# Patient Record
Sex: FEMALE | Race: BLACK OR AFRICAN AMERICAN | NOT HISPANIC OR LATINO | Employment: FULL TIME | URBAN - METROPOLITAN AREA
[De-identification: names, ages, dates, MRNs, and addresses within clinical notes are randomized per-mention and may not be internally consistent; named-entity substitution may affect disease eponyms.]

---

## 2019-07-17 ENCOUNTER — OFFICE VISIT (OUTPATIENT)
Dept: ORTHOPEDICS | Facility: CLINIC | Age: 49
End: 2019-07-17
Payer: COMMERCIAL

## 2019-07-17 VITALS — HEART RATE: 91 BPM | BODY MASS INDEX: 23.04 KG/M2 | WEIGHT: 130 LBS | HEIGHT: 63 IN

## 2019-07-17 DIAGNOSIS — G56.03 BILATERAL CARPAL TUNNEL SYNDROME: Primary | ICD-10-CM

## 2019-07-17 PROCEDURE — 73100 PR  X-RAY WRIST 2 VW: ICD-10-PCS | Mod: FY,RT,, | Performed by: ORTHOPAEDIC SURGERY

## 2019-07-17 PROCEDURE — 99204 OFFICE O/P NEW MOD 45 MIN: CPT | Mod: 25,,, | Performed by: ORTHOPAEDIC SURGERY

## 2019-07-17 PROCEDURE — 73100 X-RAY EXAM OF WRIST: CPT | Mod: FY,RT,, | Performed by: ORTHOPAEDIC SURGERY

## 2019-07-17 PROCEDURE — 99204 PR OFFICE/OUTPT VISIT, NEW, LEVL IV, 45-59 MIN: ICD-10-PCS | Mod: 25,,, | Performed by: ORTHOPAEDIC SURGERY

## 2019-07-17 NOTE — LETTER
July 17, 2019      [facility]  [address] [address]           [facility] - Orthopedic Surgery  [address]  [address] [place] [address]  Phone: [phone]  Fax: [phone]          Patient: Sumit Burger   MR Number: [id number]   YOB: 1970   Date of Visit: 7/17/2019       Dear [facility]:    Thank you for referring Sumit Burger to me for evaluation. Attached you will find relevant portions of my assessment and plan of care.    If you have questions, please do not hesitate to call me. I look forward to following Sumit Burger along with you.    Sincerely,    Jordon Peoples MD    Enclosure  CC:  No Recipients    If you would like to receive this communication electronically, please contact [email or web address] or [phone] to request more information on Back& Link access.    For providers and/or their staff who would like to refer a patient to [facility], please contact us through our one-stop-shop provider referral line, [facility], at [phone].    If you feel you have received this communication in error or would no longer like to receive these types of communications, please e-mail [email or web address]

## 2019-07-17 NOTE — PROGRESS NOTES
History reviewed. No pertinent past medical history.    Past Surgical History:   Procedure Laterality Date    TUBAL LIGATION      WRIST FRACTURE SURGERY Right     ganglion cyst       Current Outpatient Medications   Medication Sig    alprazolam (XANAX) 0.25 MG tablet Take 0.25 mg by mouth 3 (three) times daily.    gabapentin (NEURONTIN) 300 MG capsule Take 300 mg by mouth 3 (three) times daily.    zolpidem (AMBIEN) 10 mg Tab Take 5 mg by mouth nightly as needed.     No current facility-administered medications for this visit.        Review of patient's allergies indicates:   Allergen Reactions    Benadryl [diphenhydramine hcl]     Codeine     Iodine and iodide containing products     Latex, natural rubber     Percocet [oxycodone-acetaminophen]     Shrimp        History reviewed. No pertinent family history.    Social History     Socioeconomic History    Marital status:      Spouse name: Not on file    Number of children: Not on file    Years of education: Not on file    Highest education level: Not on file   Occupational History    Not on file   Social Needs    Financial resource strain: Not on file    Food insecurity:     Worry: Not on file     Inability: Not on file    Transportation needs:     Medical: Not on file     Non-medical: Not on file   Tobacco Use    Smoking status: Never Smoker    Smokeless tobacco: Never Used   Substance and Sexual Activity    Alcohol use: Not Currently    Drug use: Never    Sexual activity: Not on file   Lifestyle    Physical activity:     Days per week: Not on file     Minutes per session: Not on file    Stress: Not on file   Relationships    Social connections:     Talks on phone: Not on file     Gets together: Not on file     Attends [employer] service: Not on file     Active member of club or organization: Not on file     Attends meetings of clubs or organizations: Not on file     Relationship status: Not on file   Other Topics Concern    Not on  "file   Social History Narrative    Not on file       Chief Complaint:   Chief Complaint   Patient presents with    Initial Visit     Bilateral Wrist Pain for many years 10 or more. She is now having Numbness and Tingling. She states her Right Wrist is worse than her Left.  [facility] No [facility]G or Xrays       Consulting Physician: [facility]    History of Present Illness:    This is a 48 y.o. year old female who complains of atient's been having a history of numbness in both her hands are several years she also has some pain in her right wrist she had a past history of a ganglion removed over the dorsum of the wrist she describes sharp pain at times      ROS    Examination:    Vital Signs:    Vitals:    07/17/19 0929   Pulse: 91   Weight: 59 kg (130 lb)   Height: 5' 3" (1.6 m)   PainSc:   3   PainLoc: Wrist       This a well-developed, well nourished patient in no acute distress.    Alert and oriented and cooperative to examination.       Physical Exam: ight hand-patient has a positive Tinel's sign over the carpal tunnel she has a healed incision over the dorsum of the wrist from a angle hand surgery she has a negative Finkelstein test is good range of motion of the wrist      Left hand-patient is a positive Tinel sign over the carpal tunnel is decreased sensation in median nerve distribution has no palpable masses moves the wrist well    Imaging:  AP and lateral x-ray of the right wrist is negative     Assessment: bilateral carpal tunnel syndrome    Plan:  We'll get nerve conduction tests of both upper extremities      DISCLAIMER: This note may have been dictated using voice recognition software and may contain grammatical errors.     NOTE: Consult report sent to referring provider via EPIC EMR.  "

## 2020-10-14 ENCOUNTER — HOSPITAL ENCOUNTER (EMERGENCY)
Facility: HOSPITAL | Age: 50
Discharge: HOME | End: 2020-10-14
Attending: EMERGENCY MEDICINE
Payer: OTHER GOVERNMENT

## 2020-10-14 VITALS
TEMPERATURE: 99 F | WEIGHT: 140 LBS | BODY MASS INDEX: 24.8 KG/M2 | RESPIRATION RATE: 18 BRPM | SYSTOLIC BLOOD PRESSURE: 136 MMHG | OXYGEN SATURATION: 100 % | HEIGHT: 63 IN | HEART RATE: 99 BPM | DIASTOLIC BLOOD PRESSURE: 82 MMHG

## 2020-10-14 DIAGNOSIS — R07.9 CHEST PAIN: ICD-10-CM

## 2020-10-14 DIAGNOSIS — R05.9 COUGH: ICD-10-CM

## 2020-10-14 DIAGNOSIS — J84.9 BILATERAL INTERSTITIAL PNEUMONIA: Primary | ICD-10-CM

## 2020-10-14 LAB
ALBUMIN SERPL BCP-MCNC: 3.7 G/DL (ref 3.5–5.2)
ALP SERPL-CCNC: 49 U/L (ref 55–135)
ALT SERPL W/O P-5'-P-CCNC: 22 U/L (ref 10–44)
ANION GAP SERPL CALC-SCNC: 11 MMOL/L (ref 8–16)
AST SERPL-CCNC: 25 U/L (ref 10–40)
BASOPHILS # BLD AUTO: 0.05 K/UL (ref 0–0.2)
BASOPHILS NFR BLD: 0.7 % (ref 0–1.9)
BILIRUB SERPL-MCNC: 0.6 MG/DL (ref 0.1–1)
BNP SERPL-MCNC: 10 PG/ML (ref 0–99)
BUN SERPL-MCNC: 12 MG/DL (ref 6–20)
CALCIUM SERPL-MCNC: 9.6 MG/DL (ref 8.7–10.5)
CHLORIDE SERPL-SCNC: 99 MMOL/L (ref 95–110)
CO2 SERPL-SCNC: 26 MMOL/L (ref 23–29)
CREAT SERPL-MCNC: 0.8 MG/DL (ref 0.5–1.4)
DIFFERENTIAL METHOD: ABNORMAL
EOSINOPHIL # BLD AUTO: 0 K/UL (ref 0–0.5)
EOSINOPHIL NFR BLD: 0.5 % (ref 0–8)
ERYTHROCYTE [DISTWIDTH] IN BLOOD BY AUTOMATED COUNT: 14.5 % (ref 11.5–14.5)
EST. GFR  (AFRICAN AMERICAN): >60 ML/MIN/1.73 M^2
EST. GFR  (NON AFRICAN AMERICAN): >60 ML/MIN/1.73 M^2
GLUCOSE SERPL-MCNC: 104 MG/DL (ref 70–110)
HCT VFR BLD AUTO: 43.9 % (ref 37–48.5)
HGB BLD-MCNC: 14 G/DL (ref 12–16)
IMM GRANULOCYTES # BLD AUTO: 0.03 K/UL (ref 0–0.04)
IMM GRANULOCYTES NFR BLD AUTO: 0.4 % (ref 0–0.5)
LYMPHOCYTES # BLD AUTO: 1.2 K/UL (ref 1–4.8)
LYMPHOCYTES NFR BLD: 16.5 % (ref 18–48)
MCH RBC QN AUTO: 27.9 PG (ref 27–31)
MCHC RBC AUTO-ENTMCNC: 31.9 G/DL (ref 32–36)
MCV RBC AUTO: 88 FL (ref 82–98)
MONOCYTES # BLD AUTO: 0.9 K/UL (ref 0.3–1)
MONOCYTES NFR BLD: 11.8 % (ref 4–15)
NEUTROPHILS # BLD AUTO: 5.2 K/UL (ref 1.8–7.7)
NEUTROPHILS NFR BLD: 70.1 % (ref 38–73)
NRBC BLD-RTO: 0 /100 WBC
PLATELET # BLD AUTO: 259 K/UL (ref 150–350)
PMV BLD AUTO: 11.2 FL (ref 9.2–12.9)
POTASSIUM SERPL-SCNC: 4.5 MMOL/L (ref 3.5–5.1)
PROT SERPL-MCNC: 7.4 G/DL (ref 6–8.4)
RBC # BLD AUTO: 5.01 M/UL (ref 4–5.4)
SARS-COV-2 RDRP RESP QL NAA+PROBE: NEGATIVE
SODIUM SERPL-SCNC: 136 MMOL/L (ref 136–145)
WBC # BLD AUTO: 7.47 K/UL (ref 3.9–12.7)

## 2020-10-14 PROCEDURE — 93005 ELECTROCARDIOGRAM TRACING: CPT | Performed by: INTERNAL MEDICINE

## 2020-10-14 PROCEDURE — 83880 ASSAY OF NATRIURETIC PEPTIDE: CPT

## 2020-10-14 PROCEDURE — 99900035 HC TECH TIME PER 15 MIN (STAT)

## 2020-10-14 PROCEDURE — 93010 ELECTROCARDIOGRAM REPORT: CPT | Mod: ,,, | Performed by: INTERNAL MEDICINE

## 2020-10-14 PROCEDURE — 85025 COMPLETE CBC W/AUTO DIFF WBC: CPT

## 2020-10-14 PROCEDURE — U0002 COVID-19 LAB TEST NON-CDC: HCPCS

## 2020-10-14 PROCEDURE — 99285 EMERGENCY DEPT VISIT HI MDM: CPT | Mod: 25

## 2020-10-14 PROCEDURE — 36415 COLL VENOUS BLD VENIPUNCTURE: CPT

## 2020-10-14 PROCEDURE — 93010 EKG 12-LEAD: ICD-10-PCS | Mod: ,,, | Performed by: INTERNAL MEDICINE

## 2020-10-14 PROCEDURE — 80053 COMPREHEN METABOLIC PANEL: CPT

## 2020-10-14 RX ORDER — AZITHROMYCIN 250 MG/1
250 TABLET, FILM COATED ORAL DAILY
Qty: 6 TABLET | Refills: 0 | Status: SHIPPED | OUTPATIENT
Start: 2020-10-14 | End: 2020-10-19

## 2020-10-14 RX ORDER — BENZONATATE 100 MG/1
100 CAPSULE ORAL 3 TIMES DAILY PRN
Qty: 30 CAPSULE | Refills: 0 | Status: SHIPPED | OUTPATIENT
Start: 2020-10-14 | End: 2020-10-14 | Stop reason: SDUPTHER

## 2020-10-14 RX ORDER — AZITHROMYCIN 250 MG/1
250 TABLET, FILM COATED ORAL DAILY
Qty: 6 TABLET | Refills: 0 | Status: SHIPPED | OUTPATIENT
Start: 2020-10-14 | End: 2020-10-14 | Stop reason: SDUPTHER

## 2020-10-14 RX ORDER — AMOXICILLIN AND CLAVULANATE POTASSIUM 875; 125 MG/1; MG/1
1 TABLET, FILM COATED ORAL 2 TIMES DAILY
Status: ON HOLD | COMMUNITY
Start: 2020-10-08 | End: 2020-10-20 | Stop reason: ALTCHOICE

## 2020-10-14 RX ORDER — BENZONATATE 100 MG/1
100 CAPSULE ORAL 3 TIMES DAILY PRN
Qty: 30 CAPSULE | Refills: 0 | Status: SHIPPED | OUTPATIENT
Start: 2020-10-14 | End: 2020-10-21

## 2020-10-14 RX ORDER — PROMETHAZINE HYDROCHLORIDE AND DEXTROMETHORPHAN HYDROBROMIDE 6.25; 15 MG/5ML; MG/5ML
5 SYRUP ORAL
COMMUNITY
Start: 2020-10-08 | End: 2020-10-14

## 2020-10-14 NOTE — ED TRIAGE NOTES
"Present to the ER with c/o " it just feel like something sitting in my chest, the simplest task get short of breath" reports negative covid last thruway and reports non productive cough x 4 days, reports going to urgent care and diagnosed with URI and placed on antibiotics, pt states " it feels like everything transfer to my chest, if feel like if I do anything I cough, if I talk, if I walk, like I fell like I;m gasping for air"  Reports worsening cp with cough, denies fever, chills   "

## 2020-10-14 NOTE — DISCHARGE INSTRUCTIONS
Rest.  Encourage oral fluids.  Tylenol every 4 hours ibuprofen every 6 hours if needed for any fever.  Take Tessalon Perles and Zithromax as directed.  Call Dr. Rolon's office for an appointment in 1 week.

## 2020-10-14 NOTE — ED PROVIDER NOTES
Encounter Date: 10/14/2020       History     Chief Complaint   Patient presents with    Cough    Shortness of Breath     50-year-old female has a history of hypertension for which she is noncompliant with her amlodipine, presents emergency room with complaints of having cough and shortness of breath of 4 days duration.  On presentation the patient's oxygen saturation is 97-99% on room air.  She admits to cough that is nonproductive.  No fever.  She states she has had some sweats.  There is some mid sternal chest pain with coughing.  No complaints of any palpitations.  No wheezing.  The patient has stop smoking for the last 2 months.  The patient states that she had a negative COVID test 1 week ago.  She denies knowing that she has been around anybody that has been COVID positive.  No history of any prior bronchospastic disease.  The 1 0 hemoptysis        Review of patient's allergies indicates:   Allergen Reactions    Benadryl [diphenhydramine hcl]     Codeine     Iodine and iodide containing products     Latex, natural rubber     Percocet [oxycodone-acetaminophen]     Shrimp      No past medical history on file.  Past Surgical History:   Procedure Laterality Date    TUBAL LIGATION      WRIST FRACTURE SURGERY Right     ganglion cyst     No family history on file.  Social History     Tobacco Use    Smoking status: Never Smoker    Smokeless tobacco: Never Used   Substance Use Topics    Alcohol use: Not Currently    Drug use: Never     Review of Systems   Constitutional: Negative for activity change, appetite change, chills, diaphoresis and fever.   HENT: Negative for congestion, drooling, ear pain, hearing loss, postnasal drip, sore throat and trouble swallowing.    Respiratory: Positive for cough and shortness of breath.    Cardiovascular: Negative for chest pain, palpitations and leg swelling.   Gastrointestinal: Negative for abdominal pain, constipation, diarrhea, nausea and vomiting.   Genitourinary:  Negative for difficulty urinating, dysuria and frequency.   Musculoskeletal: Negative for back pain.   Skin: Negative for rash.   Neurological: Positive for dizziness. Negative for syncope, weakness and headaches.   Hematological: Does not bruise/bleed easily.   All other systems reviewed and are negative.      Physical Exam     Initial Vitals [10/14/20 1126]   BP Pulse Resp Temp SpO2   (!) 161/77 104 (!) 22 97.8 °F (36.6 °C) 95 %      MAP       --         Physical Exam    Constitutional: She appears well-developed and well-nourished. She is not diaphoretic. No distress.   Persistent but intermittent cough   HENT:   Head: Normocephalic and atraumatic.   Right Ear: External ear normal.   Left Ear: External ear normal.   Nose: Nose normal.   Mouth/Throat: Oropharynx is clear and moist. No oropharyngeal exudate.   Eyes: Conjunctivae are normal. Pupils are equal, round, and reactive to light. Right eye exhibits no discharge. Left eye exhibits no discharge.   Neck: Normal range of motion. Neck supple. No JVD present.   Cardiovascular: Normal rate, regular rhythm, normal heart sounds and intact distal pulses. Exam reveals no gallop and no friction rub.    No murmur heard.  Pulmonary/Chest: Breath sounds normal. No respiratory distress. She has no wheezes. She has no rhonchi. She has no rales.   Abdominal: Soft. Bowel sounds are normal. She exhibits no distension. There is no abdominal tenderness. There is no rebound and no guarding.   Musculoskeletal: Normal range of motion. No tenderness or edema.   Lymphadenopathy:     She has no cervical adenopathy.   Neurological: She is alert and oriented to person, place, and time. She has normal strength. GCS score is 15. GCS eye subscore is 4. GCS verbal subscore is 5. GCS motor subscore is 6.   Skin: Skin is warm and dry. Capillary refill takes less than 2 seconds. No rash noted. No erythema. No pallor.   Psychiatric: She has a normal mood and affect. Her behavior is normal.  Judgment and thought content normal.         ED Course   Procedures  Labs Reviewed   CBC W/ AUTO DIFFERENTIAL - Abnormal; Notable for the following components:       Result Value    Mean Corpuscular Hemoglobin Conc 31.9 (*)     Lymph% 16.5 (*)     All other components within normal limits   COMPREHENSIVE METABOLIC PANEL - Abnormal; Notable for the following components:    Alkaline Phosphatase 49 (*)     All other components within normal limits   SARS-COV-2 RNA AMPLIFICATION, QUAL   B-TYPE NATRIURETIC PEPTIDE        ECG Results          EKG 12-lead (In process)  Result time 10/14/20 12:29:40    In process by Interface, Lab In [facility] (10/14/20 12:29:40)                 Narrative:    Test Reason : R07.9,    Vent. Rate : 104 BPM     Atrial Rate : 104 BPM     P-R Int : 114 ms          QRS Dur : 078 ms      QT Int : 328 ms       P-R-T Axes : 061 022 030 degrees     QTc Int : 431 ms    Sinus tachycardia  Possible Left atrial enlargement  Borderline Abnormal ECG  No previous ECGs available    Referred By: [email or web address]   SELF           Confirmed By:                             Imaging Results          X-Ray Chest PA And Lateral (Final result)  Result time 10/14/20 13:02:44    Final result by Casa Gonzalez MD (10/14/20 13:02:44)                 Narrative:    HISTORY: Cough,  dyspnea.    FINDINGS: PA and lateral chest radiograph at 1244 hours compared to  05/15/2019 show the trachea is midline, with the cardiomediastinal  silhouette and pulmonary vascular distribution within normal limits.    The lungs are normally and symmetrically expanded, with diffuse fine  reticulonodular and groundglass opacities throughout both lungs,  nonspecific. There is no consolidation, pleural effusion, or evidence  of bharat interstitial pulmonary edema. The bones are normal.    IMPRESSION: Diffuse interstitial opacities, nonspecific, potentially  viral or atypical pneumonia such as mycoplasma pneumonia,  smoking-related interstitial lung  disease, inhalational lung disease  or hypersensitivity pneumonitis, or diffuse alveolar hemorrhage.    Electronically Signed by Casa MORALES on 10/14/2020 1:22 PM                                            Attending Attestation:             Attending ED Notes:   This patient presented with a persistent nonproductive cough, has good oxygen saturation varying between % on room air.  Patient's peak flow was 380.  There was no evidence of any bronchospasm clinically.  Labs including CBC chemistries were normal.  COVID screen was negative.  BNP was normal.  The patient's chest x-ray shows a diffuse pattern in both lungs consistent with a probable viral pneumonia.  These findings were discussed with Dr. Rolon who suggested a course of Zithromax for the potential of a mycoplasma infection and a cough suppressant with office follow-up in 1 week.  The patient has been made aware of these recommendations and in agreement with the planned management.                    Clinical Impression:       ICD-10-CM ICD-9-CM   1. Bilateral interstitial pneumonia  J84.9 516.8   2. Chest pain  R07.9 786.50   3. Cough  R05 786.2                          ED Disposition Condition    Discharge Stable        ED Prescriptions     Medication Sig Dispense Start Date End Date Auth. Provider    azithromycin (Z-SABRINA) 250 MG tablet Take 1 tablet (250 mg total) by mouth once daily. Take first 2 tablets together, then 1 every day until finished. 6 tablet 10/14/2020  Ricky Moon Jr., MD    benzonatate (TESSALON) 100 MG capsule Take 1 capsule (100 mg total) by mouth 3 (three) times daily as needed for Cough. 30 capsule 10/14/2020 10/24/2020 Ricky Moon Jr., MD        Follow-up Information     Follow up With Specialties Details Why Contact Info    Ava Rolon MD Pulmonary Disease, Sleep Medicine Schedule an appointment as soon as possible for a visit in 1 week For re-evaluation [address]  [address]  [place]  [address]  [phone]                                         Ricky Moon Jr., MD  10/14/20 1400

## 2020-10-19 ENCOUNTER — OFFICE VISIT (OUTPATIENT)
Dept: PULMONOLOGY | Facility: CLINIC | Age: 50
End: 2020-10-19
Payer: MEDICAID

## 2020-10-19 ENCOUNTER — HOSPITAL ENCOUNTER (INPATIENT)
Facility: HOSPITAL | Age: 50
LOS: 1 days | Discharge: LEFT BEFORE DISCHARGE | DRG: 168 | End: 2020-10-19
Attending: EMERGENCY MEDICINE | Admitting: STUDENT IN AN ORGANIZED HEALTH CARE EDUCATION/TRAINING PROGRAM
Payer: OTHER GOVERNMENT

## 2020-10-19 ENCOUNTER — TELEPHONE (OUTPATIENT)
Dept: PULMONOLOGY | Facility: CLINIC | Age: 50
End: 2020-10-19

## 2020-10-19 ENCOUNTER — HOSPITAL ENCOUNTER (OUTPATIENT)
Dept: RADIOLOGY | Facility: HOSPITAL | Age: 50
Discharge: HOME | DRG: 168 | End: 2020-10-19
Attending: NURSE PRACTITIONER
Payer: OTHER GOVERNMENT

## 2020-10-19 VITALS
TEMPERATURE: 99 F | BODY MASS INDEX: 25.76 KG/M2 | TEMPERATURE: 98 F | DIASTOLIC BLOOD PRESSURE: 86 MMHG | OXYGEN SATURATION: 94 % | WEIGHT: 147 LBS | OXYGEN SATURATION: 96 % | WEIGHT: 140 LBS | HEART RATE: 112 BPM | DIASTOLIC BLOOD PRESSURE: 76 MMHG | HEIGHT: 63 IN | SYSTOLIC BLOOD PRESSURE: 132 MMHG | HEIGHT: 62 IN | BODY MASS INDEX: 26.05 KG/M2 | SYSTOLIC BLOOD PRESSURE: 135 MMHG | RESPIRATION RATE: 24 BRPM | HEART RATE: 103 BPM

## 2020-10-19 DIAGNOSIS — Z20.822 SUSPECTED COVID-19 VIRUS INFECTION: ICD-10-CM

## 2020-10-19 DIAGNOSIS — R06.00 DYSPNEA, UNSPECIFIED TYPE: ICD-10-CM

## 2020-10-19 DIAGNOSIS — R07.9 CHEST PAIN: ICD-10-CM

## 2020-10-19 DIAGNOSIS — J98.4 PNEUMONITIS: Primary | ICD-10-CM

## 2020-10-19 DIAGNOSIS — J18.9 PNEUMONIA DUE TO INFECTIOUS ORGANISM, UNSPECIFIED LATERALITY, UNSPECIFIED PART OF LUNG: ICD-10-CM

## 2020-10-19 DIAGNOSIS — J18.9 PNEUMONIA DUE TO INFECTIOUS ORGANISM, UNSPECIFIED LATERALITY, UNSPECIFIED PART OF LUNG: Primary | ICD-10-CM

## 2020-10-19 DIAGNOSIS — J18.9 PNEUMONIA: ICD-10-CM

## 2020-10-19 LAB
ADENOVIRUS: NOT DETECTED
ALBUMIN SERPL BCP-MCNC: 3.8 G/DL (ref 3.5–5.2)
ALP SERPL-CCNC: 50 U/L (ref 55–135)
ALT SERPL W/O P-5'-P-CCNC: 26 U/L (ref 10–44)
ANION GAP SERPL CALC-SCNC: 11 MMOL/L (ref 8–16)
AST SERPL-CCNC: 33 U/L (ref 10–40)
BASOPHILS # BLD AUTO: 0.06 K/UL (ref 0–0.2)
BASOPHILS NFR BLD: 0.8 % (ref 0–1.9)
BILIRUB SERPL-MCNC: 0.4 MG/DL (ref 0.1–1)
BORDETELLA PARAPERTUSSIS (IS1001): NOT DETECTED
BORDETELLA PERTUSSIS (PTXP): NOT DETECTED
BUN SERPL-MCNC: 9 MG/DL (ref 6–20)
CALCIUM SERPL-MCNC: 9.3 MG/DL (ref 8.7–10.5)
CHLAMYDIA PNEUMONIAE: NOT DETECTED
CHLORIDE SERPL-SCNC: 100 MMOL/L (ref 95–110)
CK SERPL-CCNC: 66 U/L (ref 20–180)
CO2 SERPL-SCNC: 25 MMOL/L (ref 23–29)
CORONAVIRUS 229E, COMMON COLD VIRUS: NOT DETECTED
CORONAVIRUS HKU1, COMMON COLD VIRUS: NOT DETECTED
CORONAVIRUS NL63, COMMON COLD VIRUS: NOT DETECTED
CORONAVIRUS OC43, COMMON COLD VIRUS: NOT DETECTED
CREAT SERPL-MCNC: 0.8 MG/DL (ref 0.5–1.4)
CRP SERPL-MCNC: 4.13 MG/DL
DIFFERENTIAL METHOD: ABNORMAL
EOSINOPHIL # BLD AUTO: 0.1 K/UL (ref 0–0.5)
EOSINOPHIL NFR BLD: 0.8 % (ref 0–8)
ERYTHROCYTE [DISTWIDTH] IN BLOOD BY AUTOMATED COUNT: 14.5 % (ref 11.5–14.5)
EST. GFR  (AFRICAN AMERICAN): >60 ML/MIN/1.73 M^2
EST. GFR  (NON AFRICAN AMERICAN): >60 ML/MIN/1.73 M^2
FERRITIN SERPL-MCNC: 72 NG/ML (ref 20–300)
FLUBV RNA NPH QL NAA+NON-PROBE: NOT DETECTED
GLUCOSE SERPL-MCNC: 93 MG/DL (ref 70–110)
HCT VFR BLD AUTO: 44.8 % (ref 37–48.5)
HGB BLD-MCNC: 14.3 G/DL (ref 12–16)
HPIV1 RNA NPH QL NAA+NON-PROBE: NOT DETECTED
HPIV2 RNA NPH QL NAA+NON-PROBE: NOT DETECTED
HPIV3 RNA NPH QL NAA+NON-PROBE: NOT DETECTED
HPIV4 RNA NPH QL NAA+NON-PROBE: NOT DETECTED
HUMAN METAPNEUMOVIRUS: NOT DETECTED
IMM GRANULOCYTES # BLD AUTO: 0.03 K/UL (ref 0–0.04)
IMM GRANULOCYTES NFR BLD AUTO: 0.4 % (ref 0–0.5)
INFLUENZA A (SUBTYPES H1,H1-2009,H3): NOT DETECTED
LACTATE SERPL-SCNC: 1.5 MMOL/L (ref 0.5–1.9)
LDH SERPL L TO P-CCNC: 192 U/L (ref 110–260)
LYMPHOCYTES # BLD AUTO: 1.2 K/UL (ref 1–4.8)
LYMPHOCYTES NFR BLD: 16.1 % (ref 18–48)
MCH RBC QN AUTO: 27.6 PG (ref 27–31)
MCHC RBC AUTO-ENTMCNC: 31.9 G/DL (ref 32–36)
MCV RBC AUTO: 86 FL (ref 82–98)
MONOCYTES # BLD AUTO: 0.5 K/UL (ref 0.3–1)
MONOCYTES NFR BLD: 6.8 % (ref 4–15)
MYCOPLASMA PNEUMONIAE: NOT DETECTED
NEUTROPHILS # BLD AUTO: 5.5 K/UL (ref 1.8–7.7)
NEUTROPHILS NFR BLD: 75.1 % (ref 38–73)
NRBC BLD-RTO: 0 /100 WBC
PLATELET # BLD AUTO: 277 K/UL (ref 150–350)
PMV BLD AUTO: 10.9 FL (ref 9.2–12.9)
POTASSIUM SERPL-SCNC: 3.9 MMOL/L (ref 3.5–5.1)
PROT SERPL-MCNC: 7.3 G/DL (ref 6–8.4)
RBC # BLD AUTO: 5.19 M/UL (ref 4–5.4)
RESPIRATORY INFECTION PANEL SOURCE: NORMAL
RSV RNA NPH QL NAA+NON-PROBE: NOT DETECTED
RV+EV RNA NPH QL NAA+NON-PROBE: NOT DETECTED
SARS-COV-2 RDRP RESP QL NAA+PROBE: NEGATIVE
SODIUM SERPL-SCNC: 136 MMOL/L (ref 136–145)
TROPONIN I SERPL DL<=0.01 NG/ML-MCNC: <0.03 NG/ML
TROPONIN I SERPL DL<=0.01 NG/ML-MCNC: <0.03 NG/ML
WBC # BLD AUTO: 7.38 K/UL (ref 3.9–12.7)

## 2020-10-19 PROCEDURE — 96365 THER/PROPH/DIAG IV INF INIT: CPT

## 2020-10-19 PROCEDURE — 12000002 HC ACUTE/MED SURGE SEMI-PRIVATE ROOM

## 2020-10-19 PROCEDURE — 99204 PR OFFICE/OUTPT VISIT, NEW, LEVL IV, 45-59 MIN: ICD-10-PCS | Mod: S$GLB,,, | Performed by: NURSE PRACTITIONER

## 2020-10-19 PROCEDURE — 84484 ASSAY OF TROPONIN QUANT: CPT | Mod: 91

## 2020-10-19 PROCEDURE — 25500020 PHARM REV CODE 255: Performed by: EMERGENCY MEDICINE

## 2020-10-19 PROCEDURE — 82550 ASSAY OF CK (CPK): CPT

## 2020-10-19 PROCEDURE — U0002 COVID-19 LAB TEST NON-CDC: HCPCS

## 2020-10-19 PROCEDURE — 94761 N-INVAS EAR/PLS OXIMETRY MLT: CPT

## 2020-10-19 PROCEDURE — 93010 ELECTROCARDIOGRAM REPORT: CPT | Mod: 76,,, | Performed by: INTERNAL MEDICINE

## 2020-10-19 PROCEDURE — 99285 EMERGENCY DEPT VISIT HI MDM: CPT | Mod: 25

## 2020-10-19 PROCEDURE — 25000242 PHARM REV CODE 250 ALT 637 W/ HCPCS: Performed by: STUDENT IN AN ORGANIZED HEALTH CARE EDUCATION/TRAINING PROGRAM

## 2020-10-19 PROCEDURE — 99900031 HC PATIENT EDUCATION (STAT)

## 2020-10-19 PROCEDURE — 93010 EKG 12-LEAD: ICD-10-PCS | Mod: 76,,, | Performed by: INTERNAL MEDICINE

## 2020-10-19 PROCEDURE — 82728 ASSAY OF FERRITIN: CPT | Mod: 91

## 2020-10-19 PROCEDURE — 87798 DETECT AGENT NOS DNA AMP: CPT

## 2020-10-19 PROCEDURE — 36415 COLL VENOUS BLD VENIPUNCTURE: CPT

## 2020-10-19 PROCEDURE — 63600175 PHARM REV CODE 636 W HCPCS: Performed by: EMERGENCY MEDICINE

## 2020-10-19 PROCEDURE — 85025 COMPLETE CBC W/AUTO DIFF WBC: CPT | Mod: 91

## 2020-10-19 PROCEDURE — 80053 COMPREHEN METABOLIC PANEL: CPT | Mod: 91

## 2020-10-19 PROCEDURE — 83615 LACTATE (LD) (LDH) ENZYME: CPT

## 2020-10-19 PROCEDURE — 99900035 HC TECH TIME PER 15 MIN (STAT)

## 2020-10-19 PROCEDURE — 96366 THER/PROPH/DIAG IV INF ADDON: CPT

## 2020-10-19 PROCEDURE — 93005 ELECTROCARDIOGRAM TRACING: CPT | Performed by: INTERNAL MEDICINE

## 2020-10-19 PROCEDURE — 84484 ASSAY OF TROPONIN QUANT: CPT

## 2020-10-19 PROCEDURE — 87633 RESP VIRUS 12-25 TARGETS: CPT

## 2020-10-19 PROCEDURE — 83605 ASSAY OF LACTIC ACID: CPT

## 2020-10-19 PROCEDURE — 63600175 PHARM REV CODE 636 W HCPCS: Performed by: STUDENT IN AN ORGANIZED HEALTH CARE EDUCATION/TRAINING PROGRAM

## 2020-10-19 PROCEDURE — 86140 C-REACTIVE PROTEIN: CPT | Mod: 91

## 2020-10-19 PROCEDURE — 99204 OFFICE O/P NEW MOD 45 MIN: CPT | Mod: S$GLB,,, | Performed by: NURSE PRACTITIONER

## 2020-10-19 PROCEDURE — 71046 X-RAY EXAM CHEST 2 VIEWS: CPT | Mod: TC

## 2020-10-19 PROCEDURE — 25000003 PHARM REV CODE 250: Performed by: NURSE PRACTITIONER

## 2020-10-19 PROCEDURE — 94640 AIRWAY INHALATION TREATMENT: CPT

## 2020-10-19 PROCEDURE — 86738 MYCOPLASMA ANTIBODY: CPT | Mod: 91

## 2020-10-19 RX ORDER — IPRATROPIUM BROMIDE AND ALBUTEROL SULFATE 2.5; .5 MG/3ML; MG/3ML
3 SOLUTION RESPIRATORY (INHALATION) EVERY 6 HOURS
Status: DISCONTINUED | OUTPATIENT
Start: 2020-10-19 | End: 2020-10-20 | Stop reason: HOSPADM

## 2020-10-19 RX ORDER — LEVOFLOXACIN 5 MG/ML
500 INJECTION, SOLUTION INTRAVENOUS
Status: COMPLETED | OUTPATIENT
Start: 2020-10-19 | End: 2020-10-19

## 2020-10-19 RX ORDER — ACETAMINOPHEN 325 MG/1
650 TABLET ORAL EVERY 8 HOURS PRN
Status: DISCONTINUED | OUTPATIENT
Start: 2020-10-19 | End: 2020-10-20 | Stop reason: HOSPADM

## 2020-10-19 RX ORDER — BENZONATATE 100 MG/1
100 CAPSULE ORAL 3 TIMES DAILY PRN
Status: DISCONTINUED | OUTPATIENT
Start: 2020-10-19 | End: 2020-10-20 | Stop reason: HOSPADM

## 2020-10-19 RX ORDER — SODIUM CHLORIDE 0.9 % (FLUSH) 0.9 %
10 SYRINGE (ML) INJECTION
Status: DISCONTINUED | OUTPATIENT
Start: 2020-10-19 | End: 2020-10-20 | Stop reason: HOSPADM

## 2020-10-19 RX ORDER — LEVOFLOXACIN 5 MG/ML
500 INJECTION, SOLUTION INTRAVENOUS
Status: DISCONTINUED | OUTPATIENT
Start: 2020-10-19 | End: 2020-10-20 | Stop reason: HOSPADM

## 2020-10-19 RX ORDER — ACETAMINOPHEN 325 MG/1
325 TABLET ORAL EVERY 6 HOURS PRN
COMMUNITY
End: 2020-12-30

## 2020-10-19 RX ORDER — ONDANSETRON 2 MG/ML
4 INJECTION INTRAMUSCULAR; INTRAVENOUS EVERY 8 HOURS PRN
Status: DISCONTINUED | OUTPATIENT
Start: 2020-10-19 | End: 2020-10-20 | Stop reason: HOSPADM

## 2020-10-19 RX ADMIN — IOHEXOL 100 ML: 350 INJECTION, SOLUTION INTRAVENOUS at 04:10

## 2020-10-19 RX ADMIN — BENZONATATE 100 MG: 100 CAPSULE ORAL at 09:10

## 2020-10-19 RX ADMIN — METHYLPREDNISOLONE SODIUM SUCCINATE 40 MG: 40 INJECTION, POWDER, FOR SOLUTION INTRAMUSCULAR; INTRAVENOUS at 09:10

## 2020-10-19 RX ADMIN — LEVOFLOXACIN 500 MG: 5 INJECTION, SOLUTION INTRAVENOUS at 05:10

## 2020-10-19 RX ADMIN — IPRATROPIUM BROMIDE AND ALBUTEROL SULFATE 3 ML: .5; 3 SOLUTION RESPIRATORY (INHALATION) at 07:10

## 2020-10-19 NOTE — CONSULTS
Pulmonary/Critical Care Consult      PATIENT NAME: Sumit Burger  MRN: [id number]  TODAY'S DATE: 10/19/2020  6:46 PM  ADMIT DATE: 10/19/2020  AGE: 50 y.o. : 1970    CONSULT REQUESTED BY: Lv Beebe Jr., MD    REASON FOR CONSULT:   Progressive pneumonia    HPI:  Patient is a 50-year-old female who has been feeling fatigued for the last 2 months.  She then developed a cough and chest pain.  She has had diaphoresis.  She has been tested for COVID on multiple occasions.  She has been treated with Augmentin and steroids and then azithromycin and her chest x-ray is progressing.  She feels as though she cannot take a deep breath.  Her cough is dry.  She has never had a fever.  She has no myalgias or arthralgias.  She has no history of connective tissue disease.  Her only past medical history is significant for hypertension.    It is interesting that she has parakeets.  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  She does not have any other exposure to birds or bats or caves.  The patient does smoke intermittently when she drinks alcohol.  She states she smokes less than a pack of cigarettes a week.    The patient's CT shows an extremely impressive acinar filling process predominantly in the upper and mid lungs.  The bases are spared.  There is also some mediastinal and hilar adenopathy.    REVIEW OF SYSTEMS  GENERAL: Feeling fatigued in tired.  EYES: Vision is good.  ENT: No sinusitis or pharyngitis.   HEART: No chest pain or palpitations.  LUNGS:  Cough with no sputum.  Her chest hurts with coughing.  Her chest feels heavy.  She feels as though she cannot take a deep breath.  GI: No Nausea, vomiting, constipation, diarrhea, or reflux.  : No dysuria, hesitancy, or nocturia.  SKIN: No lesions or rashes.  MUSCULOSKELETAL: No joint pain or myalgias.  NEURO: No headaches or neuropathy.  LYMPH: No edema or adenopathy.  PSYCH: No anxiety or depression.  ENDO: No weight change.    ALLERGIES  Review of patient's allergies  indicates:   Allergen Reactions    Benadryl [diphenhydramine hcl]     Codeine     Iodine and iodide containing products     Latex, natural rubber     Percocet [oxycodone-acetaminophen]     Shrimp        MEDICAL AND SURGICAL HISTORY  Past Medical History:   Diagnosis Date    Hypertension      Past Surgical History:   Procedure Laterality Date    TUBAL LIGATION      WRIST FRACTURE SURGERY Right     ganglion cyst       ALCOHOL, TOBACCO AND DRUG USE  Social History     Tobacco Use   Smoking Status Light Tobacco Smoker    Types: Cigarettes    Last attempt to quit: 2020    Years since quittin.5   Smokeless Tobacco Never Used   Tobacco Comment    ocassionally never was qd     Social History     Substance and Sexual Activity   Alcohol Use Not Currently     Social History     Substance and Sexual Activity   Drug Use Never       FAMILY HISTORY  No family history on file.    VITAL SIGNS (MOST RECENT)  Temp: 97.2 °F (36.2 °C) (10/19/20 1336)  Pulse: 96 (10/19/20 1800)  Resp: (!) 22 (10/19/20 1634)  BP: 135/81 (10/19/20 1800)  SpO2: 97 % (10/19/20 1800)    INTAKE AND OUTPUT (LAST 24 HOURS):No intake or output data in the 24 hours ending 10/19/20 1846    WEIGHT  Wt Readings from Last 1 Encounters:   10/19/20 63.5 kg (140 lb)       PHYSICAL EXAM  GENERAL: Midaged patient in no distress.  HEENT: Pupils equal and reactive. Extraocular movements intact. Nose intact. Pharynx moist.  NECK: Supple.   HEART: Regular rate and rhythm. No murmur or gallop auscultated.  LUNGS:  Crackles in both bases.  Lung excursion symmetrical. No change in fremitus.   ABDOMEN: Bowel sounds present. Non-tender, no masses palpated.  : Normal anatomy.  EXTREMITIES: Normal muscle tone and joint movement, no cyanosis or clubbing.   LYMPHATICS: No adenopathy palpated, no edema.  SKIN: Dry, intact, no lesions.   NEURO: Cranial nerves II-XII intact. Motor strength 5/5 bilaterally, upper and lower extremities.  PSYCH: Appropriate  "affect.    CBC LAST (LAST 24 HOURS)  Recent Labs   Lab 10/19/20  1400   WBC 7.38   RBC 5.19   HGB 14.3   HCT 44.8   MCV 86   MCH 27.6   MCHC 31.9*   RDW 14.5      MPV 10.9   GRAN 75.1*  5.5   LYMPH 16.1*  1.2   MONO 6.8  0.5   BASO 0.06   NRBC 0       CHEMISTRY LAST (LAST 24 HOURS)  Recent Labs   Lab 10/19/20  1400      K 3.9      CO2 25   ANIONGAP 11   BUN 9   CREATININE 0.8   GLU 93   CALCIUM 9.3   ALBUMIN 3.8   PROT 7.3   ALKPHOS 50*   ALT 26   AST 33   BILITOT 0.4       CARDIAC PROFILE (LAST 24 HOURS)  Recent Labs   Lab 10/14/20  1210 10/19/20  1400   BNP 10  --    CPK  --  66   LDH  --  192   TROPONINI  --  <0.030       LAST 7 DAYS MICROBIOLOGY   Microbiology Results (last 7 days)     Procedure Component Value Units Date/Time    Respiratory Infection Panel (PCR), Nasopharyngeal [[id number]] Collected: 10/19/20 1711    Order Status: No result Updated: 10/19/20 1827    Respiratory Viral Panel by PCR [place]; Nasopharyngeal Swab [[id number]] Collected: 10/19/20 1711    Order Status: Canceled Specimen: Respiratory           MOST RECENT IMAGING  CT Chest With Contrast    There are extensive centrilobular nodular opacities, small acinar  opacities, and groundglass densities throughout both lungs with upper  and mid lung zone predominance, with notable complete sparing of the  lung bases. There is no consolidation or interlobular septal  thickening, with no "crazy paving". No fissural beading. The central  airways are patent, with no bronchial wall thickening or  bronchiectasis. No pleural effusion or pneumothorax.    There are several enlarged enhancing mediastinal and bilateral hilar  lymph nodes, none with internal calcification. The heart and great  vessels enhance normally, with no pericardial effusion.    Images of the upper abdomen show small hypoenhancing left renal  lesions suggestive of cysts, and are otherwise unremarkable. There are  no significant osseous abnormalities.    IMPRESSION: " Predominantly interstitial pattern of lung disease with  small acinar components and lymphadenopathy as described, with  differential diagnosis to include sarcoidosis, atypical fungal  infection such as histoplasmosis, inhalational lung disease,  hypersensitivity pneumonitis, or alveolar hemorrhage.      X-Ray Chest PA And Lateral  : Worsening of diffuse groundglass opacities throughout the  lungs suggestive of atypical viral pneumonia. Additional  considerations which are documented on the prior study this also be  considered. Correlation with labs is recommended        CURRENT VISIT EKG  Results for orders placed or performed during the hospital encounter of 10/19/20   EKG 12-lead    Narrative    Test Reason : Z20.828,    Vent. Rate : 103 BPM     Atrial Rate : 103 BPM     P-R Int : 118 ms          QRS Dur : 080 ms      QT Int : 336 ms       P-R-T Axes : 057 018 033 degrees     QTc Int : 440 ms    Sinus tachycardia  Possible Left atrial enlargement  Septal infarct ,age undetermined  Abnormal ECG  When compared with ECG of 14-OCT-2020 12:03,  No significant change was found    Referred By: AAAREFERR   SELF           Confirmed By:        IMPRESSION AND PLAN    Progressive bilateral acinar filling process which is worsen.  This is not an atypical pneumonia.  Concerns of an RB-ILD, sarcoid, hypersensitivity pneumonitis, pulmonary hemorrhage syndrome are at the top of the list.  The patient needs a bronchoscopy and diagnosis as soon as possible.  Will plan to keep the patient NPO after midnight and proceed with a bronchoscopy in the morning.  She is being covered with Levaquin for atypical coverage however this is not a mycoplasma or Chlamydia.  It is possible that it is a fungal pneumonitis or chronic eosinophilic pneumonitis.

## 2020-10-19 NOTE — TELEPHONE ENCOUNTER
Chest xray  IMPRESSION: Worsening of diffuse groundglass opacities throughout the lungs suggestive of atypical viral pneumonia. Additional considerations which are documented on the prior study this also be considered    CRP 3.49, d-dimer 0.75    Discussed with Dr. Rolon chest xray much worse, room air sats 94%.  Called patient back and told her to go to the ER.  Dr. Rolon spoke with ER and advised she is coming.

## 2020-10-19 NOTE — ED NOTES
Pt presents to ed with reports of prolonged pneumonia. States she had chest x ray and was told to come to ed. Reports sob and cough. nad resp even and unlabored. Call light in reach, will monitor.,

## 2020-10-19 NOTE — PATIENT INSTRUCTIONS
What is Pneumonia?    Pneumonia is a serious lung infection. Many cases of pneumonia are caused by bacteria or viruses. Fungi may also cause pneumonia, but this is less common.  You may also get pneumonia after another illness, such as a cold, flu, or bronchitis. Those most at risk include older adults, smokers, and people with long-term (chronic) health problems or weak immune systems.  Healthy lungs  · Air travels in and out of the lungs through tubes called airways.  · The tubes branch into smaller passages called bronchioles. These end in tiny sacs called alveoli.  · Blood vessels surrounding the alveoli take oxygen into the bloodstream. At the same time, the alveoli remove carbon dioxide (a waste gas) from the blood. The carbon dioxide is then exhaled.  When you have pneumonia  · Pneumonia causes the bronchioles and the alveoli to fill with excess mucus and become inflamed.  · Your bodys response may be to cough. This can help clear out the fluid.  · The fluid (or mucus) you cough up may appear green or dark yellow.  · The excess mucus may make you feel short of breath.  · The inflammation and infection may give you a fever.  What are the symptoms?  Symptoms of pneumonia can come without warning. At first, you may think you have a cold or flu. But symptoms may get worse quickly, turning into pneumonia. Symptoms can be different for bacterial and viral pneumonia. Common symptoms may include the following:  · Severe cough with green or yellow mucus that doesn't improve or that gets worse  · Fever and chills  · Nausea, vomiting or diarrhea  · Shortness of breath with normal daily activities  · Increased heart rate  · Chest pain or discomfort when breathing in or coughing  · Headache  · Excessive sweating and clammy skin  Date Last Reviewed: 12/1/2016  © 1795-0781 [employer]. [address] [place] [address], [place] [address]. All rights reserved. This information is not intended as a substitute for  professional medical care. Always follow your healthcare professional's instructions.    CRP, Ferritin, CBC,CMP, D dimer   Chest xray, if d-eva elevated will do a CTA  Tessalon 200mg by mouth three times a day

## 2020-10-19 NOTE — PROGRESS NOTES
SUBJECTIVE:    Patient ID: Sumit Burger is a 50 y.o. female.    Chief Complaint: Establish Care, Cough, and Shortness of Breath    HPI     Patient here today for follow up after an ER visit last week.  She is here still not feeling well.  She states she started feeling bad 3 weeks ago, low grade fever, severe fatigue, GI upset,  Joint pain. She went to an urgent care was Covid, and Flu negative. She was given a steroid shot and Augmentin.  She initially felt a little better but then became worse, started suffering with a dry cough and occasional pleuritic pain.  She has low grade fever, diaphoresis and dyspnea. In the ER she was Covid tested again negative, Chest xray with diffuse interstitial opacities. She was discharged with Zithromax to treat atypical pneumonia and Tessalon. She is a runner that is usually very active, she states the fatigue and now dyspnea are not getting better. She is however now expectorating some yellow mucous at times.  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     Past Medical History:   Diagnosis Date    Hypertension      Past Surgical History:   Procedure Laterality Date    TUBAL LIGATION      WRIST FRACTURE SURGERY Right     ganglion cyst     No family history on file.     Social History:   Marital Status:   Occupation:  Dental hygienist   Alcohol History:  reports previous alcohol use.  Tobacco History:  reports that she has been smoking cigarettes. She has never used smokeless tobacco.  Drug History:  reports no history of drug use.    Review of patient's allergies indicates:   Allergen Reactions    Benadryl [diphenhydramine hcl]     Codeine     Iodine and iodide containing products     Latex, natural rubber     Percocet [oxycodone-acetaminophen]     Shrimp        Current Outpatient Medications   Medication Sig Dispense Refill    benzonatate (TESSALON) 100 MG capsule Take 1 capsule (100 mg total) by mouth  "3 (three) times daily as needed for Cough. 30 capsule 0    azithromycin (Z-SABRINA) 250 MG tablet Take 1 tablet (250 mg total) by mouth once daily. Take first 2 tablets together, then 1 every day until finished. (Patient not taking: Reported on 10/19/2020) 6 tablet 0     No current facility-administered medications for this visit.        Last Chest xray 10/14/2020  IMPRESSION: Diffuse interstitial opacities, nonspecific, potentially  viral or atypical pneumonia such as mycoplasma pneumonia,  smoking-related interstitial lung disease, inhalational lung disease  or hypersensitivity pneumonitis, or diffuse alveolar hemorrhage.       Review of Systems  General: does not feel well for the last 3 weeks, low grade fever, exhausted doing minimal exertion, extremely fatigued  Eyes: Vision is good.  ENT:  No sinusitis or pharyngitis.   Heart:: tachycardic   Lungs: had a dry cough that has now morphed to producing yellow mucous since last week. Dyspnea with little exertion, has had pleuritic pain  On and off   GI: has had some nausea  : No dysuria, hesitancy, or nocturia.  Musculoskeletal: No joint pain or myalgias.  Skin: No lesions or rashes.  Neuro: has had headaches, occasional dizziness  no brain fog   Lymph: No edema or adenopathy.  Psych: No anxiety or depression.  Endo: has lost a few pounds    OBJECTIVE:      /76 (BP Location: Left arm, Patient Position: Sitting, BP Method: Medium (Automatic))   Pulse 103   Temp 99 °F (37.2 °C)   Ht 5' 3" (1.6 m)   Wt 66.7 kg (147 lb)   SpO2 (!) 94%   BMI 26.04 kg/m²     Physical Exam  GENERAL: younger patient in no distress, appears as though she does not feel well  HEENT: Pupils equal and reactive. Extraocular movements intact. Nose intact.      Pharynx moist.  NECK: Supple.   HEART: tachycardic, Regular rate and rhythm. No murmur or gallop auscultated.  LUNGS: dry cough with deep inspiration, Clear to auscultation ABDOMEN: Bowel sounds present. Non-tender, no masses " palpated.  EXTREMITIES: Normal muscle tone and joint movement, no cyanosis or clubbing.   LYMPHATICS: No adenopathy palpated, no edema.  SKIN: Dry, intact, no lesions. Clammy   NEURO: Cranial nerves II-XII intact. Motor strength 5/5 bilaterally, upper and lower extremities.  PSYCH: Appropriate affect.    Assessment:       1. Pneumonia due to infectious organism, unspecified laterality, unspecified part of lung    2. Dyspnea, unspecified type          Plan:       Pneumonia due to infectious organism, unspecified laterality, unspecified part of lung  -     C-Reactive Protein; Future; Expected date: 10/19/2020  -     Ferritin; Future; Expected date: 10/19/2020  -     CBC auto differential; Future; Expected date: 10/19/2020  -     Comprehensive Metabolic Panel; Future; Expected date: 10/19/2020  -     X-Ray Chest PA And Lateral; Future    Dyspnea, unspecified type  -     D dimer, quantitative; Future; Expected date: 10/19/2020  -     X-Ray Chest PA And Lateral; Future           CRP, Ferritin, CBC with diff,CMP, D dimer   Chest xray, if d-eva elevated will do a CTA  Tessalon 200mg by mouth three times a day    Follow up in about 6 weeks (around 11/30/2020).

## 2020-10-20 NOTE — H&P
[facility] Medicine History & Physical Examination   Patient Name: Sumit Burger  MRN: [id number]  Patient Class: IP- Inpatient   Admission Date: 10/19/2020  1:34 PM  Length of Stay: 0  Attending Physician: Jordi Trevino MD  Primary Care Provider: Faustina Santos NP  Face-to-Face encounter date: 10/19/2020  Code Status:  Full code    Chief Complaint: Shortness of Breath        Patient information was obtained from patient, past medical records and ER records.   HISTORY OF PRESENT ILLNESS:   Sumit Burger is a 50 y.o.  female who  has no past medical history on file.. The patient presented to [facility] on 10/19/2020 with a primary complaint of Shortness of Breath    Patient was initially diagnosed with pneumonia 8 days ago at an urgent care where she was given treatment with Z-Guerrero which did resolve and she was subsequently treated with oral Augmentin.  Today she presents at Dr. Khan office on account of persistent symptoms.  She complains of chills, shortness or breath, dry cough, as she states chest pain which is constant which worsens with cough.  She denies any fever, no recent weight loss, no nausea vomiting or palpitation.  She also states that she has not had any recent travels all been around any sick persons.  A chest x-ray was done which showed worsening of pneumonia hence referral to the ED.    On presentation to the ED, a chest x-ray showed Worsening of diffuse groundglass opacities throughout the lungs suggestive of atypical viral pneumonia.  Her chest CT showed Predominantly interstitial pattern of lung disease with small acinar components and lymphadenopathy.  Her COVID test was negative.    PAST MEDICAL HISTORY:   History reviewed. No pertinent past medical history.    PAST SURGICAL HISTORY:     Past Surgical History:   Procedure Laterality Date    TUBAL LIGATION      WRIST FRACTURE SURGERY Right     ganglion cyst  "      ALLERGIES:   Benadryl [diphenhydramine hcl]; Codeine; Iodine and iodide containing products; Latex, natural rubber; Percocet [oxycodone-acetaminophen]; and Shrimp    FAMILY HISTORY:   History reviewed. No pertinent family history.    SOCIAL HISTORY:     Social History     Tobacco Use    Smoking status: Light Tobacco Smoker     Types: Cigarettes     Last attempt to quit: 2020     Years since quittin.5    Smokeless tobacco: Never Used    Tobacco comment: ocassionally never was qd   Substance Use Topics    Alcohol use: Not Currently        Social History     Substance and Sexual Activity   Sexual Activity Not Currently        HOME MEDICATIONS:     Prior to Admission medications    Medication Sig Start Date End Date Taking? Authorizing Provider   acetaminophen (TYLENOL) 325 MG tablet Take 325 mg by mouth every 6 (six) hours as needed for Pain.   Yes Historical Provider   amoxicillin-clavulanate 875-125mg (AUGMENTIN) 875-125 mg per tablet Take 1 tablet by mouth 2 (two) times a day. 10/8/20 10/19/20 Yes Historical Provider   benzonatate (TESSALON) 100 MG capsule Take 1 capsule (100 mg total) by mouth 3 (three) times daily as needed for Cough. 10/14/20 10/24/20 Yes Ricky Moon Jr., MD   azithromycin (Z-SABRINA) 250 MG tablet Take 1 tablet (250 mg total) by mouth once daily. Take first 2 tablets together, then 1 every day until finished.  Patient not taking: Reported on 10/19/2020 10/14/20 10/19/20  Ricky Moon Jr., MD       REVIEW OF SYSTEMS:   10 Point Review of System was performed and was found to be negative except for that mentioned already in the HPI above.     PHYSICAL EXAM:   /81   Pulse 96   Temp 97.2 °F (36.2 °C) (Oral)   Resp (!) 22   Ht 5' 2" (1.575 m)   Wt 63.5 kg (140 lb)   SpO2 97%   BMI 25.61 kg/m²     Vitals Reviewed  General appearance: Well-developed, well-nourished female in no apparent distress.  HENT: Atraumatic head. Moist mucous membranes of oral " cavity.  Eyes: Normal extraocular movements.   Neck: Supple.   Lungs: Clear to auscultation bilaterally. No wheezing present.   Heart: Regular rate and rhythm. S1 and S2 present with no murmurs/gallop/rub. No pedal edema. No JVD present.   Abdomen: Soft, non-distended, non-tender. No rebound tenderness/guarding. Bowel sounds are normal.   Extremities: No cyanosis, clubbing, or edema.  Skin: No Rash.   Neuro: Motor and sensory exams grossly intact. Good tone. Muscle strength 5/5 in all 4 extremities  Psych/mental status: Appropriate mood and affect. Responds appropriately to questions.     EMERGENCY DEPARTMENT LABS AND IMAGING:     Labs Reviewed   CBC W/ AUTO DIFFERENTIAL - Abnormal; Notable for the following components:       Result Value    Mean Corpuscular Hemoglobin Conc 31.9 (*)     Gran% 75.1 (*)     Lymph% 16.1 (*)     All other components within normal limits   COMPREHENSIVE METABOLIC PANEL - Abnormal; Notable for the following components:    Alkaline Phosphatase 50 (*)     All other components within normal limits   C-REACTIVE PROTEIN - Abnormal; Notable for the following components:    CRP 4.13 (*)     All other components within normal limits   RESPIRATORY INFECTION PANEL (PCR), NASOPHARYNGEAL   FERRITIN   LACTATE DEHYDROGENASE   CK   LACTIC ACID, PLASMA   TROPONIN I   SARS-COV-2 RNA AMPLIFICATION, QUAL   MYCOPLASMA PNEUMONIAE AB IGG & IGM       CT Chest With Contrast   Final Result      FL Bronchoscopy Fluoro in Xray    (Results Pending)       ASSESSMENT & PLAN:   Sumit Burger is a 50 y.o. female admitted for    Active Hospital Problems    Diagnosis    Pneumonia  Continue oxygen  Consult to pulmonology  DuoNeb treatment  Solu Medrol 40 mg daily  Continue levofloxacin 500 Q24 H  Respiratory cultures and Gram stain  Would hold off on anticoagulation since patient has been worked up for a bronch        DVT Prophylaxis: will be placed on SCDs for DVT prophylaxis and will be advised to be as mobile  as possible and sit in a chair as tolerated.   ________________________________________________________________________________    Discharge Planning and Disposition: No mobility needs. Ambulating well. Good social support system. Patient will be discharged in     Face-to-Face encounter date: 10/19/2020  Encounter included review of the medical records, interviewing and examining the patient face-to-face, discussion with family and other health care providers including emergency medicine physician, admission orders, interpreting lab/test results and formulating a plan of care.     Medical Decision Making during this encounter was  [_] Low Complexity  [_] Moderate Complexity  [X] High Complexity  _________________________________________________________________________________    INPATIENT LIST OF MEDICATIONS   No current facility-administered medications for this encounter.     Current Outpatient Medications:     acetaminophen (TYLENOL) 325 MG tablet, Take 325 mg by mouth every 6 (six) hours as needed for Pain., Disp: , Rfl:     amoxicillin-clavulanate 875-125mg (AUGMENTIN) 875-125 mg per tablet, Take 1 tablet by mouth 2 (two) times a day., Disp: , Rfl:     benzonatate (TESSALON) 100 MG capsule, Take 1 capsule (100 mg total) by mouth 3 (three) times daily as needed for Cough., Disp: 30 capsule, Rfl: 0      Scheduled Meds:  Continuous Infusions:  PRN Meds:.      Jordi Trevino MD  [facility] Hospitalist  10/19/2020

## 2020-10-20 NOTE — NURSING
"Pt has refused admit and leaving AMA- state she does not want to stay here in this room because it reminds her of previous psych admit in another hospital.  House supv called to make aware but no other rooms available.  MD made aware and pt signed AMA form and made aware of possible "death" if not treated. PIV removed and pt left   "

## 2020-10-20 NOTE — PLAN OF CARE
10/19/20 1945   Patient Assessment/Suction   Level of Consciousness (AVPU) alert   Respiratory Effort Normal;Unlabored   Expansion/Accessory Muscles/Retractions no use of accessory muscles   All Lung Fields Breath Sounds wheezes, expiratory   Rhythm/Pattern, Respiratory shortness of breath;tachypneic   Cough Frequency frequent   Cough Type good;dry;nonproductive   PRE-TX-O2   O2 Device (Oxygen Therapy) room air   SpO2 95 %   Pulse Oximetry Type Continuous   $ Pulse Oximetry - Multiple Charge Pulse Oximetry - Multiple   Pulse (!) 118   Resp (!) 24   Aerosol Therapy   $ Aerosol Therapy Charges Aerosol Treatment   Daily Review of Necessity (SVN) completed   Respiratory Treatment Status (SVN) given   Treatment Route (SVN) mouthpiece   Patient Position (SVN) HOB elevated   Post Treatment Assessment (SVN) breath sounds unchanged   Signs of Intolerance (SVN) none   Breath Sounds Post-Respiratory Treatment   Post-treatment Heart Rate (beats/min) 105   Post-treatment Resp Rate (breaths/min) 20   Education   $ Education 15 min;Bronchodilator   Respiratory Evaluation   $ Care Plan Tech Time 15 min   Evaluation For New Orders

## 2020-10-21 ENCOUNTER — TELEPHONE (OUTPATIENT)
Dept: PULMONOLOGY | Facility: CLINIC | Age: 50
End: 2020-10-21

## 2020-10-21 DIAGNOSIS — D86.9 SARCOIDOSIS: Primary | ICD-10-CM

## 2020-10-21 RX ORDER — BENZONATATE 200 MG/1
200 CAPSULE ORAL 3 TIMES DAILY PRN
Qty: 90 CAPSULE | Refills: 11 | Status: SHIPPED | OUTPATIENT
Start: 2020-10-21 | End: 2020-10-31

## 2020-10-21 RX ORDER — PREDNISONE 10 MG/1
40 TABLET ORAL DAILY
Qty: 120 TABLET | Refills: 11 | Status: ON HOLD | OUTPATIENT
Start: 2020-10-21 | End: 2022-08-11 | Stop reason: HOSPADM

## 2020-10-21 NOTE — TELEPHONE ENCOUNTER
The patient's pathology is consistent with sarcoidosis.  Told her.  Will start her on 40 mg of prednisone and 200 a benzonatate 3 times a day told her to start the 1st dose tonight and come to the office in 3 weeks.

## 2020-10-21 NOTE — TELEPHONE ENCOUNTER
----- Message from Cat Amaya sent at 10/21/2020 10:03 AM CDT -----  Patient called for bronch results??          Thank You      Cat

## 2020-10-21 NOTE — ED PROVIDER NOTES
Encounter Date: 10/19/2020       History     Chief Complaint   Patient presents with    Shortness of Breath     HPI     Seen and evaluated.  Presented with a chief complaint of shortness of breath.  I was contacted by her primary care physician who had seen her previously treated as an outpatient with antibiotics, and then saw her for follow-up only to find a worsened chest x-ray.  The patient had associated diaphoresis and felt generally unwell.  This is an exacerbation of an existing condition.  She has failed outpatient antibiotics.  The symptoms are moderate to severe persistent worsening.  She is not febrile.  He does have some associated shortness of breath.     Review of patient's allergies indicates:   Allergen Reactions    Benadryl [diphenhydramine hcl]     Codeine     Iodine and iodide containing products     Latex, natural rubber     Percocet [oxycodone-acetaminophen]     Shrimp      Past Medical History:   Diagnosis Date    Pneumonia 10/2020     Past Surgical History:   Procedure Laterality Date     SECTION      x2    FALLOPIAN TUBOPLASTY      TUBAL LIGATION      WRIST FRACTURE SURGERY Right     ganglion cyst     History reviewed. No pertinent family history.  Social History     Tobacco Use    Smoking status: Light Tobacco Smoker     Types: Cigarettes     Last attempt to quit: 2020     Years since quittin.5    Smokeless tobacco: Never Used    Tobacco comment: ocassionally never was qd   Substance Use Topics    Alcohol use: Not Currently    Drug use: Never     Review of Systems   Constitutional: Positive for fatigue. Negative for fever.   HENT: Negative for sore throat.    Respiratory: Positive for shortness of breath.    Cardiovascular: Positive for chest pain.   Gastrointestinal: Negative for nausea.   Genitourinary: Negative for dysuria.   Musculoskeletal: Negative for back pain.   Skin: Negative for rash.   Neurological: Positive for weakness.   Hematological: Does  not bruise/bleed easily.   All other systems reviewed and are negative.      Physical Exam     Initial Vitals [10/19/20 1336]   BP Pulse Resp Temp SpO2   (!) 197/91 (!) 114 20 97.2 °F (36.2 °C) 96 %      MAP       --         Physical Exam    Nursing note and vitals reviewed.  Constitutional: She appears well-developed.   Ill appearing   HENT:   Head: Normocephalic and atraumatic.   Eyes: Conjunctivae are normal.   Cardiovascular: Regular rhythm.   tachycardic   Pulmonary/Chest: No respiratory distress.   Abdominal: Soft. Normal appearance.   Musculoskeletal: Normal range of motion.   Neurological: She is alert and oriented to person, place, and time.   Skin: Skin is warm and dry.   Psychiatric: She has a normal mood and affect. Her speech is normal.         ED Course   Procedures  Labs Reviewed   CBC W/ AUTO DIFFERENTIAL - Abnormal; Notable for the following components:       Result Value    Mean Corpuscular Hemoglobin Conc 31.9 (*)     Gran% 75.1 (*)     Lymph% 16.1 (*)     All other components within normal limits   COMPREHENSIVE METABOLIC PANEL - Abnormal; Notable for the following components:    Alkaline Phosphatase 50 (*)     All other components within normal limits   C-REACTIVE PROTEIN - Abnormal; Notable for the following components:    CRP 4.13 (*)     All other components within normal limits   RESPIRATORY INFECTION PANEL (PCR), NASOPHARYNGEAL    Narrative:     Receiving Lab:->[place]   FERRITIN   LACTATE DEHYDROGENASE   CK   LACTIC ACID, PLASMA   TROPONIN I   SARS-COV-2 RNA AMPLIFICATION, QUAL   PROCALCITONIN   MYCOPLASMA PNEUMONIAE AB IGG & IGM        ECG Results          EKG 12-lead (In process)  Result time 10/20/20 05:17:23    In process by Interface, Lab In [facility] (10/20/20 05:17:23)                 Narrative:    Test Reason : R07.9,    Vent. Rate : 116 BPM     Atrial Rate : 116 BPM     P-R Int : 116 ms          QRS Dur : 076 ms      QT Int : 296 ms       P-R-T Axes : 060 028 012 degrees     QTc Int  ": 411 ms    Sinus tachycardia  Possible Left atrial enlargement  Septal infarct (cited on or before 19-OCT-2020)  Abnormal ECG  When compared with ECG of 19-OCT-2020 16:30,  No significant change was found    Referred By: [email or web address]   SELF           Confirmed By:                              EKG 12-lead (In process)  Result time 10/19/20 17:32:01    In process by Interface, Lab In [facility] (10/19/20 17:32:01)                 Narrative:    Test Reason : Z20.828,    Vent. Rate : 103 BPM     Atrial Rate : 103 BPM     P-R Int : 118 ms          QRS Dur : 080 ms      QT Int : 336 ms       P-R-T Axes : 057 018 033 degrees     QTc Int : 440 ms    Sinus tachycardia  Possible Left atrial enlargement  Septal infarct ,age undetermined  Abnormal ECG  When compared with ECG of 14-OCT-2020 12:03,  No significant change was found    Referred By: [email or web address]   SELF           Confirmed By:                             Imaging Results          CT Chest With Contrast (Final result)  Result time 10/19/20 17:02:53    Final result by Casa Gonzalez MD (10/19/20 17:02:53)                 Narrative:    CMS MANDATED QUALITY DATA-CT RADIATION DOSE-436    All CT scans at this facility use dose modulation, iterative  reconstruction, and or weight based dosing when appropriate, to reduce  radiation dose to as low as reasonably achievable.    HISTORY: Persistent pneumonia, dyspnea, cough, abnormal chest x-ray.    FINDINGS: Axial imaging through the chest was performed with 100 mL  Omnipaque 350 IV contrast.  Comparison to multiple prior chest  radiographs.    There are extensive centrilobular nodular opacities, small acinar  opacities, and groundglass densities throughout both lungs with upper  and mid lung zone predominance, with notable complete sparing of the  lung bases. There is no consolidation or interlobular septal  thickening, with no "crazy paving". No fissural beading. The central  airways are patent, with no bronchial wall thickening " or  bronchiectasis. No pleural effusion or pneumothorax.    There are several enlarged enhancing mediastinal and bilateral hilar  lymph nodes, none with internal calcification. The heart and great  vessels enhance normally, with no pericardial effusion.    Images of the upper abdomen show small hypoenhancing left renal  lesions suggestive of cysts, and are otherwise unremarkable. There are  no significant osseous abnormalities.    IMPRESSION: Predominantly interstitial pattern of lung disease with  small acinar components and lymphadenopathy as described, with  differential diagnosis to include sarcoidosis, atypical fungal  infection such as histoplasmosis, inhalational lung disease,  hypersensitivity pneumonitis, or alveolar hemorrhage.    Electronically Signed by Casa MORALES on 10/19/2020 5:56 PM                               Medical Decision Making:   Initial Assessment:   Seen and examined.  Clinically worsening.  Presents with shortness of breath and chest x-ray findings that are worse than previous examination.  CT ordered.  Discussed with pulmonology who knows patient well.  Will treat with Levaquin and admitted to Hospital Medicine.                             Clinical Impression:       ICD-10-CM ICD-9-CM   1. Pneumonitis  J18.9 486   2. Suspected COVID-19 virus infection  Z20.828 V01.79   3. Pneumonia  J18.9 486   4. Chest pain  R07.9 786.50                          ED Disposition Condition    Admit                             Lv Beebe Jr., MD  10/20/20 2045

## 2020-10-21 NOTE — PLAN OF CARE
10/21/20 1002   Final Note   Assessment Type Final Discharge Note   Anticipated Discharge Disposition Left Against

## 2020-10-22 LAB
M PNEUMO IGG SER IA-ACNC: 782 U/ML (ref 0–99)
M PNEUMO IGM SER IA-ACNC: <770 U/ML (ref 0–769)

## 2020-10-23 ENCOUNTER — TELEPHONE (OUTPATIENT)
Dept: PULMONOLOGY | Facility: HOSPITAL | Age: 50
End: 2020-10-23

## 2020-11-03 ENCOUNTER — HOSPITAL ENCOUNTER (OUTPATIENT)
Dept: PREADMISSION TESTING | Facility: HOSPITAL | Age: 50
Discharge: HOME | End: 2020-11-03
Attending: INTERNAL MEDICINE
Payer: OTHER GOVERNMENT

## 2020-11-03 ENCOUNTER — HOSPITAL ENCOUNTER (OUTPATIENT)
Dept: RADIOLOGY | Facility: HOSPITAL | Age: 50
Discharge: HOME | End: 2020-11-03
Attending: INTERNAL MEDICINE
Payer: MEDICAID

## 2020-11-03 ENCOUNTER — OFFICE VISIT (OUTPATIENT)
Dept: PULMONOLOGY | Facility: CLINIC | Age: 50
End: 2020-11-03
Payer: MEDICAID

## 2020-11-03 VITALS
HEIGHT: 63 IN | TEMPERATURE: 98 F | HEART RATE: 100 BPM | BODY MASS INDEX: 24.8 KG/M2 | SYSTOLIC BLOOD PRESSURE: 127 MMHG | OXYGEN SATURATION: 99 % | DIASTOLIC BLOOD PRESSURE: 99 MMHG | WEIGHT: 140 LBS

## 2020-11-03 DIAGNOSIS — R06.02 SHORTNESS OF BREATH: ICD-10-CM

## 2020-11-03 DIAGNOSIS — D86.9 SARCOIDOSIS: Primary | ICD-10-CM

## 2020-11-03 DIAGNOSIS — D86.9 SARCOIDOSIS: ICD-10-CM

## 2020-11-03 DIAGNOSIS — R00.2 PALPITATIONS: ICD-10-CM

## 2020-11-03 DIAGNOSIS — R05.9 COUGH: ICD-10-CM

## 2020-11-03 DIAGNOSIS — R09.A2 GLOBUS PHARYNGEUS: ICD-10-CM

## 2020-11-03 PROCEDURE — 99215 OFFICE O/P EST HI 40 MIN: CPT | Mod: 25,S$GLB,, | Performed by: INTERNAL MEDICINE

## 2020-11-03 PROCEDURE — 71046 X-RAY EXAM CHEST 2 VIEWS: CPT | Mod: TC

## 2020-11-03 PROCEDURE — 90471 IMMUNIZATION ADMIN: CPT | Mod: S$GLB,,, | Performed by: INTERNAL MEDICINE

## 2020-11-03 PROCEDURE — 90686 FLU VACCINE (QUAD) GREATER THAN OR EQUAL TO 3YO PRESERVATIVE FREE IM: ICD-10-PCS | Mod: S$GLB,,, | Performed by: INTERNAL MEDICINE

## 2020-11-03 PROCEDURE — 93010 EKG 12-LEAD: ICD-10-PCS | Mod: ,,, | Performed by: INTERNAL MEDICINE

## 2020-11-03 PROCEDURE — 90471 FLU VACCINE (QUAD) GREATER THAN OR EQUAL TO 3YO PRESERVATIVE FREE IM: ICD-10-PCS | Mod: S$GLB,,, | Performed by: INTERNAL MEDICINE

## 2020-11-03 PROCEDURE — 93005 ELECTROCARDIOGRAM TRACING: CPT | Performed by: INTERNAL MEDICINE

## 2020-11-03 PROCEDURE — 90686 IIV4 VACC NO PRSV 0.5 ML IM: CPT | Mod: S$GLB,,, | Performed by: INTERNAL MEDICINE

## 2020-11-03 PROCEDURE — 99215 PR OFFICE/OUTPT VISIT, EST, LEVL V, 40-54 MIN: ICD-10-PCS | Mod: 25,S$GLB,, | Performed by: INTERNAL MEDICINE

## 2020-11-03 PROCEDURE — 93010 ELECTROCARDIOGRAM REPORT: CPT | Mod: ,,, | Performed by: INTERNAL MEDICINE

## 2020-11-03 RX ORDER — BENZONATATE 200 MG/1
200 CAPSULE ORAL 3 TIMES DAILY PRN
COMMUNITY
End: 2020-12-09

## 2020-11-03 RX ORDER — SULFAMETHOXAZOLE AND TRIMETHOPRIM 800; 160 MG/1; MG/1
1 TABLET ORAL EVERY OTHER DAY
Status: DISCONTINUED | OUTPATIENT
Start: 2020-11-04 | End: 2020-11-30

## 2020-11-03 NOTE — PATIENT INSTRUCTIONS
Pulmonary Sarcoidosis  Sarcoidosis is a disease that causes inflammation of the body tissues. This leads to small lumps called granulomas. The disease can affect any organ in the body. But it often starts in the lungs and lymph nodes.  What causes sarcoidosis?  It is not known what causes sarcoidosis. It results from a problem with the bodys immune system. The main job of the immune system is to help the body fight infection. People of any age, race, or gender can have it. But it happens most often in people between the ages of 20 and 40. It is also more common in women than in men and in people exposed to mara or moldy conditions.  How it affects the lungs  When you breathe in, you inhale air that is full of oxygen. The oxygen travels from the lungs to the blood. Then it is carried to the rest of the body. In some cases of pulmonary sarcoidosis, the lungs become scarred. This is called pulmonary fibrosis. This scarring can make it hard to take a full breath. The damage can also make it hard for oxygen to pass from the lungs into the blood.  Symptoms of pulmonary sarcoidosis  Most people have no symptoms at all. If symptoms do occur, they can include dry cough, tightness in the chest, and tiredness. Wheezing, shortness of breath, skin rashes, joint pain, kidney stones, vision problems, and loss of appetite can also occur. In some cases, pulmonary sarcoidosis stops getting worse. It may even go away. In other cases, the disease is long-lasting (chronic).  Treatment of pulmonary sarcoidosis  Many people dont need treatment. The disease may not cause symptoms. And it may go away on its own. But treatment can be done to help relieve symptoms. It can reduce inflammation and help prevent damage. Medicines are used to treat the disease. More than one may be used. They may be taken in pill form or by injection. Some common ones are listed below.  · Prednisone. This is an anti-inflammatory steroid (corticosteroid). It  helps prevent or reduce inflammation that can harm lungs.  · Methotrexate. This medicine is commonly the first one used so that you can cut back on the dose of prednisone. This helps lower the long-term side effects of the steroid.  · Azathioprine. This medicine suppresses the immune system. It may be used alone or with prednisone. It helps reduce lung inflammation.  · Cyclophosphamide. This is another type of medicine that suppresses the immune system. It may be used with prednisone. You may be given it as a single dose if you have problems with prednisone.  Other medicines that may be used include anti-TNF medicines such as inflizimab and etanercept. Antimalaria medicines such as hydroxychloroquine may help when skin, joints or brain are affected.  Note: These medicines can have serious side effects. Talk with your healthcare provider about them. Don't stop taking a medicine unless your provider says its OK. And tell your healthcare provider or pharmacist about all medicines you use. This includes over-the-counter medicines. It also includes herbs or supplements.  Date Last Reviewed: 9/1/2016 © 2000-2017 [employer]. [address] [place] [address], [place] [address]. All rights reserved. This information is not intended as a substitute for professional medical care. Always follow your healthcare professional's instructions.      Continue Prednisone 40mg daily  See an ophthalmologist  EKG  PFT's  CXR today  Will need to do bone density]  See ENT for throat   Influenza immunization

## 2020-11-03 NOTE — PROGRESS NOTES
SUBJECTIVE:    Patient ID: Sumit Burger is a 50 y.o. female.    Chief Complaint: Follow-up    HPI The patient is here with a diagnosis of sarcoidosis.  She is continuing to cough.  She states she cannot talk without coughing and losing her breath.  She cannot walk without becoming very short of breath.  She is taking 40 mg of prednisone daily.  I explained sarcoidosis its etiology the possible organ involvement, the possible outcomes.  We then reviewed the warnings of prednisone extensively.  I have answered all of her questions and her mother's questions.  Past Medical History:   Diagnosis Date    Pneumonia 10/2020     Past Surgical History:   Procedure Laterality Date    BRONCHOSCOPY WITH FLUOROSCOPY Right 10/20/2020    Procedure: BRONCHOSCOPY, WITH FLUOROSCOPY;  Surgeon: Ava Rolon MD;  Location: [facility];  Service: Pulmonary;  Laterality: Right;     SECTION      x2    FALLOPIAN TUBOPLASTY      TUBAL LIGATION      WRIST FRACTURE SURGERY Right     ganglion cyst     Family History   Problem Relation Age of Onset    Hypertension Mother         Social History:   Marital Status:   Occupation: Data Unavailable  Alcohol History:  reports previous alcohol use.  Tobacco History:  reports that she has been smoking cigarettes. She has never used smokeless tobacco.  Drug History:  reports no history of drug use.    Review of patient's allergies indicates:   Allergen Reactions    Benadryl [diphenhydramine hcl]     Codeine     Iodine and iodide containing products     Latex, natural rubber     Percocet [oxycodone-acetaminophen]     Shrimp        Current Outpatient Medications   Medication Sig Dispense Refill    benzonatate (TESSALON) 200 MG capsule Take 200 mg by mouth 3 (three) times daily as needed for Cough.      predniSONE (DELTASONE) 10 MG tablet Take 4 tablets (40 mg total) by mouth once daily. With food 120 tablet 11    acetaminophen (TYLENOL) 325 MG tablet Take 325 mg  "by mouth every 6 (six) hours as needed for Pain.       Current Facility-Administered Medications   Medication Dose Route Frequency Provider Last Rate Last Dose    [START ON 11/4/2020] sulfamethoxazole-trimethoprim 800-160mg per tablet 1 tablet  1 tablet Oral Every other day Ava Rolon MD           Alpha-1 Antitrypsin:  Last PFT:   Last CT:    Review of Systems  General: Feeling better.  Eyes: Vision is good.  ENT:  Her throat always feels like it is closing in  Heart:: always has palpatations  Lungs: coughing less, limiting physical activity, cough starts when she talks or moves  GI: No Nausea, vomiting, constipation, diarrhea, or reflux.  : No dysuria, hesitancy, or nocturia.  Musculoskeletal: muscle pains from midshin down  Skin: No lesions or rashes.  Neuro: headaches from the coughing  Lymph: No edema or adenopathy.  Psych: having anxiety and depression  Endo: 12 pounds in the last month, down    OBJECTIVE:      BP (!) 127/99 (BP Location: Left arm, Patient Position: Sitting, BP Method: Medium (Manual))   Pulse 100   Temp 97.8 °F (36.6 °C)   Ht 5' 3" (1.6 m)   Wt 63.5 kg (140 lb)   SpO2 99%   BMI 24.80 kg/m²     Physical Exam  GENERAL: Midaged patient in no distress.  HEENT: Pupils equal and reactive. Extraocular movements intact. Nose intact.      Pharynx is cobblestoned posteriorly.  NECK: Supple.   HEART: Regular rate and rhythm. No murmur or gallop auscultated.  LUNGS: Clear to auscultation and percussion. Lung excursion symmetrical. No change in fremitus. No adventitial noises.  ABDOMEN: Bowel sounds present. Non-tender, no masses palpated.  EXTREMITIES: Normal muscle tone and joint movement, no cyanosis or clubbing.   LYMPHATICS: No adenopathy palpated, no edema.  SKIN: Dry, intact, no lesions.   NEURO: Cranial nerves II-XII intact. Motor strength 5/5 bilaterally, upper and lower extremities.  PSYCH: Appropriate affect.    Assessment:       1. Sarcoidosis    2. Shortness of breath    3. " Cough    4. Globus pharyngeus    5. Palpitations        Extensive discussion with regards to sarcoidosis and prednisone  Plan:       Sarcoidosis  -     Complete PFT with bronchodilator; Future  -     X-Ray Chest PA And Lateral; Future  -     EKG 12-lead; Future  -     Influenza - Quadrivalent (PF)  -     sulfamethoxazole-trimethoprim 800-160mg per tablet 1 tablet    Shortness of breath    Cough    Globus pharyngeus    Palpitations         Follow up in about 1 month (around 12/3/2020).    Continue Tessalon perles  Continue Prednisone 40mg daily  See an ophthalmologist  EKG  PFT's  CXR today  Flu shot  Bactrim DS 1 qod  Will need to do bone density]  See ENT for throat

## 2020-11-05 ENCOUNTER — TELEPHONE (OUTPATIENT)
Dept: PULMONOLOGY | Facility: HOSPITAL | Age: 50
End: 2020-11-05

## 2020-11-05 NOTE — TELEPHONE ENCOUNTER
----- Message from Cat Amaya sent at 11/5/2020  1:15 PM CST -----  Please call with chest xray results.        Thank You       Cat

## 2020-11-06 ENCOUNTER — PATIENT MESSAGE (OUTPATIENT)
Dept: PULMONOLOGY | Facility: CLINIC | Age: 50
End: 2020-11-06

## 2020-11-18 ENCOUNTER — HOSPITAL ENCOUNTER (OUTPATIENT)
Dept: PULMONOLOGY | Facility: HOSPITAL | Age: 50
Discharge: HOME | End: 2020-11-18
Attending: INTERNAL MEDICINE
Payer: OTHER GOVERNMENT

## 2020-11-18 ENCOUNTER — DOCUMENTATION ONLY (OUTPATIENT)
Dept: PULMONOLOGY | Facility: HOSPITAL | Age: 50
End: 2020-11-18

## 2020-11-18 VITALS — BODY MASS INDEX: 24.8 KG/M2 | HEIGHT: 63 IN | WEIGHT: 140 LBS

## 2020-11-18 DIAGNOSIS — D86.9 SARCOIDOSIS: ICD-10-CM

## 2020-11-18 PROCEDURE — 94729 DIFFUSING CAPACITY: CPT

## 2020-11-18 PROCEDURE — 94010 BREATHING CAPACITY TEST: CPT

## 2020-11-18 PROCEDURE — 94727 GAS DIL/WSHOT DETER LNG VOL: CPT

## 2020-11-30 ENCOUNTER — OFFICE VISIT (OUTPATIENT)
Dept: PULMONOLOGY | Facility: CLINIC | Age: 50
End: 2020-11-30
Payer: OTHER GOVERNMENT

## 2020-11-30 VITALS
WEIGHT: 139.63 LBS | BODY MASS INDEX: 24.74 KG/M2 | SYSTOLIC BLOOD PRESSURE: 140 MMHG | OXYGEN SATURATION: 99 % | HEART RATE: 80 BPM | DIASTOLIC BLOOD PRESSURE: 90 MMHG | TEMPERATURE: 98 F | HEIGHT: 63 IN

## 2020-11-30 DIAGNOSIS — D86.9 SARCOIDOSIS: Primary | ICD-10-CM

## 2020-11-30 PROCEDURE — 99214 PR OFFICE/OUTPT VISIT, EST, LEVL IV, 30-39 MIN: ICD-10-PCS | Mod: S$GLB,,, | Performed by: INTERNAL MEDICINE

## 2020-11-30 PROCEDURE — 99214 OFFICE O/P EST MOD 30 MIN: CPT | Mod: S$GLB,,, | Performed by: INTERNAL MEDICINE

## 2020-11-30 RX ORDER — ESCITALOPRAM OXALATE 10 MG/1
10 TABLET ORAL DAILY
Qty: 30 TABLET | Refills: 11 | Status: SHIPPED | OUTPATIENT
Start: 2020-11-30 | End: 2022-08-24

## 2020-11-30 RX ORDER — SULFAMETHOXAZOLE AND TRIMETHOPRIM 800; 160 MG/1; MG/1
1 TABLET ORAL EVERY OTHER DAY
Qty: 15 TABLET | Refills: 11 | Status: SHIPPED | OUTPATIENT
Start: 2020-11-30 | End: 2020-12-30

## 2020-11-30 RX ORDER — VARENICLINE TARTRATE 0.5 (11)-1
KIT ORAL
Qty: 1 PACKAGE | Refills: 0 | Status: SHIPPED | OUTPATIENT
Start: 2020-11-30 | End: 2022-08-24

## 2020-11-30 NOTE — PROGRESS NOTES
SUBJECTIVE:    Patient ID: Sumit Burger is a 50 y.o. female.    Chief Complaint: Follow-up    HPI the patient returns feeling better.  She never got her Bactrim but did not call.  She is taking 40 mg of prednisone.  She is still waiting for her ENT and ophthalmology exams.  Her chest x-ray shows improving sarcoid.  Her PFT showed mild restriction but a severe diffusion defect.  Her EKG did not show any blocks.  She has better exercise tolerance.  She is able to climb or stairs without issue.  She is coughing less.  Past Medical History:   Diagnosis Date    Pneumonia 10/2020     Past Surgical History:   Procedure Laterality Date    BRONCHOSCOPY WITH FLUOROSCOPY Right 10/20/2020    Procedure: BRONCHOSCOPY, WITH FLUOROSCOPY;  Surgeon: Ava Rolon MD;  Location: [facility];  Service: Pulmonary;  Laterality: Right;     SECTION      x2    FALLOPIAN TUBOPLASTY      TUBAL LIGATION      WRIST FRACTURE SURGERY Right     ganglion cyst     Family History   Problem Relation Age of Onset    Hypertension Mother         Social History:   Marital Status:   Occupation: Data Unavailable  Alcohol History:  reports previous alcohol use.  Tobacco History:  reports that she has been smoking cigarettes. She has never used smokeless tobacco.  Drug History:  reports no history of drug use.    Review of patient's allergies indicates:   Allergen Reactions    Benadryl [diphenhydramine hcl]     Codeine     Iodine and iodide containing products     Latex, natural rubber     Percocet [oxycodone-acetaminophen]     Shrimp        Current Outpatient Medications   Medication Sig Dispense Refill    benzonatate (TESSALON) 200 MG capsule Take 200 mg by mouth 3 (three) times daily as needed for Cough.      predniSONE (DELTASONE) 10 MG tablet Take 4 tablets (40 mg total) by mouth once daily. With food 120 tablet 11    acetaminophen (TYLENOL) 325 MG tablet Take 325 mg by mouth every 6 (six) hours as needed for  "Pain.      escitalopram oxalate (LEXAPRO) 10 MG tablet Take 1 tablet (10 mg total) by mouth once daily. 30 tablet 11    sulfamethoxazole-trimethoprim 800-160mg (BACTRIM DS) 800-160 mg Tab Take 1 tablet by mouth every other day. 15 tablet 11    varenicline (CHANTIX STARTING MONTH BOX) 0.5 mg (11)- 1 mg (42) tablet Take one 0.5mg tab by mouth once daily X3 days,then increase to one 0.5mg tab twice daily X4 days,then increase to one 1mg tab twice daily 1 Package 0     No current facility-administered medications for this visit.        Alpha-1 Antitrypsin:  Last PFT: 11/19/20  No obstruction, mild restriction, severe diffusion defect  Last CT:  CXR 11/3   Improvement of the interstitial and groundglass opacities  throughout the lungs. Differential includes sarcoidosis, atypical  infection, inhalational lung disease, pneumonitis or alveolar  hemorrhage  Review of Systems  General: Feeling better.  Eyes: Vision is good.  ENT:  Her throat is intermittently feeling tight, seeing ENT next week  Heart:: always has palpatations  Lungs: coughing less, physical activity is improving  GI: No Nausea, vomiting, constipation, diarrhea, or reflux.  : No dysuria, hesitancy, or nocturia.  Musculoskeletal: muscle pains from midshin down  Skin: No lesions or rashes.  Neuro: headaches from the coughing  Lymph: No edema or adenopathy.  Psych: having anxiety and depression  Endo: gained 2 pounds    OBJECTIVE:      BP (!) 140/90 (BP Location: Left arm, Patient Position: Sitting, BP Method: Medium (Manual))   Pulse 80   Temp 97.6 °F (36.4 °C)   Ht 5' 3" (1.6 m)   Wt 63.3 kg (139 lb 9.6 oz)   SpO2 99%   BMI 24.73 kg/m²     Physical Exam  GENERAL: Midaged patient in no distress.  HEENT: Pupils equal and reactive. Extraocular movements intact. Nose intact.      Pharynx is cobblestoned posteriorly.  NECK: Supple.   HEART: Regular rate and rhythm. No murmur or gallop auscultated.  LUNGS: Clear to auscultation and percussion. Lung " excursion symmetrical. No change in fremitus. No adventitial noises.  ABDOMEN: Bowel sounds present. Non-tender, no masses palpated.  EXTREMITIES: Normal muscle tone and joint movement, no cyanosis or clubbing.   LYMPHATICS: No adenopathy palpated, no edema.  SKIN: Dry, intact, no lesions.   NEURO: Cranial nerves II-XII intact. Motor strength 5/5 bilaterally, upper and lower extremities.  PSYCH: Appropriate affect.    Assessment:       1. Sarcoidosis        Extensive discussion with regards to sarcoidosis and prednisone  Plan:       Sarcoidosis  -     sulfamethoxazole-trimethoprim 800-160mg (BACTRIM DS) 800-160 mg Tab; Take 1 tablet by mouth every other day.  Dispense: 15 tablet; Refill: 11  -     escitalopram oxalate (LEXAPRO) 10 MG tablet; Take 1 tablet (10 mg total) by mouth once daily.  Dispense: 30 tablet; Refill: 11  -     varenicline (CHANTIX STARTING MONTH BOX) 0.5 mg (11)- 1 mg (42) tablet; Take one 0.5mg tab by mouth once daily X3 days,then increase to one 0.5mg tab twice daily X4 days,then increase to one 1mg tab twice daily  Dispense: 1 Package; Refill: 0  -     X-Ray Chest PA And Lateral; Future; Expected date: 12/30/2020         Follow up in about 1 month (around 12/30/2020).    Continue Tessalon perles  Continue Prednisone 40mg daily for another 3 weeks, then drop to 30mg and return to office  See an ophthalmologist and ENT  Bactrim DS 1 qod  Chantix starter kit  Lexapro 10mg daily  Melatonin 6mg qhs

## 2020-12-09 ENCOUNTER — HOSPITAL ENCOUNTER (EMERGENCY)
Facility: HOSPITAL | Age: 50
Discharge: HOME | End: 2020-12-09
Attending: EMERGENCY MEDICINE
Payer: MEDICAID

## 2020-12-09 VITALS
RESPIRATION RATE: 18 BRPM | HEIGHT: 63 IN | HEART RATE: 88 BPM | SYSTOLIC BLOOD PRESSURE: 154 MMHG | OXYGEN SATURATION: 99 % | WEIGHT: 138 LBS | DIASTOLIC BLOOD PRESSURE: 90 MMHG | BODY MASS INDEX: 24.45 KG/M2 | TEMPERATURE: 98 F

## 2020-12-09 DIAGNOSIS — T78.40XA ALLERGIC REACTION, INITIAL ENCOUNTER: Primary | ICD-10-CM

## 2020-12-09 DIAGNOSIS — R06.02 SHORTNESS OF BREATH: ICD-10-CM

## 2020-12-09 LAB
ALBUMIN SERPL BCP-MCNC: 4.3 G/DL (ref 3.5–5.2)
ALP SERPL-CCNC: 53 U/L (ref 55–135)
ALT SERPL W/O P-5'-P-CCNC: 22 U/L (ref 10–44)
ANION GAP SERPL CALC-SCNC: 9 MMOL/L (ref 8–16)
AST SERPL-CCNC: 23 U/L (ref 10–40)
BASOPHILS # BLD AUTO: 0.01 K/UL (ref 0–0.2)
BASOPHILS NFR BLD: 0.1 % (ref 0–1.9)
BILIRUB SERPL-MCNC: 0.9 MG/DL (ref 0.1–1)
BNP SERPL-MCNC: 13 PG/ML (ref 0–99)
BUN SERPL-MCNC: 16 MG/DL (ref 6–20)
CALCIUM SERPL-MCNC: 9.8 MG/DL (ref 8.7–10.5)
CHLORIDE SERPL-SCNC: 101 MMOL/L (ref 95–110)
CO2 SERPL-SCNC: 27 MMOL/L (ref 23–29)
CREAT SERPL-MCNC: 1.1 MG/DL (ref 0.5–1.4)
DIFFERENTIAL METHOD: ABNORMAL
EOSINOPHIL # BLD AUTO: 0 K/UL (ref 0–0.5)
EOSINOPHIL NFR BLD: 0 % (ref 0–8)
ERYTHROCYTE [DISTWIDTH] IN BLOOD BY AUTOMATED COUNT: 15.9 % (ref 11.5–14.5)
EST. GFR  (AFRICAN AMERICAN): >60 ML/MIN/1.73 M^2
EST. GFR  (NON AFRICAN AMERICAN): 59.5 ML/MIN/1.73 M^2
GLUCOSE SERPL-MCNC: 117 MG/DL (ref 70–110)
HCT VFR BLD AUTO: 45.5 % (ref 37–48.5)
HGB BLD-MCNC: 14.1 G/DL (ref 12–16)
IMM GRANULOCYTES # BLD AUTO: 0.06 K/UL (ref 0–0.04)
IMM GRANULOCYTES NFR BLD AUTO: 0.6 % (ref 0–0.5)
INR PPP: 1.1
LYMPHOCYTES # BLD AUTO: 0.4 K/UL (ref 1–4.8)
LYMPHOCYTES NFR BLD: 3.7 % (ref 18–48)
MAGNESIUM SERPL-MCNC: 2.1 MG/DL (ref 1.6–2.6)
MCH RBC QN AUTO: 27.4 PG (ref 27–31)
MCHC RBC AUTO-ENTMCNC: 31 G/DL (ref 32–36)
MCV RBC AUTO: 89 FL (ref 82–98)
MONOCYTES # BLD AUTO: 0.1 K/UL (ref 0.3–1)
MONOCYTES NFR BLD: 1.3 % (ref 4–15)
NEUTROPHILS # BLD AUTO: 9.1 K/UL (ref 1.8–7.7)
NEUTROPHILS NFR BLD: 94.3 % (ref 38–73)
NRBC BLD-RTO: 0 /100 WBC
PLATELET # BLD AUTO: 254 K/UL (ref 150–350)
PMV BLD AUTO: 10.9 FL (ref 9.2–12.9)
POTASSIUM SERPL-SCNC: 4.5 MMOL/L (ref 3.5–5.1)
PROT SERPL-MCNC: 7.5 G/DL (ref 6–8.4)
PROTHROMBIN TIME: 14.1 SEC (ref 10.6–14.8)
RBC # BLD AUTO: 5.14 M/UL (ref 4–5.4)
SARS-COV-2 RDRP RESP QL NAA+PROBE: NEGATIVE
SODIUM SERPL-SCNC: 137 MMOL/L (ref 136–145)
TROPONIN I SERPL DL<=0.01 NG/ML-MCNC: <0.03 NG/ML
WBC # BLD AUTO: 9.69 K/UL (ref 3.9–12.7)

## 2020-12-09 PROCEDURE — 93010 EKG 12-LEAD: ICD-10-PCS | Mod: ,,, | Performed by: INTERNAL MEDICINE

## 2020-12-09 PROCEDURE — 99900035 HC TECH TIME PER 15 MIN (STAT)

## 2020-12-09 PROCEDURE — 80053 COMPREHEN METABOLIC PANEL: CPT

## 2020-12-09 PROCEDURE — 85025 COMPLETE CBC W/AUTO DIFF WBC: CPT

## 2020-12-09 PROCEDURE — 93010 ELECTROCARDIOGRAM REPORT: CPT | Mod: ,,, | Performed by: INTERNAL MEDICINE

## 2020-12-09 PROCEDURE — 36415 COLL VENOUS BLD VENIPUNCTURE: CPT

## 2020-12-09 PROCEDURE — 84484 ASSAY OF TROPONIN QUANT: CPT

## 2020-12-09 PROCEDURE — 93005 ELECTROCARDIOGRAM TRACING: CPT | Performed by: INTERNAL MEDICINE

## 2020-12-09 PROCEDURE — U0002 COVID-19 LAB TEST NON-CDC: HCPCS

## 2020-12-09 PROCEDURE — 99285 EMERGENCY DEPT VISIT HI MDM: CPT | Mod: 25

## 2020-12-09 PROCEDURE — 25000242 PHARM REV CODE 250 ALT 637 W/ HCPCS: Performed by: EMERGENCY MEDICINE

## 2020-12-09 PROCEDURE — 99900031 HC PATIENT EDUCATION (STAT)

## 2020-12-09 PROCEDURE — 83880 ASSAY OF NATRIURETIC PEPTIDE: CPT

## 2020-12-09 PROCEDURE — 83735 ASSAY OF MAGNESIUM: CPT

## 2020-12-09 PROCEDURE — 25000003 PHARM REV CODE 250: Performed by: EMERGENCY MEDICINE

## 2020-12-09 PROCEDURE — 85610 PROTHROMBIN TIME: CPT

## 2020-12-09 PROCEDURE — 94640 AIRWAY INHALATION TREATMENT: CPT

## 2020-12-09 PROCEDURE — 96375 TX/PRO/DX INJ NEW DRUG ADDON: CPT

## 2020-12-09 PROCEDURE — 96365 THER/PROPH/DIAG IV INF INIT: CPT

## 2020-12-09 PROCEDURE — 63600175 PHARM REV CODE 636 W HCPCS: Performed by: EMERGENCY MEDICINE

## 2020-12-09 RX ORDER — FAMOTIDINE 10 MG/ML
20 INJECTION INTRAVENOUS
Status: COMPLETED | OUTPATIENT
Start: 2020-12-09 | End: 2020-12-09

## 2020-12-09 RX ORDER — FAMOTIDINE 20 MG/1
20 TABLET, FILM COATED ORAL 2 TIMES DAILY
Qty: 20 TABLET | Refills: 0 | Status: SHIPPED | OUTPATIENT
Start: 2020-12-09 | End: 2022-08-11

## 2020-12-09 RX ORDER — ALBUTEROL SULFATE 90 UG/1
1-2 AEROSOL, METERED RESPIRATORY (INHALATION) EVERY 6 HOURS PRN
Qty: 1 G | Refills: 1 | Status: SHIPPED | OUTPATIENT
Start: 2020-12-09 | End: 2023-06-30

## 2020-12-09 RX ORDER — MAGNESIUM SULFATE HEPTAHYDRATE 40 MG/ML
2 INJECTION, SOLUTION INTRAVENOUS ONCE
Status: COMPLETED | OUTPATIENT
Start: 2020-12-09 | End: 2020-12-09

## 2020-12-09 RX ORDER — BENZONATATE 100 MG/1
200 CAPSULE ORAL 3 TIMES DAILY PRN
Qty: 30 CAPSULE | Refills: 1 | Status: SHIPPED | OUTPATIENT
Start: 2020-12-09 | End: 2021-01-08

## 2020-12-09 RX ORDER — IPRATROPIUM BROMIDE AND ALBUTEROL SULFATE 2.5; .5 MG/3ML; MG/3ML
3 SOLUTION RESPIRATORY (INHALATION)
Status: COMPLETED | OUTPATIENT
Start: 2020-12-09 | End: 2020-12-09

## 2020-12-09 RX ORDER — ONDANSETRON 2 MG/ML
4 INJECTION INTRAMUSCULAR; INTRAVENOUS
Status: COMPLETED | OUTPATIENT
Start: 2020-12-09 | End: 2020-12-09

## 2020-12-09 RX ORDER — METHYLPREDNISOLONE SOD SUCC 125 MG
125 VIAL (EA) INJECTION
Status: COMPLETED | OUTPATIENT
Start: 2020-12-09 | End: 2020-12-09

## 2020-12-09 RX ADMIN — ONDANSETRON 4 MG: 2 INJECTION INTRAMUSCULAR; INTRAVENOUS at 06:12

## 2020-12-09 RX ADMIN — MAGNESIUM SULFATE IN WATER 2 G: 40 INJECTION, SOLUTION INTRAVENOUS at 06:12

## 2020-12-09 RX ADMIN — IPRATROPIUM BROMIDE AND ALBUTEROL SULFATE 3 ML: .5; 3 SOLUTION RESPIRATORY (INHALATION) at 05:12

## 2020-12-09 RX ADMIN — FAMOTIDINE 20 MG: 10 INJECTION, SOLUTION INTRAVENOUS at 06:12

## 2020-12-09 RX ADMIN — METHYLPREDNISOLONE SODIUM SUCCINATE 125 MG: 125 INJECTION, POWDER, FOR SOLUTION INTRAMUSCULAR; INTRAVENOUS at 06:12

## 2020-12-09 NOTE — ED PROVIDER NOTES
Encounter Date: 2020       History     Chief Complaint   Patient presents with    Shortness of Breath     x2 days; has pulmonary sarcadosis    Chest Pain     x2 days     50-year-old female with history of sarcoidosis on chronic steroids recently started taking Bactrim as prophylaxis on Monday and since then started getting shortness of breath and worsening cough.  Denies fever or chills or nausea or vomiting or abdominal pain or weakness or numbness.  Denies any chest pain however cough and shortness of breath and wheezing or getting worse.        Review of patient's allergies indicates:   Allergen Reactions    Benadryl [diphenhydramine hcl]     Codeine     Iodine and iodide containing products     Latex, natural rubber     Percocet [oxycodone-acetaminophen]     Shrimp      Past Medical History:   Diagnosis Date    Pneumonia 10/2020     Past Surgical History:   Procedure Laterality Date    BRONCHOSCOPY WITH FLUOROSCOPY Right 10/20/2020    Procedure: BRONCHOSCOPY, WITH FLUOROSCOPY;  Surgeon: Ava Rolon MD;  Location: [facility];  Service: Pulmonary;  Laterality: Right;     SECTION      x2    FALLOPIAN TUBOPLASTY      TUBAL LIGATION      WRIST FRACTURE SURGERY Right     ganglion cyst     Family History   Problem Relation Age of Onset    Hypertension Mother      Social History     Tobacco Use    Smoking status: Light Tobacco Smoker     Types: Cigarettes     Last attempt to quit: 2020     Years since quittin.6    Smokeless tobacco: Never Used    Tobacco comment: ocassionally never was qd   Substance Use Topics    Alcohol use: Not Currently    Drug use: Never     Review of Systems   Constitutional: Negative.    HENT: Negative.    Eyes: Negative.    Respiratory: Positive for cough, shortness of breath and wheezing.    Cardiovascular: Negative.  Negative for chest pain.   Gastrointestinal: Negative.    Endocrine: Negative.    Genitourinary: Negative.    Musculoskeletal:  Negative.    Skin: Negative.    Allergic/Immunologic: Negative.    Neurological: Negative.    Hematological: Negative.    Psychiatric/Behavioral: Negative.    All other systems reviewed and are negative.      Physical Exam     Initial Vitals [12/09/20 1543]   BP Pulse Resp Temp SpO2   (!) 137/92 91 15 97.7 °F (36.5 °C) 99 %      MAP       --         Physical Exam    Nursing note and vitals reviewed.  Constitutional: She appears well-developed and well-nourished.   HENT:   Head: Normocephalic and atraumatic.   Nose: Nose normal.   Mouth/Throat: Oropharynx is clear and moist.   Eyes: Conjunctivae and EOM are normal. Pupils are equal, round, and reactive to light.   Neck: Normal range of motion. Neck supple. No thyromegaly present. No tracheal deviation present. No JVD present.   Cardiovascular: Normal rate, regular rhythm, normal heart sounds and intact distal pulses.   No murmur heard.  Pulmonary/Chest: No stridor. She is in respiratory distress. She has wheezes. She has no rales.   Abdominal: Soft. Bowel sounds are normal. She exhibits no distension. There is no abdominal tenderness.   Musculoskeletal: Normal range of motion. No edema.   Neurological: She is alert and oriented to person, place, and time. She has normal strength. No sensory deficit. GCS score is 15. GCS eye subscore is 4. GCS verbal subscore is 5. GCS motor subscore is 6.   Skin: Skin is warm. Capillary refill takes less than 2 seconds.   Psychiatric: She has a normal mood and affect. Thought content normal.         ED Course   Procedures  Labs Reviewed   CBC W/ AUTO DIFFERENTIAL - Abnormal; Notable for the following components:       Result Value    MCHC 31.0 (*)     RDW 15.9 (*)     Immature Granulocytes 0.6 (*)     Gran # (ANC) 9.1 (*)     Immature Grans (Abs) 0.06 (*)     Lymph # 0.4 (*)     Mono # 0.1 (*)     Gran % 94.3 (*)     Lymph % 3.7 (*)     Mono % 1.3 (*)     All other components within normal limits   COMPREHENSIVE METABOLIC PANEL -  Abnormal; Notable for the following components:    Glucose 117 (*)     Alkaline Phosphatase 53 (*)     eGFR if non  59.5 (*)     All other components within normal limits   TROPONIN I   B-TYPE NATRIURETIC PEPTIDE   PROTIME-INR   SARS-COV-2 RNA AMPLIFICATION, QUAL   MAGNESIUM   POCT URINE PREGNANCY          Imaging Results          X-Ray Chest PA And Lateral (Final result)  Result time 12/09/20 16:59:49   Procedure changed from X-Ray Chest AP Portable     Final result by Peng Desai MD (12/09/20 16:59:49)                 Narrative:    Reason: CHF Shortness of Breath?(x2 days; has pulmonary sarcoidosis);  Chest Pain?(x2 days)    FINDINGS:  PA and lateral chest compared with 11/3/2020 show normal  cardiomediastinal silhouette.    Pulmonary groundglass alveolar opacities remain diffuse throughout mid  and lower lung zones, with slight improved aeration since prior exam.  No pleural effusion Central pulmonary vasculature is normal. Bones are  normal.    IMPRESSION:  Slight improved aeration of diffuse bilateral pulmonary groundglass  alveolar opacities.    Electronically Signed by Peng Desai M.D. on 12/9/2020 5:02 PM                               Medical Decision Making:   Differential Diagnosis:   Patient with cough and shortness of breath and wheezing started after recent Bactrim use and patient likely has a sulfa allergy given this presentation.  Patient advised to stop Bactrim.  Patient treated for possible allergy and for bronchitis.  Screening labs done.  Chest x-ray done.  Patient advised to stop Bactrim.  Patient's symptoms improved with treatment.  As patient feeling better discharged with instructions and follow-up.  Screening labs and workup reviewed and unremarkable and chest x-ray improved compared to prior x-ray  Clinical Tests:   Lab Tests: Reviewed  Radiological Study: Reviewed                             Clinical Impression:       ICD-10-CM ICD-9-CM   1. Allergic reaction, initial  encounter  T78.40XA 995.3   2. Shortness of breath  R06.02 786.05                          ED Disposition Condition    Discharge Stable        ED Prescriptions     Medication Sig Dispense Start Date End Date Auth. Provider    albuterol (PROVENTIL/VENTOLIN HFA) 90 mcg/actuation inhaler Inhale 1-2 puffs into the lungs every 6 (six) hours as needed. Rescue 1 g 12/9/2020 12/9/2021 Fabiola Fuentes MD    benzonatate (TESSALON PERLES) 100 MG capsule Take 2 capsules (200 mg total) by mouth 3 (three) times daily as needed for Cough. 30 capsule 12/9/2020 1/8/2021 Fabiola Fuentes MD    famotidine (PEPCID) 20 MG tablet Take 1 tablet (20 mg total) by mouth 2 (two) times daily. 20 tablet 12/9/2020 12/9/2021 Fabiola Fuentes MD        Follow-up Information     Follow up With Specialties Details Why Contact Info    Ava Rolon MD Pulmonary Disease, Sleep Medicine In 1 day  [address]  [address]  [place] [address]  [phone]                                         Fabiola Fuentes MD  12/09/20 2022       Fabiola Fuentes MD  12/09/20 2030

## 2020-12-10 NOTE — ED NOTES
Chief Complaint   Patient presents with    Shortness of Breath       x2 days; has pulmonary sarcadosis    Chest Pain       x2 days     50-year-old female with history of sarcoidosis on chronic steroids recently started taking Bactrim as prophylaxis on Monday and since then started getting shortness of breath and worsening cough.  Denies fever or chills or nausea or vomiting or abdominal pain or weakness or numbness.  Denies any chest pain however cough and shortness of breath and wheezing or getting worse.

## 2020-12-11 ENCOUNTER — PATIENT MESSAGE (OUTPATIENT)
Dept: PULMONOLOGY | Facility: CLINIC | Age: 50
End: 2020-12-11

## 2020-12-14 ENCOUNTER — TELEPHONE (OUTPATIENT)
Dept: PULMONOLOGY | Facility: HOSPITAL | Age: 50
End: 2020-12-14

## 2020-12-14 DIAGNOSIS — R05.9 COUGH: Primary | ICD-10-CM

## 2020-12-14 DIAGNOSIS — F19.982 DRUG-INDUCED INSOMNIA: Primary | ICD-10-CM

## 2020-12-14 RX ORDER — VARENICLINE TARTRATE 0.5 (11)-1
KIT ORAL
Qty: 1 PACKAGE | Refills: 0 | Status: SHIPPED | OUTPATIENT
Start: 2020-12-14 | End: 2022-08-24

## 2020-12-14 RX ORDER — ZOLPIDEM TARTRATE 5 MG/1
5 TABLET ORAL NIGHTLY PRN
Qty: 30 TABLET | Refills: 5 | Status: SHIPPED | OUTPATIENT
Start: 2020-12-14 | End: 2022-08-24

## 2020-12-14 NOTE — TELEPHONE ENCOUNTER
ambien 5 mg called in for patient who is still suffering with insomnia.  She did not  the phone.  I left a message for her to call back if she is still feeling more short of breath.

## 2020-12-15 ENCOUNTER — TELEPHONE (OUTPATIENT)
Dept: PULMONOLOGY | Facility: CLINIC | Age: 50
End: 2020-12-15

## 2020-12-15 NOTE — TELEPHONE ENCOUNTER
Pt called this morning stating that she had not received a response about her message. In review, Dr. Rolon attempted to contact without success and left a message. I called Sumit this morning to advise that her message was received and an rx was called in with further instruction to return to the ER if her SOB gets worse. She did not answer so I also left a message.

## 2020-12-15 NOTE — TELEPHONE ENCOUNTER
"----- Message from Sabrina Crockett sent at 12/15/2020  7:57 AM CST -----  Spoke with her this morning. She said that she sent Dr Rolon an message "with some urgency" and has not gotten a response. Said that she ended up in the ER again and wanted to speak with either Dr Rolon or Massiel.    "

## 2020-12-21 ENCOUNTER — TELEPHONE (OUTPATIENT)
Dept: PULMONOLOGY | Facility: CLINIC | Age: 50
End: 2020-12-21

## 2020-12-21 NOTE — TELEPHONE ENCOUNTER
The patient is complaining of knee pain.  This is likely secondary to her prednisone.  If she is not dropped to 30 mg will drop it to 30 mg at this time.  No answer, left message.

## 2020-12-23 ENCOUNTER — TELEPHONE (OUTPATIENT)
Dept: PULMONOLOGY | Facility: CLINIC | Age: 50
End: 2020-12-23

## 2020-12-23 NOTE — TELEPHONE ENCOUNTER
Spoke with the patient about her knee pain.  Reminded her to be cautious taking nonsteroidals with steroids.  Her breathing is doing well so we will dropped from 30 mg to 20 mg after 2 weeks.  She will call me at that time.

## 2020-12-30 ENCOUNTER — TELEPHONE (OUTPATIENT)
Dept: PULMONOLOGY | Facility: CLINIC | Age: 50
End: 2020-12-30

## 2020-12-30 ENCOUNTER — HOSPITAL ENCOUNTER (OUTPATIENT)
Dept: RADIOLOGY | Facility: HOSPITAL | Age: 50
Discharge: HOME | End: 2020-12-30
Attending: INTERNAL MEDICINE
Payer: MEDICAID

## 2020-12-30 ENCOUNTER — OFFICE VISIT (OUTPATIENT)
Dept: PULMONOLOGY | Facility: CLINIC | Age: 50
End: 2020-12-30
Payer: OTHER GOVERNMENT

## 2020-12-30 VITALS
HEART RATE: 93 BPM | TEMPERATURE: 97 F | BODY MASS INDEX: 25.34 KG/M2 | HEIGHT: 63 IN | WEIGHT: 143 LBS | DIASTOLIC BLOOD PRESSURE: 80 MMHG | SYSTOLIC BLOOD PRESSURE: 120 MMHG | OXYGEN SATURATION: 99 %

## 2020-12-30 DIAGNOSIS — Z53.21 PATIENT LEFT BEFORE EVALUATION BY PHYSICIAN: Primary | ICD-10-CM

## 2020-12-30 DIAGNOSIS — D86.9 SARCOIDOSIS: Primary | ICD-10-CM

## 2020-12-30 DIAGNOSIS — D86.9 SARCOIDOSIS: ICD-10-CM

## 2020-12-30 PROCEDURE — 71046 X-RAY EXAM CHEST 2 VIEWS: CPT | Mod: TC

## 2020-12-30 NOTE — TELEPHONE ENCOUNTER
Pt is experiencing a lot of pain in from her mid thigh down on both legs, she also feels like her hands and feet are swelling which is very painful.  She was on 40mg prednisone but currently taking 30mg and was hoping to taper down more.  Pt also had a CXR done today and would like the results.  Pt would like for you to call her about the prednisone and pain as well as CXR results.

## 2020-12-30 NOTE — TELEPHONE ENCOUNTER
Tried to reach patient.  No answer.  Advised that she dropped to 20 mg of prednisone.  Told her her chest x-ray was stable.  Told her that the severe swelling and pain in her hands and pain running down her legs is unlikely to be the sarcoid or the prednisone.  Advised that she follow-up with her PCP.

## 2021-04-21 ENCOUNTER — TELEPHONE (OUTPATIENT)
Dept: PULMONOLOGY | Facility: CLINIC | Age: 51
End: 2021-04-21

## 2021-04-26 ENCOUNTER — TELEPHONE (OUTPATIENT)
Dept: PULMONOLOGY | Facility: HOSPITAL | Age: 51
End: 2021-04-26

## 2021-05-03 ENCOUNTER — TELEPHONE (OUTPATIENT)
Dept: PULMONOLOGY | Facility: CLINIC | Age: 51
End: 2021-05-03

## 2021-05-04 ENCOUNTER — OFFICE VISIT (OUTPATIENT)
Dept: PULMONOLOGY | Facility: CLINIC | Age: 51
End: 2021-05-04
Payer: MEDICAID

## 2021-05-04 VITALS
WEIGHT: 152 LBS | DIASTOLIC BLOOD PRESSURE: 90 MMHG | OXYGEN SATURATION: 98 % | HEIGHT: 63 IN | SYSTOLIC BLOOD PRESSURE: 140 MMHG | BODY MASS INDEX: 26.93 KG/M2 | HEART RATE: 90 BPM

## 2021-05-04 DIAGNOSIS — R06.02 SHORTNESS OF BREATH: ICD-10-CM

## 2021-05-04 DIAGNOSIS — D86.9 SARCOIDOSIS: Primary | ICD-10-CM

## 2021-05-04 PROCEDURE — 99214 PR OFFICE/OUTPT VISIT, EST, LEVL IV, 30-39 MIN: ICD-10-PCS | Mod: S$GLB,,, | Performed by: INTERNAL MEDICINE

## 2021-05-04 PROCEDURE — 99214 OFFICE O/P EST MOD 30 MIN: CPT | Mod: S$GLB,,, | Performed by: INTERNAL MEDICINE

## 2021-05-12 ENCOUNTER — TELEPHONE (OUTPATIENT)
Dept: PULMONOLOGY | Facility: HOSPITAL | Age: 51
End: 2021-05-12

## 2021-05-12 ENCOUNTER — HOSPITAL ENCOUNTER (OUTPATIENT)
Dept: RADIOLOGY | Facility: HOSPITAL | Age: 51
Discharge: HOME | End: 2021-05-12
Attending: INTERNAL MEDICINE
Payer: MEDICAID

## 2021-05-12 ENCOUNTER — HOSPITAL ENCOUNTER (OUTPATIENT)
Dept: PULMONOLOGY | Facility: HOSPITAL | Age: 51
Discharge: HOME | End: 2021-05-12
Attending: INTERNAL MEDICINE
Payer: MEDICAID

## 2021-05-12 DIAGNOSIS — R06.02 SHORTNESS OF BREATH: ICD-10-CM

## 2021-05-12 DIAGNOSIS — D86.9 SARCOIDOSIS: ICD-10-CM

## 2021-05-12 PROCEDURE — 94729 DIFFUSING CAPACITY: CPT

## 2021-05-12 PROCEDURE — 94010 BREATHING CAPACITY TEST: CPT

## 2021-05-12 PROCEDURE — 25500020 PHARM REV CODE 255: Performed by: INTERNAL MEDICINE

## 2021-05-12 PROCEDURE — 71260 CT THORAX DX C+: CPT | Mod: TC

## 2021-05-12 PROCEDURE — 94727 GAS DIL/WSHOT DETER LNG VOL: CPT

## 2021-05-12 RX ADMIN — IOHEXOL 100 ML: 350 INJECTION, SOLUTION INTRAVENOUS at 07:05

## 2021-05-14 ENCOUNTER — TELEPHONE (OUTPATIENT)
Dept: PULMONOLOGY | Facility: CLINIC | Age: 51
End: 2021-05-14

## 2021-12-17 ENCOUNTER — TELEPHONE (OUTPATIENT)
Dept: PULMONOLOGY | Facility: CLINIC | Age: 51
End: 2021-12-17

## 2021-12-17 ENCOUNTER — OFFICE VISIT (OUTPATIENT)
Dept: PULMONOLOGY | Facility: CLINIC | Age: 51
End: 2021-12-17
Payer: MEDICAID

## 2021-12-17 ENCOUNTER — HOSPITAL ENCOUNTER (OUTPATIENT)
Dept: RADIOLOGY | Facility: HOSPITAL | Age: 51
Discharge: HOME | End: 2021-12-17
Attending: NURSE PRACTITIONER
Payer: MEDICAID

## 2021-12-17 VITALS
WEIGHT: 152 LBS | OXYGEN SATURATION: 99 % | SYSTOLIC BLOOD PRESSURE: 171 MMHG | BODY MASS INDEX: 26.93 KG/M2 | HEART RATE: 99 BPM | DIASTOLIC BLOOD PRESSURE: 110 MMHG | HEIGHT: 63 IN

## 2021-12-17 DIAGNOSIS — D86.9 SARCOIDOSIS: Primary | ICD-10-CM

## 2021-12-17 DIAGNOSIS — Z72.0 TOBACCO ABUSE: ICD-10-CM

## 2021-12-17 DIAGNOSIS — D86.9 SARCOIDOSIS: ICD-10-CM

## 2021-12-17 PROCEDURE — 99214 OFFICE O/P EST MOD 30 MIN: CPT | Mod: S$GLB,,, | Performed by: NURSE PRACTITIONER

## 2021-12-17 PROCEDURE — 99214 PR OFFICE/OUTPT VISIT, EST, LEVL IV, 30-39 MIN: ICD-10-PCS | Mod: S$GLB,,, | Performed by: NURSE PRACTITIONER

## 2021-12-17 PROCEDURE — 71046 X-RAY EXAM CHEST 2 VIEWS: CPT | Mod: TC

## 2022-01-17 ENCOUNTER — HOSPITAL ENCOUNTER (EMERGENCY)
Facility: HOSPITAL | Age: 52
Discharge: HOME | End: 2022-01-17
Attending: EMERGENCY MEDICINE
Payer: MEDICAID

## 2022-01-17 VITALS
OXYGEN SATURATION: 98 % | TEMPERATURE: 98 F | HEIGHT: 65 IN | RESPIRATION RATE: 18 BRPM | DIASTOLIC BLOOD PRESSURE: 109 MMHG | WEIGHT: 145 LBS | HEART RATE: 93 BPM | SYSTOLIC BLOOD PRESSURE: 168 MMHG | BODY MASS INDEX: 24.16 KG/M2

## 2022-01-17 DIAGNOSIS — S46.812A STRAIN OF LEFT TRAPEZIUS MUSCLE, INITIAL ENCOUNTER: ICD-10-CM

## 2022-01-17 DIAGNOSIS — R52 PAIN: ICD-10-CM
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
## 2022-01-17 LAB
B-HCG UR QL: NEGATIVE
CTP QC/QA: YES

## 2022-01-17 PROCEDURE — 99285 EMERGENCY DEPT VISIT HI MDM: CPT | Mod: 25

## 2022-01-17 PROCEDURE — 93005 ELECTROCARDIOGRAM TRACING: CPT | Performed by: INTERNAL MEDICINE

## 2022-01-17 PROCEDURE — 81025 URINE PREGNANCY TEST: CPT | Performed by: STUDENT IN AN ORGANIZED HEALTH CARE EDUCATION/TRAINING PROGRAM

## 2022-01-17 RX ORDER — METHOCARBAMOL 500 MG/1
1000 TABLET, FILM COATED ORAL 3 TIMES DAILY
Qty: 30 TABLET | Refills: 0 | Status: SHIPPED | OUTPATIENT
Start: 2022-01-17 | End: 2022-01-22
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
## 2022-01-17 NOTE — Clinical Note
"Sumit Ceja" Tao was seen and treated in our emergency department on 1/17/2022.  She may return to work on 01/18/2022.       If you have any questions or concerns, please don't hesitate to call.      Luis Angel Curran PA-C"
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
## 2022-08-10 ENCOUNTER — ANESTHESIA EVENT (OUTPATIENT)
Dept: SURGERY | Facility: HOSPITAL | Age: 52
End: 2022-08-10
Payer: OTHER GOVERNMENT

## 2022-08-10 ENCOUNTER — ANESTHESIA (OUTPATIENT)
Dept: SURGERY | Facility: HOSPITAL | Age: 52
End: 2022-08-10
Payer: OTHER GOVERNMENT

## 2022-08-10 ENCOUNTER — HOSPITAL ENCOUNTER (OUTPATIENT)
Facility: HOSPITAL | Age: 52
Discharge: HOME | End: 2022-08-11
Attending: EMERGENCY MEDICINE | Admitting: INTERNAL MEDICINE
Payer: OTHER GOVERNMENT

## 2022-08-10 DIAGNOSIS — R10.13 EPIGASTRIC PAIN: ICD-10-CM

## 2022-08-10 DIAGNOSIS — R11.2 NON-INTRACTABLE VOMITING WITH NAUSEA, UNSPECIFIED VOMITING TYPE: ICD-10-CM

## 2022-08-10 DIAGNOSIS — K37 APPENDICITIS, UNSPECIFIED APPENDICITIS TYPE: Primary | ICD-10-CM

## 2022-08-10 PROBLEM — K35.80 ACUTE APPENDICITIS: Status: ACTIVE | Noted: 2022-08-10

## 2022-08-10 LAB
ALBUMIN SERPL BCP-MCNC: 4.7 G/DL (ref 3.5–5.2)
ALP SERPL-CCNC: 45 U/L (ref 55–135)
ALT SERPL W/O P-5'-P-CCNC: 32 U/L (ref 10–44)
ANION GAP SERPL CALC-SCNC: 10 MMOL/L (ref 8–16)
AST SERPL-CCNC: 28 U/L (ref 10–40)
B-HCG UR QL: NEGATIVE
BACTERIA #/AREA URNS HPF: NEGATIVE /HPF
BASOPHILS # BLD AUTO: 0.02 K/UL (ref 0–0.2)
BASOPHILS NFR BLD: 0.1 % (ref 0–1.9)
BILIRUB SERPL-MCNC: 0.5 MG/DL (ref 0.1–1)
BILIRUB UR QL STRIP: NEGATIVE
BUN SERPL-MCNC: 8 MG/DL (ref 6–20)
CALCIUM SERPL-MCNC: 9.6 MG/DL (ref 8.7–10.5)
CHLORIDE SERPL-SCNC: 95 MMOL/L (ref 95–110)
CLARITY UR: CLEAR
CO2 SERPL-SCNC: 24 MMOL/L (ref 23–29)
COLOR UR: YELLOW
CREAT SERPL-MCNC: 0.6 MG/DL (ref 0.5–1.4)
CTP QC/QA: YES
DIFFERENTIAL METHOD: ABNORMAL
EOSINOPHIL # BLD AUTO: 0 K/UL (ref 0–0.5)
EOSINOPHIL NFR BLD: 0 % (ref 0–8)
ERYTHROCYTE [DISTWIDTH] IN BLOOD BY AUTOMATED COUNT: 13.8 % (ref 11.5–14.5)
EST. GFR  (NO RACE VARIABLE): >60 ML/MIN/1.73 M^2
GLUCOSE SERPL-MCNC: 129 MG/DL (ref 70–110)
GLUCOSE UR QL STRIP: NEGATIVE
HCT VFR BLD AUTO: 41.8 % (ref 37–48.5)
HGB BLD-MCNC: 13.8 G/DL (ref 12–16)
HGB UR QL STRIP: ABNORMAL
HYALINE CASTS #/AREA URNS LPF: 6 /LPF
IMM GRANULOCYTES # BLD AUTO: 0.07 K/UL (ref 0–0.04)
IMM GRANULOCYTES NFR BLD AUTO: 0.4 % (ref 0–0.5)
KETONES UR QL STRIP: NEGATIVE
LEUKOCYTE ESTERASE UR QL STRIP: NEGATIVE
LIPASE SERPL-CCNC: 25 U/L (ref 4–60)
LYMPHOCYTES # BLD AUTO: 0.9 K/UL (ref 1–4.8)
LYMPHOCYTES NFR BLD: 5.2 % (ref 18–48)
MCH RBC QN AUTO: 28.4 PG (ref 27–31)
MCHC RBC AUTO-ENTMCNC: 33 G/DL (ref 32–36)
MCV RBC AUTO: 86 FL (ref 82–98)
MICROSCOPIC COMMENT: ABNORMAL
MONOCYTES # BLD AUTO: 0.6 K/UL (ref 0.3–1)
MONOCYTES NFR BLD: 3.5 % (ref 4–15)
NEUTROPHILS # BLD AUTO: 16.2 K/UL (ref 1.8–7.7)
NEUTROPHILS NFR BLD: 90.8 % (ref 38–73)
NITRITE UR QL STRIP: NEGATIVE
NRBC BLD-RTO: 0 /100 WBC
PH UR STRIP: 6 [PH] (ref 5–8)
PLATELET # BLD AUTO: 259 K/UL (ref 150–450)
PLATELET BLD QL SMEAR: ABNORMAL
PMV BLD AUTO: 11.4 FL (ref 9.2–12.9)
POTASSIUM SERPL-SCNC: 3.7 MMOL/L (ref 3.5–5.1)
PROT SERPL-MCNC: 8.8 G/DL (ref 6–8.4)
PROT UR QL STRIP: ABNORMAL
RBC # BLD AUTO: 4.86 M/UL (ref 4–5.4)
RBC #/AREA URNS HPF: 3 /HPF (ref 0–4)
SARS-COV-2 RDRP RESP QL NAA+PROBE: NEGATIVE
SODIUM SERPL-SCNC: 129 MMOL/L (ref 136–145)
SP GR UR STRIP: 1.01 (ref 1–1.03)
SQUAMOUS #/AREA URNS HPF: 5 /HPF
URN SPEC COLLECT METH UR: ABNORMAL
UROBILINOGEN UR STRIP-ACNC: NEGATIVE EU/DL
WBC # BLD AUTO: 17.88 K/UL (ref 3.9–12.7)
WBC #/AREA URNS HPF: 3 /HPF (ref 0–5)

## 2022-08-10 PROCEDURE — 81025 URINE PREGNANCY TEST: CPT | Performed by: PHYSICIAN ASSISTANT

## 2022-08-10 PROCEDURE — 44970 LAPAROSCOPY APPENDECTOMY: CPT | Mod: ,,, | Performed by: SURGERY

## 2022-08-10 PROCEDURE — 25000003 PHARM REV CODE 250: Performed by: NURSE ANESTHETIST, CERTIFIED REGISTERED

## 2022-08-10 PROCEDURE — 63600175 PHARM REV CODE 636 W HCPCS: Performed by: ANESTHESIOLOGY

## 2022-08-10 PROCEDURE — 71000039 HC RECOVERY, EACH ADD'L HOUR: Performed by: SURGERY

## 2022-08-10 PROCEDURE — 37000008 HC ANESTHESIA 1ST 15 MINUTES: Performed by: SURGERY

## 2022-08-10 PROCEDURE — 00840 ANES IPER PX LOWER ABD NOS: CPT | Performed by: SURGERY

## 2022-08-10 PROCEDURE — 27000671 HC TUBING MICROBORE EXT: Performed by: ANESTHESIOLOGY

## 2022-08-10 PROCEDURE — G0378 HOSPITAL OBSERVATION PER HR: HCPCS

## 2022-08-10 PROCEDURE — 63600175 PHARM REV CODE 636 W HCPCS: Performed by: EMERGENCY MEDICINE

## 2022-08-10 PROCEDURE — 96375 TX/PRO/DX INJ NEW DRUG ADDON: CPT

## 2022-08-10 PROCEDURE — 25500020 PHARM REV CODE 255: Performed by: EMERGENCY MEDICINE

## 2022-08-10 PROCEDURE — 25000003 PHARM REV CODE 250: Performed by: EMERGENCY MEDICINE

## 2022-08-10 PROCEDURE — 27202177 HC INTRODUCER, TRACHEAL TUBE: Performed by: ANESTHESIOLOGY

## 2022-08-10 PROCEDURE — 96376 TX/PRO/DX INJ SAME DRUG ADON: CPT

## 2022-08-10 PROCEDURE — U0002 COVID-19 LAB TEST NON-CDC: HCPCS | Performed by: EMERGENCY MEDICINE

## 2022-08-10 PROCEDURE — 25000003 PHARM REV CODE 250: Performed by: PHYSICIAN ASSISTANT

## 2022-08-10 PROCEDURE — 37000009 HC ANESTHESIA EA ADD 15 MINS: Performed by: SURGERY

## 2022-08-10 PROCEDURE — 36000708 HC OR TIME LEV III 1ST 15 MIN: Performed by: SURGERY

## 2022-08-10 PROCEDURE — 96375 TX/PRO/DX INJ NEW DRUG ADDON: CPT | Mod: 59

## 2022-08-10 PROCEDURE — 25000003 PHARM REV CODE 250: Performed by: SURGERY

## 2022-08-10 PROCEDURE — 27201423 OPTIME MED/SURG SUP & DEVICES STERILE SUPPLY: Performed by: SURGERY

## 2022-08-10 PROCEDURE — 27000673 HC TUBING BLOOD Y: Performed by: ANESTHESIOLOGY

## 2022-08-10 PROCEDURE — 63600175 PHARM REV CODE 636 W HCPCS: Performed by: NURSE ANESTHETIST, CERTIFIED REGISTERED

## 2022-08-10 PROCEDURE — 96365 THER/PROPH/DIAG IV INF INIT: CPT | Mod: 59

## 2022-08-10 PROCEDURE — 71000033 HC RECOVERY, INTIAL HOUR: Performed by: SURGERY

## 2022-08-10 PROCEDURE — 85025 COMPLETE CBC W/AUTO DIFF WBC: CPT | Performed by: NURSE PRACTITIONER

## 2022-08-10 PROCEDURE — 99220 PR INITIAL OBSERVATION CARE,LEVL III: CPT | Mod: 57,,, | Performed by: SURGERY

## 2022-08-10 PROCEDURE — 96361 HYDRATE IV INFUSION ADD-ON: CPT

## 2022-08-10 PROCEDURE — 88304 TISSUE EXAM BY PATHOLOGIST: CPT | Mod: TC

## 2022-08-10 PROCEDURE — 80053 COMPREHEN METABOLIC PANEL: CPT | Performed by: NURSE PRACTITIONER

## 2022-08-10 PROCEDURE — 83690 ASSAY OF LIPASE: CPT | Performed by: NURSE PRACTITIONER

## 2022-08-10 PROCEDURE — 36000709 HC OR TIME LEV III EA ADD 15 MIN: Performed by: SURGERY

## 2022-08-10 PROCEDURE — 44970 PR LAP,APPENDECTOMY: ICD-10-PCS | Mod: ,,, | Performed by: SURGERY

## 2022-08-10 PROCEDURE — 27202107 HC XP QUATRO SENSOR: Performed by: ANESTHESIOLOGY

## 2022-08-10 PROCEDURE — 99285 EMERGENCY DEPT VISIT HI MDM: CPT | Mod: 25

## 2022-08-10 PROCEDURE — 81001 URINALYSIS AUTO W/SCOPE: CPT | Performed by: NURSE PRACTITIONER

## 2022-08-10 PROCEDURE — 63600175 PHARM REV CODE 636 W HCPCS: Performed by: SURGERY

## 2022-08-10 PROCEDURE — 99220 PR INITIAL OBSERVATION CARE,LEVL III: ICD-10-PCS | Mod: 57,,, | Performed by: SURGERY

## 2022-08-10 PROCEDURE — 63600175 PHARM REV CODE 636 W HCPCS: Performed by: PHYSICIAN ASSISTANT

## 2022-08-10 RX ORDER — MEPERIDINE HYDROCHLORIDE 50 MG/ML
12.5 INJECTION INTRAMUSCULAR; INTRAVENOUS; SUBCUTANEOUS EVERY 10 MIN PRN
Status: DISCONTINUED | OUTPATIENT
Start: 2022-08-10 | End: 2022-08-10 | Stop reason: HOSPADM

## 2022-08-10 RX ORDER — ONDANSETRON 2 MG/ML
INJECTION INTRAMUSCULAR; INTRAVENOUS
Status: DISCONTINUED | OUTPATIENT
Start: 2022-08-10 | End: 2022-08-10

## 2022-08-10 RX ORDER — MORPHINE SULFATE 4 MG/ML
4 INJECTION, SOLUTION INTRAMUSCULAR; INTRAVENOUS EVERY 4 HOURS PRN
Status: DISCONTINUED | OUTPATIENT
Start: 2022-08-10 | End: 2022-08-10

## 2022-08-10 RX ORDER — ACETAMINOPHEN 10 MG/ML
1000 INJECTION, SOLUTION INTRAVENOUS EVERY 8 HOURS
Status: DISCONTINUED | OUTPATIENT
Start: 2022-08-10 | End: 2022-08-11

## 2022-08-10 RX ORDER — PROPOFOL 10 MG/ML
VIAL (ML) INTRAVENOUS
Status: DISCONTINUED | OUTPATIENT
Start: 2022-08-10 | End: 2022-08-10

## 2022-08-10 RX ORDER — SODIUM CHLORIDE 9 MG/ML
INJECTION, SOLUTION INTRAVENOUS CONTINUOUS
Status: DISCONTINUED | OUTPATIENT
Start: 2022-08-10 | End: 2022-08-10

## 2022-08-10 RX ORDER — LANOLIN ALCOHOL/MO/W.PET/CERES
800 CREAM (GRAM) TOPICAL
Status: DISCONTINUED | OUTPATIENT
Start: 2022-08-10 | End: 2022-08-11 | Stop reason: HOSPADM

## 2022-08-10 RX ORDER — FENTANYL CITRATE 50 UG/ML
INJECTION, SOLUTION INTRAMUSCULAR; INTRAVENOUS
Status: DISCONTINUED | OUTPATIENT
Start: 2022-08-10 | End: 2022-08-10

## 2022-08-10 RX ORDER — SODIUM CHLORIDE 9 MG/ML
INJECTION, SOLUTION INTRAVENOUS CONTINUOUS
Status: DISCONTINUED | OUTPATIENT
Start: 2022-08-10 | End: 2022-08-11 | Stop reason: HOSPADM

## 2022-08-10 RX ORDER — KETOROLAC TROMETHAMINE 30 MG/ML
15 INJECTION, SOLUTION INTRAMUSCULAR; INTRAVENOUS
Status: COMPLETED | OUTPATIENT
Start: 2022-08-10 | End: 2022-08-10

## 2022-08-10 RX ORDER — ACETAMINOPHEN 10 MG/ML
INJECTION, SOLUTION INTRAVENOUS
Status: DISCONTINUED | OUTPATIENT
Start: 2022-08-10 | End: 2022-08-10

## 2022-08-10 RX ORDER — ONDANSETRON 2 MG/ML
4 INJECTION INTRAMUSCULAR; INTRAVENOUS EVERY 6 HOURS PRN
Status: DISCONTINUED | OUTPATIENT
Start: 2022-08-10 | End: 2022-08-10

## 2022-08-10 RX ORDER — POLYETHYLENE GLYCOL 3350 17 G/17G
17 POWDER, FOR SOLUTION ORAL 2 TIMES DAILY PRN
Status: DISCONTINUED | OUTPATIENT
Start: 2022-08-10 | End: 2022-08-11 | Stop reason: HOSPADM

## 2022-08-10 RX ORDER — HYDROCODONE BITARTRATE AND ACETAMINOPHEN 5; 325 MG/1; MG/1
1 TABLET ORAL EVERY 4 HOURS PRN
Status: DISCONTINUED | OUTPATIENT
Start: 2022-08-10 | End: 2022-08-11 | Stop reason: HOSPADM

## 2022-08-10 RX ORDER — ONDANSETRON 2 MG/ML
4 INJECTION INTRAMUSCULAR; INTRAVENOUS EVERY 12 HOURS PRN
Status: DISCONTINUED | OUTPATIENT
Start: 2022-08-10 | End: 2022-08-11 | Stop reason: HOSPADM

## 2022-08-10 RX ORDER — TALC
9 POWDER (GRAM) TOPICAL NIGHTLY PRN
Status: DISCONTINUED | OUTPATIENT
Start: 2022-08-10 | End: 2022-08-11 | Stop reason: HOSPADM

## 2022-08-10 RX ORDER — SODIUM CHLORIDE 0.9 % (FLUSH) 0.9 %
10 SYRINGE (ML) INJECTION
Status: DISCONTINUED | OUTPATIENT
Start: 2022-08-10 | End: 2022-08-11 | Stop reason: HOSPADM

## 2022-08-10 RX ORDER — ONDANSETRON 2 MG/ML
4 INJECTION INTRAMUSCULAR; INTRAVENOUS DAILY PRN
Status: DISCONTINUED | OUTPATIENT
Start: 2022-08-10 | End: 2022-08-10 | Stop reason: HOSPADM

## 2022-08-10 RX ORDER — ENOXAPARIN SODIUM 100 MG/ML
40 INJECTION SUBCUTANEOUS EVERY 24 HOURS
Status: DISCONTINUED | OUTPATIENT
Start: 2022-08-10 | End: 2022-08-11 | Stop reason: HOSPADM

## 2022-08-10 RX ORDER — ONDANSETRON 2 MG/ML
4 INJECTION INTRAMUSCULAR; INTRAVENOUS
Status: COMPLETED | OUTPATIENT
Start: 2022-08-10 | End: 2022-08-10

## 2022-08-10 RX ORDER — LIDOCAINE HYDROCHLORIDE 20 MG/ML
INJECTION, SOLUTION EPIDURAL; INFILTRATION; INTRACAUDAL; PERINEURAL
Status: DISCONTINUED | OUTPATIENT
Start: 2022-08-10 | End: 2022-08-10

## 2022-08-10 RX ORDER — FENTANYL CITRATE 50 UG/ML
25 INJECTION, SOLUTION INTRAMUSCULAR; INTRAVENOUS EVERY 5 MIN PRN
Status: COMPLETED | OUTPATIENT
Start: 2022-08-10 | End: 2022-08-10

## 2022-08-10 RX ORDER — SUCCINYLCHOLINE CHLORIDE 20 MG/ML
INJECTION INTRAMUSCULAR; INTRAVENOUS
Status: DISCONTINUED | OUTPATIENT
Start: 2022-08-10 | End: 2022-08-10

## 2022-08-10 RX ORDER — LIDOCAINE HYDROCHLORIDE 20 MG/ML
JELLY TOPICAL
Status: DISCONTINUED | OUTPATIENT
Start: 2022-08-10 | End: 2022-08-10

## 2022-08-10 RX ORDER — AMLODIPINE BESYLATE 10 MG/1
10 TABLET ORAL DAILY
COMMUNITY
Start: 2021-12-30 | End: 2023-10-11

## 2022-08-10 RX ORDER — FAMOTIDINE 10 MG/ML
INJECTION INTRAVENOUS
Status: DISCONTINUED | OUTPATIENT
Start: 2022-08-10 | End: 2022-08-10

## 2022-08-10 RX ORDER — ESMOLOL HYDROCHLORIDE 10 MG/ML
INJECTION INTRAVENOUS
Status: DISCONTINUED | OUTPATIENT
Start: 2022-08-10 | End: 2022-08-10

## 2022-08-10 RX ORDER — LEVOFLOXACIN 5 MG/ML
750 INJECTION, SOLUTION INTRAVENOUS
Status: DISCONTINUED | OUTPATIENT
Start: 2022-08-10 | End: 2022-08-11 | Stop reason: HOSPADM

## 2022-08-10 RX ORDER — LOSARTAN POTASSIUM 100 MG/1
50 TABLET ORAL
Status: ON HOLD | COMMUNITY
Start: 2021-12-17 | End: 2022-08-11 | Stop reason: HOSPADM

## 2022-08-10 RX ORDER — MUPIROCIN 20 MG/G
1 OINTMENT TOPICAL 2 TIMES DAILY
Status: DISCONTINUED | OUTPATIENT
Start: 2022-08-10 | End: 2022-08-11 | Stop reason: HOSPADM

## 2022-08-10 RX ORDER — ROCURONIUM BROMIDE 10 MG/ML
INJECTION, SOLUTION INTRAVENOUS
Status: DISCONTINUED | OUTPATIENT
Start: 2022-08-10 | End: 2022-08-10

## 2022-08-10 RX ORDER — HYDROMORPHONE HYDROCHLORIDE 1 MG/ML
1 INJECTION, SOLUTION INTRAMUSCULAR; INTRAVENOUS; SUBCUTANEOUS EVERY 4 HOURS PRN
Status: DISCONTINUED | OUTPATIENT
Start: 2022-08-10 | End: 2022-08-11

## 2022-08-10 RX ORDER — ONDANSETRON 4 MG/1
4 TABLET, ORALLY DISINTEGRATING ORAL EVERY 6 HOURS PRN
Qty: 12 TABLET | Refills: 0 | Status: SHIPPED | OUTPATIENT
Start: 2022-08-10 | End: 2023-06-30

## 2022-08-10 RX ORDER — MORPHINE SULFATE 2 MG/ML
2 INJECTION, SOLUTION INTRAMUSCULAR; INTRAVENOUS
Status: COMPLETED | OUTPATIENT
Start: 2022-08-10 | End: 2022-08-10

## 2022-08-10 RX ORDER — MIDAZOLAM HYDROCHLORIDE 1 MG/ML
INJECTION INTRAMUSCULAR; INTRAVENOUS
Status: DISCONTINUED | OUTPATIENT
Start: 2022-08-10 | End: 2022-08-10

## 2022-08-10 RX ADMIN — FENTANYL CITRATE 25 MCG: 50 INJECTION, SOLUTION INTRAMUSCULAR; INTRAVENOUS at 06:08

## 2022-08-10 RX ADMIN — LIDOCAINE HYDROCHLORIDE 5 ML: 20 JELLY TOPICAL at 05:08

## 2022-08-10 RX ADMIN — SUCCINYLCHOLINE CHLORIDE 140 MG: 20 INJECTION, SOLUTION INTRAMUSCULAR; INTRAVENOUS at 05:08

## 2022-08-10 RX ADMIN — MORPHINE SULFATE 2 MG: 2 INJECTION, SOLUTION INTRAMUSCULAR; INTRAVENOUS at 04:08

## 2022-08-10 RX ADMIN — IOHEXOL 100 ML: 350 INJECTION, SOLUTION INTRAVENOUS at 03:08

## 2022-08-10 RX ADMIN — ACETAMINOPHEN 1000 MG: 10 INJECTION, SOLUTION INTRAVENOUS at 05:08

## 2022-08-10 RX ADMIN — KETOROLAC TROMETHAMINE 15 MG: 30 INJECTION, SOLUTION INTRAMUSCULAR at 12:08

## 2022-08-10 RX ADMIN — SUGAMMADEX 400 MG: 100 INJECTION, SOLUTION INTRAVENOUS at 05:08

## 2022-08-10 RX ADMIN — SODIUM CHLORIDE 1000 ML: 0.9 INJECTION, SOLUTION INTRAVENOUS at 12:08

## 2022-08-10 RX ADMIN — ROCURONIUM BROMIDE 20 MG: 10 INJECTION, SOLUTION INTRAVENOUS at 05:08

## 2022-08-10 RX ADMIN — SODIUM CHLORIDE 1000 ML: 0.9 INJECTION, SOLUTION INTRAVENOUS at 03:08

## 2022-08-10 RX ADMIN — ACETAMINOPHEN 1000 MG: 10 INJECTION, SOLUTION INTRAVENOUS at 09:08

## 2022-08-10 RX ADMIN — FAMOTIDINE 20 MG: 10 INJECTION, SOLUTION INTRAVENOUS at 05:08

## 2022-08-10 RX ADMIN — SODIUM CHLORIDE: 0.9 INJECTION, SOLUTION INTRAVENOUS at 08:08

## 2022-08-10 RX ADMIN — FENTANYL CITRATE 50 MCG: 50 INJECTION INTRAMUSCULAR; INTRAVENOUS at 06:08

## 2022-08-10 RX ADMIN — ROCURONIUM BROMIDE 5 MG: 10 INJECTION, SOLUTION INTRAVENOUS at 05:08

## 2022-08-10 RX ADMIN — LEVOFLOXACIN 750 MG: 5 INJECTION, SOLUTION INTRAVENOUS at 05:08

## 2022-08-10 RX ADMIN — PROPOFOL 140 MG: 10 INJECTION, EMULSION INTRAVENOUS at 05:08

## 2022-08-10 RX ADMIN — ONDANSETRON 4 MG: 2 INJECTION INTRAMUSCULAR; INTRAVENOUS at 12:08

## 2022-08-10 RX ADMIN — SODIUM CHLORIDE: 900 INJECTION INTRAVENOUS at 05:08

## 2022-08-10 RX ADMIN — HYDROMORPHONE HYDROCHLORIDE 1 MG: 1 INJECTION, SOLUTION INTRAMUSCULAR; INTRAVENOUS; SUBCUTANEOUS at 11:08

## 2022-08-10 RX ADMIN — ONDANSETRON 4 MG: 2 INJECTION INTRAMUSCULAR; INTRAVENOUS at 05:08

## 2022-08-10 RX ADMIN — FENTANYL CITRATE 50 MCG: 50 INJECTION INTRAMUSCULAR; INTRAVENOUS at 05:08

## 2022-08-10 RX ADMIN — IBUPROFEN 600 MG: 400 TABLET ORAL at 09:08

## 2022-08-10 RX ADMIN — ESMOLOL HYDROCHLORIDE 40 MG: 10 INJECTION, SOLUTION INTRAVENOUS at 05:08

## 2022-08-10 RX ADMIN — LIDOCAINE HYDROCHLORIDE 80 MG: 20 INJECTION, SOLUTION EPIDURAL; INFILTRATION; INTRACAUDAL; PERINEURAL at 05:08

## 2022-08-10 RX ADMIN — MIDAZOLAM HYDROCHLORIDE 1 MG: 1 INJECTION, SOLUTION INTRAMUSCULAR; INTRAVENOUS at 05:08

## 2022-08-10 RX ADMIN — ONDANSETRON 4 MG: 2 INJECTION INTRAMUSCULAR; INTRAVENOUS at 03:08

## 2022-08-10 NOTE — BRIEF OP NOTE
[facility]  Brief Operative Note    SUMMARY     Surgery Date: 8/10/2022     Surgeon(s) and Role:     * Jackson Vogel MD - Primary    Assisting Surgeon: None    Pre-op Diagnosis:  Appendicitis, unspecified appendicitis type [K37]    Post-op Diagnosis:  Post-Op Diagnosis Codes:     * Appendicitis, unspecified appendicitis type [K37]    Procedure(s) (LRB):  APPENDECTOMY, LAPAROSCOPIC (N/A)    Anesthesia: General    Operative Findings: Acutely inflamed appendix      Estimated Blood Loss: 10 cc's    Estimated Blood Loss has been documented.         Specimens:   Specimen (24h ago, onward)             Start     Ordered    08/10/22 1745  Specimen to Pathology - Surgery  Once        Comments: Pre-op Diagnosis: Appendicitis, unspecified appendicitis type [K37]Procedure(s):APPENDECTOMY, LAPAROSCOPIC Number of specimens: 1Name of specimens: 1. Appendix     Question:  Release to patient  Answer:  Immediate    08/10/22 7552                [id number]

## 2022-08-10 NOTE — ANESTHESIA PROCEDURE NOTES
Intubation    Date/Time: 8/10/2022 5:19 PM  Performed by: Leonel Christina CRNA  Authorized by: Ta Maier Jr., MD     Intubation:     Induction:  Rapid sequence induction    Intubated:  Postinduction    Mask Ventilation:  N/a    Attempts:  1    Attempted By:  CRNA    Method of Intubation:  Video laryngoscopy    Blade:  Pryor 3    Laryngeal View Grade: Grade I - full view of cords      Difficult Airway Encountered?: No      Complications:  None    Airway Device:  Oral endotracheal tube    Airway Device Size:  7.5    Style/Cuff Inflation:  Cuffed    Tube secured:  21    Secured at:  The lips    Placement Verified By:  Capnometry    Complicating Factors:  None    Findings Post-Intubation:  BS equal bilateral and atraumatic/condition of teeth unchanged

## 2022-08-10 NOTE — TRANSFER OF CARE
Anesthesia Transfer of Care Note    Patient: Sumit Burger    Procedure(s) Performed: Procedure(s) (LRB):  APPENDECTOMY, LAPAROSCOPIC (N/A)    Patient location: PACU    Anesthesia Type: general    Transport from OR: Transported from OR on room air with adequate spontaneous ventilation    Post pain: adequate analgesia    Post assessment: no apparent anesthetic complications    Post vital signs: stable    Level of consciousness: awake, alert and oriented    Nausea/Vomiting: no nausea/vomiting    Complications: none    Transfer of care protocol was followed      Last vitals:   Visit Vitals  BP (!) 168/94   Pulse 99   Temp 36.9 °C (98.5 °F) (Oral)   Resp 16   Wt 72.6 kg (160 lb)   SpO2 99%   BMI 26.63 kg/m²

## 2022-08-10 NOTE — ANESTHESIA PREPROCEDURE EVALUATION
08/10/2022  Sumit Burger is a 51 y.o., female.      Pre-op Assessment    I have reviewed the Patient Summary Reports.     I have reviewed the Nursing Notes. I have reviewed the NPO Status.   I have reviewed the Medications.     Review of Systems  Anesthesia Hx:  No problems with previous Anesthesia Denies Hx of Anesthetic complications  Neg history of prior surgery. Denies Family Hx of Anesthesia complications.   Denies Personal Hx of Anesthesia complications.   Social:  No Alcohol Use, Smoker    Hematology/Oncology:  Hematology Normal   Oncology Normal     EENT/Dental:EENT/Dental Normal   Cardiovascular:   Hypertension    Pulmonary:  Pulmonary Normal Hx of Sarcoidosis, but patient states currently clear   Renal/:  Renal/ Normal     Hepatic/GI:  Hepatic/GI Normal    Musculoskeletal:  Musculoskeletal Normal    Neurological:   Neuromuscular disease: Carpal tunnel bilateral.    Endocrine:  Endocrine Normal    Dermatological:  Skin Normal    Psych:  Psychiatric Normal           Patient Active Problem List   Diagnosis    Carpal tunnel syndrome on right    Wrist pain    Bursitis/tendonitis, shoulder    Cervical radiculopathy    Cervical strain    Carpal tunnel syndrome, bilateral    Pneumonia    Sarcoidosis    Shortness of breath    Cough    Globus pharyngeus    Palpitations       Past Surgical History:   Procedure Laterality Date    BRONCHOSCOPY WITH FLUOROSCOPY Right 10/20/2020    Procedure: BRONCHOSCOPY, WITH FLUOROSCOPY;  Surgeon: Ava Rolon MD;  Location: [facility];  Service: Pulmonary;  Laterality: Right;     SECTION      x2    FALLOPIAN TUBOPLASTY      TUBAL LIGATION      WRIST FRACTURE SURGERY Right     ganglion cyst        Tobacco Use:  The patient  reports that she has been smoking cigarettes. She has never used smokeless tobacco.     Results for orders placed  or performed during the hospital encounter of 01/17/22   EKG 12-lead    Collection Time: 01/17/22  2:49 PM    Narrative    Test Reason : R52,    Vent. Rate : 095 BPM     Atrial Rate : 095 BPM     P-R Int : 128 ms          QRS Dur : 078 ms      QT Int : 338 ms       P-R-T Axes : 055 034 -12 degrees     QTc Int : 424 ms    Normal sinus rhythm  Nonspecific T wave abnormality    Abnormal ECG  When compared with ECG of 09-DEC-2020 17:16,  Inverted T waves have replaced nonspecific T wave abnormality in Inferior  leads  Nonspecific T wave abnormality now evident in Anterior-lateral leads  Confirmed by Josette MENON, Osmin ALVAREZ ([id number]) on 1/18/2022 5:01:22 PM    Referred By: [email or web address]   SELF           Confirmed By:Osmin Finch MD        Imaging Results          CT Abdomen Pelvis With Contrast (Final result)  Result time 08/10/22 15:42:20    Final result by Raji Ring MD (08/10/22 15:42:20)                 Narrative:    CMS MANDATED QUALITY DATA - CT RADIATION  436    All CT scans at this facility utilize dose modulation, iterative reconstruction, and/or weight based dosing when appropriate to reduce radiation dose to as low as reasonably achievable.    CT ABDOMEN PELVIS WITH IV CONTRAST    CLINICAL HISTORY:  51 years Female LLQ abdominal pain    COMPARISON: None    FINDINGS: Images through the lower thorax demonstrate mild basilar atelectasis/scarring bilaterally. Bone window images show no acute aggressive osseous abnormality.    Borderline hepatic steatosis. No focal hepatic lesion. Gallbladder and biliary tree are unremarkable. Spleen appears normal. Pancreas is unremarkable. No adrenal lesion. Right renal parapelvic cysts. Subcentimeter left renal hypodensities are too small to characterize, most likely cysts. No renal calculi identified. Ureters are normal in caliber. Urinary bladder is within normal limits.    Stomach is largely collapsed. No evidence of small bowel obstruction. In the right lower  quadrant, there is a tubular structure containing fluid and gas, with surrounding inflammatory stranding and small internal calcifications. The structures thought to represent an enlarged appendix (11 mm in diameter). Lack of oral contrast material limits confidence however. No evidence of perforation. No abscess/drainable fluid collection. No evidence of colitis.    No pathologically enlarged nodes within the abdomen or pelvis. Uterus is unremarkable.    IMPRESSION:    CT findings concerning for acute appendicitis. See above comments.    Electronically signed by:  Raji Ring MD  8/10/2022 3:42 PM CDT Workstation: [id number]                               Lab Results   Component Value Date    WBC 17.88 (H) 08/10/2022    HGB 13.8 08/10/2022    HCT 41.8 08/10/2022    MCV 86 08/10/2022     08/10/2022     BMP  Lab Results   Component Value Date     (L) 08/10/2022    K 3.7 08/10/2022    CL 95 08/10/2022    CO2 24 08/10/2022    BUN 8 08/10/2022    CREATININE 0.6 08/10/2022    CALCIUM 9.6 08/10/2022    ANIONGAP 10 08/10/2022     (H) 08/10/2022     (H) 12/09/2020    GLU 93 10/19/2020      Latest Reference Range & Units 08/10/22 14:44   Preg Test, Ur Negative  Negative     No results found for this or any previous visit.          Physical Exam  General: Well nourished and Alert    Airway:  Mallampati: II   Mouth Opening: Normal  TM Distance: Normal  Tongue: Normal  Neck ROM: Normal ROM    Dental:  Intact    Chest/Lungs:  Clear to auscultation, Normal Respiratory Rate    Heart:  Rate: Normal  Rhythm: Regular Rhythm  Sounds: Normal        Anesthesia Plan  Type of Anesthesia, risks & benefits discussed:    Anesthesia Type: Gen ETT  Intra-op Monitoring Plan: Standard ASA Monitors  Post Op Pain Control Plan: multimodal analgesia  Induction:  IV  Airway Plan: Video, Post-Induction  Informed Consent: Informed consent signed with the Patient representative and all parties understand the risks and agree  with anesthesia plan.  All questions answered. Patient consented to blood products? Yes  ASA Score: 2  Anesthesia Plan Notes: Tylenol 1 gm IV, Ketamine  Zofran, Pepcid  RSI    Ready For Surgery From Anesthesia Perspective.     .

## 2022-08-10 NOTE — CONSULTS
[facility]  General Surgery  Consult Note    Patient Name: Sumit Burger  MRN: [id number]  Code Status: Prior  Admission Date: 8/10/2022  Hospital Length of Stay: 0 days  Attending Physician: Harpreet Newsome MD  Primary Care Provider: Ava Rolon MD    Patient information was obtained from patient and ER records.     Consults  Subjective:     Principal Problem: <principal problem not specified>    History of Present Illness: 52 yo F who presented to the ER this afternoon with abdominal pain that had localized to the RLQ.  SHe notes that her pain began yesterday evening after dinner.  Initially she thought it was food poisioning that pain increased and became more localized to the right lower quadrant pumping her come to the ER.  She noted mild nausea.  She denies fevers.  In the ER she had a CT scan with findings consistent with acute appendicitis.  Patient does have a past medical history significant for sarcoidosis.  She has had no significant abdominal surgical history.      No current facility-administered medications on file prior to encounter.     Current Outpatient Medications on File Prior to Encounter   Medication Sig    amLODIPine (NORVASC) 10 MG tablet 10 mg.    losartan (COZAAR) 100 MG tablet 50 mg.    albuterol (PROVENTIL/VENTOLIN HFA) 90 mcg/actuation inhaler Inhale 1-2 puffs into the lungs every 6 (six) hours as needed. Rescue    escitalopram oxalate (LEXAPRO) 10 MG tablet Take 1 tablet (10 mg total) by mouth once daily.    famotidine (PEPCID) 20 MG tablet Take 1 tablet (20 mg total) by mouth 2 (two) times daily. (Patient not taking: Reported on 12/30/2020)    predniSONE (DELTASONE) 10 MG tablet Take 4 tablets (40 mg total) by mouth once daily. With food (Patient not taking: Reported on 12/17/2021)    varenicline (CHANTIX STARTING MONTH BOX) 0.5 mg (11)- 1 mg (42) tablet Take one 0.5mg tab by mouth once daily X3 days,then increase to one 0.5mg tab twice daily X4  days,then increase to one 1mg tab twice daily (Patient not taking: No sig reported)    varenicline (CHANTIX STARTING MONTH BOX) 0.5 mg (11)- 1 mg (42) tablet Take one 0.5mg tab by mouth once daily X3 days,then increase to one 0.5mg tab twice daily X4 days,then increase to one 1mg tab twice daily (Patient not taking: No sig reported)    zolpidem (AMBIEN) 5 MG Tab Take 1 tablet (5 mg total) by mouth nightly as needed.       Review of patient's allergies indicates:   Allergen Reactions    Bactrim [sulfamethoxazole-trimethoprim] Hives, Shortness Of Breath and Nausea And Vomiting     Hives, nausea and vomiting, and shortness of breath.  Did not require admission    Benadryl [diphenhydramine hcl]     Codeine     Latex, natural rubber     Percocet [oxycodone-acetaminophen]     Shrimp        Past Medical History:   Diagnosis Date    HTN (hypertension)     Pneumonia 10/2020    Sarcoidosis      Past Surgical History:   Procedure Laterality Date    BRONCHOSCOPY WITH FLUOROSCOPY Right 10/20/2020    Procedure: BRONCHOSCOPY, WITH FLUOROSCOPY;  Surgeon: Ava Rolon MD;  Location: [facility];  Service: Pulmonary;  Laterality: Right;     SECTION      x2    FALLOPIAN TUBOPLASTY      TUBAL LIGATION      WRIST FRACTURE SURGERY Right     ganglion cyst     Family History       Problem Relation (Age of Onset)    Hypertension Mother          Tobacco Use    Smoking status: Light Tobacco Smoker     Types: Cigarettes     Last attempt to quit: 2020     Years since quittin.3    Smokeless tobacco: Never Used    Tobacco comment: ocassionally never was qd   Substance and Sexual Activity    Alcohol use: Not Currently    Drug use: Never    Sexual activity: Not Currently     Review of Systems   Constitutional:  Negative for activity change and appetite change.   Gastrointestinal:  Positive for abdominal pain and nausea.   Skin:  Negative for color change and pallor.   Hematological:  Negative for  adenopathy. Does not bruise/bleed easily.   Objective:     Vital Signs (Most Recent):  Temp: 98.5 °F (36.9 °C) (08/10/22 1124)  Pulse: 99 (08/10/22 1124)  Resp: 16 (08/10/22 1628)  BP: (!) 168/94 (08/10/22 1124)  SpO2: 99 % (08/10/22 1124)   Vital Signs (24h Range):  Temp:  [98.5 °F (36.9 °C)] 98.5 °F (36.9 °C)  Pulse:  [99] 99  Resp:  [16-18] 16  SpO2:  [99 %] 99 %  BP: (168)/(94) 168/94     Weight: 72.6 kg (160 lb)  Body mass index is 26.63 kg/m².    Physical Exam  Vitals reviewed.   Cardiovascular:      Rate and Rhythm: Normal rate.   Pulmonary:      Effort: Pulmonary effort is normal. No respiratory distress.   Abdominal:      General: Abdomen is flat. Bowel sounds are normal. There is no distension.      Tenderness: There is abdominal tenderness. There is guarding.      Comments: RLQ tenderness and guarding on deep palpation.     Neurological:      General: No focal deficit present.      Mental Status: She is alert.   Psychiatric:         Mood and Affect: Mood normal.         Behavior: Behavior normal.       Significant Labs:  I have reviewed all pertinent lab results within the past 24 hours.  CBC:   Recent Labs   Lab 08/10/22  1239   WBC 17.88*   RBC 4.86   HGB 13.8   HCT 41.8      MCV 86   MCH 28.4   MCHC 33.0     BMP:   Recent Labs   Lab 08/10/22  1239   *   *   K 3.7   CL 95   CO2 24   BUN 8   CREATININE 0.6   CALCIUM 9.6       Significant Diagnostics:  CT scan reviewed and with findings consistent with acute appendicitis.        Assessment/Plan:     Acute appendicitis  D/w pt and daughter.  Findings consistent with acute appendicitis.  Will proceed with lap appy today.  Informed consent obtained.      VTE Risk Mitigation (From admission, onward)         Ordered     Place sequential compression device  Until discontinued         08/10/22 1631                Thank you for your consult. I will follow-up with patient. Please contact us if you have any additional questions.    Jackson VALENZUELA  MD Lela  General Surgery  Former[facility]

## 2022-08-10 NOTE — HPI
52 yo F who presented to the ER this afternoon with abdominal pain that had localized to the RLQ.  SHe notes that her pain began yesterday evening after dinner.  Initially she thought it was food poisioning that pain increased and became more localized to the right lower quadrant pumping her come to the ER.  She noted mild nausea.  She denies fevers.  In the ER she had a CT scan with findings consistent with acute appendicitis.  Patient does have a past medical history significant for sarcoidosis.  She has had no significant abdominal surgical history.

## 2022-08-10 NOTE — OP NOTE
Date of procedure:  August 10, 2022    Staff surgeon:  Dr. Jackson Vogel    Preoperative diagnosis:  Acute appendicitis    Postoperative diagnosis:  The same    Procedure:  Laparoscopic appendectomy    Anesthesia:  General endotracheal anesthesia    Indication for procedure:  51-year-old female presented to the hospital with findings consistent with acute appendicitis.  Patient was scheduled for laparoscopic appendectomy on the above-mentioned date.        Description of procedure:    FOllowing signing of informed consent patient was taken to the operating room and placed in supine position. GEneral endotracheal anesthesia is administered.  Winchester catheter is inserted.  Abdomen is prepped and draped in standard fashion and an appropriate time out procedure is performed.  A small vertical incision was made just above the umbilicus.  Dissection was carried down to the fascia.  The abdominal cavity was entered with a Veress needle.  Pneumoperitoneum to 15 mmHg was established.  12 mm Optiview trocars placed under direct visualization.  Next 2 small 5 mm trocars were placed in the lower midline under direct visualization.  Patient is placed in Trendelenburg and tilted to the left side.  Evaluation of the right lower quadrant demonstrates an acutely inflamed appendix with localized purulent fluid surrounding.  The appendix was grasped and held upright.  The mesoappendix was  from the appendix at the base of the appendix.  I next staple across the base of the appendix with an Endo-JEANNA stapler.  Next the mesoappendix stapled across uneventfully.  The appendix is removed from the abdominal cavity with the assistance of an Endo-Catch bag.  Both staple lines are evaluated.  There is a slight ooze from the mesentery of the appendix.  This is cauterized uneventfully.  Next the areas irrigated copious amounts of fluid hemostasis once again checked and obtained.  Having achieved hemostasis all trocars removed under direct  visualization.  The umbilical fascia was closed with 0 Vicryl suture.  Skin incisions closed with 4-0 Monocryl.  Patient is extubated taken recovery room stable condition.  There were no immediate complications.  Blood loss was 10 cc

## 2022-08-10 NOTE — ASSESSMENT & PLAN NOTE
D/w pt and daughter.  Findings consistent with acute appendicitis.  Will proceed with lap appy today.  Informed consent obtained.

## 2022-08-10 NOTE — ED PROVIDER NOTES
Encounter Date: 8/10/2022       History     Chief Complaint   Patient presents with    Abdominal Pain     Vomiting-started lastnight-reports r lower abd hurts with vomiting     51-year-old female presents complaining of abdominal pain patient has a history of sarcoidosis, hypertension patient reports left upper quadrant abdominal pain starting yesterday patient describes pain as sharp patient reports pain started after eating fast food patient reports nausea and vomiting patient denies change in bowel movements        Review of patient's allergies indicates:   Allergen Reactions    Bactrim [sulfamethoxazole-trimethoprim] Hives, Shortness Of Breath and Nausea And Vomiting     Hives, nausea and vomiting, and shortness of breath.  Did not require admission    Benadryl [diphenhydramine hcl]     Codeine     Latex, natural rubber     Percocet [oxycodone-acetaminophen]     Shrimp      Past Medical History:   Diagnosis Date    HTN (hypertension)     Pneumonia 10/2020    Sarcoidosis      Past Surgical History:   Procedure Laterality Date    BRONCHOSCOPY WITH FLUOROSCOPY Right 10/20/2020    Procedure: BRONCHOSCOPY, WITH FLUOROSCOPY;  Surgeon: Ava Rolon MD;  Location: [facility] ENDO;  Service: Pulmonary;  Laterality: Right;     SECTION      x2    FALLOPIAN TUBOPLASTY      LAPAROSCOPIC APPENDECTOMY N/A 8/10/2022    Procedure: APPENDECTOMY, LAPAROSCOPIC;  Surgeon: Jackson Vogel MD;  Location: [facility] OR;  Service: General;  Laterality: N/A;    TUBAL LIGATION      WRIST FRACTURE SURGERY Right     ganglion cyst     Family History   Problem Relation Age of Onset    Hypertension Mother      Social History     Tobacco Use    Smoking status: Light Tobacco Smoker     Types: Cigarettes     Last attempt to quit: 2020     Years since quittin.3    Smokeless tobacco: Never Used    Tobacco comment: ocassionally never was qd   Substance Use Topics    Alcohol use: Not Currently    Drug use: Never      Review of Systems   Constitutional: Negative for fever.   HENT: Negative for congestion, rhinorrhea, sore throat and trouble swallowing.    Eyes: Negative for visual disturbance.   Respiratory: Negative for cough, chest tightness, shortness of breath and wheezing.    Cardiovascular: Negative for chest pain, palpitations and leg swelling.   Gastrointestinal: Positive for abdominal pain, nausea and vomiting. Negative for abdominal distention, constipation and diarrhea.   Genitourinary: Negative for difficulty urinating, dysuria, flank pain and frequency.   Musculoskeletal: Negative for arthralgias, back pain, joint swelling and neck pain.   Skin: Negative for color change and rash.   Neurological: Negative for dizziness, syncope, speech difficulty, weakness, numbness and headaches.   All other systems reviewed and are negative.      Physical Exam     Initial Vitals [08/10/22 1124]   BP Pulse Resp Temp SpO2   (!) 168/94 99 18 98.5 °F (36.9 °C) 99 %      MAP       --         Physical Exam    Nursing note and vitals reviewed.  Constitutional: She appears well-developed and well-nourished. She is not diaphoretic. No distress.   HENT:   Head: Normocephalic and atraumatic.   Right Ear: External ear normal.   Left Ear: External ear normal.   Nose: Nose normal.   Mouth/Throat: Oropharynx is clear and moist. No oropharyngeal exudate.   Eyes: Conjunctivae and EOM are normal. Pupils are equal, round, and reactive to light. Right eye exhibits no discharge. Left eye exhibits no discharge. No scleral icterus.   Neck: Neck supple. No thyromegaly present. No tracheal deviation present. No JVD present.   Normal range of motion.  Cardiovascular: Normal rate, regular rhythm, normal heart sounds and intact distal pulses. Exam reveals no gallop and no friction rub.    No murmur heard.  Pulmonary/Chest: Breath sounds normal. No stridor. No respiratory distress. She has no wheezes. She has no rhonchi. She has no rales. She exhibits no  tenderness.   Abdominal: Abdomen is soft. Bowel sounds are normal. She exhibits no distension and no mass. There is abdominal tenderness.   Mild tenderness to palpation left upper quadrant There is no rebound and no guarding.   Musculoskeletal:         General: Tenderness present. No edema. Normal range of motion.      Cervical back: Normal range of motion and neck supple.     Lymphadenopathy:     She has no cervical adenopathy.   Neurological: She is alert and oriented to person, place, and time. She has normal strength. No cranial nerve deficit or sensory deficit.   Skin: Skin is warm and dry. No rash and no abscess noted. No erythema. No pallor.         ED Course   Procedures  Labs Reviewed   CBC W/ AUTO DIFFERENTIAL - Abnormal; Notable for the following components:       Result Value    WBC 17.88 (*)     Gran # (ANC) 16.2 (*)     Immature Grans (Abs) 0.07 (*)     Lymph # 0.9 (*)     Gran % 90.8 (*)     Lymph % 5.2 (*)     Mono % 3.5 (*)     All other components within normal limits   COMPREHENSIVE METABOLIC PANEL - Abnormal; Notable for the following components:    Sodium 129 (*)     Glucose 129 (*)     Total Protein 8.8 (*)     Alkaline Phosphatase 45 (*)     All other components within normal limits   URINALYSIS, REFLEX TO URINE CULTURE - Abnormal; Notable for the following components:    Protein, UA Trace (*)     Occult Blood UA 2+ (*)     All other components within normal limits    Narrative:     In and Out Cath as needed it patient unable to void  Specimen Source->Urine   URINALYSIS MICROSCOPIC - Abnormal; Notable for the following components:    Hyaline Casts, UA 6 (*)     All other components within normal limits    Narrative:     In and Out Cath as needed it patient unable to void  Specimen Source->Urine   LIPASE   SARS-COV-2 RNA AMPLIFICATION, QUAL   POCT URINE PREGNANCY          Imaging Results          CT Abdomen Pelvis With Contrast (Final result)  Result time 08/10/22 15:42:20    Final result by  Raji Ring MD (08/10/22 15:42:20)                 Narrative:    CMS MANDATED QUALITY DATA - CT RADIATION  436    All CT scans at this facility utilize dose modulation, iterative reconstruction, and/or weight based dosing when appropriate to reduce radiation dose to as low as reasonably achievable.    CT ABDOMEN PELVIS WITH IV CONTRAST    CLINICAL HISTORY:  51 years Female LLQ abdominal pain    COMPARISON: None    FINDINGS: Images through the lower thorax demonstrate mild basilar atelectasis/scarring bilaterally. Bone window images show no acute aggressive osseous abnormality.    Borderline hepatic steatosis. No focal hepatic lesion. Gallbladder and biliary tree are unremarkable. Spleen appears normal. Pancreas is unremarkable. No adrenal lesion. Right renal parapelvic cysts. Subcentimeter left renal hypodensities are too small to characterize, most likely cysts. No renal calculi identified. Ureters are normal in caliber. Urinary bladder is within normal limits.    Stomach is largely collapsed. No evidence of small bowel obstruction. In the right lower quadrant, there is a tubular structure containing fluid and gas, with surrounding inflammatory stranding and small internal calcifications. The structures thought to represent an enlarged appendix (11 mm in diameter). Lack of oral contrast material limits confidence however. No evidence of perforation. No abscess/drainable fluid collection. No evidence of colitis.    No pathologically enlarged nodes within the abdomen or pelvis. Uterus is unremarkable.    IMPRESSION:    CT findings concerning for acute appendicitis. See above comments.    Electronically signed by:  Raji Ring MD  8/10/2022 3:42 PM CDT Workstation: [id number]                               Medications   ondansetron injection 4 mg (4 mg Intravenous Given 8/10/22 1239)   sodium chloride 0.9% bolus 1,000 mL (0 mLs Intravenous Stopped 8/10/22 1350)   iohexoL (OMNIPAQUE 350) injection 100 mL (100  mLs Intravenous Given 8/10/22 1518)   ketorolac injection 15 mg (15 mg Intravenous Given 8/10/22 1239)   sodium chloride 0.9% bolus 1,000 mL (1,000 mLs Intravenous New Bag 8/10/22 1531)   ondansetron injection 4 mg (4 mg Intravenous Given 8/10/22 1557)   morphine injection 2 mg (2 mg Intravenous Given 8/10/22 1628)   fentaNYL 50 mcg/mL injection 25 mcg (25 mcg Intravenous Given 8/10/22 1850)   potassium chloride SA CR tablet 40 mEq (40 mEq Oral Given 8/11/22 1019)     Medical Decision Making:   History:   Old Medical Records: I decided to obtain old medical records.  Initial Assessment:   51-year-old female presents complaining of abdominal pain differential diagnosis includes infection obstruction perforation gastritis dehydration            Attending Attestation:             Attending ED Notes:   Patient has findings concerning for acute appendicitis patient started on antibiotics and consulted General surgery with Internal Medicine to follow for admission.               Clinical Impression:   Final diagnoses:  [R11.2] Non-intractable vomiting with nausea, unspecified vomiting type  [R10.13] Epigastric pain  [K37] Appendicitis, unspecified appendicitis type (Primary)          ED Disposition Condition    Observation               Harpreet Newsome MD  08/12/22 1210

## 2022-08-10 NOTE — CONSULTS
[facility]  General Surgery  Consult Note    Patient Name: Sumit Burger  MRN: [id number]  Code Status: Prior  Admission Date: 8/10/2022  Hospital Length of Stay: 0 days  Attending Physician: Harpreet Newsome MD  Primary Care Provider: Ava Rolon MD    Patient information was obtained from patient and ER records.     Inpatient consult to General surgery  Consult performed by: Jackson Vogel MD  Consult ordered by: Harpreet Newsome MD        Subjective:     Principal Problem: <principal problem not specified>    History of Present Illness: 52 yo F who presented to the ER this afternoon with abdominal pain that had localized to the RLQ.  SHe notes that her pain began yesterday evening after dinner.  Initially she thought it was food poisioning that pain increased and became more localized to the right lower quadrant pumping her come to the ER.  She noted mild nausea.  She denies fevers.  In the ER she had a CT scan with findings consistent with acute appendicitis.  Patient does have a past medical history significant for sarcoidosis.  She has had no significant abdominal surgical history.      No new subjective & objective note has been filed under this hospital service since the last note was generated.    Assessment/Plan:     Acute appendicitis  D/w pt and daughter.  Findings consistent with acute appendicitis.  Will proceed with lap appy today.  Informed consent obtained.      VTE Risk Mitigation (From admission, onward)         Ordered     Place sequential compression device  Until discontinued         08/10/22 4325                Thank you for your consult. I will follow-up with patient. Please contact us if you have any additional questions.    Jackson Vogel MD  General Surgery  [facility]

## 2022-08-10 NOTE — SUBJECTIVE & OBJECTIVE
No current facility-administered medications on file prior to encounter.     Current Outpatient Medications on File Prior to Encounter   Medication Sig    amLODIPine (NORVASC) 10 MG tablet 10 mg.    losartan (COZAAR) 100 MG tablet 50 mg.    albuterol (PROVENTIL/VENTOLIN HFA) 90 mcg/actuation inhaler Inhale 1-2 puffs into the lungs every 6 (six) hours as needed. Rescue    escitalopram oxalate (LEXAPRO) 10 MG tablet Take 1 tablet (10 mg total) by mouth once daily.    famotidine (PEPCID) 20 MG tablet Take 1 tablet (20 mg total) by mouth 2 (two) times daily. (Patient not taking: Reported on 12/30/2020)    predniSONE (DELTASONE) 10 MG tablet Take 4 tablets (40 mg total) by mouth once daily. With food (Patient not taking: Reported on 12/17/2021)    varenicline (CHANTIX STARTING MONTH BOX) 0.5 mg (11)- 1 mg (42) tablet Take one 0.5mg tab by mouth once daily X3 days,then increase to one 0.5mg tab twice daily X4 days,then increase to one 1mg tab twice daily (Patient not taking: No sig reported)    varenicline (CHANTIX STARTING MONTH BOX) 0.5 mg (11)- 1 mg (42) tablet Take one 0.5mg tab by mouth once daily X3 days,then increase to one 0.5mg tab twice daily X4 days,then increase to one 1mg tab twice daily (Patient not taking: No sig reported)    zolpidem (AMBIEN) 5 MG Tab Take 1 tablet (5 mg total) by mouth nightly as needed.       Review of patient's allergies indicates:   Allergen Reactions    Bactrim [sulfamethoxazole-trimethoprim] Hives, Shortness Of Breath and Nausea And Vomiting     Hives, nausea and vomiting, and shortness of breath.  Did not require admission    Benadryl [diphenhydramine hcl]     Codeine     Latex, natural rubber     Percocet [oxycodone-acetaminophen]     Shrimp        Past Medical History:   Diagnosis Date    HTN (hypertension)     Pneumonia 10/2020    Sarcoidosis      Past Surgical History:   Procedure Laterality Date    BRONCHOSCOPY WITH FLUOROSCOPY Right 10/20/2020    Procedure: BRONCHOSCOPY, WITH  FLUOROSCOPY;  Surgeon: Ava Rolon MD;  Location: [facility];  Service: Pulmonary;  Laterality: Right;     SECTION      x2    FALLOPIAN TUBOPLASTY      TUBAL LIGATION      WRIST FRACTURE SURGERY Right     ganglion cyst     Family History       Problem Relation (Age of Onset)    Hypertension Mother          Tobacco Use    Smoking status: Light Tobacco Smoker     Types: Cigarettes     Last attempt to quit: 2020     Years since quittin.3    Smokeless tobacco: Never Used    Tobacco comment: ocassionally never was qd   Substance and Sexual Activity    Alcohol use: Not Currently    Drug use: Never    Sexual activity: Not Currently     Review of Systems   Constitutional:  Negative for activity change and appetite change.   Gastrointestinal:  Positive for abdominal pain and nausea.   Skin:  Negative for color change and pallor.   Hematological:  Negative for adenopathy. Does not bruise/bleed easily.   Objective:     Vital Signs (Most Recent):  Temp: 98.5 °F (36.9 °C) (08/10/22 1124)  Pulse: 99 (08/10/22 1124)  Resp: 16 (08/10/22 1628)  BP: (!) 168/94 (08/10/22 1124)  SpO2: 99 % (08/10/22 1124)   Vital Signs (24h Range):  Temp:  [98.5 °F (36.9 °C)] 98.5 °F (36.9 °C)  Pulse:  [99] 99  Resp:  [16-18] 16  SpO2:  [99 %] 99 %  BP: (168)/(94) 168/94     Weight: 72.6 kg (160 lb)  Body mass index is 26.63 kg/m².    Physical Exam  Vitals reviewed.   Cardiovascular:      Rate and Rhythm: Normal rate.   Pulmonary:      Effort: Pulmonary effort is normal. No respiratory distress.   Abdominal:      General: Abdomen is flat. Bowel sounds are normal. There is no distension.      Tenderness: There is abdominal tenderness. There is guarding.      Comments: RLQ tenderness and guarding on deep palpation.     Neurological:      General: No focal deficit present.      Mental Status: She is alert.   Psychiatric:         Mood and Affect: Mood normal.         Behavior: Behavior normal.       Significant Labs:  I have reviewed  all pertinent lab results within the past 24 hours.  CBC:   Recent Labs   Lab 08/10/22  1239   WBC 17.88*   RBC 4.86   HGB 13.8   HCT 41.8      MCV 86   MCH 28.4   MCHC 33.0     BMP:   Recent Labs   Lab 08/10/22  1239   *   *   K 3.7   CL 95   CO2 24   BUN 8   CREATININE 0.6   CALCIUM 9.6       Significant Diagnostics:  CT scan reviewed and with findings consistent with acute appendicitis.

## 2022-08-11 VITALS
DIASTOLIC BLOOD PRESSURE: 65 MMHG | WEIGHT: 160 LBS | SYSTOLIC BLOOD PRESSURE: 128 MMHG | HEART RATE: 89 BPM | OXYGEN SATURATION: 100 % | RESPIRATION RATE: 18 BRPM | BODY MASS INDEX: 26.63 KG/M2 | TEMPERATURE: 98 F

## 2022-08-11 PROBLEM — K35.80 ACUTE APPENDICITIS: Status: RESOLVED | Noted: 2022-08-10 | Resolved: 2022-08-11

## 2022-08-11 LAB
ALBUMIN SERPL BCP-MCNC: 3.6 G/DL (ref 3.5–5.2)
ALP SERPL-CCNC: 41 U/L (ref 55–135)
ALT SERPL W/O P-5'-P-CCNC: 20 U/L (ref 10–44)
ANION GAP SERPL CALC-SCNC: 7 MMOL/L (ref 8–16)
AST SERPL-CCNC: 18 U/L (ref 10–40)
BILIRUB SERPL-MCNC: 0.7 MG/DL (ref 0.1–1)
BUN SERPL-MCNC: 6 MG/DL (ref 6–20)
CALCIUM SERPL-MCNC: 8.5 MG/DL (ref 8.7–10.5)
CHLORIDE SERPL-SCNC: 106 MMOL/L (ref 95–110)
CO2 SERPL-SCNC: 25 MMOL/L (ref 23–29)
CREAT SERPL-MCNC: 0.6 MG/DL (ref 0.5–1.4)
ERYTHROCYTE [DISTWIDTH] IN BLOOD BY AUTOMATED COUNT: 14.2 % (ref 11.5–14.5)
EST. GFR  (NO RACE VARIABLE): >60 ML/MIN/1.73 M^2
GLUCOSE SERPL-MCNC: 103 MG/DL (ref 70–110)
HCT VFR BLD AUTO: 33.2 % (ref 37–48.5)
HGB BLD-MCNC: 10.8 G/DL (ref 12–16)
MAGNESIUM SERPL-MCNC: 1.8 MG/DL (ref 1.6–2.6)
MCH RBC QN AUTO: 28.8 PG (ref 27–31)
MCHC RBC AUTO-ENTMCNC: 32.5 G/DL (ref 32–36)
MCV RBC AUTO: 89 FL (ref 82–98)
PLATELET # BLD AUTO: 201 K/UL (ref 150–450)
PMV BLD AUTO: 11.6 FL (ref 9.2–12.9)
POTASSIUM SERPL-SCNC: 3.3 MMOL/L (ref 3.5–5.1)
PROT SERPL-MCNC: 6.9 G/DL (ref 6–8.4)
RBC # BLD AUTO: 3.75 M/UL (ref 4–5.4)
SODIUM SERPL-SCNC: 138 MMOL/L (ref 136–145)
WBC # BLD AUTO: 11.53 K/UL (ref 3.9–12.7)

## 2022-08-11 PROCEDURE — 83735 ASSAY OF MAGNESIUM: CPT | Performed by: INTERNAL MEDICINE

## 2022-08-11 PROCEDURE — 25000003 PHARM REV CODE 250: Performed by: SURGERY

## 2022-08-11 PROCEDURE — 96376 TX/PRO/DX INJ SAME DRUG ADON: CPT

## 2022-08-11 PROCEDURE — 80053 COMPREHEN METABOLIC PANEL: CPT | Performed by: INTERNAL MEDICINE

## 2022-08-11 PROCEDURE — G0378 HOSPITAL OBSERVATION PER HR: HCPCS

## 2022-08-11 PROCEDURE — 85027 COMPLETE CBC AUTOMATED: CPT | Performed by: SURGERY

## 2022-08-11 PROCEDURE — 25000003 PHARM REV CODE 250: Performed by: HOSPITALIST

## 2022-08-11 PROCEDURE — 36415 COLL VENOUS BLD VENIPUNCTURE: CPT | Performed by: INTERNAL MEDICINE

## 2022-08-11 PROCEDURE — 63600175 PHARM REV CODE 636 W HCPCS: Performed by: SURGERY

## 2022-08-11 RX ORDER — AMOXICILLIN AND CLAVULANATE POTASSIUM 875; 125 MG/1; MG/1
1 TABLET, FILM COATED ORAL EVERY 12 HOURS
Qty: 10 TABLET | Refills: 0 | Status: SHIPPED | OUTPATIENT
Start: 2022-08-11 | End: 2022-08-16

## 2022-08-11 RX ORDER — HYDROCODONE BITARTRATE AND ACETAMINOPHEN 5; 325 MG/1; MG/1
1 TABLET ORAL EVERY 6 HOURS PRN
Qty: 20 TABLET | Refills: 0 | Status: SHIPPED | OUTPATIENT
Start: 2022-08-11 | End: 2022-08-16

## 2022-08-11 RX ORDER — POTASSIUM CHLORIDE 20 MEQ/1
40 TABLET, EXTENDED RELEASE ORAL ONCE
Status: COMPLETED | OUTPATIENT
Start: 2022-08-11 | End: 2022-08-11

## 2022-08-11 RX ADMIN — HYDROCODONE BITARTRATE AND ACETAMINOPHEN 1 TABLET: 5; 325 TABLET ORAL at 10:08

## 2022-08-11 RX ADMIN — ACETAMINOPHEN 1000 MG: 10 INJECTION, SOLUTION INTRAVENOUS at 05:08

## 2022-08-11 RX ADMIN — ONDANSETRON 4 MG: 2 INJECTION INTRAMUSCULAR; INTRAVENOUS at 10:08

## 2022-08-11 RX ADMIN — POTASSIUM CHLORIDE 40 MEQ: 1500 TABLET, EXTENDED RELEASE ORAL at 10:08

## 2022-08-11 RX ADMIN — HYDROCODONE BITARTRATE AND ACETAMINOPHEN 1 TABLET: 5; 325 TABLET ORAL at 02:08

## 2022-08-11 RX ADMIN — SODIUM CHLORIDE: 0.9 INJECTION, SOLUTION INTRAVENOUS at 05:08

## 2022-08-11 NOTE — ANESTHESIA POSTPROCEDURE EVALUATION
Anesthesia Post Evaluation    Patient: Sumit Burger    Procedure(s) Performed: Procedure(s) (LRB):  APPENDECTOMY, LAPAROSCOPIC (N/A)    Final Anesthesia Type: general      Patient location during evaluation: PACU  Patient participation: Yes- Able to Participate  Level of consciousness: awake and alert and oriented  Post-procedure vital signs: reviewed and stable  Pain management: adequate  Airway patency: patent    PONV status at discharge: No PONV  Anesthetic complications: no      Cardiovascular status: blood pressure returned to baseline, hemodynamically stable and stable  Respiratory status: unassisted, spontaneous ventilation and room air  Hydration status: euvolemic  Follow-up not needed.          Vitals Value Taken Time   /78 08/10/22 1945   Temp 36.2 °C (97.2 °F) 08/10/22 1803   Pulse 85 08/10/22 1949   Resp 16 08/10/22 1945   SpO2 100 % 08/10/22 1949   Vitals shown include unvalidated device data.      No case tracking events are documented in the log.      Pain/Kimberly Score: Pain Rating Prior to Med Admin: 7 (8/10/2022  6:50 PM)  Kimberly Score: 10 (8/10/2022  7:15 PM)

## 2022-08-11 NOTE — HOSPITAL COURSE
51-year-old lady with prior history of sarcoid came with abdominal pain found to have acute appendicitis.  She underwent laparoscopic appendicectomy.  So far postop recovery have remained uneventful.  She is eating regular diet, has good bowel sounds.  Laparoscopic sites look unremarkable.  She was discharged home in hemodynamically stable condition with prescription of Zofran, Augmentin, pain medications.  She was given with lifting instructions.  She was advised to follow-up with her PCP and surgeon.     I have seen the patient on the day of discharge and reviewed the discharge instructions as outlined below. Patient verbalized understanding and is aware to contact primary care physician or return to ED if new or worsening symptoms.

## 2022-08-11 NOTE — SUBJECTIVE & OBJECTIVE
Past Medical History:   Diagnosis Date    HTN (hypertension)     Pneumonia 10/2020    Sarcoidosis        Past Surgical History:   Procedure Laterality Date    BRONCHOSCOPY WITH FLUOROSCOPY Right 10/20/2020    Procedure: BRONCHOSCOPY, WITH FLUOROSCOPY;  Surgeon: Ava Rolon MD;  Location: [facility];  Service: Pulmonary;  Laterality: Right;     SECTION      x2    FALLOPIAN TUBOPLASTY      TUBAL LIGATION      WRIST FRACTURE SURGERY Right     ganglion cyst       Review of patient's allergies indicates:   Allergen Reactions    Bactrim [sulfamethoxazole-trimethoprim] Hives, Shortness Of Breath and Nausea And Vomiting     Hives, nausea and vomiting, and shortness of breath.  Did not require admission    Benadryl [diphenhydramine hcl]     Codeine     Latex, natural rubber     Percocet [oxycodone-acetaminophen]     Shrimp        No current facility-administered medications on file prior to encounter.     Current Outpatient Medications on File Prior to Encounter   Medication Sig    amLODIPine (NORVASC) 10 MG tablet 10 mg.    losartan (COZAAR) 100 MG tablet 50 mg.    albuterol (PROVENTIL/VENTOLIN HFA) 90 mcg/actuation inhaler Inhale 1-2 puffs into the lungs every 6 (six) hours as needed. Rescue    escitalopram oxalate (LEXAPRO) 10 MG tablet Take 1 tablet (10 mg total) by mouth once daily.    famotidine (PEPCID) 20 MG tablet Take 1 tablet (20 mg total) by mouth 2 (two) times daily. (Patient not taking: Reported on 2020)    predniSONE (DELTASONE) 10 MG tablet Take 4 tablets (40 mg total) by mouth once daily. With food (Patient not taking: Reported on 2021)    varenicline (CHANTIX STARTING MONTH BOX) 0.5 mg (11)- 1 mg (42) tablet Take one 0.5mg tab by mouth once daily X3 days,then increase to one 0.5mg tab twice daily X4 days,then increase to one 1mg tab twice daily (Patient not taking: No sig reported)    varenicline (CHANTIX STARTING MONTH BOX) 0.5 mg (11)- 1 mg (42) tablet Take one 0.5mg tab by mouth  once daily X3 days,then increase to one 0.5mg tab twice daily X4 days,then increase to one 1mg tab twice daily (Patient not taking: No sig reported)    zolpidem (AMBIEN) 5 MG Tab Take 1 tablet (5 mg total) by mouth nightly as needed.     Family History       Problem Relation (Age of Onset)    Hypertension Mother          Tobacco Use    Smoking status: Light Tobacco Smoker     Types: Cigarettes     Last attempt to quit: 2020     Years since quittin.3    Smokeless tobacco: Never Used    Tobacco comment: ocassionally never was qd   Substance and Sexual Activity    Alcohol use: Not Currently    Drug use: Never    Sexual activity: Not Currently     Review of Systems  ROS:  Complete 12 point review of systems negative except as per HPI above  Objective:     Vital Signs (Most Recent):  Temp: 97.2 °F (36.2 °C) (08/10/22 1803)  Pulse: 92 (08/10/22 1845)  Resp: (!) 6 (08/10/22 1850)  BP: 122/74 (08/10/22 184)  SpO2: 100 % (08/10/22 1845)   Vital Signs (24h Range):  Temp:  [97.2 °F (36.2 °C)-98.5 °F (36.9 °C)] 97.2 °F (36.2 °C)  Pulse:  [] 92  Resp:  [6-18] 6  SpO2:  [99 %-100 %] 100 %  BP: (122-168)/(74-94) 122/74     Weight: 72.6 kg (160 lb)  Body mass index is 26.63 kg/m².    Physical Exam  GEN: nontoxic, nondiaphoretic, comfortable, seen in Pre-Op holding area  ENT: DRY mucous membranes  EYES: PERRLA, anicteric sclera, no conjunctival discharge  PULM: comfortable work of breathing, CTA B  CV: RRR, 2+ radial pulses  GI: abd soft NT, TTP Right sided  Skin: dry and warm no jaundice  Psych: mood calm, affect normal, insight good  Neuro: Nonfocal motor exam, fluent speech, A&O, follows commands     Significant Labs: All pertinent labs within the past 24 hours have been reviewed.  BMP:   Recent Labs   Lab 08/10/22  1239   *   *   K 3.7   CL 95   CO2 24   BUN 8   CREATININE 0.6   CALCIUM 9.6     CBC:   Recent Labs   Lab 08/10/22  1239   WBC 17.88*   HGB 13.8   HCT 41.8        CMP:   Recent Labs    Lab 08/10/22  1239   *   K 3.7   CL 95   CO2 24   *   BUN 8   CREATININE 0.6   CALCIUM 9.6   PROT 8.8*   ALBUMIN 4.7   BILITOT 0.5   ALKPHOS 45*   AST 28   ALT 32   ANIONGAP 10       Significant Imaging: I have reviewed all pertinent imaging results/findings within the past 24 hours.  CT ABD PELVIS Reveals acute appendicitis

## 2022-08-11 NOTE — PLAN OF CARE
[facility]  Initial Discharge Assessment       Primary Care Provider: Ava Rolon MD    Admission Diagnosis: Appendicitis, unspecified appendicitis type [K37]    Admission Date: 8/10/2022  Expected Discharge Date: 8/11/2022      met with Pt at bedside to complete discharge assessment. Pt AAOx4s. Demographics, PCP, and insurance verified. No home health. No dialysis. Pt reports ability to complete ADLs without assistance. Pt verbalized plan to discharge home via family transport. Pt has no other needs to be addressed at this time.      Discharge Barriers Identified: None    Payor: [employer] / Plan: [facility] OPTUM / Product Type: Government /     Extended Emergency Contact Information  Primary Emergency Contact: BurgerSusan  Mobile Phone: [phone]  Relation: Daughter  Preferred language: English   needed? No  Secondary Emergency Contact: Rayray Burger  Address: [address] [place] [address] [address] [place] [address] [facility]  Home Phone: [phone]  Mobile Phone: [phone]  Relation: Other    Discharge Plan A: Home with family  Discharge Plan B: Home with family      [facility] [place] - [address]  [address]   [place] [address]  Phone: [phone] Fax: [phone]    [id number]  [place] - [address] AT [facility] [address]  [address]  [place] [address]  Phone: [phone] Fax: [phone]      Initial Assessment (most recent)     Adult Discharge Assessment - 08/11/22 1002        Discharge Assessment    Assessment Type Discharge Planning Assessment     Confirmed/corrected address, phone number and insurance Yes     Confirmed Demographics Correct on Facesheet     Source of Information patient;family     Does patient/caregiver understand observation status Yes     Communicated SHAKIRA with patient/caregiver Yes     Lives With child(carter), dependent     Do you expect to return to  your current living situation? Yes     Do you have help at home or someone to help you manage your care at home? Yes     Who are your caregiver(s) and their phone number(s)? Susan Burger (Daughter)   [phone] (Mobile)     Prior to hospitilization cognitive status: Alert/Oriented     Current cognitive status: Alert/Oriented     Walking or Climbing Stairs Difficulty none     Dressing/Bathing Difficulty none     Home Accessibility stairs within home;wheelchair accessible     Stairs, Within Home, Primary Stairs to 2nd floor     Number of Stairs, Within Home, Primary other (see comments)   Stairs to 2nd floor    Surface of Stairs, Within Home, Primary carpeting     Stair Railings, Within Home, Primary railings on both sides of stairs     Landing, Stairs, Within Home, Primary adequate turning radius     Home Layout Able to live on 1st floor;Bedroom on 2nd floor     Equipment Currently Used at Home none     Readmission within 30 days? No     Patient currently being followed by outpatient case management? No     Do you currently have service(s) that help you manage your care at home? No     Do you take prescription medications? Yes     Do you have prescription coverage? Yes     Coverage [employer] [facility] [employer]     Do you have any problems affording any of your prescribed medications? No     Is the patient taking medications as prescribed? yes     Who is going to help you get home at discharge? Family will transport     How do you get to doctors appointments? car, drives self     Are you on dialysis? No     Do you take coumadin? No     Discharge Plan A Home with family     Discharge Plan B Home with family     DME Needed Upon Discharge  none     Discharge Plan discussed with: Patient;Parent(s)     Name(s) and Number(s) Pt's mother     Discharge Barriers Identified None        Relationship/Environment    Name(s) of Who Lives With Patient Rayray Burger Other [phone], Children: 18, 14

## 2022-08-11 NOTE — H&P
[facility] Medicine  History & Physical    Patient Name: Sumit Burger  MRN: [id number]  Patient Class: OP- Observation  Admission Date: 8/10/2022  Attending Physician: Mark Anthony Kang MD  Primary Care Provider: Ava Rolon MD       Patient information was obtained from patient and ER records.     Subjective:     Principal Problem:Acute appendicitis    Chief Complaint:   Chief Complaint   Patient presents with    Abdominal Pain     Vomiting-started lastnight-reports r lower abd hurts with vomiting        HPI: 46-year-old female history of sarcoidosis and right carpal tunnel, currently not taking any medications, who presents to the hospital with abdominal pain.  Duration abdominal pain is 1 day.  Location is right-sided.  Intensity is severe at its worst, waxing and waning in intensity.  She has associated anorexia, nausea, and low-grade fever.  No chest pain, shortness a breath, headache, syncope, or palpitation.  No bleeding, dysuria, or joint pain.  Based on her symptoms she presented to ER for evaluation.  UPT negative.  Urinalysis unremarkable.  WBC 17. CT abdomen revealed acute appendicitis.  General surgery consultation was obtained.  Patient to go for emergent appendectomy today.      Past Medical History:   Diagnosis Date    HTN (hypertension)     Pneumonia 10/2020    Sarcoidosis        Past Surgical History:   Procedure Laterality Date    BRONCHOSCOPY WITH FLUOROSCOPY Right 10/20/2020    Procedure: BRONCHOSCOPY, WITH FLUOROSCOPY;  Surgeon: Ava Rolon MD;  Location: [facility];  Service: Pulmonary;  Laterality: Right;     SECTION      x2    FALLOPIAN TUBOPLASTY      TUBAL LIGATION      WRIST FRACTURE SURGERY Right     ganglion cyst       Review of patient's allergies indicates:   Allergen Reactions    Bactrim [sulfamethoxazole-trimethoprim] Hives, Shortness Of Breath and Nausea And Vomiting     Hives, nausea and vomiting, and shortness of breath.   Did not require admission    Benadryl [diphenhydramine hcl]     Codeine     Latex, natural rubber     Percocet [oxycodone-acetaminophen]     Shrimp        No current facility-administered medications on file prior to encounter.     Current Outpatient Medications on File Prior to Encounter   Medication Sig    amLODIPine (NORVASC) 10 MG tablet 10 mg.    losartan (COZAAR) 100 MG tablet 50 mg.    albuterol (PROVENTIL/VENTOLIN HFA) 90 mcg/actuation inhaler Inhale 1-2 puffs into the lungs every 6 (six) hours as needed. Rescue    escitalopram oxalate (LEXAPRO) 10 MG tablet Take 1 tablet (10 mg total) by mouth once daily.    famotidine (PEPCID) 20 MG tablet Take 1 tablet (20 mg total) by mouth 2 (two) times daily. (Patient not taking: Reported on 2020)    predniSONE (DELTASONE) 10 MG tablet Take 4 tablets (40 mg total) by mouth once daily. With food (Patient not taking: Reported on 2021)    varenicline (CHANTIX STARTING MONTH BOX) 0.5 mg (11)- 1 mg (42) tablet Take one 0.5mg tab by mouth once daily X3 days,then increase to one 0.5mg tab twice daily X4 days,then increase to one 1mg tab twice daily (Patient not taking: No sig reported)    varenicline (CHANTIX STARTING MONTH BOX) 0.5 mg (11)- 1 mg (42) tablet Take one 0.5mg tab by mouth once daily X3 days,then increase to one 0.5mg tab twice daily X4 days,then increase to one 1mg tab twice daily (Patient not taking: No sig reported)    zolpidem (AMBIEN) 5 MG Tab Take 1 tablet (5 mg total) by mouth nightly as needed.     Family History       Problem Relation (Age of Onset)    Hypertension Mother          Tobacco Use    Smoking status: Light Tobacco Smoker     Types: Cigarettes     Last attempt to quit: 2020     Years since quittin.3    Smokeless tobacco: Never Used    Tobacco comment: ocassionally never was qd   Substance and Sexual Activity    Alcohol use: Not Currently    Drug use: Never    Sexual activity: Not Currently     Review of  Systems  ROS:  Complete 12 point review of systems negative except as per HPI above  Objective:     Vital Signs (Most Recent):  Temp: 97.2 °F (36.2 °C) (08/10/22 1803)  Pulse: 92 (08/10/22 1845)  Resp: (!) 6 (08/10/22 1850)  BP: 122/74 (08/10/22 1845)  SpO2: 100 % (08/10/22 1845)   Vital Signs (24h Range):  Temp:  [97.2 °F (36.2 °C)-98.5 °F (36.9 °C)] 97.2 °F (36.2 °C)  Pulse:  [] 92  Resp:  [6-18] 6  SpO2:  [99 %-100 %] 100 %  BP: (122-168)/(74-94) 122/74     Weight: 72.6 kg (160 lb)  Body mass index is 26.63 kg/m².    Physical Exam  GEN: nontoxic, nondiaphoretic, comfortable, seen in Pre-Op holding area  ENT: DRY mucous membranes  EYES: PERRLA, anicteric sclera, no conjunctival discharge  PULM: comfortable work of breathing, CTA B  CV: RRR, 2+ radial pulses  GI: abd soft NT, TTP Right sided  Skin: dry and warm no jaundice  Psych: mood calm, affect normal, insight good  Neuro: Nonfocal motor exam, fluent speech, A&O, follows commands     Significant Labs: All pertinent labs within the past 24 hours have been reviewed.  BMP:   Recent Labs   Lab 08/10/22  1239   *   *   K 3.7   CL 95   CO2 24   BUN 8   CREATININE 0.6   CALCIUM 9.6     CBC:   Recent Labs   Lab 08/10/22  1239   WBC 17.88*   HGB 13.8   HCT 41.8        CMP:   Recent Labs   Lab 08/10/22  1239   *   K 3.7   CL 95   CO2 24   *   BUN 8   CREATININE 0.6   CALCIUM 9.6   PROT 8.8*   ALBUMIN 4.7   BILITOT 0.5   ALKPHOS 45*   AST 28   ALT 32   ANIONGAP 10       Significant Imaging: I have reviewed all pertinent imaging results/findings within the past 24 hours.  CT ABD PELVIS Reveals acute appendicitis    Assessment/Plan:     * Acute appendicitis  Observation  Surgical consultation for appendectomy today.  I discussed the case with General surgery.  Continue supportive care measures including pain control and antiemetics as needed  Continue empiric IV antibiotics for now.  Follow culture data, deescalate as able.  NPO  pending surgery and advance as tolerated thereafter per General surgery  Continuous IV fluids  Serial labs.  Replace electrolytes as needed.  DVT prophylaxis with SCDs and Lovenox       VTE Risk Mitigation (From admission, onward)         Ordered     enoxaparin injection 40 mg  Daily         08/10/22 1854     IP VTE HIGH RISK PATIENT  Once         08/10/22 1854     Place sequential compression device  Until discontinued         08/10/22 1854                   Mark Anthony Kang MD  Department of Hospital Medicine   [facility]

## 2022-08-11 NOTE — NURSING
C/o pain was unable to give because patient was over the Acetaminaphine. After speaking to Dr Rima duenas OK'ed for patient to have a Hydrocodone and notified pharmacy that patient is in pain and being discharged, medication given and discharge instruction given. IV d/stella.

## 2022-08-11 NOTE — ASSESSMENT & PLAN NOTE
Observation  Surgical consultation for appendectomy today.  I discussed the case with General surgery.  Continue supportive care measures including pain control and antiemetics as needed  Continue empiric IV antibiotics for now.  Follow culture data, deescalate as able.  NPO pending surgery and advance as tolerated thereafter per General surgery  Continuous IV fluids  Serial labs.  Replace electrolytes as needed.  DVT prophylaxis with SCDs and Lovenox

## 2022-08-11 NOTE — PLAN OF CARE
met with Pt at bedside to confirm discharge plans. Pt verbalized plan to discharge home via family transport.  called Pt's PCP office to schedule a  follow-up appointment and added to AVS. Pt has no other needs to be addressed by case management. Pt cleared to discharge by case management.      Final Discharge Note (most recent)     Final Note - 08/11/22 1008        Final Note    Assessment Type Final Discharge Note     Anticipated Discharge Disposition Home or Self Care     What phone number can be called within the next 1-3 days to see how you are doing after discharge? [phone]     Hospital Resources/Appts/Education Provided Provided patient/caregiver with written discharge plan information;Appointments scheduled and added to AVS        Post-Acute Status    Discharge Delays None known at this time

## 2022-08-11 NOTE — DISCHARGE SUMMARY
[facility]  Discharge Summary      Patient Name: Sumit Burger  MRN: [id number]  Patient Class: OP- Observation  Admission Date: 8/10/2022  Hospital Length of Stay: 0 days  Discharge Date and Time: 8/11/2022 11:06 AM  Attending Physician: No att. providers found   Discharging Provider: Azalea Abarca MD  Primary Care Provider: Ava Rolon MD      HPI:   46-year-old female history of sarcoidosis and right carpal tunnel, currently not taking any medications, who presents to the hospital with abdominal pain.  Duration abdominal pain is 1 day.  Location is right-sided.  Intensity is severe at its worst, waxing and waning in intensity.  She has associated anorexia, nausea, and low-grade fever.  No chest pain, shortness a breath, headache, syncope, or palpitation.  No bleeding, dysuria, or joint pain.  Based on her symptoms she presented to ER for evaluation.  UPT negative.  Urinalysis unremarkable.  WBC 17. CT abdomen revealed acute appendicitis.  General surgery consultation was obtained.  Patient to go for emergent appendectomy today.      Procedure(s) (LRB):  APPENDECTOMY, LAPAROSCOPIC (N/A)      Hospital Course:   51-year-old lady with prior history of sarcoid came with abdominal pain found to have acute appendicitis.  She underwent laparoscopic appendicectomy.  So far postop recovery have remained uneventful.  She is eating regular diet, has good bowel sounds.  Laparoscopic sites look unremarkable.  She was discharged home in hemodynamically stable condition with prescription of Zofran, Augmentin, pain medications.  She was given with lifting instructions.  She was advised to follow-up with her PCP and surgeon.     I have seen the patient on the day of discharge and reviewed the discharge instructions as outlined below. Patient verbalized understanding and is aware to contact primary care physician or return to ED if new or worsening symptoms.        Goals of Care  Treatment Preferences:  Code Status: Full Code      Consults:   Consults (From admission, onward)        Status Ordering Provider     Inpatient consult to General surgery  Once        Provider:  Jackson Vogel MD    Completed LORY BARROSO          No new Assessment & Plan notes have been filed under this hospital service since the last note was generated.  Service: Hospital Medicine    Final Active Diagnoses:      Problems Resolved During this Admission:    Diagnosis Date Noted Date Resolved POA    PRINCIPAL PROBLEM:  Acute appendicitis [K35.80] 08/10/2022 08/11/2022 Yes       Discharged Condition: good    Disposition: Home or Self Care    Follow Up:   Follow-up Information     [facility] - Emergency Dept .    Specialty: Emergency Medicine  Why: If symptoms worsen  Contact information:  [address] [facility] [id number] [phone]  Additional information:  1st floor           Jackson Vogel MD Follow up in 2 week(s).    Specialties: General Surgery, Colon and Rectal Surgery, Surgery  Contact information:  [address]  [address]  [place] [address]  [phone]             [facility]. Go on 8/30/2022.    Specialty: Internal Medicine  Why: Appointment scheduled for 08/30/2022 at 10:00am.  Contact information:  [address]  [address]  [place] [facility] [place] [address]  [phone]             [facility] Follow up.    Contact information:  [address] [facility] [address]  [phone]                       Patient Instructions:      Diet Cardiac   Order Comments: Advance slowly over next 2 days     Lifting restrictions   Order Comments: No more than 15lbs weight lifting for next 3 weeks     Notify your health care provider if you experience any of the following:  temperature >100.4     Notify your health care provider if you experience any of the following:  redness, tenderness, or signs of infection (pain, swelling, redness, odor or  green/yellow discharge around incision site)     Notify your health care provider if you experience any of the following:  severe uncontrolled pain     Activity as tolerated       Significant Diagnostic Studies: Labs: All labs within the past 24 hours have been reviewed    Pending Diagnostic Studies:     Procedure Component Value Units Date/Time    Specimen to Pathology - Surgery [[id number]] Collected: 08/10/22 4305    Order Status: Sent Lab Status: No result     Specimen: Tissue          Medications:  Reconciled Home Medications:      Medication List      START taking these medications    amoxicillin-clavulanate 875-125mg 875-125 mg per tablet  Commonly known as: AUGMENTIN  Take 1 tablet by mouth every 12 (twelve) hours. for 5 days     HYDROcodone-acetaminophen 5-325 mg per tablet  Commonly known as: NORCO  Take 1 tablet by mouth every 6 (six) hours as needed for Pain.     ondansetron 4 MG Tbdl  Commonly known as: ZOFRAN-ODT  Take 1 tablet (4 mg total) by mouth every 6 (six) hours as needed.        CONTINUE taking these medications    albuterol 90 mcg/actuation inhaler  Commonly known as: PROVENTIL/VENTOLIN HFA  Inhale 1-2 puffs into the lungs every 6 (six) hours as needed. Rescue     amLODIPine 10 MG tablet  Commonly known as: NORVASC  10 mg.     * CHANTIX STARTING MONTH BOX 0.5 mg (11)- 1 mg (42) tablet  Generic drug: varenicline  Take one 0.5mg tab by mouth once daily X3 days,then increase to one 0.5mg tab twice daily X4 days,then increase to one 1mg tab twice daily     * CHANTIX STARTING MONTH BOX 0.5 mg (11)- 1 mg (42) tablet  Generic drug: varenicline  Take one 0.5mg tab by mouth once daily X3 days,then increase to one 0.5mg tab twice daily X4 days,then increase to one 1mg tab twice daily     EScitalopram oxalate 10 MG tablet  Commonly known as: LEXAPRO  Take 1 tablet (10 mg total) by mouth once daily.     zolpidem 5 MG Tab  Commonly known as: AMBIEN  Take 1 tablet (5 mg total) by mouth nightly as needed.          * This list has 2 medication(s) that are the same as other medications prescribed for you. Read the directions carefully, and ask your doctor or other care provider to review them with you.            STOP taking these medications    famotidine 20 MG tablet  Commonly known as: PEPCID     losartan 100 MG tablet  Commonly known as: COZAAR     predniSONE 10 MG tablet  Commonly known as: DELTASONE            Indwelling Lines/Drains at time of discharge:   Lines/Drains/Airways     None                 Time spent on the discharge of patient: 32 minutes         Azalea Abarca MD  Department of Hospital Medicine  [facility]

## 2022-08-11 NOTE — HPI
46-year-old female history of sarcoidosis and right carpal tunnel, currently not taking any medications, who presents to the hospital with abdominal pain.  Duration abdominal pain is 1 day.  Location is right-sided.  Intensity is severe at its worst, waxing and waning in intensity.  She has associated anorexia, nausea, and low-grade fever.  No chest pain, shortness a breath, headache, syncope, or palpitation.  No bleeding, dysuria, or joint pain.  Based on her symptoms she presented to ER for evaluation.  UPT negative.  Urinalysis unremarkable.  WBC 17. CT abdomen revealed acute appendicitis.  General surgery consultation was obtained.  Patient to go for emergent appendectomy today.

## 2022-08-16 DIAGNOSIS — G89.18 POST-OP PAIN: Primary | ICD-10-CM

## 2022-08-16 RX ORDER — HYDROCODONE BITARTRATE AND ACETAMINOPHEN 10; 325 MG/1; MG/1
1 TABLET ORAL EVERY 6 HOURS PRN
Qty: 20 TABLET | Refills: 0 | Status: SHIPPED | OUTPATIENT
Start: 2022-08-16 | End: 2022-09-26

## 2022-08-16 NOTE — TELEPHONE ENCOUNTER
----- Message from Tresa Ellis sent at 8/16/2022 11:04 AM CDT -----  Type:  Sooner Appointment Request    Caller is requesting a sooner appointment.  Caller declined first available appointment listed below.  Caller will not accept being placed on the waitlist and is requesting a message be sent to doctor.    Name of Caller:  pt  When is the first available appointment?  8/30  Symptoms:  dx from hospital 8/11 and 2 week f/u  Best Call Back Number:  [phone] (home)     Additional Information:  please advise--thank you

## 2022-08-16 NOTE — TELEPHONE ENCOUNTER
I called patient and informed her that Dr. Vogel sent in HYDROcodone-acetaminophen (NORCO)  mg to her pharmacy.  Woody

## 2022-08-16 NOTE — TELEPHONE ENCOUNTER
I called patient to schedule her post op with Peggy Ellis PA-C on Thursday, 8/25/2 @ 9am at [facility].  Patient states that she's still having nausea and pain with a score of 7.  I informed her that I'll send Dr. Vogel the message.  She uses [facility] on [facility].  Woody

## 2022-08-24 ENCOUNTER — OFFICE VISIT (OUTPATIENT)
Dept: SURGERY | Facility: CLINIC | Age: 52
End: 2022-08-24
Payer: MEDICAID

## 2022-08-24 VITALS
TEMPERATURE: 98 F | HEIGHT: 65 IN | HEART RATE: 80 BPM | BODY MASS INDEX: 26.48 KG/M2 | WEIGHT: 158.94 LBS | DIASTOLIC BLOOD PRESSURE: 98 MMHG | SYSTOLIC BLOOD PRESSURE: 156 MMHG

## 2022-08-24 DIAGNOSIS — Z09 POSTOP CHECK: Primary | ICD-10-CM

## 2022-08-24 PROCEDURE — 3008F BODY MASS INDEX DOCD: CPT | Mod: CPTII,,, | Performed by: SURGERY

## 2022-08-24 PROCEDURE — 3008F PR BODY MASS INDEX (BMI) DOCUMENTED: ICD-10-PCS | Mod: CPTII,,, | Performed by: SURGERY

## 2022-08-24 PROCEDURE — 99024 PR POST-OP FOLLOW-UP VISIT: ICD-10-PCS | Mod: ,,, | Performed by: SURGERY

## 2022-08-24 PROCEDURE — 1159F MED LIST DOCD IN RCRD: CPT | Mod: CPTII,,, | Performed by: SURGERY

## 2022-08-24 PROCEDURE — 3080F DIAST BP >= 90 MM HG: CPT | Mod: CPTII,,, | Performed by: SURGERY

## 2022-08-24 PROCEDURE — 99999 PR PBB SHADOW E&M-EST. PATIENT-LVL III: ICD-10-PCS | Mod: PBBFAC,,, | Performed by: SURGERY

## 2022-08-24 PROCEDURE — 3077F PR MOST RECENT SYSTOLIC BLOOD PRESSURE >= 140 MM HG: ICD-10-PCS | Mod: CPTII,,, | Performed by: SURGERY

## 2022-08-24 PROCEDURE — 99999 PR PBB SHADOW E&M-EST. PATIENT-LVL III: CPT | Mod: PBBFAC,,, | Performed by: SURGERY

## 2022-08-24 PROCEDURE — 1159F PR MEDICATION LIST DOCUMENTED IN MEDICAL RECORD: ICD-10-PCS | Mod: CPTII,,, | Performed by: SURGERY

## 2022-08-24 PROCEDURE — 99213 OFFICE O/P EST LOW 20 MIN: CPT | Mod: PBBFAC,PN | Performed by: SURGERY

## 2022-08-24 PROCEDURE — 3080F PR MOST RECENT DIASTOLIC BLOOD PRESSURE >= 90 MM HG: ICD-10-PCS | Mod: CPTII,,, | Performed by: SURGERY

## 2022-08-24 PROCEDURE — 99024 POSTOP FOLLOW-UP VISIT: CPT | Mod: ,,, | Performed by: SURGERY

## 2022-08-24 PROCEDURE — 3077F SYST BP >= 140 MM HG: CPT | Mod: CPTII,,, | Performed by: SURGERY

## 2022-08-24 NOTE — PROGRESS NOTES
Cc: post op    HPI: 52 y.o.  female  2 weeks s/p laparoscopic appendectomy.   Pt notes she is now feeling well.  Did have some nausea persisting for a while which has since resolved    PE: AFVSS    AAOx3  CTA  Soft/NT/nd  Inc: c/d/i        Path:   VERMIFORM APPENDIX:   - ACUTE APPENDICITIS AND PERIAPPENDICITIS.   - NEGATIVE FOR DYSPLASIA OR MALIGNANCY.     A/P:   Pt doing well post surgery.   F/U with me prn

## 2022-09-23 ENCOUNTER — TELEPHONE (OUTPATIENT)
Dept: PSYCHIATRY | Facility: CLINIC | Age: 52
End: 2022-09-23
Payer: OTHER GOVERNMENT

## 2022-09-23 NOTE — TELEPHONE ENCOUNTER
----- Message from Christel Rodriguez MA sent at 9/23/2022 10:22 AM CDT -----  Regarding: Reschedule Appt.  Patient states she will keep Monday appt.@9am. if no other appts are available,but she is only able to come on Fridays or Mondays because she does not want to miss work and if we could schedule her around that.

## 2022-09-23 NOTE — TELEPHONE ENCOUNTER
Called pt regarding below message. Pt states she will keep Monday appt but for future appts to only schedule on Wednesdays or Fridays due to work schedule. Advised pt I will add this to her preferences. Pt states no further needs.

## 2022-09-26 ENCOUNTER — OFFICE VISIT (OUTPATIENT)
Dept: PSYCHIATRY | Facility: CLINIC | Age: 52
End: 2022-09-26
Payer: OTHER GOVERNMENT

## 2022-09-26 VITALS
SYSTOLIC BLOOD PRESSURE: 130 MMHG | BODY MASS INDEX: 27.66 KG/M2 | DIASTOLIC BLOOD PRESSURE: 85 MMHG | HEART RATE: 83 BPM | HEIGHT: 65 IN | WEIGHT: 166 LBS

## 2022-09-26 DIAGNOSIS — F41.0 PANIC DISORDER: ICD-10-CM

## 2022-09-26 DIAGNOSIS — F33.1 MODERATE EPISODE OF RECURRENT MAJOR DEPRESSIVE DISORDER: Primary | ICD-10-CM

## 2022-09-26 DIAGNOSIS — F43.10 PTSD (POST-TRAUMATIC STRESS DISORDER): ICD-10-CM

## 2022-09-26 PROCEDURE — 99999 PR PBB SHADOW E&M-EST. PATIENT-LVL IV: ICD-10-PCS | Mod: PBBFAC,,, | Performed by: PHYSICIAN ASSISTANT

## 2022-09-26 PROCEDURE — 99214 OFFICE O/P EST MOD 30 MIN: CPT | Mod: PBBFAC,PN | Performed by: PHYSICIAN ASSISTANT

## 2022-09-26 PROCEDURE — 90792 PR PSYCHIATRIC DIAGNOSTIC EVALUATION W/MEDICAL SERVICES: ICD-10-PCS | Mod: ,,, | Performed by: PHYSICIAN ASSISTANT

## 2022-09-26 PROCEDURE — 90792 PSYCH DIAG EVAL W/MED SRVCS: CPT | Mod: ,,, | Performed by: PHYSICIAN ASSISTANT

## 2022-09-26 PROCEDURE — 99999 PR PBB SHADOW E&M-EST. PATIENT-LVL IV: CPT | Mod: PBBFAC,,, | Performed by: PHYSICIAN ASSISTANT

## 2022-09-26 RX ORDER — BUPROPION HYDROCHLORIDE 150 MG/1
150 TABLET ORAL DAILY
Qty: 30 TABLET | Refills: 0 | Status: SHIPPED | OUTPATIENT
Start: 2022-09-26 | End: 2022-10-04 | Stop reason: SDUPTHER

## 2022-09-26 RX ORDER — ALPRAZOLAM 0.25 MG/1
0.25 TABLET ORAL 2 TIMES DAILY PRN
Qty: 15 TABLET | Refills: 0 | Status: SHIPPED | OUTPATIENT
Start: 2022-09-26 | End: 2022-10-04 | Stop reason: SDUPTHER

## 2022-09-26 RX ORDER — TRAZODONE HYDROCHLORIDE 50 MG/1
TABLET ORAL
Qty: 15 TABLET | Refills: 0 | Status: SHIPPED | OUTPATIENT
Start: 2022-09-26 | End: 2022-10-04 | Stop reason: SDUPTHER
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
## 2022-09-26 NOTE — PATIENT INSTRUCTIONS
Re-trial Wellbutrin XL 150mg once daily in the morning.     Utilize alprazolam 0.25-0.5mg as needed for panic.    Utilize trazodone 12.5-50mg for sleep.     Please go to emergency department if feeling as though you are a harm to yourself or others or if you are in crisis. Please call the clinic to report any worsening of symptoms or problems associated with medication.    Side effects of benzodiazepines includes sedation, fatigue, depression, dizziness, slurred speech, weakness, forgetfulness, confusion, nervousness, dry mouth. Life threatening side effects include respiratory depression which can result in death especially when taken with other medications such as opioids (this is a [place] boxed warning) and liver/kidney dysfunction. Stopping this medication abruptly can cause withdrawal seizures that have the potential to result in death. These medications are not indicated or recommended for long term usage due to risks not outweighing benefits for the medication. Benzodiazepines are habit forming. Do not use alcohol while taking this medication. Patient verbalized understanding of these risks.

## 2022-10-04 DIAGNOSIS — F41.0 PANIC DISORDER: ICD-10-CM

## 2022-10-04 DIAGNOSIS — F33.1 MODERATE EPISODE OF RECURRENT MAJOR DEPRESSIVE DISORDER: ICD-10-CM

## 2022-10-04 RX ORDER — BUPROPION HYDROCHLORIDE 150 MG/1
150 TABLET ORAL DAILY
Qty: 30 TABLET | Refills: 0 | Status: SHIPPED | OUTPATIENT
Start: 2022-10-04 | End: 2022-10-21 | Stop reason: SDUPTHER

## 2022-10-04 RX ORDER — TRAZODONE HYDROCHLORIDE 50 MG/1
TABLET ORAL
Qty: 15 TABLET | Refills: 0 | Status: SHIPPED | OUTPATIENT
Start: 2022-10-04 | End: 2022-10-17

## 2022-10-04 RX ORDER — ALPRAZOLAM 0.25 MG/1
0.25 TABLET ORAL 2 TIMES DAILY PRN
Qty: 15 TABLET | Refills: 0 | Status: SHIPPED | OUTPATIENT
Start: 2022-10-04 | End: 2022-11-09 | Stop reason: SDUPTHER

## 2022-10-04 NOTE — TELEPHONE ENCOUNTER
Patient canceled the appointment for tomorrow 10/5/22. Patient rescheduled for 10/21/22 @8 am. Patient also requests a refill of the following:     Pt is requesting medication refill on traZODone (DESYREL) 50 MG tablet  Last refill: 09/26/22   Last visit: 09/26/22  Follow Up: 10/21/2022    Pt is requesting medication refill on ALPRAZolam (XANAX) 0.25 MG tablet  Last refill: 09/26/22  Last visit: 09/26/2022  Follow Up: 10/21/2022    Pt is requesting medication refill on buPROPion (WELLBUTRIN XL) 150 MG TB24 tablet  Last refill: 09/26/2022  Last visit: 09/26/2022  Follow Up: 10/21/2022    Verified pharmacy information.     Called patient back to find out if she is completely out of her medication as they are not due for refill until 10/26/2022/ She did not answer, left a voice message asking her to return the call to our office.

## 2022-10-21 ENCOUNTER — HOSPITAL ENCOUNTER (EMERGENCY)
Facility: HOSPITAL | Age: 52
Discharge: HOME | End: 2022-10-21
Attending: STUDENT IN AN ORGANIZED HEALTH CARE EDUCATION/TRAINING PROGRAM
Payer: OTHER GOVERNMENT

## 2022-10-21 ENCOUNTER — OFFICE VISIT (OUTPATIENT)
Dept: PSYCHIATRY | Facility: CLINIC | Age: 52
End: 2022-10-21
Payer: OTHER GOVERNMENT

## 2022-10-21 VITALS
OXYGEN SATURATION: 99 % | SYSTOLIC BLOOD PRESSURE: 150 MMHG | HEART RATE: 84 BPM | TEMPERATURE: 98 F | DIASTOLIC BLOOD PRESSURE: 97 MMHG | RESPIRATION RATE: 19 BRPM | HEIGHT: 63 IN | WEIGHT: 160 LBS | BODY MASS INDEX: 28.35 KG/M2

## 2022-10-21 VITALS
HEART RATE: 98 BPM | BODY MASS INDEX: 27.15 KG/M2 | DIASTOLIC BLOOD PRESSURE: 105 MMHG | SYSTOLIC BLOOD PRESSURE: 162 MMHG | HEIGHT: 65 IN | WEIGHT: 162.94 LBS

## 2022-10-21 DIAGNOSIS — G47.00 INSOMNIA, UNSPECIFIED TYPE: ICD-10-CM

## 2022-10-21 DIAGNOSIS — F33.1 MODERATE EPISODE OF RECURRENT MAJOR DEPRESSIVE DISORDER: Primary | ICD-10-CM

## 2022-10-21 DIAGNOSIS — D86.9 SARCOIDOSIS: ICD-10-CM

## 2022-10-21 DIAGNOSIS — F41.0 PANIC DISORDER: ICD-10-CM

## 2022-10-21 DIAGNOSIS — F43.10 PTSD (POST-TRAUMATIC STRESS DISORDER): ICD-10-CM

## 2022-10-21 DIAGNOSIS — R07.9 CHEST PAIN: Primary | ICD-10-CM

## 2022-10-21 DIAGNOSIS — F17.200 NICOTINE USE DISORDER: ICD-10-CM

## 2022-10-21 LAB
ALBUMIN SERPL BCP-MCNC: 4.8 G/DL (ref 3.5–5.2)
ALP SERPL-CCNC: 67 U/L (ref 55–135)
ALT SERPL W/O P-5'-P-CCNC: 32 U/L (ref 10–44)
ANION GAP SERPL CALC-SCNC: 9 MMOL/L (ref 8–16)
AST SERPL-CCNC: 27 U/L (ref 10–40)
BASOPHILS # BLD AUTO: 0.04 K/UL (ref 0–0.2)
BASOPHILS NFR BLD: 0.3 % (ref 0–1.9)
BILIRUB SERPL-MCNC: 0.8 MG/DL (ref 0.1–1)
BNP SERPL-MCNC: 20 PG/ML (ref 0–99)
BUN SERPL-MCNC: 15 MG/DL (ref 6–20)
CALCIUM SERPL-MCNC: 9.8 MG/DL (ref 8.7–10.5)
CHLORIDE SERPL-SCNC: 100 MMOL/L (ref 95–110)
CO2 SERPL-SCNC: 27 MMOL/L (ref 23–29)
CREAT SERPL-MCNC: 0.8 MG/DL (ref 0.5–1.4)
DIFFERENTIAL METHOD: ABNORMAL
EOSINOPHIL # BLD AUTO: 0.1 K/UL (ref 0–0.5)
EOSINOPHIL NFR BLD: 0.5 % (ref 0–8)
ERYTHROCYTE [DISTWIDTH] IN BLOOD BY AUTOMATED COUNT: 14.5 % (ref 11.5–14.5)
EST. GFR  (NO RACE VARIABLE): >60 ML/MIN/1.73 M^2
GLUCOSE SERPL-MCNC: 94 MG/DL (ref 70–110)
HCT VFR BLD AUTO: 46.9 % (ref 37–48.5)
HGB BLD-MCNC: 14.8 G/DL (ref 12–16)
IMM GRANULOCYTES # BLD AUTO: 0.04 K/UL (ref 0–0.04)
IMM GRANULOCYTES NFR BLD AUTO: 0.3 % (ref 0–0.5)
INR PPP: 1
LYMPHOCYTES # BLD AUTO: 3.5 K/UL (ref 1–4.8)
LYMPHOCYTES NFR BLD: 27.6 % (ref 18–48)
MCH RBC QN AUTO: 28.1 PG (ref 27–31)
MCHC RBC AUTO-ENTMCNC: 31.6 G/DL (ref 32–36)
MCV RBC AUTO: 89 FL (ref 82–98)
MONOCYTES # BLD AUTO: 0.7 K/UL (ref 0.3–1)
MONOCYTES NFR BLD: 5.4 % (ref 4–15)
NEUTROPHILS # BLD AUTO: 8.4 K/UL (ref 1.8–7.7)
NEUTROPHILS NFR BLD: 65.9 % (ref 38–73)
NRBC BLD-RTO: 0 /100 WBC
PLATELET # BLD AUTO: 260 K/UL (ref 150–450)
PMV BLD AUTO: 10.7 FL (ref 9.2–12.9)
POTASSIUM SERPL-SCNC: 4.2 MMOL/L (ref 3.5–5.1)
PROT SERPL-MCNC: 9 G/DL (ref 6–8.4)
PROTHROMBIN TIME: 12 SEC (ref 11.4–13.7)
RBC # BLD AUTO: 5.27 M/UL (ref 4–5.4)
SODIUM SERPL-SCNC: 136 MMOL/L (ref 136–145)
TROPONIN I SERPL DL<=0.01 NG/ML-MCNC: <0.03 NG/ML
WBC # BLD AUTO: 12.7 K/UL (ref 3.9–12.7)

## 2022-10-21 PROCEDURE — 99999 PR PBB SHADOW E&M-EST. PATIENT-LVL V: CPT | Mod: PBBFAC,,, | Performed by: PHYSICIAN ASSISTANT

## 2022-10-21 PROCEDURE — 93010 EKG 12-LEAD: ICD-10-PCS | Mod: ,,, | Performed by: INTERNAL MEDICINE

## 2022-10-21 PROCEDURE — 93005 ELECTROCARDIOGRAM TRACING: CPT | Performed by: INTERNAL MEDICINE

## 2022-10-21 PROCEDURE — 99999 PR PBB SHADOW E&M-EST. PATIENT-LVL V: ICD-10-PCS | Mod: PBBFAC,,, | Performed by: PHYSICIAN ASSISTANT

## 2022-10-21 PROCEDURE — 99215 OFFICE O/P EST HI 40 MIN: CPT | Mod: PBBFAC,PN | Performed by: PHYSICIAN ASSISTANT

## 2022-10-21 PROCEDURE — 99214 PR OFFICE/OUTPT VISIT, EST, LEVL IV, 30-39 MIN: ICD-10-PCS | Mod: S$PBB,,, | Performed by: PHYSICIAN ASSISTANT

## 2022-10-21 PROCEDURE — 90833 PSYTX W PT W E/M 30 MIN: CPT | Mod: ,,, | Performed by: PHYSICIAN ASSISTANT

## 2022-10-21 PROCEDURE — 85610 PROTHROMBIN TIME: CPT | Performed by: EMERGENCY MEDICINE

## 2022-10-21 PROCEDURE — 85025 COMPLETE CBC W/AUTO DIFF WBC: CPT | Performed by: EMERGENCY MEDICINE

## 2022-10-21 PROCEDURE — 83880 ASSAY OF NATRIURETIC PEPTIDE: CPT | Performed by: EMERGENCY MEDICINE

## 2022-10-21 PROCEDURE — 99214 OFFICE O/P EST MOD 30 MIN: CPT | Mod: S$PBB,,, | Performed by: PHYSICIAN ASSISTANT

## 2022-10-21 PROCEDURE — 93010 ELECTROCARDIOGRAM REPORT: CPT | Mod: ,,, | Performed by: INTERNAL MEDICINE

## 2022-10-21 PROCEDURE — 99284 EMERGENCY DEPT VISIT MOD MDM: CPT | Mod: 25

## 2022-10-21 PROCEDURE — 80053 COMPREHEN METABOLIC PANEL: CPT | Performed by: EMERGENCY MEDICINE

## 2022-10-21 PROCEDURE — 84484 ASSAY OF TROPONIN QUANT: CPT | Performed by: EMERGENCY MEDICINE

## 2022-10-21 PROCEDURE — 90833 PR PSYCHOTHERAPY W/PATIENT W/E&M, 30 MIN (ADD ON): ICD-10-PCS | Mod: ,,, | Performed by: PHYSICIAN ASSISTANT

## 2022-10-21 RX ORDER — LOSARTAN POTASSIUM 100 MG/1
50 TABLET ORAL
Status: ON HOLD | COMMUNITY
Start: 2021-12-17 | End: 2023-07-03 | Stop reason: HOSPADM

## 2022-10-21 RX ORDER — PREDNISONE 10 MG/1
40 TABLET ORAL DAILY
COMMUNITY
Start: 2022-10-05 | End: 2023-06-30 | Stop reason: DRUGHIGH

## 2022-10-21 RX ORDER — AZATHIOPRINE 50 MG/1
50 TABLET ORAL DAILY
COMMUNITY
Start: 2022-10-05 | End: 2023-06-30

## 2022-10-21 RX ORDER — ZOLPIDEM TARTRATE 5 MG/1
5 TABLET ORAL NIGHTLY PRN
Qty: 10 TABLET | Refills: 0 | Status: SHIPPED | OUTPATIENT
Start: 2022-10-21 | End: 2022-11-09 | Stop reason: SDUPTHER

## 2022-10-21 RX ORDER — BUPROPION HYDROCHLORIDE 300 MG/1
300 TABLET ORAL DAILY
Qty: 30 TABLET | Refills: 0 | Status: SHIPPED | OUTPATIENT
Start: 2022-10-21 | End: 2022-11-09 | Stop reason: SDUPTHER
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
## 2022-10-21 NOTE — PATIENT INSTRUCTIONS
Rexulti - antidepressant augmentation  Trintellix or Viibryd - new serotonin medicines     Up to 10mg melatonin with trazodone 12.5mg (quarter tablet)    Increase wellbutrin XL to 300mg daily     Please go to emergency department if feeling as though you are a harm to yourself or others or if you are in crisis. Please call the clinic to report any worsening of symptoms or problems associated with medication.

## 2022-10-21 NOTE — ED PROVIDER NOTES
"Encounter Date: 10/21/2022       History     Chief Complaint   Patient presents with    Hypertension     Pt c/o "spasms" in chest. + htn     52-year-old female with history of pulmonary sarcoid currently on azathioprine and prednisone, presents for 1 day history of intermittent left-sided chest spasms, lasting 10 seconds at a time, nonradiating, with no nausea or vomiting, along with a few day history of bilateral burning sensation in the hands in a glove distribution.  She denies shortness of breath.  She has chronic cough secondary to a pulmonary sarcoid.  She denies fevers or chills.  She also reports increased blood pressure over past few days.    Review of patient's allergies indicates:   Allergen Reactions    Sulfamethoxazole-trimethoprim Hives, Shortness Of Breath, Nausea And Vomiting and Rash     Hives, nausea and vomiting, and shortness of breath.  Did not require admission  Hives, nausea and vomiting, and shortness of breath.  Did not require admission    Benadryl [diphenhydramine hcl]     Latex, natural rubber     Percocet [oxycodone-acetaminophen]     Shrimp     Codeine Itching and Nausea Only     Past Medical History:   Diagnosis Date    HTN (hypertension)     Pneumonia 10/2020    Sarcoidosis      Past Surgical History:   Procedure Laterality Date    BRONCHOSCOPY WITH FLUOROSCOPY Right 10/20/2020    Procedure: BRONCHOSCOPY, WITH FLUOROSCOPY;  Surgeon: Ava Rolon MD;  Location: [facility] ENDO;  Service: Pulmonary;  Laterality: Right;     SECTION      x2    FALLOPIAN TUBOPLASTY      LAPAROSCOPIC APPENDECTOMY N/A 8/10/2022    Procedure: APPENDECTOMY, LAPAROSCOPIC;  Surgeon: Jackson Vogel MD;  Location: [facility] OR;  Service: General;  Laterality: N/A;    TUBAL LIGATION      WRIST FRACTURE SURGERY Right     ganglion cyst     Family History   Problem Relation Age of Onset    Hypertension Mother      Social History     Tobacco Use    Smoking status: Light Smoker     Packs/day: 0.25     Types: " Cigarettes     Last attempt to quit: 2020     Years since quittin.5    Smokeless tobacco: Never    Tobacco comments:     ocassionally never was qd   Substance Use Topics    Alcohol use: Not Currently    Drug use: Never     Review of Systems   Constitutional:  Negative for activity change, appetite change, chills, fever and unexpected weight change.   HENT:  Negative for dental problem and drooling.    Eyes:  Negative for discharge and itching.   Respiratory:  Positive for cough and chest tightness. Negative for shortness of breath, wheezing and stridor.    Cardiovascular:  Negative for chest pain, palpitations and leg swelling.   Gastrointestinal:  Negative for abdominal distention, abdominal pain, diarrhea and nausea.   Genitourinary:  Negative for difficulty urinating, dysuria, frequency and urgency.   Musculoskeletal:  Negative for back pain, gait problem and joint swelling.   Neurological:  Negative for dizziness, syncope, numbness and headaches.   Psychiatric/Behavioral:  Negative for agitation, behavioral problems and confusion.      Physical Exam     Initial Vitals [10/21/22 0925]   BP Pulse Resp Temp SpO2   (!) 158/103 98 17 98.3 °F (36.8 °C) 100 %      MAP       --         Physical Exam    Nursing note and vitals reviewed.  Constitutional: She appears well-developed and well-nourished. She is not diaphoretic.   HENT:   Head: Normocephalic and atraumatic.   Mouth/Throat: Oropharynx is clear and moist.   Eyes: EOM are normal. Pupils are equal, round, and reactive to light. Right eye exhibits no discharge. Left eye exhibits no discharge.   Neck: No tracheal deviation present.   Normal range of motion.  Cardiovascular:  Normal rate, regular rhythm and intact distal pulses.           Pulmonary/Chest: No respiratory distress. She has no wheezes. She exhibits no tenderness.   Abdominal: Abdomen is soft. She exhibits no distension. There is no abdominal tenderness.   Musculoskeletal:         General: No  tenderness or edema. Normal range of motion.      Cervical back: Normal range of motion.     Neurological: She is alert and oriented to person, place, and time. She has normal strength. No cranial nerve deficit or sensory deficit. GCS eye subscore is 4. GCS verbal subscore is 5. GCS motor subscore is 6.   Skin: Skin is warm and dry. No rash noted.   Psychiatric: She has a normal mood and affect. Her behavior is normal. Thought content normal.       ED Course   Procedures  Labs Reviewed   CBC W/ AUTO DIFFERENTIAL - Abnormal; Notable for the following components:       Result Value    MCHC 31.6 (*)     Gran # (ANC) 8.4 (*)     All other components within normal limits   COMPREHENSIVE METABOLIC PANEL - Abnormal; Notable for the following components:    Total Protein 9.0 (*)     All other components within normal limits   B-TYPE NATRIURETIC PEPTIDE   TROPONIN I   PROTIME-INR     EKG Readings: (Independently Interpreted)   EKG with regular rate, regular rhythm, normal axis, no acute ST elevations or depressions, normal ID, QRS and QT interval. Interpreted by me.     ECG Results              EKG 12-lead (In process)  Result time 10/21/22 09:39:30      In process by Interface, [facility] (10/21/22 09:39:30)                   Narrative:    Test Reason : R07.9,    Vent. Rate : 093 BPM     Atrial Rate : 093 BPM     P-R Int : 116 ms          QRS Dur : 084 ms      QT Int : 356 ms       P-R-T Axes : 059 017 030 degrees     QTc Int : 442 ms    Normal sinus rhythm  Possible Left atrial enlargement  Borderline Abnormal ECG  When compared with ECG of 17-JAN-2022 14:49,  Non-specific change in ST segment in Anterior leads  Nonspecific T wave abnormality no longer evident in Anterior leads    Referred By: [email or web address]   SELF           Confirmed By:                                   Imaging Results              X-Ray Chest PA And Lateral (Final result)  Result time 10/21/22 10:47:37   Procedure changed from X-Ray Chest AP Portable      Final result by Ced Dillon MD (10/21/22 10:47:37)                   Narrative:    CHEST PA AND LATERAL    CLINICAL DATA:Chest pain.    FINDINGS: PA and lateral views demonstrate no cardiac, pulmonary, or osseous abnormalities.    IMPRESSION:  1. Normal two-view chest.    Electronically signed by:  Ced Dillon MD  10/21/2022 10:47 AM CDT Workstation: [id number]                                  X-Rays:   Independently Interpreted Readings:   Chest X-Ray: Normal heart size.  No infiltrates.  No acute abnormalities.   Medications - No data to display  Medical Decision Making:   Initial Assessment:   52-year-old female presents for intermittent left-sided chest tightness over the past day.  Vitals notable for hypertension otherwise normal.  Differential Diagnosis:   Side effect of azathioprine, steroids, sarcoid flare, ACS, CHF, pneumonia, GERD  ED Management:  Chest x-ray without acute cardiopulmonary abnormality, no infiltrate seen, doubt pneumonia or pneumothorax.  EKG without acute ischemic changes, troponin within normal limits, history less concerning for ACS, patient does not have risk factors aside for hypertension, heart score of 2.  Patient did not have chest pain prior to arrival so no 2nd troponin ordered given he been going for home for 3 days.  BNP within normal limits, doubt heart failure.  Patient is not hypoxic, not tachycardic, does not have pleuritic chest pain, doubt acute PE.  Patient's hypertension is likely due to the recent steroid use.  She has a mild leukocytosis which is also likely due to recent steroid use.  Patient is stable for discharge with outpatient follow-up with echo and cardiac MRI for her sarcoidosis.  [facility]  Will refer her to Cardiology here for that imaging.  Patient still with discharge regular.                        Clinical Impression:   Final diagnoses:  [R07.9] Chest  pain (Primary)  [D86.9] Sarcoidosis        ED Disposition Condition    Discharge Stable          ED Prescriptions    None       Follow-up Information       Follow up With Specialties Details Why Contact Info    [facility]  Call today To set up a follow-up appointment and get a repeat echo [address] [facility] [address]  [phone]               Oswald Sahu MD  10/21/22 3627

## 2022-10-21 NOTE — DISCHARGE INSTRUCTIONS
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
## 2022-10-26 ENCOUNTER — OFFICE VISIT (OUTPATIENT)
Dept: PSYCHIATRY | Facility: CLINIC | Age: 52
End: 2022-10-26
Payer: OTHER GOVERNMENT

## 2022-10-26 DIAGNOSIS — F43.10 PTSD (POST-TRAUMATIC STRESS DISORDER): Primary | ICD-10-CM

## 2022-10-26 DIAGNOSIS — F33.1 MODERATE EPISODE OF RECURRENT MAJOR DEPRESSIVE DISORDER: ICD-10-CM

## 2022-10-26 DIAGNOSIS — F40.01 PANIC DISORDER WITH AGORAPHOBIA: ICD-10-CM

## 2022-10-26 PROCEDURE — 99212 OFFICE O/P EST SF 10 MIN: CPT | Mod: PBBFAC,PN | Performed by: PSYCHOLOGIST

## 2022-10-26 PROCEDURE — 90791 PSYCH DIAGNOSTIC EVALUATION: CPT | Mod: ,,, | Performed by: PSYCHOLOGIST

## 2022-10-26 PROCEDURE — 90791 PR PSYCHIATRIC DIAGNOSTIC EVALUATION: ICD-10-PCS | Mod: ,,, | Performed by: PSYCHOLOGIST

## 2022-10-26 PROCEDURE — 99999 PR PBB SHADOW E&M-EST. PATIENT-LVL II: ICD-10-PCS | Mod: PBBFAC,,, | Performed by: PSYCHOLOGIST

## 2022-10-26 PROCEDURE — 99999 PR PBB SHADOW E&M-EST. PATIENT-LVL II: CPT | Mod: PBBFAC,,, | Performed by: PSYCHOLOGIST

## 2022-11-09 ENCOUNTER — OFFICE VISIT (OUTPATIENT)
Dept: PSYCHIATRY | Facility: CLINIC | Age: 52
End: 2022-11-09
Payer: MEDICAID

## 2022-11-09 DIAGNOSIS — F43.10 PTSD (POST-TRAUMATIC STRESS DISORDER): ICD-10-CM

## 2022-11-09 DIAGNOSIS — G47.00 INSOMNIA, UNSPECIFIED TYPE: ICD-10-CM

## 2022-11-09 DIAGNOSIS — F33.1 MODERATE EPISODE OF RECURRENT MAJOR DEPRESSIVE DISORDER: Primary | ICD-10-CM

## 2022-11-09 DIAGNOSIS — F41.0 PANIC DISORDER: ICD-10-CM

## 2022-11-09 PROCEDURE — 1160F RVW MEDS BY RX/DR IN RCRD: CPT | Mod: SA,HB,CPTII,95 | Performed by: PHYSICIAN ASSISTANT

## 2022-11-09 PROCEDURE — 99999 PR PBB SHADOW E&M-EST. PATIENT-LVL III: ICD-10-PCS | Mod: PBBFAC,SA,HB, | Performed by: PHYSICIAN ASSISTANT

## 2022-11-09 PROCEDURE — 99999 PR PBB SHADOW E&M-EST. PATIENT-LVL III: CPT | Mod: PBBFAC,SA,HB, | Performed by: PHYSICIAN ASSISTANT

## 2022-11-09 PROCEDURE — 1159F MED LIST DOCD IN RCRD: CPT | Mod: SA,HB,CPTII,95 | Performed by: PHYSICIAN ASSISTANT

## 2022-11-09 PROCEDURE — 1160F PR REVIEW ALL MEDS BY PRESCRIBER/CLIN PHARMACIST DOCUMENTED: ICD-10-PCS | Mod: SA,HB,CPTII,95 | Performed by: PHYSICIAN ASSISTANT

## 2022-11-09 PROCEDURE — 1159F PR MEDICATION LIST DOCUMENTED IN MEDICAL RECORD: ICD-10-PCS | Mod: SA,HB,CPTII,95 | Performed by: PHYSICIAN ASSISTANT

## 2022-11-09 PROCEDURE — 99214 PR OFFICE/OUTPT VISIT, EST, LEVL IV, 30-39 MIN: ICD-10-PCS | Mod: S$PBB,SA,HB,95 | Performed by: PHYSICIAN ASSISTANT

## 2022-11-09 PROCEDURE — 99213 OFFICE O/P EST LOW 20 MIN: CPT | Mod: PBBFAC,PN | Performed by: PHYSICIAN ASSISTANT

## 2022-11-09 PROCEDURE — 99214 OFFICE O/P EST MOD 30 MIN: CPT | Mod: S$PBB,SA,HB,95 | Performed by: PHYSICIAN ASSISTANT

## 2022-11-09 RX ORDER — ZOLPIDEM TARTRATE 5 MG/1
5 TABLET ORAL NIGHTLY PRN
Qty: 30 TABLET | Refills: 0 | Status: SHIPPED | OUTPATIENT
Start: 2022-11-09 | End: 2023-06-30

## 2022-11-09 RX ORDER — BUPROPION HYDROCHLORIDE 300 MG/1
300 TABLET ORAL DAILY
Qty: 30 TABLET | Refills: 1 | Status: SHIPPED | OUTPATIENT
Start: 2022-11-09 | End: 2023-06-30

## 2022-11-09 RX ORDER — ESCITALOPRAM OXALATE 5 MG/1
5 TABLET ORAL DAILY
Qty: 30 TABLET | Refills: 1 | Status: SHIPPED | OUTPATIENT
Start: 2022-11-09 | End: 2023-06-30

## 2022-11-09 RX ORDER — ALPRAZOLAM 0.25 MG/1
0.25 TABLET ORAL 2 TIMES DAILY PRN
Qty: 15 TABLET | Refills: 0 | Status: SHIPPED | OUTPATIENT
Start: 2022-11-09 | End: 2023-06-30
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
## 2022-11-11 ENCOUNTER — TELEPHONE (OUTPATIENT)
Dept: FAMILY MEDICINE | Facility: CLINIC | Age: 52
End: 2022-11-11
Payer: OTHER GOVERNMENT

## 2022-11-11 NOTE — TELEPHONE ENCOUNTER
----- Message from Haily Cheng sent at 11/11/2022  8:46 AM CST -----  Contact: pt at [phone]  Type:  Sooner Appointment Request    Caller is requesting a sooner appointment.  Caller declined first available appointment listed below.  Caller will not accept being placed on the waitlist and is requesting a message be sent to doctor.    Name of Caller:  pt  When is the first available appointment?  N/A  Symptoms:  est care  Best Call Back Number:  [phone]  Additional Information:  Please call back to advise.

## 2022-11-11 NOTE — TELEPHONE ENCOUNTER
Spoke to pt and provided with medicaid escalation line for scheduling. Unable to schedule NP appt for pt.  Medicaid escalation line [phone]

## 2022-11-16 ENCOUNTER — OFFICE VISIT (OUTPATIENT)
Dept: PSYCHIATRY | Facility: CLINIC | Age: 52
End: 2022-11-16
Payer: MEDICAID

## 2022-11-16 DIAGNOSIS — F43.10 PTSD (POST-TRAUMATIC STRESS DISORDER): Primary | ICD-10-CM

## 2022-11-16 PROCEDURE — 1159F MED LIST DOCD IN RCRD: CPT | Mod: AH,HB,CPTII, | Performed by: PSYCHOLOGIST

## 2022-11-16 PROCEDURE — 99999 PR PBB SHADOW E&M-EST. PATIENT-LVL II: ICD-10-PCS | Mod: PBBFAC,AF,HB, | Performed by: PSYCHOLOGIST

## 2022-11-16 PROCEDURE — 1160F PR REVIEW ALL MEDS BY PRESCRIBER/CLIN PHARMACIST DOCUMENTED: ICD-10-PCS | Mod: AH,HB,CPTII, | Performed by: PSYCHOLOGIST

## 2022-11-16 PROCEDURE — 90834 PR PSYCHOTHERAPY W/PATIENT, 45 MIN: ICD-10-PCS | Mod: AH,HB,, | Performed by: PSYCHOLOGIST

## 2022-11-16 PROCEDURE — 99212 OFFICE O/P EST SF 10 MIN: CPT | Mod: PBBFAC,PN | Performed by: PSYCHOLOGIST

## 2022-11-16 PROCEDURE — 90834 PSYTX W PT 45 MINUTES: CPT | Mod: AH,HB,, | Performed by: PSYCHOLOGIST

## 2022-11-16 PROCEDURE — 99999 PR PBB SHADOW E&M-EST. PATIENT-LVL II: CPT | Mod: PBBFAC,AF,HB, | Performed by: PSYCHOLOGIST

## 2022-11-16 PROCEDURE — 1160F RVW MEDS BY RX/DR IN RCRD: CPT | Mod: AH,HB,CPTII, | Performed by: PSYCHOLOGIST

## 2022-11-16 PROCEDURE — 1159F PR MEDICATION LIST DOCUMENTED IN MEDICAL RECORD: ICD-10-PCS | Mod: AH,HB,CPTII, | Performed by: PSYCHOLOGIST
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
## 2022-12-06 ENCOUNTER — TELEPHONE (OUTPATIENT)
Dept: PSYCHIATRY | Facility: CLINIC | Age: 52
End: 2022-12-06
Payer: OTHER GOVERNMENT

## 2022-12-06 NOTE — TELEPHONE ENCOUNTER
----- Message from Hannah Almodovar sent at 12/6/2022  4:14 PM CST -----  Contact: PATIENT  Type: Apoointment Request      Name of Caller:PATIENT  Would the patient rather a call back or a response via [facility]? CALL   Best Call Back Number:[phone]  Additional Information: SURENDRA

## 2022-12-15 ENCOUNTER — TELEPHONE (OUTPATIENT)
Dept: SMOKING CESSATION | Facility: CLINIC | Age: 52
End: 2022-12-15
Payer: OTHER GOVERNMENT

## 2022-12-15 NOTE — TELEPHONE ENCOUNTER
Telephoned patient for follow up after missed intake appointment.  Patient was unavailable at this time.  I left a detailed message with nature of call and contact information.

## 2023-01-23 ENCOUNTER — OFFICE VISIT (OUTPATIENT)
Dept: PSYCHIATRY | Facility: CLINIC | Age: 53
End: 2023-01-23
Payer: MEDICAID

## 2023-01-23 DIAGNOSIS — F43.10 PTSD (POST-TRAUMATIC STRESS DISORDER): Primary | ICD-10-CM

## 2023-01-23 PROCEDURE — 99999 PR PBB SHADOW E&M-EST. PATIENT-LVL II: CPT | Mod: PBBFAC,AF,HB, | Performed by: PSYCHOLOGIST

## 2023-01-23 PROCEDURE — 90837 PSYTX W PT 60 MINUTES: CPT | Mod: AH,HB,S$PBB, | Performed by: PSYCHOLOGIST

## 2023-01-23 PROCEDURE — 1159F PR MEDICATION LIST DOCUMENTED IN MEDICAL RECORD: ICD-10-PCS | Mod: AH,HB,CPTII, | Performed by: PSYCHOLOGIST

## 2023-01-23 PROCEDURE — 1160F RVW MEDS BY RX/DR IN RCRD: CPT | Mod: AH,HB,CPTII, | Performed by: PSYCHOLOGIST

## 2023-01-23 PROCEDURE — 90837 PR PSYCHOTHERAPY W/PATIENT, 60 MIN: ICD-10-PCS | Mod: AH,HB,S$PBB, | Performed by: PSYCHOLOGIST

## 2023-01-23 PROCEDURE — 99999 PR PBB SHADOW E&M-EST. PATIENT-LVL II: ICD-10-PCS | Mod: PBBFAC,AF,HB, | Performed by: PSYCHOLOGIST

## 2023-01-23 PROCEDURE — 90838 PSYTX W PT W E/M 60 MIN: CPT | Mod: PBBFAC,PN | Performed by: PSYCHOLOGIST

## 2023-01-23 PROCEDURE — 1160F PR REVIEW ALL MEDS BY PRESCRIBER/CLIN PHARMACIST DOCUMENTED: ICD-10-PCS | Mod: AH,HB,CPTII, | Performed by: PSYCHOLOGIST

## 2023-01-23 PROCEDURE — 99212 OFFICE O/P EST SF 10 MIN: CPT | Mod: PBBFAC,PN | Performed by: PSYCHOLOGIST

## 2023-01-23 PROCEDURE — 1159F MED LIST DOCD IN RCRD: CPT | Mod: AH,HB,CPTII, | Performed by: PSYCHOLOGIST
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
## 2023-05-18 ENCOUNTER — HOSPITAL ENCOUNTER (EMERGENCY)
Facility: HOSPITAL | Age: 53
Discharge: HOME | End: 2023-05-18
Attending: EMERGENCY MEDICINE
Payer: OTHER GOVERNMENT

## 2023-05-18 VITALS
DIASTOLIC BLOOD PRESSURE: 120 MMHG | RESPIRATION RATE: 18 BRPM | TEMPERATURE: 98 F | WEIGHT: 150 LBS | HEIGHT: 63 IN | BODY MASS INDEX: 26.58 KG/M2 | HEART RATE: 97 BPM | SYSTOLIC BLOOD PRESSURE: 188 MMHG | OXYGEN SATURATION: 100 %

## 2023-05-18 DIAGNOSIS — M79.674 PAIN OF TOE OF RIGHT FOOT: Primary | ICD-10-CM
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
## 2023-05-18 PROCEDURE — 99283 EMERGENCY DEPT VISIT LOW MDM: CPT

## 2023-05-18 RX ORDER — HYDROCODONE BITARTRATE AND ACETAMINOPHEN 5; 325 MG/1; MG/1
1 TABLET ORAL
Status: DISCONTINUED | OUTPATIENT
Start: 2023-05-18 | End: 2023-05-18

## 2023-05-18 RX ORDER — HYDROCODONE BITARTRATE AND ACETAMINOPHEN 5; 325 MG/1; MG/1
1 TABLET ORAL EVERY 6 HOURS PRN
Qty: 10 TABLET | Refills: 0 | Status: SHIPPED | OUTPATIENT
Start: 2023-05-18 | End: 2023-06-30

## 2023-05-18 NOTE — Clinical Note
"Sumit Ceja" Tao was seen and treated in our emergency department on 5/18/2023.  She may return to work on 05/22/2023.       If you have any questions or concerns, please don't hesitate to call.      Rebeca LOPEZ RN    "

## 2023-05-18 NOTE — ED NOTES
Pain medicine ordered for patient but she drove herself here and states that she can get someone to come get her.

## 2023-05-18 NOTE — ED PROVIDER NOTES
"Encounter Date: 2023       History     Chief Complaint   Patient presents with    Toe Injury     Pt states she tripped going down the stairs and injured her "big toe"     HPI    Patient is a 52-year-old female with history of hypertension presenting with acute onset right great toe pain x8 hours.  Patient was walking down the stairs with a laundry basket when she rolled her foot and bent her right great toe.  Patient has been able to walk however has significant pain with any dorsiflexion and plantar flexion of the right great toe.  Denies any significant swelling, bleeding.  Patient took ibuprofen 400 mg at 8:00 p.m. without significant relief.  Patient was awoken from the pain and decided to come to the ED for evaluation this morning.  No other areas of pain or trauma.    Review of patient's allergies indicates:   Allergen Reactions    Sulfamethoxazole-trimethoprim Hives, Shortness Of Breath, Nausea And Vomiting and Rash     Hives, nausea and vomiting, and shortness of breath.  Did not require admission  Hives, nausea and vomiting, and shortness of breath.  Did not require admission    Benadryl [diphenhydramine hcl]     Latex, natural rubber     Percocet [oxycodone-acetaminophen]     Shrimp     Codeine Itching and Nausea Only     Past Medical History:   Diagnosis Date    HTN (hypertension)     Pneumonia 10/2020    Sarcoidosis      Past Surgical History:   Procedure Laterality Date    BRONCHOSCOPY WITH FLUOROSCOPY Right 10/20/2020    Procedure: BRONCHOSCOPY, WITH FLUOROSCOPY;  Surgeon: Ava Rolon MD;  Location: [facility] ENDO;  Service: Pulmonary;  Laterality: Right;     SECTION      x2    FALLOPIAN TUBOPLASTY      LAPAROSCOPIC APPENDECTOMY N/A 8/10/2022    Procedure: APPENDECTOMY, LAPAROSCOPIC;  Surgeon: Jackson Vogel MD;  Location: [facility] OR;  Service: General;  Laterality: N/A;    TUBAL LIGATION      WRIST FRACTURE SURGERY Right     ganglion cyst     Family History   Problem Relation Age of Onset "    Hypertension Mother      Social History     Tobacco Use    Smoking status: Light Smoker     Packs/day: 0.25     Types: Cigarettes     Last attempt to quit: 4/19/2020     Years since quitting: 3.0    Smokeless tobacco: Never    Tobacco comments:     ocassionally never was qd   Substance Use Topics    Alcohol use: Not Currently    Drug use: Never     Review of Systems   Musculoskeletal:  Positive for arthralgias. Negative for back pain, gait problem and joint swelling.   Skin:  Positive for wound.     Physical Exam     Initial Vitals [05/18/23 0438]   BP Pulse Resp Temp SpO2   (!) 188/120 97 18 98.3 °F (36.8 °C) 100 %      MAP       --         Physical Exam    Constitutional: She appears well-developed and well-nourished.   HENT:   Head: Normocephalic.   Eyes: Conjunctivae are normal.   Cardiovascular:  Normal rate.           Abdominal: Abdomen is soft.   Musculoskeletal:      Comments: Significant tenderness to right great toe and distal end of metatarsal.  No sensation and cap refill.  No evidence of swelling or erythema.         ED Course   Procedures  Labs Reviewed - No data to display       Imaging Results              X-Ray Foot Complete Right (In process)                      Medications - No data to display  Medical Decision Making:   Initial Assessment:   52-year-old female presenting with acute right great toe pain after tripping on the stairs.  No other area of injury.  Vital signs are stable.  Exam with significant tenderness to 1st metatarsal and great toe.  Differential Diagnosis:   Fracture, dislocation, hematoma, tendon or ligament injury  Clinical Tests:   Radiological Study: Ordered and Reviewed  ED Management:  Patient given Norco for pain control.  X-ray interpreted by me with no obvious fracture.  Patient took ibuprofen prior to arrival.  Patient drove here and could not receive narcotic for additional pain control because she did not have a ride readily available.  Patient given hard-soled  postop shoe as well as short course of Norco prescription.  Return precautions were discussed.  Patient understands and agrees with this plan.    Alfie Mathur MD  Internal/Emergency Medicine, PGY-V              Attending Attestation:   Physician Attestation Statement for Resident:  As the supervising MD   I agree with the above history.  -:   As the supervising MD I agree with the above PE.     As the supervising MD I agree with the above treatment, course, plan, and disposition.    I have reviewed and agree with the residents interpretation of the following: x-rays.                            Clinical Impression:   Final diagnoses:  [W19.XXXA] Fall  [M79.674] Pain of toe of right foot (Primary)        ED Disposition Condition    Discharge Stable          ED Prescriptions       Medication Sig Dispense Start Date End Date Auth. Provider    HYDROcodone-acetaminophen (NORCO) 5-325 mg per tablet Take 1 tablet by mouth every 6 (six) hours as needed for Pain. 10 tablet 5/18/2023 -- Alfie Mathur MD          Follow-up Information       Follow up With Specialties Details Why Contact Info Additional Information    [facility] - Emergency Dept Emergency Medicine  As needed, If symptoms worsen including worsening pain, swelling, fever, any other concerns [address] [facility] [address]  [phone] 1st floor             Alfie Mathur MD  Resident  05/18/23 0616       Luis You MD  05/18/23 0633

## 2023-05-18 NOTE — ED NOTES
"I went in to see if patient had someone to come get her so I could give her the pain medicine that the  Ordered. I said to her when they get here let me know and I will come in and give it to her. She stated " that is ridiculous". She seemed to be upset that I could not give her the pain medicine tell her ride got here.  "

## 2023-05-18 NOTE — ED NOTES
Patient identifiers for Sumit Burger checked and correct.  LOC:  Sumit Burger is awake, alert, and aware of environment with an appropriate affect. She is oriented x 3 and speaking appropriately.  APPEARANCE:  She is resting comfortably and in no acute distress. She is clean and well groomed, patient's clothing is properly fastened.  SKIN:  The skin is warm and dry. She has normal skin turgor and moist mucus membranes. Skin is intact; no bruising or breakdown noted.  MUSCULOSKELETAL:  She is moving all extremities well, no obvious deformities noted. Pulses intact.   RESPIRATORY:  Airway is open and patent. Respirations are spontaneous and non-labored with normal effort and rate.  CARDIAC:  She has a normal rate and rhythm. No peripheral edema noted. Capillary refill < 3 seconds.  ABDOMEN:  No distention noted.  Soft and non-tender upon palpation.  NEUROLOGICAL:  PERRL. Facial expression is symmetrical. Hand grasps are equal bilaterally. Normal sensation in all extremities when touched with finger.  Allergies reported:    Review of patient's allergies indicates:   Allergen Reactions    Sulfamethoxazole-trimethoprim Hives, Shortness Of Breath, Nausea And Vomiting and Rash     Hives, nausea and vomiting, and shortness of breath.  Did not require admission  Hives, nausea and vomiting, and shortness of breath.  Did not require admission    Benadryl [diphenhydramine hcl]     Latex, natural rubber     Percocet [oxycodone-acetaminophen]     Shrimp     Codeine Itching and Nausea Only     OTHER NOTES:  Sumit Burger is here  by herself.

## 2023-06-26 ENCOUNTER — OFFICE VISIT (OUTPATIENT)
Dept: PULMONOLOGY | Facility: CLINIC | Age: 53
End: 2023-06-26
Payer: OTHER GOVERNMENT

## 2023-06-26 ENCOUNTER — HOSPITAL ENCOUNTER (OUTPATIENT)
Dept: RADIOLOGY | Facility: HOSPITAL | Age: 53
Discharge: HOME | End: 2023-06-26
Attending: NURSE PRACTITIONER
Payer: MEDICAID

## 2023-06-26 VITALS
HEART RATE: 108 BPM | WEIGHT: 153.63 LBS | DIASTOLIC BLOOD PRESSURE: 72 MMHG | BODY MASS INDEX: 27.21 KG/M2 | SYSTOLIC BLOOD PRESSURE: 140 MMHG | OXYGEN SATURATION: 98 %

## 2023-06-26 DIAGNOSIS — R05.9 COUGH, UNSPECIFIED TYPE: ICD-10-CM

## 2023-06-26 DIAGNOSIS — Z72.0 TOBACCO ABUSE: ICD-10-CM

## 2023-06-26 DIAGNOSIS — D86.9 SARCOIDOSIS: Primary | ICD-10-CM

## 2023-06-26 PROCEDURE — 99213 OFFICE O/P EST LOW 20 MIN: CPT | Mod: S$GLB,,, | Performed by: NURSE PRACTITIONER

## 2023-06-26 PROCEDURE — 99213 PR OFFICE/OUTPT VISIT, EST, LEVL III, 20-29 MIN: ICD-10-PCS | Mod: S$GLB,,, | Performed by: NURSE PRACTITIONER

## 2023-06-26 PROCEDURE — 71046 X-RAY EXAM CHEST 2 VIEWS: CPT | Mod: TC

## 2023-06-26 RX ORDER — DOXYCYCLINE HYCLATE 100 MG/1
100 TABLET, DELAYED RELEASE ORAL EVERY 12 HOURS
Status: ON HOLD | COMMUNITY
End: 2023-07-03 | Stop reason: HOSPADM

## 2023-06-26 RX ORDER — PREDNISONE 10 MG/1
TABLET ORAL
Qty: 40 TABLET | Refills: 0 | Status: ON HOLD | OUTPATIENT
Start: 2023-06-26 | End: 2023-07-03 | Stop reason: HOSPADM

## 2023-06-26 RX ORDER — PROMETHAZINE HYDROCHLORIDE AND DEXTROMETHORPHAN HYDROBROMIDE 6.25; 15 MG/5ML; MG/5ML
5 SYRUP ORAL NIGHTLY PRN
Qty: 118 ML | Refills: 0 | Status: SHIPPED | OUTPATIENT
Start: 2023-06-26 | End: 2023-07-20

## 2023-06-26 RX ORDER — BENZONATATE 200 MG/1
200 CAPSULE ORAL 3 TIMES DAILY PRN
Qty: 90 CAPSULE | Refills: 3 | Status: ON HOLD | OUTPATIENT
Start: 2023-06-26 | End: 2023-07-03 | Stop reason: SDUPTHER

## 2023-06-26 NOTE — PATIENT INSTRUCTIONS
Chest xray  Ace, ESR, CBC  DO NOT SMOKE  Long Prednisone taper   PFT  Tessalon 200mg by mouth three times a day for the cough  Tussionex 5ml at night as needed for cough  Finish the antibiotic urgent care sent

## 2023-06-26 NOTE — PROGRESS NOTES
SUBJECTIVE:    Patient ID: Sumit Burger is a 52 y.o. female.    Chief Complaint: Follow-up and Sarcoidosis (Follow up Sarcoidosis/Patients has cough, SOB, over 3 weeks)    Patient here for the first time since .  She states she has been doing well up till a month ago. She started having more and more dyspnea.  She is also coughing more and more when she talks.  The cough is waking her up at night. She does still smoke at times.   Past Medical History:   Diagnosis Date    HTN (hypertension)     Pneumonia 10/2020    Sarcoidosis      Past Surgical History:   Procedure Laterality Date    BRONCHOSCOPY WITH FLUOROSCOPY Right 10/20/2020    Procedure: BRONCHOSCOPY, WITH FLUOROSCOPY;  Surgeon: Ava Rooln MD;  Location: [facility] ENDO;  Service: Pulmonary;  Laterality: Right;     SECTION      x2    FALLOPIAN TUBOPLASTY      LAPAROSCOPIC APPENDECTOMY N/A 8/10/2022    Procedure: APPENDECTOMY, LAPAROSCOPIC;  Surgeon: Jackson Vogel MD;  Location: [facility] OR;  Service: General;  Laterality: N/A;    TUBAL LIGATION      WRIST FRACTURE SURGERY Right     ganglion cyst     Family History   Problem Relation Age of Onset    Hypertension Mother         Social History:   Marital Status:   Occupation: Dental hygienist  Alcohol History:  reports that she does not currently use alcohol.  Tobacco History:  reports that she has been smoking cigarettes. She has been smoking an average of .25 packs per day. She has never used smokeless tobacco.  Drug History:  reports no history of drug use.    Review of patient's allergies indicates:   Allergen Reactions    Sulfamethoxazole-trimethoprim Hives, Shortness Of Breath, Nausea And Vomiting and Rash     Hives, nausea and vomiting, and shortness of breath.  Did not require admission  Hives, nausea and vomiting, and shortness of breath.  Did not require admission    Benadryl [diphenhydramine hcl]     Latex, natural rubber     Percocet [oxycodone-acetaminophen]     Shrimp      Codeine Itching and Nausea Only       Current Outpatient Medications   Medication Sig Dispense Refill    albuterol (PROVENTIL/VENTOLIN HFA) 90 mcg/actuation inhaler Inhale 1-2 puffs into the lungs every 6 (six) hours as needed. Rescue 1 g 1    ALPRAZolam (XANAX) 0.25 MG tablet Take 1 tablet (0.25 mg total) by mouth 2 (two) times daily as needed for Anxiety. 15 tablet 0    amLODIPine (NORVASC) 10 MG tablet Take 10 mg by mouth once daily.      azaTHIOprine (IMURAN) 50 mg Tab Take 50 mg by mouth once daily.      benzonatate (TESSALON) 200 MG capsule Take 1 capsule (200 mg total) by mouth 3 (three) times daily as needed for Cough. 90 capsule 3    buPROPion (WELLBUTRIN XL) 300 MG 24 hr tablet Take 1 tablet (300 mg total) by mouth once daily. 30 tablet 1    doxycycline (DORYX) 100 MG EC tablet Take 100 mg by mouth every 12 (twelve) hours.      EScitalopram oxalate (LEXAPRO) 5 MG Tab Take 1 tablet (5 mg total) by mouth once daily. 30 tablet 1    HYDROcodone-acetaminophen (NORCO) 5-325 mg per tablet Take 1 tablet by mouth every 6 (six) hours as needed for Pain. (Patient not taking: Reported on 6/26/2023) 10 tablet 0    losartan (COZAAR) 100 MG tablet 50 mg.      ondansetron (ZOFRAN-ODT) 4 MG TbDL Take 1 tablet (4 mg total) by mouth every 6 (six) hours as needed. (Patient not taking: Reported on 10/21/2022) 12 tablet 0    predniSONE (DELTASONE) 10 MG tablet Take 40 mg by mouth once daily.      predniSONE (DELTASONE) 10 MG tablet Take 4 tabs x 4 days, then take 3 tabs x 4 days, then take 2 tabs x 4 days, then take 1 tab x 4 days. 40 tablet 0    promethazine-dextromethorphan (PROMETHAZINE-DM) 6.25-15 mg/5 mL Syrp Take 5 mLs by mouth nightly as needed (cough). 118 mL 0    zolpidem (AMBIEN) 5 MG Tab Take 1 tablet (5 mg total) by mouth nightly as needed (insomnia). 30 tablet 0     No current facility-administered medications for this visit.         Last PFT: 11/19/20  No obstruction, mild restriction, severe diffusion  defect  Last CT chest:  IMPRESSION:05/2021     1.  Bilateral mild groundglass opacities with more central distribution are demonstrated and have decreased when compared to previous exam. No new pulmonary nodules.  2.  Interval resolution of previously described mediastinal and hilar lymphadenopathy     Review of Systems  General: fatigued   Eyes: Vision is good.  ENT:  Her throat is fine  Heart::no palpitations, some pressure  Lungs:  dry cough more and more and increased dyspnea over the past month  GI: No Nausea, vomiting, constipation, diarrhea, or reflux.  : No dysuria, hesitancy, or nocturia.  Musculoskeletal: joint and muscle pain  Skin: No lesions or rashes.  Neuro: no headaches   Lymph: No edema or adenopathy.  Psych:  anxiety and depression  Endo: gained more weight    OBJECTIVE:      BP (!) 140/72 (BP Location: Right arm, Patient Position: Sitting, BP Method: Medium (Manual))   Pulse 108   Wt 69.7 kg (153 lb 9.6 oz)   LMP 06/24/2022   SpO2 98%   BMI 27.21 kg/m²     Physical Exam  GENERAL: Midaged patient in no distress.  HEENT: Pupils equal and reactive. Extraocular movements intact. Nose intact.  Pharynx pink.  NECK: Supple.   HEART: Regular rate and rhythm. No murmur or gallop auscultated.  LUNGS: coughing throughout visit Clear to auscultation and percussion. Lung excursion symmetrical. No change in fremitus. No adventitial noises.  ABDOMEN: Bowel sounds present. Non-tender, no masses palpated.  EXTREMITIES: Normal muscle tone and joint movement, no cyanosis or clubbing.   LYMPHATICS: No adenopathy palpated, no edema.  SKIN: Dry, intact, no lesions.   NEURO: Cranial nerves II-XII intact. Motor strength 5/5 bilaterally, upper and lower extremities.  PSYCH: Appropriate affect.    Assessment:       1. Sarcoidosis    2. Cough, unspecified type    3. Tobacco abuse            Plan:       Sarcoidosis    Cough, unspecified type  -     Complete PFT with bronchodilator; Future  -     X-Ray Chest PA And  Lateral; Future  -     Angiotensin Converting Enzyme; Future; Expected date: 06/26/2023  -     Sedimentation rate; Future; Expected date: 06/26/2023    Tobacco abuse    Other orders  -     predniSONE (DELTASONE) 10 MG tablet; Take 4 tabs x 4 days, then take 3 tabs x 4 days, then take 2 tabs x 4 days, then take 1 tab x 4 days.  Dispense: 40 tablet; Refill: 0  -     benzonatate (TESSALON) 200 MG capsule; Take 1 capsule (200 mg total) by mouth 3 (three) times daily as needed for Cough.  Dispense: 90 capsule; Refill: 3  -     promethazine-dextromethorphan (PROMETHAZINE-DM) 6.25-15 mg/5 mL Syrp; Take 5 mLs by mouth nightly as needed (cough).  Dispense: 118 mL; Refill: 0      Follow up if symptoms worsen or fail to improve.      Chest xray  Ace, ESR, CBC  DO NOT SMOKE  Long Prednisone taper   PFT  Tessalon 200mg by mouth three times a day for the cough  Promethazine dm at night   Finish the antibiotic urgent care sent

## 2023-06-27 ENCOUNTER — PATIENT MESSAGE (OUTPATIENT)
Dept: PULMONOLOGY | Facility: CLINIC | Age: 53
End: 2023-06-27

## 2023-06-27 DIAGNOSIS — D86.9 SARCOIDOSIS: Primary | ICD-10-CM

## 2023-06-28 ENCOUNTER — TELEPHONE (OUTPATIENT)
Dept: PULMONOLOGY | Facility: CLINIC | Age: 53
End: 2023-06-28

## 2023-06-28 DIAGNOSIS — D86.9 SARCOIDOSIS: Primary | ICD-10-CM

## 2023-06-28 DIAGNOSIS — R06.00 DYSPNEA, UNSPECIFIED TYPE: Primary | ICD-10-CM

## 2023-06-28 DIAGNOSIS — R07.9 CHEST PAIN, UNSPECIFIED TYPE: ICD-10-CM

## 2023-06-28 NOTE — TELEPHONE ENCOUNTER
----- Message from Rachna Kolb sent at 6/28/2023 12:42 PM CDT -----  Regarding: Patient Requesting Orders  Contact: Sumit, [phone]  The patient called stating her PCP may be concerned that her SOB could be a heart issue and wants the patient to have a echocardiogram.  She is requesting that LUIS ARMANDO Floyd place the order for her so she wouldn't have to wait on her PCP which would be in August.  Please assist and thanks in advance for your help.       Mariajose, PAR ext [phone]

## 2023-06-28 NOTE — TELEPHONE ENCOUNTER
Called patient and discussed chest xray.  Spoke with the patient  will order ECHO and EKG.   The patient states she has had chest discomfort and sweating for a month. I told her if that is the case she should go to the ER to be evaluated. She states she does not feel it is that urgent as this has been going on for a month. States her cough has actually improved some since starting the Prednisone. She is finishing the Doxycycline.

## 2023-06-29 ENCOUNTER — TELEPHONE (OUTPATIENT)
Dept: PULMONOLOGY | Facility: CLINIC | Age: 53
End: 2023-06-29

## 2023-06-29 ENCOUNTER — PATIENT MESSAGE (OUTPATIENT)
Dept: PULMONOLOGY | Facility: CLINIC | Age: 53
End: 2023-06-29

## 2023-06-29 DIAGNOSIS — R05.9 COUGH, UNSPECIFIED TYPE: Primary | ICD-10-CM

## 2023-06-30 ENCOUNTER — PATIENT MESSAGE (OUTPATIENT)
Dept: RHEUMATOLOGY | Facility: CLINIC | Age: 53
End: 2023-06-30
Payer: OTHER GOVERNMENT

## 2023-06-30 ENCOUNTER — HOSPITAL ENCOUNTER (OUTPATIENT)
Dept: RADIOLOGY | Facility: HOSPITAL | Age: 53
Discharge: HOME | End: 2023-06-30
Attending: NURSE PRACTITIONER
Payer: MEDICAID

## 2023-06-30 ENCOUNTER — PATIENT MESSAGE (OUTPATIENT)
Dept: PULMONOLOGY | Facility: CLINIC | Age: 53
End: 2023-06-30

## 2023-06-30 ENCOUNTER — HOSPITAL ENCOUNTER (INPATIENT)
Facility: HOSPITAL | Age: 53
LOS: 3 days | Discharge: HOME | DRG: 198 | End: 2023-07-03
Attending: EMERGENCY MEDICINE | Admitting: INTERNAL MEDICINE
Payer: OTHER GOVERNMENT

## 2023-06-30 DIAGNOSIS — D86.9 SARCOIDOSIS: Primary | ICD-10-CM

## 2023-06-30 DIAGNOSIS — D86.9 SARCOIDOSIS: ICD-10-CM

## 2023-06-30 DIAGNOSIS — R07.9 CHEST PAIN: ICD-10-CM

## 2023-06-30 DIAGNOSIS — R06.02 SHORTNESS OF BREATH: ICD-10-CM

## 2023-06-30 DIAGNOSIS — I50.9 CHF (CONGESTIVE HEART FAILURE): ICD-10-CM

## 2023-06-30 PROBLEM — I10 ESSENTIAL HYPERTENSION: Status: ACTIVE | Noted: 2023-06-30

## 2023-06-30 PROBLEM — Z72.0 TOBACCO ABUSE: Status: ACTIVE | Noted: 2023-06-30

## 2023-06-30 PROBLEM — J18.9 BILATERAL PNEUMONIA: Status: ACTIVE | Noted: 2023-06-30

## 2023-06-30 LAB
ALBUMIN SERPL BCP-MCNC: 4 G/DL (ref 3.5–5.2)
ALP SERPL-CCNC: 101 U/L (ref 55–135)
ALT SERPL W/O P-5'-P-CCNC: 46 U/L (ref 10–44)
ANION GAP SERPL CALC-SCNC: 7 MMOL/L (ref 8–16)
AST SERPL-CCNC: 25 U/L (ref 10–40)
BASOPHILS # BLD AUTO: 0.01 K/UL (ref 0–0.2)
BASOPHILS NFR BLD: 0.1 % (ref 0–1.9)
BILIRUB SERPL-MCNC: 0.6 MG/DL (ref 0.1–1)
BNP SERPL-MCNC: 9 PG/ML (ref 0–99)
BUN SERPL-MCNC: 16 MG/DL (ref 6–20)
CALCIUM SERPL-MCNC: 9.4 MG/DL (ref 8.7–10.5)
CHLORIDE SERPL-SCNC: 105 MMOL/L (ref 95–110)
CO2 SERPL-SCNC: 25 MMOL/L (ref 23–29)
CREAT SERPL-MCNC: 0.7 MG/DL (ref 0.5–1.4)
DIFFERENTIAL METHOD: ABNORMAL
EOSINOPHIL # BLD AUTO: 0 K/UL (ref 0–0.5)
EOSINOPHIL NFR BLD: 0 % (ref 0–8)
ERYTHROCYTE [DISTWIDTH] IN BLOOD BY AUTOMATED COUNT: 14.4 % (ref 11.5–14.5)
EST. GFR  (NO RACE VARIABLE): >60 ML/MIN/1.73 M^2
GLUCOSE SERPL-MCNC: 128 MG/DL (ref 70–110)
HCT VFR BLD AUTO: 44.7 % (ref 37–48.5)
HGB BLD-MCNC: 14.8 G/DL (ref 12–16)
IMM GRANULOCYTES # BLD AUTO: 0.02 K/UL (ref 0–0.04)
IMM GRANULOCYTES NFR BLD AUTO: 0.2 % (ref 0–0.5)
LACTATE SERPL-SCNC: 1.6 MMOL/L (ref 0.5–1.9)
LACTATE SERPL-SCNC: 1.6 MMOL/L (ref 0.5–1.9)
LYMPHOCYTES # BLD AUTO: 1 K/UL (ref 1–4.8)
LYMPHOCYTES NFR BLD: 11.6 % (ref 18–48)
MCH RBC QN AUTO: 28 PG (ref 27–31)
MCHC RBC AUTO-ENTMCNC: 33.1 G/DL (ref 32–36)
MCV RBC AUTO: 85 FL (ref 82–98)
MONOCYTES # BLD AUTO: 0.2 K/UL (ref 0.3–1)
MONOCYTES NFR BLD: 2.4 % (ref 4–15)
NEUTROPHILS # BLD AUTO: 7.3 K/UL (ref 1.8–7.7)
NEUTROPHILS NFR BLD: 85.7 % (ref 38–73)
NRBC BLD-RTO: 0 /100 WBC
PLATELET # BLD AUTO: 323 K/UL (ref 150–450)
PMV BLD AUTO: 10.8 FL (ref 9.2–12.9)
POTASSIUM SERPL-SCNC: 4.1 MMOL/L (ref 3.5–5.1)
PROT SERPL-MCNC: 7.9 G/DL (ref 6–8.4)
RBC # BLD AUTO: 5.28 M/UL (ref 4–5.4)
SODIUM SERPL-SCNC: 137 MMOL/L (ref 136–145)
TROPONIN I SERPL HS-MCNC: 2.8 PG/ML (ref 0–14.9)
WBC # BLD AUTO: 8.47 K/UL (ref 3.9–12.7)

## 2023-06-30 PROCEDURE — 63600175 PHARM REV CODE 636 W HCPCS: Performed by: INTERNAL MEDICINE

## 2023-06-30 PROCEDURE — 36415 COLL VENOUS BLD VENIPUNCTURE: CPT | Performed by: EMERGENCY MEDICINE

## 2023-06-30 PROCEDURE — 25000003 PHARM REV CODE 250: Performed by: INTERNAL MEDICINE

## 2023-06-30 PROCEDURE — 84484 ASSAY OF TROPONIN QUANT: CPT | Performed by: EMERGENCY MEDICINE

## 2023-06-30 PROCEDURE — 63600175 PHARM REV CODE 636 W HCPCS: Performed by: EMERGENCY MEDICINE

## 2023-06-30 PROCEDURE — 87040 BLOOD CULTURE FOR BACTERIA: CPT | Performed by: EMERGENCY MEDICINE

## 2023-06-30 PROCEDURE — 93010 ELECTROCARDIOGRAM REPORT: CPT | Mod: ,,, | Performed by: INTERNAL MEDICINE

## 2023-06-30 PROCEDURE — 80053 COMPREHEN METABOLIC PANEL: CPT | Mod: 91 | Performed by: EMERGENCY MEDICINE

## 2023-06-30 PROCEDURE — 99900035 HC TECH TIME PER 15 MIN (STAT)

## 2023-06-30 PROCEDURE — 93010 EKG 12-LEAD: ICD-10-PCS | Mod: ,,, | Performed by: INTERNAL MEDICINE

## 2023-06-30 PROCEDURE — 99285 EMERGENCY DEPT VISIT HI MDM: CPT | Mod: 25

## 2023-06-30 PROCEDURE — 25500020 PHARM REV CODE 255: Performed by: NURSE PRACTITIONER

## 2023-06-30 PROCEDURE — 94761 N-INVAS EAR/PLS OXIMETRY MLT: CPT

## 2023-06-30 PROCEDURE — 99900031 HC PATIENT EDUCATION (STAT)

## 2023-06-30 PROCEDURE — 25000242 PHARM REV CODE 250 ALT 637 W/ HCPCS: Performed by: INTERNAL MEDICINE

## 2023-06-30 PROCEDURE — 85025 COMPLETE CBC W/AUTO DIFF WBC: CPT | Mod: 91 | Performed by: EMERGENCY MEDICINE

## 2023-06-30 PROCEDURE — 93005 ELECTROCARDIOGRAM TRACING: CPT | Performed by: INTERNAL MEDICINE

## 2023-06-30 PROCEDURE — 94640 AIRWAY INHALATION TREATMENT: CPT

## 2023-06-30 PROCEDURE — 71260 CT THORAX DX C+: CPT | Mod: TC

## 2023-06-30 PROCEDURE — 94799 UNLISTED PULMONARY SVC/PX: CPT

## 2023-06-30 PROCEDURE — 83605 ASSAY OF LACTIC ACID: CPT | Mod: 91 | Performed by: EMERGENCY MEDICINE

## 2023-06-30 PROCEDURE — 25000003 PHARM REV CODE 250: Performed by: EMERGENCY MEDICINE

## 2023-06-30 PROCEDURE — 83880 ASSAY OF NATRIURETIC PEPTIDE: CPT | Performed by: EMERGENCY MEDICINE

## 2023-06-30 PROCEDURE — 12000002 HC ACUTE/MED SURGE SEMI-PRIVATE ROOM

## 2023-06-30 RX ORDER — SODIUM CHLORIDE 0.9 % (FLUSH) 0.9 %
10 SYRINGE (ML) INJECTION EVERY 6 HOURS PRN
Status: DISCONTINUED | OUTPATIENT
Start: 2023-06-30 | End: 2023-07-03 | Stop reason: HOSPADM

## 2023-06-30 RX ORDER — HYDROCODONE BITARTRATE AND ACETAMINOPHEN 5; 325 MG/1; MG/1
1 TABLET ORAL EVERY 6 HOURS PRN
Status: DISCONTINUED | OUTPATIENT
Start: 2023-06-30 | End: 2023-07-03 | Stop reason: HOSPADM

## 2023-06-30 RX ORDER — VALSARTAN 80 MG/1
80 TABLET ORAL DAILY
Status: ON HOLD | COMMUNITY
Start: 2023-05-26 | End: 2023-07-03 | Stop reason: HOSPADM

## 2023-06-30 RX ORDER — ACETAMINOPHEN 325 MG/1
650 TABLET ORAL EVERY 4 HOURS PRN
Status: DISCONTINUED | OUTPATIENT
Start: 2023-06-30 | End: 2023-07-03 | Stop reason: HOSPADM

## 2023-06-30 RX ORDER — SIMETHICONE 80 MG
1 TABLET,CHEWABLE ORAL 4 TIMES DAILY PRN
Status: DISCONTINUED | OUTPATIENT
Start: 2023-06-30 | End: 2023-07-03 | Stop reason: HOSPADM

## 2023-06-30 RX ORDER — GLUCAGON 1 MG
1 KIT INJECTION
Status: DISCONTINUED | OUTPATIENT
Start: 2023-06-30 | End: 2023-07-03 | Stop reason: HOSPADM

## 2023-06-30 RX ORDER — ACETAMINOPHEN 500 MG
1000 TABLET ORAL EVERY 8 HOURS PRN
Status: DISCONTINUED | OUTPATIENT
Start: 2023-06-30 | End: 2023-07-03 | Stop reason: HOSPADM

## 2023-06-30 RX ORDER — MORPHINE SULFATE 4 MG/ML
4 INJECTION, SOLUTION INTRAMUSCULAR; INTRAVENOUS EVERY 4 HOURS PRN
Status: DISCONTINUED | OUTPATIENT
Start: 2023-06-30 | End: 2023-07-03 | Stop reason: HOSPADM

## 2023-06-30 RX ORDER — MAG HYDROX/ALUMINUM HYD/SIMETH 200-200-20
30 SUSPENSION, ORAL (FINAL DOSE FORM) ORAL 4 TIMES DAILY PRN
Status: DISCONTINUED | OUTPATIENT
Start: 2023-06-30 | End: 2023-07-03 | Stop reason: HOSPADM

## 2023-06-30 RX ORDER — ENOXAPARIN SODIUM 100 MG/ML
40 INJECTION SUBCUTANEOUS EVERY 24 HOURS
Status: DISCONTINUED | OUTPATIENT
Start: 2023-06-30 | End: 2023-07-03 | Stop reason: HOSPADM

## 2023-06-30 RX ORDER — NALOXONE HCL 0.4 MG/ML
0.02 VIAL (ML) INJECTION
Status: DISCONTINUED | OUTPATIENT
Start: 2023-06-30 | End: 2023-07-03 | Stop reason: HOSPADM

## 2023-06-30 RX ORDER — AMLODIPINE BESYLATE 5 MG/1
10 TABLET ORAL DAILY
Status: DISCONTINUED | OUTPATIENT
Start: 2023-07-01 | End: 2023-07-01

## 2023-06-30 RX ORDER — ONDANSETRON 2 MG/ML
4 INJECTION INTRAMUSCULAR; INTRAVENOUS EVERY 6 HOURS PRN
Status: DISCONTINUED | OUTPATIENT
Start: 2023-06-30 | End: 2023-07-03 | Stop reason: HOSPADM

## 2023-06-30 RX ORDER — METHYLPREDNISOLONE SOD SUCC 125 MG
125 VIAL (EA) INJECTION
Status: DISCONTINUED | OUTPATIENT
Start: 2023-06-30 | End: 2023-06-30

## 2023-06-30 RX ORDER — IPRATROPIUM BROMIDE AND ALBUTEROL SULFATE 2.5; .5 MG/3ML; MG/3ML
3 SOLUTION RESPIRATORY (INHALATION)
Status: DISPENSED | OUTPATIENT
Start: 2023-06-30 | End: 2023-07-01

## 2023-06-30 RX ORDER — BENZONATATE 100 MG/1
100 CAPSULE ORAL 3 TIMES DAILY PRN
Status: DISCONTINUED | OUTPATIENT
Start: 2023-07-01 | End: 2023-07-02

## 2023-06-30 RX ORDER — TALC
9 POWDER (GRAM) TOPICAL NIGHTLY PRN
Status: DISCONTINUED | OUTPATIENT
Start: 2023-06-30 | End: 2023-07-03 | Stop reason: HOSPADM

## 2023-06-30 RX ORDER — IPRATROPIUM BROMIDE AND ALBUTEROL SULFATE 2.5; .5 MG/3ML; MG/3ML
3 SOLUTION RESPIRATORY (INHALATION) EVERY 4 HOURS
Status: DISCONTINUED | OUTPATIENT
Start: 2023-06-30 | End: 2023-07-02

## 2023-06-30 RX ORDER — PROCHLORPERAZINE EDISYLATE 5 MG/ML
5 INJECTION INTRAMUSCULAR; INTRAVENOUS EVERY 6 HOURS PRN
Status: DISCONTINUED | OUTPATIENT
Start: 2023-06-30 | End: 2023-07-03 | Stop reason: HOSPADM

## 2023-06-30 RX ORDER — IBUPROFEN 200 MG
16 TABLET ORAL
Status: DISCONTINUED | OUTPATIENT
Start: 2023-06-30 | End: 2023-07-03 | Stop reason: HOSPADM

## 2023-06-30 RX ORDER — IBUPROFEN 200 MG
24 TABLET ORAL
Status: DISCONTINUED | OUTPATIENT
Start: 2023-06-30 | End: 2023-07-03 | Stop reason: HOSPADM

## 2023-06-30 RX ADMIN — METHYLPREDNISOLONE SODIUM SUCCINATE 40 MG: 40 INJECTION, POWDER, FOR SOLUTION INTRAMUSCULAR; INTRAVENOUS at 08:06

## 2023-06-30 RX ADMIN — BENZONATATE 100 MG: 100 CAPSULE ORAL at 11:06

## 2023-06-30 RX ADMIN — METHYLPREDNISOLONE SODIUM SUCCINATE 40 MG: 40 INJECTION, POWDER, FOR SOLUTION INTRAMUSCULAR; INTRAVENOUS at 11:06

## 2023-06-30 RX ADMIN — MORPHINE SULFATE 4 MG: 4 INJECTION, SOLUTION INTRAMUSCULAR; INTRAVENOUS at 11:06

## 2023-06-30 RX ADMIN — ENOXAPARIN SODIUM 40 MG: 100 INJECTION SUBCUTANEOUS at 11:06

## 2023-06-30 RX ADMIN — IOHEXOL 100 ML: 350 INJECTION, SOLUTION INTRAVENOUS at 01:06

## 2023-06-30 RX ADMIN — CEFTRIAXONE SODIUM 2 G: 2 INJECTION, POWDER, FOR SOLUTION INTRAMUSCULAR; INTRAVENOUS at 08:06

## 2023-06-30 RX ADMIN — AZITHROMYCIN DIHYDRATE 500 MG: 500 INJECTION, POWDER, LYOPHILIZED, FOR SOLUTION INTRAVENOUS at 10:06

## 2023-06-30 RX ADMIN — GUAIFENESIN AND DEXTROMETHORPHAN HYDROBROMIDE 1 TABLET: 600; 30 TABLET, EXTENDED RELEASE ORAL at 10:06

## 2023-06-30 RX ADMIN — IPRATROPIUM BROMIDE AND ALBUTEROL SULFATE 3 ML: 2.5; .5 SOLUTION RESPIRATORY (INHALATION) at 08:06

## 2023-07-01 ENCOUNTER — CLINICAL SUPPORT (OUTPATIENT)
Dept: CARDIOLOGY | Facility: HOSPITAL | Age: 53
DRG: 198 | End: 2023-07-01
Attending: INTERNAL MEDICINE
Payer: OTHER GOVERNMENT

## 2023-07-01 VITALS — BODY MASS INDEX: 26.95 KG/M2 | HEIGHT: 63 IN | WEIGHT: 152.13 LBS

## 2023-07-01 LAB
ANION GAP SERPL CALC-SCNC: 8 MMOL/L (ref 8–16)
AORTIC ROOT ANNULUS: 2.4 CM
AORTIC VALVE CUSP SEPERATION: 1.8 CM
AV INDEX (PROSTH): 0.93
AV MEAN GRADIENT: 6 MMHG
AV PEAK GRADIENT: 11 MMHG
AV VALVE AREA: 2.63 CM2
AV VELOCITY RATIO: 0.91
BASOPHILS # BLD AUTO: 0.01 K/UL (ref 0–0.2)
BASOPHILS NFR BLD: 0.1 % (ref 0–1.9)
BILIRUB UR QL STRIP: NEGATIVE
BSA FOR ECHO PROCEDURE: 1.75 M2
BUN SERPL-MCNC: 13 MG/DL (ref 6–20)
CALCIUM SERPL-MCNC: 9.7 MG/DL (ref 8.7–10.5)
CHLORIDE SERPL-SCNC: 105 MMOL/L (ref 95–110)
CLARITY UR: CLEAR
CO2 SERPL-SCNC: 26 MMOL/L (ref 23–29)
COLOR UR: YELLOW
CREAT SERPL-MCNC: 0.7 MG/DL (ref 0.5–1.4)
CV ECHO LV RWT: 0.53 CM
DIFFERENTIAL METHOD: ABNORMAL
DOP CALC AO PEAK VEL: 1.64 M/S
DOP CALC AO VTI: 30.2 CM
DOP CALC LVOT AREA: 2.8 CM2
DOP CALC LVOT DIAMETER: 1.9 CM
DOP CALC LVOT PEAK VEL: 1.49 M/S
DOP CALC LVOT STROKE VOLUME: 79.35 CM3
DOP CALC MV VTI: 21.2 CM
DOP CALCLVOT PEAK VEL VTI: 28 CM
E WAVE DECELERATION TIME: 174 MSEC
E/A RATIO: 0.76
E/E' RATIO: 6.86 M/S
ECHO LV POSTERIOR WALL: 0.98 CM (ref 0.6–1.1)
EJECTION FRACTION: 65 %
EOSINOPHIL # BLD AUTO: 0 K/UL (ref 0–0.5)
EOSINOPHIL NFR BLD: 0 % (ref 0–8)
ERYTHROCYTE [DISTWIDTH] IN BLOOD BY AUTOMATED COUNT: 14.5 % (ref 11.5–14.5)
EST. GFR  (NO RACE VARIABLE): >60 ML/MIN/1.73 M^2
FRACTIONAL SHORTENING: 35 % (ref 28–44)
GLUCOSE SERPL-MCNC: 131 MG/DL (ref 70–110)
GLUCOSE UR QL STRIP: NEGATIVE
HCT VFR BLD AUTO: 42.8 % (ref 37–48.5)
HGB BLD-MCNC: 14.2 G/DL (ref 12–16)
HGB UR QL STRIP: NEGATIVE
IMM GRANULOCYTES # BLD AUTO: 0.03 K/UL (ref 0–0.04)
IMM GRANULOCYTES NFR BLD AUTO: 0.4 % (ref 0–0.5)
INTERVENTRICULAR SEPTUM: 0.99 CM (ref 0.6–1.1)
IVC DIAMETER: 1.52 CM
IVRT: 70 MSEC
KETONES UR QL STRIP: NEGATIVE
LEFT ATRIUM SIZE: 2.9 CM
LEFT ATRIUM VOLUME INDEX MOD: 30.6 ML/M2
LEFT ATRIUM VOLUME MOD: 52.6 CM3
LEFT INTERNAL DIMENSION IN SYSTOLE: 2.4 CM (ref 2.1–4)
LEFT VENTRICLE DIASTOLIC VOLUME INDEX: 33.78 ML/M2
LEFT VENTRICLE DIASTOLIC VOLUME: 58.1 ML
LEFT VENTRICLE MASS INDEX: 64 G/M2
LEFT VENTRICLE SYSTOLIC VOLUME INDEX: 11.7 ML/M2
LEFT VENTRICLE SYSTOLIC VOLUME: 20.2 ML
LEFT VENTRICULAR INTERNAL DIMENSION IN DIASTOLE: 3.7 CM (ref 3.5–6)
LEFT VENTRICULAR MASS: 110.12 G
LEUKOCYTE ESTERASE UR QL STRIP: NEGATIVE
LV LATERAL E/E' RATIO: 5.54 M/S
LV SEPTAL E/E' RATIO: 9 M/S
LVOT MG: 5 MMHG
LVOT MV: 0.95 CM/S
LYMPHOCYTES # BLD AUTO: 1 K/UL (ref 1–4.8)
LYMPHOCYTES NFR BLD: 12.9 % (ref 18–48)
MAGNESIUM SERPL-MCNC: 2.3 MG/DL (ref 1.6–2.6)
MCH RBC QN AUTO: 28.2 PG (ref 27–31)
MCHC RBC AUTO-ENTMCNC: 33.2 G/DL (ref 32–36)
MCV RBC AUTO: 85 FL (ref 82–98)
MONOCYTES # BLD AUTO: 0.1 K/UL (ref 0.3–1)
MONOCYTES NFR BLD: 1.5 % (ref 4–15)
MV MEAN GRADIENT: 3 MMHG
MV PEAK A VEL: 0.95 M/S
MV PEAK E VEL: 0.72 M/S
MV PEAK GRADIENT: 6 MMHG
MV VALVE AREA BY CONTINUITY EQUATION: 3.74 CM2
NEUTROPHILS # BLD AUTO: 6.8 K/UL (ref 1.8–7.7)
NEUTROPHILS NFR BLD: 85.1 % (ref 38–73)
NITRITE UR QL STRIP: NEGATIVE
NRBC BLD-RTO: 0 /100 WBC
PH UR STRIP: 6 [PH] (ref 5–8)
PISA TR MAX VEL: 2.78 M/S
PLATELET # BLD AUTO: 301 K/UL (ref 150–450)
PMV BLD AUTO: 10.6 FL (ref 9.2–12.9)
POTASSIUM SERPL-SCNC: 3.9 MMOL/L (ref 3.5–5.1)
PROT UR QL STRIP: NEGATIVE
PV MV: 0.83 M/S
PV PEAK VELOCITY: 1.22 CM/S
RA PRESSURE: 3 MMHG
RBC # BLD AUTO: 5.04 M/UL (ref 4–5.4)
RIGHT VENTRICULAR END-DIASTOLIC DIMENSION: 1.58 CM
RV TISSUE DOPPLER FREE WALL SYSTOLIC VELOCITY 1 (APICAL 4 CHAMBER VIEW): 18.9 CM/S
SODIUM SERPL-SCNC: 139 MMOL/L (ref 136–145)
SP GR UR STRIP: 1.01 (ref 1–1.03)
TDI LATERAL: 0.13 M/S
TDI SEPTAL: 0.08 M/S
TDI: 0.11 M/S
TR MAX PG: 31 MMHG
TRICUSPID ANNULAR PLANE SYSTOLIC EXCURSION: 2.96 CM
TV REST PULMONARY ARTERY PRESSURE: 34 MMHG
URN SPEC COLLECT METH UR: NORMAL
UROBILINOGEN UR STRIP-ACNC: NEGATIVE EU/DL
WBC # BLD AUTO: 8 K/UL (ref 3.9–12.7)

## 2023-07-01 PROCEDURE — 93306 TTE W/DOPPLER COMPLETE: CPT

## 2023-07-01 PROCEDURE — 83735 ASSAY OF MAGNESIUM: CPT | Performed by: INTERNAL MEDICINE

## 2023-07-01 PROCEDURE — 93306 TTE W/DOPPLER COMPLETE: CPT | Mod: 26,,, | Performed by: GENERAL PRACTICE

## 2023-07-01 PROCEDURE — 80048 BASIC METABOLIC PNL TOTAL CA: CPT | Performed by: INTERNAL MEDICINE

## 2023-07-01 PROCEDURE — 12000002 HC ACUTE/MED SURGE SEMI-PRIVATE ROOM

## 2023-07-01 PROCEDURE — 99900035 HC TECH TIME PER 15 MIN (STAT)

## 2023-07-01 PROCEDURE — 81003 URINALYSIS AUTO W/O SCOPE: CPT | Performed by: EMERGENCY MEDICINE

## 2023-07-01 PROCEDURE — 93306 ECHO (CUPID ONLY): ICD-10-PCS | Mod: 26,,, | Performed by: GENERAL PRACTICE

## 2023-07-01 PROCEDURE — 25000003 PHARM REV CODE 250: Performed by: INTERNAL MEDICINE

## 2023-07-01 PROCEDURE — 99900031 HC PATIENT EDUCATION (STAT)

## 2023-07-01 PROCEDURE — 94799 UNLISTED PULMONARY SVC/PX: CPT

## 2023-07-01 PROCEDURE — 94761 N-INVAS EAR/PLS OXIMETRY MLT: CPT

## 2023-07-01 PROCEDURE — 85025 COMPLETE CBC W/AUTO DIFF WBC: CPT | Performed by: INTERNAL MEDICINE

## 2023-07-01 PROCEDURE — 36415 COLL VENOUS BLD VENIPUNCTURE: CPT | Performed by: INTERNAL MEDICINE

## 2023-07-01 PROCEDURE — 99255 IP/OBS CONSLTJ NEW/EST HI 80: CPT | Mod: ,,, | Performed by: INTERNAL MEDICINE

## 2023-07-01 PROCEDURE — 63600175 PHARM REV CODE 636 W HCPCS: Performed by: EMERGENCY MEDICINE

## 2023-07-01 PROCEDURE — 63600175 PHARM REV CODE 636 W HCPCS: Performed by: INTERNAL MEDICINE

## 2023-07-01 PROCEDURE — 25000003 PHARM REV CODE 250: Performed by: EMERGENCY MEDICINE

## 2023-07-01 PROCEDURE — 99255 PR INITIAL INPATIENT CONSULT,LEVL V: ICD-10-PCS | Mod: ,,, | Performed by: INTERNAL MEDICINE

## 2023-07-01 PROCEDURE — 25000242 PHARM REV CODE 250 ALT 637 W/ HCPCS: Performed by: INTERNAL MEDICINE

## 2023-07-01 PROCEDURE — 94640 AIRWAY INHALATION TREATMENT: CPT

## 2023-07-01 RX ORDER — HYDROXYZINE HYDROCHLORIDE 25 MG/1
25 TABLET, FILM COATED ORAL 3 TIMES DAILY PRN
Status: DISCONTINUED | OUTPATIENT
Start: 2023-07-01 | End: 2023-07-03 | Stop reason: HOSPADM

## 2023-07-01 RX ORDER — VALSARTAN 80 MG/1
80 TABLET ORAL NIGHTLY
Status: DISCONTINUED | OUTPATIENT
Start: 2023-07-01 | End: 2023-07-02

## 2023-07-01 RX ORDER — PREDNISONE 20 MG/1
60 TABLET ORAL DAILY
Status: DISCONTINUED | OUTPATIENT
Start: 2023-07-02 | End: 2023-07-02

## 2023-07-01 RX ORDER — VALSARTAN 80 MG/1
80 TABLET ORAL NIGHTLY
Status: DISCONTINUED | OUTPATIENT
Start: 2023-07-01 | End: 2023-07-01

## 2023-07-01 RX ORDER — HYDRALAZINE HYDROCHLORIDE 25 MG/1
25 TABLET, FILM COATED ORAL EVERY 8 HOURS
Status: DISCONTINUED | OUTPATIENT
Start: 2023-07-01 | End: 2023-07-03 | Stop reason: HOSPADM

## 2023-07-01 RX ADMIN — HYDROCODONE BITARTRATE AND ACETAMINOPHEN 1 TABLET: 5; 325 TABLET ORAL at 09:07

## 2023-07-01 RX ADMIN — IPRATROPIUM BROMIDE AND ALBUTEROL SULFATE 3 ML: 2.5; .5 SOLUTION RESPIRATORY (INHALATION) at 11:07

## 2023-07-01 RX ADMIN — HYDROXYZINE HYDROCHLORIDE 25 MG: 25 TABLET ORAL at 01:07

## 2023-07-01 RX ADMIN — METHYLPREDNISOLONE SODIUM SUCCINATE 40 MG: 40 INJECTION, POWDER, FOR SOLUTION INTRAMUSCULAR; INTRAVENOUS at 05:07

## 2023-07-01 RX ADMIN — AZITHROMYCIN DIHYDRATE 500 MG: 500 INJECTION, POWDER, LYOPHILIZED, FOR SOLUTION INTRAVENOUS at 11:07

## 2023-07-01 RX ADMIN — IPRATROPIUM BROMIDE AND ALBUTEROL SULFATE 3 ML: 2.5; .5 SOLUTION RESPIRATORY (INHALATION) at 03:07

## 2023-07-01 RX ADMIN — IPRATROPIUM BROMIDE AND ALBUTEROL SULFATE 3 ML: 2.5; .5 SOLUTION RESPIRATORY (INHALATION) at 12:07

## 2023-07-01 RX ADMIN — VALSARTAN 80 MG: 80 TABLET, FILM COATED ORAL at 11:07

## 2023-07-01 RX ADMIN — IPRATROPIUM BROMIDE AND ALBUTEROL SULFATE 3 ML: 2.5; .5 SOLUTION RESPIRATORY (INHALATION) at 08:07

## 2023-07-01 RX ADMIN — Medication 9 MG: at 09:07

## 2023-07-01 RX ADMIN — METHYLPREDNISOLONE SODIUM SUCCINATE 40 MG: 40 INJECTION, POWDER, FOR SOLUTION INTRAMUSCULAR; INTRAVENOUS at 11:07

## 2023-07-01 RX ADMIN — ENOXAPARIN SODIUM 40 MG: 100 INJECTION SUBCUTANEOUS at 04:07

## 2023-07-01 RX ADMIN — GUAIFENESIN AND DEXTROMETHORPHAN HYDROBROMIDE 1 TABLET: 600; 30 TABLET, EXTENDED RELEASE ORAL at 09:07

## 2023-07-01 RX ADMIN — MORPHINE SULFATE 4 MG: 4 INJECTION, SOLUTION INTRAMUSCULAR; INTRAVENOUS at 11:07

## 2023-07-01 RX ADMIN — HYDRALAZINE HYDROCHLORIDE 25 MG: 25 TABLET ORAL at 09:07

## 2023-07-01 RX ADMIN — HYDROCODONE BITARTRATE AND ACETAMINOPHEN 1 TABLET: 5; 325 TABLET ORAL at 03:07

## 2023-07-01 RX ADMIN — CEFTRIAXONE SODIUM 2 G: 2 INJECTION, POWDER, FOR SOLUTION INTRAMUSCULAR; INTRAVENOUS at 09:07

## 2023-07-01 RX ADMIN — MORPHINE SULFATE 4 MG: 4 INJECTION, SOLUTION INTRAMUSCULAR; INTRAVENOUS at 04:07

## 2023-07-01 NOTE — PLAN OF CARE
[facility]  Initial Discharge Assessment     Patient is independent, no needs identified at this time.     Primary Care Provider: [employer]    Admission Diagnosis: Shortness of breath [R06.02]    Admission Date: 6/30/2023  Expected Discharge Date:     Transition of Care Barriers: None    Payor: [employer] / Plan: [place] [employer] / Product Type: Government /     Extended Emergency Contact Information  Primary Emergency Contact: Susan Burger  Mobile Phone: [phone]  Relation: Daughter  Preferred language: English   needed? No  Secondary Emergency Contact: Rayray Burger  Address: [address] [place] [address] [place] [address] [facility]  Home Phone: [phone]  Mobile Phone: [phone]  Relation: Other    Discharge Plan A: Home  Discharge Plan B: Home      [facility] [place] [facility] [place] - [address]  [email or web address]  [place] [address]  Phone: [phone] Fax: [phone]    [id number] - [place] - [address] AT [facility] [address]  [address]  [place] [address]  Phone: [phone] Fax: [phone]      Initial Assessment (most recent)       Adult Discharge Assessment - 07/01/23 1157          Discharge Assessment    Assessment Type Discharge Planning Assessment     Facility Arrived From: Home     Do you expect to return to your current living situation? Yes     Do you have help at home or someone to help you manage your care at home? Yes     Prior to hospitilization cognitive status: Alert/Oriented;No Deficits     Current cognitive status: Alert/Oriented;No Deficits     Equipment Currently Used at Home none     Readmission within 30 days? No     Patient currently being followed by outpatient case management? No     Do you currently have service(s) that help you manage your care at home? No     Do you take prescription medications? Yes     Do you have prescription coverage? Yes     [place] Medicaid (Healthy Blue)     How do you get to doctors appointments? car, drives self     Are you on dialysis? No     Do you take coumadin? No     Discharge Plan A Home     Discharge Plan B Home     DME Needed Upon Discharge  none     Transition of Care Barriers None

## 2023-07-01 NOTE — CONSULTS
Pulmonary/Critical Care Consult      PATIENT NAME: Sumit Burger  MRN: [id number]  TODAY'S DATE: 2023  ADMIT DATE: 2023  AGE: 52 y.o. : 1970    CONSULT REQUESTED BY: Betito Deleon MD    REASON FOR CONSULT:   Sarcoidosis    HISTORY OF PRESENT ILLNESS   Sumit Burger is a 52 y.o. female with a PMH of arcoidosis, HTN, and tobacco use on whom we have been consulted for shortness of breath.    RUL transbronchial biopsy showed granulomatous inflammation consistent with sarcoidosis. She was not seen in pulmonology from  until this year. She was given a course of doxycycline at an urgent care center 3 weeks ago when she developed cough, congestion, SOB, and excessive sweating. On recent pulm visit, ACE level was found to be elevated. She was started on a 16-day prednisone taper starting at 40 mg daily on 23 by Massiel Floyd in Dr. Rolon's office.      SMOKING HISTORY  Active smoker until a month ago of about 1/4 PPD.    EXPOSURE HISTORY  Never incarcerated or homeless or known TB (or other pneumonia) exposure). Lived in various other countries on  bases in [facility], [facility], and others.      REVIEW OF SYSTEMS  GENERAL: Feeling better since starting treatment. Sweating and fatigue with any exertion, sometimes even talking for past 3 weeks. Not cyclical.  EYES: Vision is good.  ENT: No sinusitis or pharyngitis.   HEART: No chest pain or palpitations.  LUNGS: No cough, sputum, or wheezing.  GI: No abdominal pain, nausea, vomiting, or diarrhea.  : No dysuria, urgency, or frequency.  SKIN: No lesions or rashes.  MUSCULOSKELETAL: No joint pain or myalgias.  NEURO: No headaches or neuropathy.  LYMPH: No edema or adenopathy.  PSYCH: No anxiety or depression.  ENDO: No unexpected weight change.    ALLERGIES  Review of patient's allergies indicates:   Allergen Reactions    Sulfamethoxazole-trimethoprim Hives, Shortness Of Breath, Nausea And Vomiting and Rash     Hives, nausea  and vomiting, and shortness of breath.  Did not require admission  Hives, nausea and vomiting, and shortness of breath.  Did not require admission    Benadryl [diphenhydramine hcl]     Latex, natural rubber     Percocet [oxycodone-acetaminophen]     Shrimp     Codeine Itching and Nausea Only       INPATIENT SCHEDULED MEDICATIONS   albuterol-ipratropium  3 mL Nebulization Q4H    azithromycin  500 mg Intravenous Q24H    cefTRIAXone (ROCEPHIN) IVPB  2 g Intravenous Q24H    dextromethorphan-guaiFENesin  mg  1 tablet Oral BID    enoxparin  40 mg Subcutaneous Daily    hydrALAZINE  25 mg Oral Q8H    methylPREDNISolone sodium succinate injection  40 mg Intravenous Q6H    valsartan  80 mg Oral QHS         MEDICAL AND SURGICAL HISTORY  Past Medical History:   Diagnosis Date    HTN (hypertension)     Pneumonia 10/2020    Sarcoidosis      Past Surgical History:   Procedure Laterality Date    BRONCHOSCOPY WITH FLUOROSCOPY Right 10/20/2020    Procedure: BRONCHOSCOPY, WITH FLUOROSCOPY;  Surgeon: Ava Rolon MD;  Location: [facility] ENDO;  Service: Pulmonary;  Laterality: Right;     SECTION      x2    FALLOPIAN TUBOPLASTY      LAPAROSCOPIC APPENDECTOMY N/A 8/10/2022    Procedure: APPENDECTOMY, LAPAROSCOPIC;  Surgeon: Jackson Vogel MD;  Location: [facility] OR;  Service: General;  Laterality: N/A;    TUBAL LIGATION      WRIST FRACTURE SURGERY Right     ganglion cyst       ALCOHOL, TOBACCO AND DRUG USE  Social History     Tobacco Use   Smoking Status Light Smoker    Packs/day: 0.25    Types: Cigarettes    Last attempt to quit: 2020    Years since quitting: 3.2   Smokeless Tobacco Never   Tobacco Comments    ocassionally never was qd     Social History     Substance and Sexual Activity   Alcohol Use Not Currently     Social History     Substance and Sexual Activity   Drug Use Never       FAMILY HISTORY  Family History   Problem Relation Age of Onset    Hypertension Mother        VITAL SIGNS (MOST RECENT)  Temp: 98 °F  (36.7 °C) (07/01/23 1614)  Pulse: 93 (07/01/23 1614)  Resp: 16 (07/01/23 1625)  BP: (!) 165/80 (notified nurse osorioke) (07/01/23 1614)  SpO2: 98 % (07/01/23 1614)    INTAKE AND OUTPUT (LAST 24 HOURS):  Intake/Output Summary (Last 24 hours) at 7/1/2023 6611  Last data filed at 7/1/2023 0355  Gross per 24 hour   Intake 120 ml   Output --   Net 120 ml       WEIGHT  Wt Readings from Last 1 Encounters:   06/30/23 69 kg (152 lb 1.9 oz)       PHYSICAL EXAM  GENERAL: A&O. NAD.  HEENT: Extraocular movements intact. Pharynx moist.  NECK: Supple. No JVD or hepatojugular reflux.  HEART: Regular rate and normal rhythm. No murmur or gallop auscultated.  LUNGS: Clear to auscultation and percussion. Lung excursion symmetrical.   ABDOMEN: Soft, non-tender, non-distended, no masses palpated.  EXTREMITIES: Normal muscle tone and joint movement, no cyanosis or clubbing.   LYMPHATICS: No adenopathy palpated, no edema.  SKIN: Dry, intact, no lesions.   NEURO: No gross deficit.  PSYCH: Appropriate affect    ACUTE PHASE REACTANT (LAST 24 HOURS)  No results for input(s): FERRITIN, CRP, LDH, DDIMER in the last 24 hours.    CBC LAST (LAST 24 HOURS)  Recent Labs   Lab 07/01/23  0500   WBC 8.00   RBC 5.04   HGB 14.2   HCT 42.8   MCV 85   MCH 28.2   MCHC 33.2   RDW 14.5      MPV 10.6   GRAN 85.1*  6.8   LYMPH 12.9*  1.0   MONO 1.5*  0.1*   BASO 0.01   NRBC 0       CHEMISTRY LAST (LAST 24 HOURS)  Recent Labs   Lab 06/30/23 2008 07/01/23  0500    139   K 4.1 3.9    105   CO2 25 26   ANIONGAP 7* 8   BUN 16 13   CREATININE 0.7 0.7   * 131*   CALCIUM 9.4 9.7   MG  --  2.3   ALBUMIN 4.0  --    PROT 7.9  --    ALKPHOS 101  --    ALT 46*  --    AST 25  --    BILITOT 0.6  --        COAGULATION LAST (LAST 24 HOURS)  No results for input(s): LABPT, INR, APTT in the last 24 hours.    CARDIAC PROFILE (LAST 24 HOURS)  Recent Labs   Lab 06/30/23 2018   BNP 9       LAST 7 DAYS MICROBIOLOGY   Microbiology Results (last 7 days)        Procedure Component Value Units Date/Time    AFB Culture & Smear [[id number]]     Order Status: Sent Specimen: Respiratory from Sputum     Blood culture x two cultures. Draw prior to antibiotics. [[id number]] Collected: 06/30/23 2020    Order Status: Completed Specimen: Blood Updated: 07/01/23 0317     Blood Culture, Routine No Growth to date    Narrative:      Aerobic and anaerobic    Blood culture x two cultures. Draw prior to antibiotics. [[id number]] Collected: 06/30/23 2021    Order Status: Completed Specimen: Blood Updated: 07/01/23 0317     Blood Culture, Routine No Growth to date    Narrative:      Aerobic and anaerobic    Culture, Respiratory with Gram Stain [[id number]]     Order Status: No result Specimen: Respiratory     AFB Culture & Smear [[id number]]     Order Status: No result Specimen: Sputum             MOST RECENT IMAGING  Echo  · The left ventricle is normal in size with concentric remodeling and   normal systolic function.  · The estimated ejection fraction is 65%.  · Normal left ventricular diastolic function.  · Mild tricuspid regurgitation.  · Moderate left atrial enlargement.  · Mild mitral regurgitation.  · The estimated PA systolic pressure is 34 mmHg.  · Normal right ventricular size with normal right ventricular systolic   function.  · Normal central venous pressure (3 mmHg).     X-Ray Chest AP Portable  Narrative: EXAMINATION:  XR CHEST AP PORTABLE    CLINICAL HISTORY:  CHF;    FINDINGS:  Portable chest at 2033 compared with 06/26/2023 shows normal cardiomediastinal silhouette.    Diffuse pulmonary interstitial and ground-glass alveolar opacities have not significantly changed.  Central pulmonary vasculature is unchanged.  No pneumothorax or pleural effusion.  No acute osseous abnormality.  Impression: Unchanged pulmonary interstitial and ground-glass alveolar opacities which can be seen with pulmonary edema, although internal bleed can be seen with pneumonia or alveolar  hemorrhage.    Electronically signed by: Peng Desai MD  Date:    07/01/2023  Time:    08:57      CURRENT VISIT EKG  Results for orders placed or performed during the hospital encounter of 06/30/23   EKG 12-lead    Narrative    Test Reason : R06.02,    Vent. Rate : 079 BPM     Atrial Rate : 079 BPM     P-R Int : 130 ms          QRS Dur : 084 ms      QT Int : 350 ms       P-R-T Axes : 035 009 032 degrees     QTc Int : 401 ms    Normal sinus rhythm  Possible Left atrial enlargement  LVH  Abnormal ECG  When compared with ECG of 21-OCT-2022 09:32,  No significant change was found    Referred By: [email or web address]   SELF           Confirmed By:        ECHOCARDIOGRAM RESULTS  Results for orders placed during the hospital encounter of 06/30/23    Echo    Interpretation Summary  · The left ventricle is normal in size with concentric remodeling and normal systolic function.  · The estimated ejection fraction is 65%.  · Normal left ventricular diastolic function.  · Mild tricuspid regurgitation.  · Moderate left atrial enlargement.  · Mild mitral regurgitation.  · The estimated PA systolic pressure is 34 mmHg.  · Normal right ventricular size with normal right ventricular systolic function.  · Normal central venous pressure (3 mmHg).        RESPIRATORY SUPPORT              LAST ARTERIAL BLOOD GAS  ABG  No results for input(s): PH, PO2, PCO2, HCO3, BE in the last 168 hours.    CXR 6/30 and 6/26/23: bilateral lower lung predominant hazy interstitial opacities that were not present on CXR in 12/2021.    CT chest 6/30/23: micronodules in a miliary and subpleural/lymphatic distribution with occasional areas of confluence into larger densities consistent with the patient's known history of sarcoidosis; upper-lobe predominant. This is similar to but much progressed from the findings on CT chest 5/12/2021.    IMPRESSION AND PLAN  Sumit Burger is a 52 y.o. female with a PMH of sarcoidosis, HTN, and tobacco use on whom we have been  consulted for shortness of breath.    #Sarcoidosis with exacerbation  Procalcitonin low. NO fevers. Dry cough.  - AFB x 3 and Quantiferon ordered, but I doubt MTB infection  - sputum induction  - continue Abx for now -- may cancel tomorrow  - airborne precautions for now, but may cancel also  - steroids weaned to 60 mg prednisone daily starting tomorrow    Discussed with hospitalist. I have reviewed the patient's most recent CBC and serum chemistry, as well as the most recent chest x-ray and/or chest CT. My interpretation of the chest imaging is as above. The patient is at high risk of morbidity or death and should be admitted to the hospital.    Chris Garcia MD  [facility]  Department of Pulmonology  Date of Service: 07/01/2023  5:27 PM

## 2023-07-01 NOTE — PHARMACY MED REC
"  Admission Medication History     The home medication history was taken by Ofe Stein.    You may go to "Admission" then "Reconcile Home Medications" tabs to review and/or act upon these items.     The home medication list has been updated by the Pharmacy department.   Please read ALL comments highlighted in yellow.   Please address this information as you see fit.    Feel free to contact us if you have any questions or require assistance.      The medications listed below were removed from the home medication list. Please reorder if appropriate:  Patient reports no longer taking the following medication(s):  Ventolin HFA inh  Xanax  Norvasc  Imuran  Wellbutrin XL  Lexapro  Pepcid  Norco  Losartan  Ambien        Medications listed below were obtained from: Patient/family and Analytic software- [email or web address]  Current Facility-Administered Medications on File Prior to Encounter   Medication Dose Route Frequency Provider Last Rate Last Admin    [COMPLETED] iohexoL (OMNIPAQUE 350) injection 100 mL  100 mL Intravenous ONCE PRN Massiel Floyd, FNP   100 mL at 06/30/23 1331     Current Outpatient Medications on File Prior to Encounter   Medication Sig Dispense Refill    benzonatate (TESSALON) 200 MG capsule Take 1 capsule (200 mg total) by mouth 3 (three) times daily as needed for Cough. 90 capsule 3    doxycycline (DORYX) 100 MG EC tablet Take 100 mg by mouth every 12 (twelve) hours.      predniSONE (DELTASONE) 10 MG tablet Take 4 tabs x 4 days, then take 3 tabs x 4 days, then take 2 tabs x 4 days, then take 1 tab x 4 days. 40 tablet 0    promethazine-dextromethorphan (PROMETHAZINE-DM) 6.25-15 mg/5 mL Syrp Take 5 mLs by mouth nightly as needed (cough). 118 mL 0    valsartan (DIOVAN) 80 MG tablet Take 80 mg by mouth once daily.      amLODIPine (NORVASC) 10 MG tablet Take 10 mg by mouth once daily.      losartan (COZAAR) 100 MG tablet 50 mg.      [DISCONTINUED] albuterol (PROVENTIL/VENTOLIN HFA) 90 mcg/actuation inhaler " Inhale 1-2 puffs into the lungs every 6 (six) hours as needed. Rescue 1 g 1    [DISCONTINUED] ALPRAZolam (XANAX) 0.25 MG tablet Take 1 tablet (0.25 mg total) by mouth 2 (two) times daily as needed for Anxiety. 15 tablet 0    [DISCONTINUED] azaTHIOprine (IMURAN) 50 mg Tab Take 50 mg by mouth once daily.      [DISCONTINUED] buPROPion (WELLBUTRIN XL) 300 MG 24 hr tablet Take 1 tablet (300 mg total) by mouth once daily. 30 tablet 1    [DISCONTINUED] EScitalopram oxalate (LEXAPRO) 5 MG Tab Take 1 tablet (5 mg total) by mouth once daily. 30 tablet 1    [DISCONTINUED] famotidine (PEPCID) 20 MG tablet Take 1 tablet (20 mg total) by mouth 2 (two) times daily. (Patient not taking: Reported on 12/30/2020) 20 tablet 0    [DISCONTINUED] HYDROcodone-acetaminophen (NORCO) 5-325 mg per tablet Take 1 tablet by mouth every 6 (six) hours as needed for Pain. (Patient not taking: Reported on 6/26/2023) 10 tablet 0    [DISCONTINUED] ondansetron (ZOFRAN-ODT) 4 MG TbDL Take 1 tablet (4 mg total) by mouth every 6 (six) hours as needed. (Patient not taking: Reported on 10/21/2022) 12 tablet 0    [DISCONTINUED] predniSONE (DELTASONE) 10 MG tablet Take 40 mg by mouth once daily.      [DISCONTINUED] zolpidem (AMBIEN) 5 MG Tab Take 1 tablet (5 mg total) by mouth nightly as needed (insomnia). 30 tablet 0         Ofe Stein  [id number]                .

## 2023-07-01 NOTE — PLAN OF CARE
07/01/23 0011   Patient Assessment/Suction   Level of Consciousness (AVPU) alert   Respiratory Effort Unlabored   All Lung Fields Breath Sounds coarse;diminished   Cough Frequency frequent   Cough Type nonproductive   PRE-TX-O2   Device (Oxygen Therapy) room air   SpO2 99 %   Pulse Oximetry Type Intermittent   $ Pulse Oximetry - Multiple Charge Pulse Oximetry - Multiple   Pulse 81   Resp 18   Aerosol Therapy   $ Aerosol Therapy Charges Aerosol Treatment   Respiratory Treatment Status (SVN) given   Treatment Route (SVN) mask   Patient Position (SVN) Montague's   Post Treatment Assessment (SVN) breath sounds unchanged   Signs of Intolerance (SVN) none   Breath Sounds Post-Respiratory Treatment   Post-treatment Heart Rate (beats/min) 83   Post-treatment Resp Rate (breaths/min) 20   Respiratory Evaluation   $ Care Plan Tech Time 15 min   $ Eval/Re-eval Charges Evaluation   Evaluation For New Orders   Admitting Diagnosis Bilat pneumonia

## 2023-07-01 NOTE — ED PROVIDER NOTES
Encounter Date: 2023       History     Chief Complaint   Patient presents with    Cough     X 3 weeks, on steroids and antibiotics, had ct today, states no improvement     52-year-old female presented emergency department with 3 weeks of productive cough and wheezing and shortness of breath which is gradually getting worse.  Patient also complains of weight loss and night sweats.  Patient complains of lack of appetite as well.  Patient was seen by primary care provider and had a CT scan done today as there was suspicion given the abnormal chest x-ray and persistent symptoms.  Patient on outpatient antibiotics and getting worse so also concerned.  CT scan shows concern for possible miliary tuberculosis so patient was called to come back to the emergency department to get admitted and treated.  Patient denies dysuria or hematuria or any focal weakness or numbness however has generalized malaise and fatigue and weakness.    Review of patient's allergies indicates:   Allergen Reactions    Sulfamethoxazole-trimethoprim Hives, Shortness Of Breath, Nausea And Vomiting and Rash     Hives, nausea and vomiting, and shortness of breath.  Did not require admission  Hives, nausea and vomiting, and shortness of breath.  Did not require admission    Benadryl [diphenhydramine hcl]     Latex, natural rubber     Percocet [oxycodone-acetaminophen]     Shrimp     Codeine Itching and Nausea Only     Past Medical History:   Diagnosis Date    HTN (hypertension)     Pneumonia 10/2020    Sarcoidosis      Past Surgical History:   Procedure Laterality Date    BRONCHOSCOPY WITH FLUOROSCOPY Right 10/20/2020    Procedure: BRONCHOSCOPY, WITH FLUOROSCOPY;  Surgeon: Ava Rolon MD;  Location: [facility] ENDO;  Service: Pulmonary;  Laterality: Right;     SECTION      x2    FALLOPIAN TUBOPLASTY      LAPAROSCOPIC APPENDECTOMY N/A 8/10/2022    Procedure: APPENDECTOMY, LAPAROSCOPIC;  Surgeon: Jackson Vogel MD;  Location: [facility] OR;   Service: General;  Laterality: N/A;    TUBAL LIGATION      WRIST FRACTURE SURGERY Right     ganglion cyst     Family History   Problem Relation Age of Onset    Hypertension Mother      Social History     Tobacco Use    Smoking status: Light Smoker     Packs/day: 0.25     Types: Cigarettes     Last attempt to quit: 4/19/2020     Years since quitting: 3.1    Smokeless tobacco: Never    Tobacco comments:     ocassionally never was qd   Substance Use Topics    Alcohol use: Not Currently    Drug use: Never     Review of Systems   Constitutional:  Positive for appetite change, fatigue and unexpected weight change.   HENT: Negative.     Eyes: Negative.    Respiratory:  Positive for cough, shortness of breath and wheezing.    Cardiovascular: Negative.  Negative for chest pain.   Gastrointestinal: Negative.    Endocrine: Negative.    Genitourinary: Negative.    Musculoskeletal: Negative.    Skin: Negative.    Allergic/Immunologic: Negative.    Neurological: Negative.    Hematological: Negative.    Psychiatric/Behavioral: Negative.     All other systems reviewed and are negative.    Physical Exam     Initial Vitals [06/30/23 1805]   BP Pulse Resp Temp SpO2   (!) 184/104 85 20 98.4 °F (36.9 °C) 99 %      MAP       --         Physical Exam    Nursing note and vitals reviewed.  Constitutional: She appears well-developed and well-nourished.   HENT:   Head: Normocephalic and atraumatic.   Nose: Nose normal.   Mouth/Throat: Oropharynx is clear and moist.   Eyes: Conjunctivae and EOM are normal. Pupils are equal, round, and reactive to light.   Neck: Neck supple. No thyromegaly present. No tracheal deviation present. No JVD present.   Normal range of motion.  Cardiovascular:  Normal rate, regular rhythm, normal heart sounds and intact distal pulses.           No murmur heard.  Pulmonary/Chest: Breath sounds normal. No stridor. No respiratory distress. She has no wheezes. She has no rales.   Abdominal: Abdomen is soft. Bowel sounds  are normal. There is no abdominal tenderness.   Musculoskeletal:         General: No edema. Normal range of motion.      Cervical back: Normal range of motion and neck supple.     Neurological: She is alert and oriented to person, place, and time. She has normal strength. No sensory deficit. GCS score is 15. GCS eye subscore is 4. GCS verbal subscore is 5. GCS motor subscore is 6.   Skin: Skin is warm. Capillary refill takes less than 2 seconds.   Psychiatric: She has a normal mood and affect. Thought content normal.       ED Course   Procedures  Labs Reviewed   CBC W/ AUTO DIFFERENTIAL - Abnormal; Notable for the following components:       Result Value    Mono # 0.2 (*)     Gran % 85.7 (*)     Lymph % 11.6 (*)     Mono % 2.4 (*)     All other components within normal limits   COMPREHENSIVE METABOLIC PANEL - Abnormal; Notable for the following components:    Glucose 128 (*)     ALT 46 (*)     Anion Gap 7 (*)     All other components within normal limits   CULTURE, BLOOD   CULTURE, BLOOD   CULTURE, RESPIRATORY   AFB CULTURE & SMEAR   LACTIC ACID, PLASMA   TROPONIN I HIGH SENSITIVITY   B-TYPE NATRIURETIC PEPTIDE   URINALYSIS, REFLEX TO URINE CULTURE          Imaging Results              X-Ray Chest AP Portable (In process)  Result time 06/30/23 19:14:53                  X-Rays:   Independently Interpreted Readings:   Other Readings:  Chest x-ray shows bilateral pulmonary infiltrates consistent with sarcoidosis versus multifocal infiltrates from pneumonia given patient's symptoms  Medications   albuterol-ipratropium 2.5 mg-0.5 mg/3 mL nebulizer solution 3 mL ( Nebulization Canceled Entry 6/30/23 1845)   cefTRIAXone (ROCEPHIN) 2 g in dextrose 5 % 100 mL IVPB (ready to mix) (2 g Intravenous New Bag 6/30/23 2040)   azithromycin 500 mg in dextrose 5 % 250 mL IVPB (ready to mix system) ( Intravenous Canceled Entry 6/30/23 1900)   methylPREDNISolone sodium succinate injection 40 mg (40 mg Intravenous Given 6/30/23 2040)    azithromycin 500 mg in dextrose 5 % 250 mL IVPB (ready to mix system) (has no administration in time range)   albuterol-ipratropium 2.5 mg-0.5 mg/3 mL nebulizer solution 3 mL (3 mLs Nebulization Given 6/30/23 2021)   dextromethorphan-guaiFENesin  mg per 12 hr tablet 1 tablet (has no administration in time range)     Medical Decision Making:   Differential Diagnosis:   52-year-old female presented emergency department with the abnormal CT finding with complaints of having 3 weeks of worsening cough and generalized malaise fatigue and weakness and differential diagnosis includes pulmonary tuberculosis and COPD exacerbation and pulmonary infection with multifocal pneumonia.  Given failing outpatient treatment and CT scan findings and lab work findings which were from today Hospital Medicine consulted for evaluation for further management.  Patient isolated.  Broad-spectrum antibiotics started.  Sputum cultures a to be done.  Blood cultures ordered and hospitalist evaluate patient for further management.  Independently Interpreted Test(s):   I have ordered and independently interpreted X-rays - see prior notes.  I have ordered and independently interpreted EKG Reading(s) - see prior notes  Clinical Tests:   Lab Tests: Reviewed  Radiological Study: Reviewed  Medical Tests: Reviewed  Other:   I have discussed this case with another health care provider.       <> Summary of the Discussion: Yes Hospital Medicine consult for evaluation for further management given abnormal CT and patient's presentation to rule out pulmonary tuberculosis                        Clinical Impression:   Final diagnoses:  [R06.02] Shortness of breath        ED Disposition Condition    Admit Fair                Fabiola Fuentes MD  06/30/23 1943       Fabiola Fuentes MD  06/30/23 1985

## 2023-07-01 NOTE — H&P
Formerly [facility], [facility] Medicine History & Physical Examination   Patient Name: Sumit Burger  MRN: [id number]  Patient Class: IP- Inpatient   Admission Date: 6/30/2023  6:02 PM  Length of Stay: 0  Attending Physician: Betito Deleon MD  Primary Care Provider: [facility]  Face-to-Face encounter date: 06/30/2023  Code Status: Full Code  Chief Complaint: Cough     HISTORY OF PRESENT ILLNESS:   Sumit Burger is a 52 y.o. of Black or  descent with a known history of chronic tobacco usage (last smoked one month ago) and sarcoidosis, was directed to visit the emergency room by her pulmonologist. She began to experience symptoms, including cough, congestion, shortness of breath, and excessive sweating approximately three weeks ago. She was seen at urgent care center and being prescribed a course of doxycycline, but there was no alleviation in her condition. Consequently, her pulmonologist prescribed Prednisone 40mg, which she has been taking for a week, resulting in some symptom improvement. Today, CT scan of her chest displayed diffuse micronodular ground-glass opacities bilaterally and several ill-defined hypoenhancement zones in the liver. Considering her symptoms and imaging results, her pulmonologist advised her to visit the ER for further evaluation. Upon arrival, she was administered intravenous antibiotics and steroids, and hospital medicine was consulted for admission.    REVIEW OF SYSTEMS:   10 Point Review of System was performed and was found to be negative except for that mentioned already in the HPI above.     PAST MEDICAL HISTORY:     Past Medical History:   Diagnosis Date    HTN (hypertension)     Pneumonia 10/2020    Sarcoidosis        PAST SURGICAL HISTORY:     Past Surgical History:   Procedure Laterality Date    BRONCHOSCOPY WITH FLUOROSCOPY Right 10/20/2020    Procedure: BRONCHOSCOPY, WITH FLUOROSCOPY;  Surgeon: Ava Rolon MD;   Location: [facility] ENDO;  Service: Pulmonary;  Laterality: Right;     SECTION      x2    FALLOPIAN TUBOPLASTY      LAPAROSCOPIC APPENDECTOMY N/A 8/10/2022    Procedure: APPENDECTOMY, LAPAROSCOPIC;  Surgeon: Jackson Vogel MD;  Location: [facility] OR;  Service: General;  Laterality: N/A;    TUBAL LIGATION      WRIST FRACTURE SURGERY Right     ganglion cyst       ALLERGIES:   Sulfamethoxazole-trimethoprim; Benadryl [diphenhydramine hcl]; Latex, natural rubber; Percocet [oxycodone-acetaminophen]; Shrimp; and Codeine    FAMILY HISTORY:     Family History   Problem Relation Age of Onset    Hypertension Mother        SOCIAL HISTORY:     Social History     Tobacco Use    Smoking status: Light Smoker     Packs/day: 0.25     Types: Cigarettes     Last attempt to quit: 2020     Years since quitting: 3.1    Smokeless tobacco: Never    Tobacco comments:     ocassionally never was qd   Substance Use Topics    Alcohol use: Not Currently        Social History     Substance and Sexual Activity   Sexual Activity Not Currently        HOME MEDICATIONS:     Prior to Admission medications    Medication Sig Start Date End Date Taking? Authorizing Provider   albuterol (PROVENTIL/VENTOLIN HFA) 90 mcg/actuation inhaler Inhale 1-2 puffs into the lungs every 6 (six) hours as needed. Rescue 12/9/20 10/21/22  Fabiola Fuentes MD   ALPRAZolam (XANAX) 0.25 MG tablet Take 1 tablet (0.25 mg total) by mouth 2 (two) times daily as needed for Anxiety. 22  Zahra Roberts PA-C   amLODIPine (NORVASC) 10 MG tablet Take 10 mg by mouth once daily. 21   Historical Provider   azaTHIOprine (IMURAN) 50 mg Tab Take 50 mg by mouth once daily. 10/5/22   Historical Provider   benzonatate (TESSALON) 200 MG capsule Take 1 capsule (200 mg total) by mouth 3 (three) times daily as needed for Cough. 6/26/23 10/24/23  OSMEL Savage   buPROPion (WELLBUTRIN XL) 300 MG 24 hr tablet Take 1 tablet (300 mg total) by mouth once daily. 22  "1/8/23  Zahra Roberts PA-C   doxycycline (DORYX) 100 MG EC tablet Take 100 mg by mouth every 12 (twelve) hours.    Historical Provider   EScitalopram oxalate (LEXAPRO) 5 MG Tab Take 1 tablet (5 mg total) by mouth once daily. 11/9/22 1/8/23  Zahra Roberts PA-C   HYDROcodone-acetaminophen (NORCO) 5-325 mg per tablet Take 1 tablet by mouth every 6 (six) hours as needed for Pain.  Patient not taking: Reported on 6/26/2023 5/18/23   Alfie Mathur MD   losartan (COZAAR) 100 MG tablet 50 mg. 12/17/21   Historical Provider   ondansetron (ZOFRAN-ODT) 4 MG TbDL Take 1 tablet (4 mg total) by mouth every 6 (six) hours as needed.  Patient not taking: Reported on 10/21/2022 8/10/22   Harpreet Newsome MD   predniSONE (DELTASONE) 10 MG tablet Take 40 mg by mouth once daily. 10/5/22   Historical Provider   predniSONE (DELTASONE) 10 MG tablet Take 4 tabs x 4 days, then take 3 tabs x 4 days, then take 2 tabs x 4 days, then take 1 tab x 4 days. 6/26/23   OSMEL Savage   promethazine-dextromethorphan (PROMETHAZINE-DM) 6.25-15 mg/5 mL Syrp Take 5 mLs by mouth nightly as needed (cough). 6/26/23 7/20/23  OSMEL Savage   zolpidem (AMBIEN) 5 MG Tab Take 1 tablet (5 mg total) by mouth nightly as needed (insomnia). 11/9/22 5/10/23  Zahra Roberts PA-C   famotidine (PEPCID) 20 MG tablet Take 1 tablet (20 mg total) by mouth 2 (two) times daily.  Patient not taking: Reported on 12/30/2020 12/9/20 8/11/22  Fabiola Fuentes MD         PHYSICAL EXAM:   BP (!) 184/104   Pulse 85   Temp 98.4 °F (36.9 °C) (Oral)   Resp 20   Ht 5' 3" (1.6 m)   Wt 68 kg (150 lb)   LMP 06/24/2022   SpO2 99%   BMI 26.57 kg/m²   Vitals Reviewed  General appearance: non-toxic and not acutely ill-appearingBlack or  female in no apparent distress.  Skin: No Rash.   Neuro: Motor and sensory exams grossly intact. Good tone. Power in all 4 extremities 5/5.   HENT: Atraumatic head. Moist mucous membranes of oral cavity.  Eyes: Normal " extraocular movements.   Neck: Supple. No evidence of lymphadenopathy. No thyroidomegaly.  Lungs: Coarse breath sounds bilaterally   Heart: Regular rate and rhythm. S1 and S2 present with no murmurs/gallop/rub. No pedal edema. No JVD present.   Abdomen: Soft, non-distended, non-tender. No rebound tenderness/guarding. No masses or organomegaly. Bowel sounds are normal. Bladder is not palpable.   Extremities: No cyanosis, clubbing.  Psych/mental status: Alert and oriented. Cooperative. Responds appropriately to questions.   EMERGENCY DEPARTMENT LABS AND IMAGING:     Labs Reviewed   CULTURE, BLOOD   CULTURE, BLOOD   CULTURE, RESPIRATORY   AFB CULTURE & SMEAR   CBC W/ AUTO DIFFERENTIAL   COMPREHENSIVE METABOLIC PANEL   LACTIC ACID, PLASMA   URINALYSIS, REFLEX TO URINE CULTURE   TROPONIN I HIGH SENSITIVITY   B-TYPE NATRIURETIC PEPTIDE       X-Ray Chest AP Portable    (Results Pending)       ASSESSMENT & PLAN:   Sumit Burger is a 52 y.o. female admitted for    Active Hospital Problems    Diagnosis    *Bilateral pneumonia    Tobacco abuse    Essential hypertension    Sarcoidosis    Shortness of breath      Plan  Admit to med-surg  Oxygen supplementation  IV antibiotics - IV Azithromycin and IV Ceftriaxone  IV steroids  Duo nebs q4h   CT scan chest reviewed  Pulmonary consulted, discussed case with Dr. Garcia  Supportive care with electrolyte replacement, anti-tussive medications    Diet: Cardiac    DVT Prophylaxis: low molecular weight heparin and Encourage ambulation. OOB as tolerated.     Discharge Planning and Disposition:  Home with assistance from family    Expected LOS: 1-2 days    This patient is high risk for life-threatening deterioration and death secondary to above comorbidities and need for IV treatment. This patient meets inpatient criteria.   ________________________________________________________________________________    Face-to-Face encounter date: 06/30/2023  Encounter included review of the  medical records, interviewing and examining the patient face-to-face, discussion with family and other health care providers including emergency medicine physician, admission orders, interpreting lab/test results and formulating a plan of care.   Medical Decision Making during this encounter was High Complexity due to bilateral pneumonia  ________________________________________________________________________________    INPATIENT LIST OF MEDICATIONS     Current Facility-Administered Medications:     albuterol-ipratropium 2.5 mg-0.5 mg/3 mL nebulizer solution 3 mL, 3 mL, Nebulization, ED 1 Time, Fabiola Fuentes MD    albuterol-ipratropium 2.5 mg-0.5 mg/3 mL nebulizer solution 3 mL, 3 mL, Nebulization, Q4H, Betito Deleon MD    azithromycin 500 mg in dextrose 5 % 250 mL IVPB (ready to mix system), 500 mg, Intravenous, Once, Fabiola Fuentes MD    azithromycin 500 mg in dextrose 5 % 250 mL IVPB (ready to mix system), 500 mg, Intravenous, Q24H, Betito Deleon MD    cefTRIAXone (ROCEPHIN) 2 g in dextrose 5 % 100 mL IVPB (ready to mix), 2 g, Intravenous, Q24H, Fabiola Fuentes MD    dextromethorphan-guaiFENesin  mg per 12 hr tablet 1 tablet, 1 tablet, Oral, BID, Betito Deleon MD    methylPREDNISolone sodium succinate injection 40 mg, 40 mg, Intravenous, Q6H, Betito Deleon MD    Current Outpatient Medications:     albuterol (PROVENTIL/VENTOLIN HFA) 90 mcg/actuation inhaler, Inhale 1-2 puffs into the lungs every 6 (six) hours as needed. Rescue, Disp: 1 g, Rfl: 1    ALPRAZolam (XANAX) 0.25 MG tablet, Take 1 tablet (0.25 mg total) by mouth 2 (two) times daily as needed for Anxiety., Disp: 15 tablet, Rfl: 0    amLODIPine (NORVASC) 10 MG tablet, Take 10 mg by mouth once daily., Disp: , Rfl:     azaTHIOprine (IMURAN) 50 mg Tab, Take 50 mg by mouth once daily., Disp: , Rfl:     benzonatate (TESSALON) 200 MG capsule, Take 1 capsule (200 mg total) by mouth 3 (three) times daily as needed for Cough., Disp: 90 capsule,  Rfl: 3    buPROPion (WELLBUTRIN XL) 300 MG 24 hr tablet, Take 1 tablet (300 mg total) by mouth once daily., Disp: 30 tablet, Rfl: 1    doxycycline (DORYX) 100 MG EC tablet, Take 100 mg by mouth every 12 (twelve) hours., Disp: , Rfl:     EScitalopram oxalate (LEXAPRO) 5 MG Tab, Take 1 tablet (5 mg total) by mouth once daily., Disp: 30 tablet, Rfl: 1    HYDROcodone-acetaminophen (NORCO) 5-325 mg per tablet, Take 1 tablet by mouth every 6 (six) hours as needed for Pain. (Patient not taking: Reported on 6/26/2023), Disp: 10 tablet, Rfl: 0    losartan (COZAAR) 100 MG tablet, 50 mg., Disp: , Rfl:     ondansetron (ZOFRAN-ODT) 4 MG TbDL, Take 1 tablet (4 mg total) by mouth every 6 (six) hours as needed. (Patient not taking: Reported on 10/21/2022), Disp: 12 tablet, Rfl: 0    predniSONE (DELTASONE) 10 MG tablet, Take 40 mg by mouth once daily., Disp: , Rfl:     predniSONE (DELTASONE) 10 MG tablet, Take 4 tabs x 4 days, then take 3 tabs x 4 days, then take 2 tabs x 4 days, then take 1 tab x 4 days., Disp: 40 tablet, Rfl: 0    promethazine-dextromethorphan (PROMETHAZINE-DM) 6.25-15 mg/5 mL Syrp, Take 5 mLs by mouth nightly as needed (cough)., Disp: 118 mL, Rfl: 0    zolpidem (AMBIEN) 5 MG Tab, Take 1 tablet (5 mg total) by mouth nightly as needed (insomnia)., Disp: 30 tablet, Rfl: 0      Scheduled Meds:   albuterol-ipratropium  3 mL Nebulization ED 1 Time    albuterol-ipratropium  3 mL Nebulization Q4H    azithromycin  500 mg Intravenous Once    azithromycin  500 mg Intravenous Q24H    cefTRIAXone (ROCEPHIN) IVPB  2 g Intravenous Q24H    dextromethorphan-guaiFENesin  mg  1 tablet Oral BID    methylPREDNISolone sodium succinate injection  40 mg Intravenous Q6H     Continuous Infusions:  PRN Meds:.      [facility]  06/30/2023

## 2023-07-01 NOTE — RESPIRATORY THERAPY
07/01/23 0828   Patient Assessment/Suction   Level of Consciousness (AVPU) alert   Respiratory Effort Unlabored;Normal   All Lung Fields Breath Sounds diminished;wheezes, expiratory   ION Breath Sounds coarse;diminished   LLL Breath Sounds diminished   RUL Breath Sounds coarse;diminished   RLL Breath Sounds diminished   Rhythm/Pattern, Respiratory unlabored;pattern regular   Cough Frequency infrequent   Cough Type nonproductive   PRE-TX-O2   Device (Oxygen Therapy) room air   SpO2 98 %   Pulse Oximetry Type Intermittent   $ Pulse Oximetry - Multiple Charge Pulse Oximetry - Multiple   Pulse 97   Resp 20   Aerosol Therapy   $ Aerosol Therapy Charges Aerosol Treatment   Daily Review of Necessity (SVN) completed   Respiratory Treatment Status (SVN) given   Treatment Route (SVN) mask;oxygen   Patient Position (SVN) semi-Montague's   Post Treatment Assessment (SVN) increased aeration   Signs of Intolerance (SVN) none   Education   $ Education Bronchodilator

## 2023-07-01 NOTE — PROGRESS NOTES
"[facility] Medicine Progress Note  Patient Name: Sumit Burger MRN: [id number]   Patient Class: IP- Inpatient  Length of Stay: 1   Admission Date: 6/30/2023  6:02 PM Attending Physician: Betito Deleon MD   Primary Care Provider: [facility] ADMINISTRATION Face-to-Face encounter date: 07/01/2023   Chief Complaint: Cough (X 3 weeks, on steroids and antibiotics, had ct today, states no improvement)      Subjective:    Interval History   Patient is doing fairly well  Not coughing as much as she was yesterday when I saw her in the ER. Shortness of breath is till there as she has not returned back to baseline  Denies chest pain, palpitations, abdominal pain, nausea/vomiting.   No concerns/issues overnight reported by the patient or the nursing staff.  Reviewed the labs and discussed the plan of care.   No family present at bedside.     Review of Systems   All other Review of Systems were found to be negative expect for that mentioned already in HPI.     Hospital Course     07/01/2023     albuterol-ipratropium  3 mL Nebulization Q4H    azithromycin  500 mg Intravenous Q24H    cefTRIAXone (ROCEPHIN) IVPB  2 g Intravenous Q24H    dextromethorphan-guaiFENesin  mg  1 tablet Oral BID    enoxparin  40 mg Subcutaneous Daily    methylPREDNISolone sodium succinate injection  40 mg Intravenous Q6H    valsartan  80 mg Oral QHS       acetaminophen, acetaminophen, aluminum-magnesium hydroxide-simethicone, benzonatate, dextrose 50%, dextrose 50%, glucagon (human recombinant), glucose, glucose, HYDROcodone-acetaminophen, hydrOXYzine HCL, melatonin, morphine, naloxone, ondansetron, prochlorperazine, simethicone, sodium chloride 0.9%      Objective:   Physical Exam  BP (!) 185/95 Comment: notified nurse jordan  Pulse 82   Temp 98.1 °F (36.7 °C) (Oral)   Resp 18   Ht 5' 3" (1.6 m)   Wt 69 kg (152 lb 1.9 oz)   LMP 06/24/2022   SpO2 98%   Breastfeeding No   BMI 26.95 kg/m²   Constitutional: No " distress.   HENT: Atraumatic.   Cardiovascular: Normal rate, regular rhythm and normal heart sounds.   Pulmonary/Chest: Coarse breath sounds  Abdominal: Soft. Bowel sounds are normal. Exhibits no distension and no mass. No tenderness  Neurological: Alert.   Skin: Skin is warm and dry.     Labs and Imaging    Significant Labs: All pertinent labs within the past 24 hours have been reviewed.    Significant Imaging: I have reviewed all pertinent imaging results/findings within the past 24 hours.    I have reviewed the Vitals, labs and imaging as above.     Assessment & Plan:   Sumit Burger is a 52 y.o. female admitted for    Active Hospital Problems    Diagnosis    *Bilateral pneumonia    Tobacco abuse    Essential hypertension    Sarcoidosis    Shortness of breath       Plan  Continue antibiotics, follow cultures, repeat AFB  Bronchodilator treatment  IV solumedrol  Pulmonary consulted, eval pending  Continue home BP meds, added hydralazine to the regimen      Active PT: No  Active OT: No  Active SLP: No    Core measures:  - Code status:   - Diet: Cardiac  VTE Risk Mitigation (From admission, onward)           Ordered     enoxaparin injection 40 mg  Daily         06/30/23 2313     IP VTE HIGH RISK PATIENT  Once         06/30/23 2313     Place sequential compression device  Until discontinued         06/30/23 2313                    Discharge Planning:   Discharge Planning   SHAKIRA: 07/03    Code Status: Full Code   Is the patient medically ready for discharge?: no    Reason for patient still in hospital (select all that apply): Patient trending condition  Discharge Plan A: Home with assistance from family        Above encounter included review of the medical records, interviewing and examining the patient face-to-face, discussion with family and other health care providers, ordering and interpreting lab/test results and formulating a plan of care.     Medical Decision Making:  [] Low Complexity  [] Moderate  Complexity  [x] High Complexity    Betito Deleon MD  [facility] Hospitalist  07/01/2023

## 2023-07-02 LAB
ANION GAP SERPL CALC-SCNC: 6 MMOL/L (ref 8–16)
BASOPHILS # BLD AUTO: 0.02 K/UL (ref 0–0.2)
BASOPHILS NFR BLD: 0.1 % (ref 0–1.9)
BUN SERPL-MCNC: 15 MG/DL (ref 6–20)
CALCIUM SERPL-MCNC: 9.7 MG/DL (ref 8.7–10.5)
CHLORIDE SERPL-SCNC: 106 MMOL/L (ref 95–110)
CO2 SERPL-SCNC: 30 MMOL/L (ref 23–29)
CREAT SERPL-MCNC: 0.7 MG/DL (ref 0.5–1.4)
DIFFERENTIAL METHOD: ABNORMAL
EOSINOPHIL # BLD AUTO: 0 K/UL (ref 0–0.5)
EOSINOPHIL NFR BLD: 0 % (ref 0–8)
ERYTHROCYTE [DISTWIDTH] IN BLOOD BY AUTOMATED COUNT: 14.6 % (ref 11.5–14.5)
EST. GFR  (NO RACE VARIABLE): >60 ML/MIN/1.73 M^2
GLUCOSE SERPL-MCNC: 93 MG/DL (ref 70–110)
HCT VFR BLD AUTO: 43.2 % (ref 37–48.5)
HGB BLD-MCNC: 13.6 G/DL (ref 12–16)
IMM GRANULOCYTES # BLD AUTO: 0.1 K/UL (ref 0–0.04)
IMM GRANULOCYTES NFR BLD AUTO: 0.6 % (ref 0–0.5)
LYMPHOCYTES # BLD AUTO: 2 K/UL (ref 1–4.8)
LYMPHOCYTES NFR BLD: 13 % (ref 18–48)
MAGNESIUM SERPL-MCNC: 2.3 MG/DL (ref 1.6–2.6)
MCH RBC QN AUTO: 27.6 PG (ref 27–31)
MCHC RBC AUTO-ENTMCNC: 31.5 G/DL (ref 32–36)
MCV RBC AUTO: 88 FL (ref 82–98)
MONOCYTES # BLD AUTO: 1.7 K/UL (ref 0.3–1)
MONOCYTES NFR BLD: 10.7 % (ref 4–15)
NEUTROPHILS # BLD AUTO: 11.9 K/UL (ref 1.8–7.7)
NEUTROPHILS NFR BLD: 75.6 % (ref 38–73)
NRBC BLD-RTO: 0 /100 WBC
PLATELET # BLD AUTO: 311 K/UL (ref 150–450)
PMV BLD AUTO: 10.7 FL (ref 9.2–12.9)
POTASSIUM SERPL-SCNC: 4.2 MMOL/L (ref 3.5–5.1)
RBC # BLD AUTO: 4.92 M/UL (ref 4–5.4)
SODIUM SERPL-SCNC: 142 MMOL/L (ref 136–145)
WBC # BLD AUTO: 15.75 K/UL (ref 3.9–12.7)

## 2023-07-02 PROCEDURE — 99232 SBSQ HOSP IP/OBS MODERATE 35: CPT | Mod: ,,, | Performed by: INTERNAL MEDICINE

## 2023-07-02 PROCEDURE — 25000003 PHARM REV CODE 250: Performed by: INTERNAL MEDICINE

## 2023-07-02 PROCEDURE — 87205 SMEAR GRAM STAIN: CPT | Performed by: EMERGENCY MEDICINE

## 2023-07-02 PROCEDURE — 99232 PR SUBSEQUENT HOSPITAL CARE,LEVL II: ICD-10-PCS | Mod: ,,, | Performed by: INTERNAL MEDICINE

## 2023-07-02 PROCEDURE — 93010 EKG 12-LEAD: ICD-10-PCS | Mod: ,,, | Performed by: GENERAL PRACTICE

## 2023-07-02 PROCEDURE — 83735 ASSAY OF MAGNESIUM: CPT | Performed by: INTERNAL MEDICINE

## 2023-07-02 PROCEDURE — 12000002 HC ACUTE/MED SURGE SEMI-PRIVATE ROOM

## 2023-07-02 PROCEDURE — 87206 SMEAR FLUORESCENT/ACID STAI: CPT | Performed by: EMERGENCY MEDICINE

## 2023-07-02 PROCEDURE — 93010 ELECTROCARDIOGRAM REPORT: CPT | Mod: ,,, | Performed by: GENERAL PRACTICE

## 2023-07-02 PROCEDURE — 93005 ELECTROCARDIOGRAM TRACING: CPT | Performed by: GENERAL PRACTICE

## 2023-07-02 PROCEDURE — 63600175 PHARM REV CODE 636 W HCPCS: Performed by: INTERNAL MEDICINE

## 2023-07-02 PROCEDURE — 63600175 PHARM REV CODE 636 W HCPCS: Performed by: EMERGENCY MEDICINE

## 2023-07-02 PROCEDURE — 99900035 HC TECH TIME PER 15 MIN (STAT)

## 2023-07-02 PROCEDURE — 36415 COLL VENOUS BLD VENIPUNCTURE: CPT | Performed by: INTERNAL MEDICINE

## 2023-07-02 PROCEDURE — 80048 BASIC METABOLIC PNL TOTAL CA: CPT | Performed by: INTERNAL MEDICINE

## 2023-07-02 PROCEDURE — 25000003 PHARM REV CODE 250: Performed by: EMERGENCY MEDICINE

## 2023-07-02 PROCEDURE — 25000242 PHARM REV CODE 250 ALT 637 W/ HCPCS: Performed by: INTERNAL MEDICINE

## 2023-07-02 PROCEDURE — 94799 UNLISTED PULMONARY SVC/PX: CPT

## 2023-07-02 PROCEDURE — 94640 AIRWAY INHALATION TREATMENT: CPT

## 2023-07-02 PROCEDURE — 87070 CULTURE OTHR SPECIMN AEROBIC: CPT | Performed by: EMERGENCY MEDICINE

## 2023-07-02 PROCEDURE — 87116 MYCOBACTERIA CULTURE: CPT | Performed by: EMERGENCY MEDICINE

## 2023-07-02 PROCEDURE — 94761 N-INVAS EAR/PLS OXIMETRY MLT: CPT

## 2023-07-02 PROCEDURE — 85025 COMPLETE CBC W/AUTO DIFF WBC: CPT | Performed by: INTERNAL MEDICINE

## 2023-07-02 RX ORDER — ALBUTEROL SULFATE 0.83 MG/ML
2.5 SOLUTION RESPIRATORY (INHALATION) EVERY 4 HOURS PRN
Status: DISCONTINUED | OUTPATIENT
Start: 2023-07-02 | End: 2023-07-03 | Stop reason: HOSPADM

## 2023-07-02 RX ORDER — VALSARTAN 80 MG/1
160 TABLET ORAL NIGHTLY
Status: DISCONTINUED | OUTPATIENT
Start: 2023-07-02 | End: 2023-07-03 | Stop reason: HOSPADM

## 2023-07-02 RX ORDER — IPRATROPIUM BROMIDE AND ALBUTEROL SULFATE 2.5; .5 MG/3ML; MG/3ML
3 SOLUTION RESPIRATORY (INHALATION)
Status: DISCONTINUED | OUTPATIENT
Start: 2023-07-03 | End: 2023-07-03 | Stop reason: HOSPADM

## 2023-07-02 RX ORDER — BENZONATATE 100 MG/1
200 CAPSULE ORAL 3 TIMES DAILY PRN
Status: DISCONTINUED | OUTPATIENT
Start: 2023-07-02 | End: 2023-07-03 | Stop reason: HOSPADM

## 2023-07-02 RX ADMIN — IPRATROPIUM BROMIDE AND ALBUTEROL SULFATE 3 ML: 2.5; .5 SOLUTION RESPIRATORY (INHALATION) at 04:07

## 2023-07-02 RX ADMIN — PREDNISONE 60 MG: 20 TABLET ORAL at 08:07

## 2023-07-02 RX ADMIN — HYDRALAZINE HYDROCHLORIDE 25 MG: 25 TABLET ORAL at 03:07

## 2023-07-02 RX ADMIN — Medication 9 MG: at 10:07

## 2023-07-02 RX ADMIN — GUAIFENESIN AND DEXTROMETHORPHAN HYDROBROMIDE 1 TABLET: 600; 30 TABLET, EXTENDED RELEASE ORAL at 09:07

## 2023-07-02 RX ADMIN — GUAIFENESIN AND DEXTROMETHORPHAN HYDROBROMIDE 1 TABLET: 600; 30 TABLET, EXTENDED RELEASE ORAL at 08:07

## 2023-07-02 RX ADMIN — IPRATROPIUM BROMIDE AND ALBUTEROL SULFATE 3 ML: 2.5; .5 SOLUTION RESPIRATORY (INHALATION) at 11:07

## 2023-07-02 RX ADMIN — CEFTRIAXONE SODIUM 2 G: 2 INJECTION, POWDER, FOR SOLUTION INTRAMUSCULAR; INTRAVENOUS at 07:07

## 2023-07-02 RX ADMIN — BENZONATATE 100 MG: 100 CAPSULE ORAL at 10:07

## 2023-07-02 RX ADMIN — AZITHROMYCIN DIHYDRATE 500 MG: 500 INJECTION, POWDER, LYOPHILIZED, FOR SOLUTION INTRAVENOUS at 10:07

## 2023-07-02 RX ADMIN — HYDROXYZINE HYDROCHLORIDE 25 MG: 25 TABLET ORAL at 03:07

## 2023-07-02 RX ADMIN — ACETAMINOPHEN 1000 MG: 500 TABLET, FILM COATED ORAL at 10:07

## 2023-07-02 RX ADMIN — ENOXAPARIN SODIUM 40 MG: 100 INJECTION SUBCUTANEOUS at 06:07

## 2023-07-02 RX ADMIN — HYDRALAZINE HYDROCHLORIDE 25 MG: 25 TABLET ORAL at 09:07

## 2023-07-02 RX ADMIN — MORPHINE SULFATE 4 MG: 4 INJECTION, SOLUTION INTRAMUSCULAR; INTRAVENOUS at 10:07

## 2023-07-02 RX ADMIN — MORPHINE SULFATE 4 MG: 4 INJECTION, SOLUTION INTRAMUSCULAR; INTRAVENOUS at 03:07

## 2023-07-02 RX ADMIN — VALSARTAN 160 MG: 80 TABLET, FILM COATED ORAL at 09:07

## 2023-07-02 RX ADMIN — IPRATROPIUM BROMIDE AND ALBUTEROL SULFATE 3 ML: 2.5; .5 SOLUTION RESPIRATORY (INHALATION) at 12:07

## 2023-07-02 RX ADMIN — HYDRALAZINE HYDROCHLORIDE 25 MG: 25 TABLET ORAL at 05:07

## 2023-07-02 RX ADMIN — IPRATROPIUM BROMIDE AND ALBUTEROL SULFATE 3 ML: 2.5; .5 SOLUTION RESPIRATORY (INHALATION) at 08:07

## 2023-07-02 RX ADMIN — IPRATROPIUM BROMIDE AND ALBUTEROL SULFATE 3 ML: 2.5; .5 SOLUTION RESPIRATORY (INHALATION) at 07:07

## 2023-07-02 NOTE — PROGRESS NOTES
"[facility] Medicine Progress Note  Patient Name: Sumit Burger MRN: [id number]   Patient Class: IP- Inpatient  Length of Stay: 2   Admission Date: 6/30/2023  6:02 PM Attending Physician: Betito Deleon MD   Primary Care Provider: [facility] ADMINISTRATION Face-to-Face encounter date: 07/02/2023   Chief Complaint: Cough (X 3 weeks, on steroids and antibiotics, had ct today, states no improvement)      Subjective:    Interval History   Coughing and breathing is better but not back to baseline  Denies chest pain, palpitations, abdominal pain, nausea/vomiting.   No concerns/issues overnight reported by the patient or the nursing staff.  Reviewed the labs and discussed the plan of care.   No family present at bedside.     Review of Systems   All other Review of Systems were found to be negative expect for that mentioned already in HPI.     Hospital Course     07/02/2023     albuterol-ipratropium  3 mL Nebulization Q4H    azithromycin  500 mg Intravenous Q24H    cefTRIAXone (ROCEPHIN) IVPB  2 g Intravenous Q24H    dextromethorphan-guaiFENesin  mg  1 tablet Oral BID    enoxparin  40 mg Subcutaneous Daily    hydrALAZINE  25 mg Oral Q8H    predniSONE  60 mg Oral Daily    valsartan  160 mg Oral QHS       acetaminophen, acetaminophen, aluminum-magnesium hydroxide-simethicone, benzonatate, dextrose 50%, dextrose 50%, glucagon (human recombinant), glucose, glucose, HYDROcodone-acetaminophen, hydrOXYzine HCL, melatonin, morphine, naloxone, ondansetron, prochlorperazine, simethicone, sodium chloride 0.9%      Objective:   Physical Exam  BP (!) 167/93 Comment: notified nurse sedrick  Pulse 75   Temp 98 °F (36.7 °C) (Oral)   Resp 16   Ht 5' 3" (1.6 m)   Wt 69 kg (152 lb 1.9 oz)   LMP 06/24/2022   SpO2 98%   Breastfeeding No   BMI 26.95 kg/m²   Constitutional: No distress.   HENT: Atraumatic.   Cardiovascular: Normal rate, regular rhythm and normal heart sounds.   Pulmonary/Chest: " Coarse breath sounds  Abdominal: Soft. Bowel sounds are normal. Exhibits no distension and no mass. No tenderness  Neurological: Alert.   Skin: Skin is warm and dry.     Labs and Imaging    Significant Labs: All pertinent labs within the past 24 hours have been reviewed.    Significant Imaging: I have reviewed all pertinent imaging results/findings within the past 24 hours.    I have reviewed the Vitals, labs and imaging as above.     Assessment & Plan:   Sumit Burger is a 52 y.o. female admitted for    Active Hospital Problems    Diagnosis    *Bilateral pneumonia    Tobacco abuse    Essential hypertension    Sarcoidosis    Shortness of breath       Plan  Continue antibiotics, follow cultures, repeat AFB  Bronchodilator treatment  IV solumedrol changed to prednisone  Pulmonary consulted, eval pending  Continue home BP meds, added hydralazine to the regimen      Active PT: No  Active OT: No  Active SLP: No    Core measures:  - Code status:   - Diet: Cardiac  VTE Risk Mitigation (From admission, onward)           Ordered     enoxaparin injection 40 mg  Daily         06/30/23 2313     IP VTE HIGH RISK PATIENT  Once         06/30/23 2313     Place sequential compression device  Until discontinued         06/30/23 2313                    Discharge Planning:   Discharge Planning   SHAKIRA: 07/04    Code Status: Full Code   Is the patient medically ready for discharge?: no    Reason for patient still in hospital (select all that apply): Patient trending condition  Discharge Plan A: Home with assistance from family        Above encounter included review of the medical records, interviewing and examining the patient face-to-face, discussion with family and other health care providers, ordering and interpreting lab/test results and formulating a plan of care.     Medical Decision Making:  [] Low Complexity  [] Moderate Complexity  [x] High Complexity    Betito Deleon MD  [facility] Hospitalist  07/02/2023

## 2023-07-02 NOTE — PLAN OF CARE
Problem: Adult Inpatient Plan of Care  Goal: Absence of Hospital-Acquired Illness or Injury  Outcome: Ongoing, Progressing  Goal: Optimal Comfort and Wellbeing  Outcome: Ongoing, Progressing     Problem: Fluid Imbalance (Pneumonia)  Goal: Fluid Balance  Outcome: Ongoing, Progressing     Problem: Infection (Pneumonia)  Goal: Resolution of Infection Signs and Symptoms  Outcome: Ongoing, Progressing     Problem: Respiratory Compromise (Pneumonia)  Goal: Effective Oxygenation and Ventilation  Outcome: Ongoing, Progressing     Problem: Infection  Goal: Absence of Infection Signs and Symptoms  Outcome: Ongoing, Progressing

## 2023-07-03 VITALS
HEIGHT: 63 IN | OXYGEN SATURATION: 99 % | TEMPERATURE: 98 F | RESPIRATION RATE: 16 BRPM | HEART RATE: 75 BPM | SYSTOLIC BLOOD PRESSURE: 139 MMHG | DIASTOLIC BLOOD PRESSURE: 90 MMHG | BODY MASS INDEX: 26.95 KG/M2 | WEIGHT: 152.13 LBS

## 2023-07-03 LAB
ANION GAP SERPL CALC-SCNC: 3 MMOL/L (ref 8–16)
BASOPHILS # BLD AUTO: 0.01 K/UL (ref 0–0.2)
BASOPHILS NFR BLD: 0.1 % (ref 0–1.9)
BUN SERPL-MCNC: 15 MG/DL (ref 6–20)
CALCIUM SERPL-MCNC: 9.3 MG/DL (ref 8.7–10.5)
CHLORIDE SERPL-SCNC: 102 MMOL/L (ref 95–110)
CO2 SERPL-SCNC: 31 MMOL/L (ref 23–29)
CREAT SERPL-MCNC: 0.7 MG/DL (ref 0.5–1.4)
DIFFERENTIAL METHOD: ABNORMAL
EOSINOPHIL # BLD AUTO: 0 K/UL (ref 0–0.5)
EOSINOPHIL NFR BLD: 0.1 % (ref 0–8)
ERYTHROCYTE [DISTWIDTH] IN BLOOD BY AUTOMATED COUNT: 15 % (ref 11.5–14.5)
EST. GFR  (NO RACE VARIABLE): >60 ML/MIN/1.73 M^2
GLUCOSE SERPL-MCNC: 93 MG/DL (ref 70–110)
HCT VFR BLD AUTO: 43.6 % (ref 37–48.5)
HGB BLD-MCNC: 14.3 G/DL (ref 12–16)
IMM GRANULOCYTES # BLD AUTO: 0.08 K/UL (ref 0–0.04)
IMM GRANULOCYTES NFR BLD AUTO: 0.7 % (ref 0–0.5)
LYMPHOCYTES # BLD AUTO: 2.8 K/UL (ref 1–4.8)
LYMPHOCYTES NFR BLD: 25.2 % (ref 18–48)
MAGNESIUM SERPL-MCNC: 2.4 MG/DL (ref 1.6–2.6)
MCH RBC QN AUTO: 28.1 PG (ref 27–31)
MCHC RBC AUTO-ENTMCNC: 32.8 G/DL (ref 32–36)
MCV RBC AUTO: 86 FL (ref 82–98)
MONOCYTES # BLD AUTO: 1 K/UL (ref 0.3–1)
MONOCYTES NFR BLD: 9.2 % (ref 4–15)
NEUTROPHILS # BLD AUTO: 7.2 K/UL (ref 1.8–7.7)
NEUTROPHILS NFR BLD: 64.7 % (ref 38–73)
NRBC BLD-RTO: 0 /100 WBC
PLATELET # BLD AUTO: 316 K/UL (ref 150–450)
PMV BLD AUTO: 10.5 FL (ref 9.2–12.9)
POTASSIUM SERPL-SCNC: 4.5 MMOL/L (ref 3.5–5.1)
RBC # BLD AUTO: 5.08 M/UL (ref 4–5.4)
SODIUM SERPL-SCNC: 136 MMOL/L (ref 136–145)
WBC # BLD AUTO: 11.1 K/UL (ref 3.9–12.7)

## 2023-07-03 PROCEDURE — 63600175 PHARM REV CODE 636 W HCPCS: Performed by: INTERNAL MEDICINE

## 2023-07-03 PROCEDURE — 25000003 PHARM REV CODE 250: Performed by: INTERNAL MEDICINE

## 2023-07-03 PROCEDURE — 85025 COMPLETE CBC W/AUTO DIFF WBC: CPT | Performed by: INTERNAL MEDICINE

## 2023-07-03 PROCEDURE — 94640 AIRWAY INHALATION TREATMENT: CPT

## 2023-07-03 PROCEDURE — 25000242 PHARM REV CODE 250 ALT 637 W/ HCPCS: Performed by: INTERNAL MEDICINE

## 2023-07-03 PROCEDURE — 99900031 HC PATIENT EDUCATION (STAT)

## 2023-07-03 PROCEDURE — 99900035 HC TECH TIME PER 15 MIN (STAT)

## 2023-07-03 PROCEDURE — 94760 N-INVAS EAR/PLS OXIMETRY 1: CPT

## 2023-07-03 PROCEDURE — 99232 SBSQ HOSP IP/OBS MODERATE 35: CPT | Mod: ,,, | Performed by: INTERNAL MEDICINE

## 2023-07-03 PROCEDURE — 36415 COLL VENOUS BLD VENIPUNCTURE: CPT | Performed by: INTERNAL MEDICINE

## 2023-07-03 PROCEDURE — 99232 PR SUBSEQUENT HOSPITAL CARE,LEVL II: ICD-10-PCS | Mod: ,,, | Performed by: INTERNAL MEDICINE

## 2023-07-03 PROCEDURE — 80048 BASIC METABOLIC PNL TOTAL CA: CPT | Performed by: INTERNAL MEDICINE

## 2023-07-03 PROCEDURE — 83735 ASSAY OF MAGNESIUM: CPT | Performed by: INTERNAL MEDICINE

## 2023-07-03 RX ORDER — ALBUTEROL SULFATE 90 UG/1
2 AEROSOL, METERED RESPIRATORY (INHALATION)
Qty: 6.7 G | Refills: 0 | Status: SHIPPED | OUTPATIENT
Start: 2023-07-03 | End: 2024-01-10

## 2023-07-03 RX ORDER — AMOXICILLIN 250 MG
2 CAPSULE ORAL 2 TIMES DAILY PRN
Status: DISCONTINUED | OUTPATIENT
Start: 2023-07-03 | End: 2023-07-03 | Stop reason: HOSPADM

## 2023-07-03 RX ORDER — BENZONATATE 200 MG/1
200 CAPSULE ORAL 3 TIMES DAILY PRN
Qty: 90 CAPSULE | Refills: 3 | Status: SHIPPED | OUTPATIENT
Start: 2023-07-03 | End: 2023-10-31

## 2023-07-03 RX ORDER — PREDNISONE 10 MG/1
30 TABLET ORAL DAILY
Qty: 45 TABLET | Refills: 0 | Status: SHIPPED | OUTPATIENT
Start: 2023-07-03 | End: 2023-07-18

## 2023-07-03 RX ORDER — VALSARTAN 160 MG/1
160 TABLET ORAL NIGHTLY
Qty: 90 TABLET | Refills: 0 | Status: SHIPPED | OUTPATIENT
Start: 2023-07-03 | End: 2024-01-10

## 2023-07-03 RX ORDER — AMOXICILLIN 250 MG
1 CAPSULE ORAL 2 TIMES DAILY PRN
Qty: 60 TABLET | Refills: 0 | Status: SHIPPED | OUTPATIENT
Start: 2023-07-03 | End: 2023-08-02

## 2023-07-03 RX ADMIN — SENNOSIDES AND DOCUSATE SODIUM 2 TABLET: 50; 8.6 TABLET ORAL at 05:07

## 2023-07-03 RX ADMIN — HYDRALAZINE HYDROCHLORIDE 25 MG: 25 TABLET ORAL at 05:07

## 2023-07-03 RX ADMIN — HYDROCODONE BITARTRATE AND ACETAMINOPHEN 1 TABLET: 5; 325 TABLET ORAL at 01:07

## 2023-07-03 RX ADMIN — IPRATROPIUM BROMIDE AND ALBUTEROL SULFATE 3 ML: .5; 3 SOLUTION RESPIRATORY (INHALATION) at 07:07

## 2023-07-03 RX ADMIN — HYDROXYZINE HYDROCHLORIDE 25 MG: 25 TABLET ORAL at 01:07

## 2023-07-03 RX ADMIN — PREDNISONE 30 MG: 5 TABLET ORAL at 09:07

## 2023-07-03 NOTE — PLAN OF CARE
Pt cleared for discharge from case management    Called [facility], [phone] (4 times), no answer, unable to make f/u appt. Instructed pt, on avs to call and schedule appt w/I 1 week.     07/03/23 1215   Final Note   Assessment Type Final Discharge Note   Anticipated Discharge Disposition Home   What phone number can be called within the next 1-3 days to see how you are doing after discharge?   ([phone])

## 2023-07-03 NOTE — DISCHARGE INSTRUCTIONS
Diet:  Resume regular diet    Physical Activity:  Activity as tolerated    Instructions:  1. Take all medications as prescribed  2. Keep all follow-up appointments  3. Return to the hospital or call your primary care physicians if any worsening symptoms such as shortness of breath, chest pain occur.    Follow-Up Appointments:  Please call your primary care physician to schedule an appointment in 1 week time.    Pending laboratory work/Tests to be performed/followed by the Primary care Physician: None

## 2023-07-03 NOTE — PROGRESS NOTES
discharge instructions and education completed, all questions answered and understanding voiced; PIV(s) removed; tele removed; Pt escorted off unit for discharge with all belongings/medications and safety intact

## 2023-07-03 NOTE — PROGRESS NOTES
Pulmonary/Critical Care  Progress Note      PATIENT NAME: Sumit Burger  MRN: [id number]  TODAY'S DATE: 2023  ADMIT DATE: 2023  AGE: 52 y.o. : 1970    CONSULT REQUESTED BY: Betito Deleon MD    REASON FOR CONSULT:   Sarcoidosis    HISTORY OF PRESENT ILLNESS   Sumit Burger is a 52 y.o. female with a PMH of arcoidosis, HTN, and tobacco use on whom we have been consulted for shortness of breath.    RUL transbronchial biopsy showed granulomatous inflammation consistent with sarcoidosis. She was not seen in pulmonology from  until this year. She was given a course of doxycycline at an urgent care center 3 weeks ago when she developed cough, congestion, SOB, and excessive sweating. On recent pulm visit, ACE level was found to be elevated. She was started on a 16-day prednisone taper starting at 40 mg daily on 23 by Massiel Floyd in Dr. Rolon's office.    23: Breathing is getting better.    7/3/2023 - Stable overnight and is still coughing but better, she feels that she could be managed at home.      SMOKING HISTORY  Active smoker until a month ago of about 1/4 PPD.    EXPOSURE HISTORY  Never incarcerated or homeless or known TB (or other pneumonia) exposure). Lived in various other countries on  bases in [facility], [facility], and others.      REVIEW OF SYSTEMS  GENERAL: Feeling better since starting treatment. Sweating and fatigue with any exertion, sometimes even talking for past 3 weeks. Not cyclical.  EYES: Vision is good.  ENT: No sinusitis or pharyngitis.   HEART: No chest pain or palpitations.  LUNGS: No cough, sputum, or wheezing.  GI: No abdominal pain, nausea, vomiting, or diarrhea.  : No dysuria, urgency, or frequency.  SKIN: No lesions or rashes.  MUSCULOSKELETAL: No joint pain or myalgias.  NEURO: No headaches or neuropathy.  LYMPH: No edema or adenopathy.  PSYCH: No anxiety or depression.  ENDO: No unexpected weight change.    ALLERGIES  Review of  patient's allergies indicates:   Allergen Reactions    Sulfamethoxazole-trimethoprim Hives, Shortness Of Breath, Nausea And Vomiting and Rash     Hives, nausea and vomiting, and shortness of breath.  Did not require admission  Hives, nausea and vomiting, and shortness of breath.  Did not require admission    Benadryl [diphenhydramine hcl]     Latex, natural rubber     Percocet [oxycodone-acetaminophen]     Shrimp     Codeine Itching and Nausea Only       INPATIENT SCHEDULED MEDICATIONS   albuterol-ipratropium  3 mL Nebulization Q6H WAKE    azithromycin  500 mg Intravenous Q24H    cefTRIAXone (ROCEPHIN) IVPB  2 g Intravenous Q24H    dextromethorphan-guaiFENesin  mg  1 tablet Oral BID    enoxparin  40 mg Subcutaneous Daily    hydrALAZINE  25 mg Oral Q8H    predniSONE  30 mg Oral Daily    valsartan  160 mg Oral QHS         MEDICAL AND SURGICAL HISTORY  Past Medical History:   Diagnosis Date    HTN (hypertension)     Pneumonia 10/2020    Sarcoidosis      Past Surgical History:   Procedure Laterality Date    BRONCHOSCOPY WITH FLUOROSCOPY Right 10/20/2020    Procedure: BRONCHOSCOPY, WITH FLUOROSCOPY;  Surgeon: Ava Rolon MD;  Location: [facility] ENDO;  Service: Pulmonary;  Laterality: Right;     SECTION      x2    FALLOPIAN TUBOPLASTY      LAPAROSCOPIC APPENDECTOMY N/A 8/10/2022    Procedure: APPENDECTOMY, LAPAROSCOPIC;  Surgeon: Jackson Vogel MD;  Location: [facility] OR;  Service: General;  Laterality: N/A;    TUBAL LIGATION      WRIST FRACTURE SURGERY Right     ganglion cyst       ALCOHOL, TOBACCO AND DRUG USE  Social History     Tobacco Use   Smoking Status Light Smoker    Packs/day: 0.25    Types: Cigarettes    Last attempt to quit: 2020    Years since quitting: 3.2   Smokeless Tobacco Never   Tobacco Comments    ocassionally never was qd     Social History     Substance and Sexual Activity   Alcohol Use Not Currently     Social History     Substance and Sexual Activity   Drug Use Never       FAMILY  HISTORY  Family History   Problem Relation Age of Onset    Hypertension Mother        VITAL SIGNS (MOST RECENT)  Temp: 98 °F (36.7 °C) (07/03/23 0717)  Pulse: 64 (07/03/23 0732)  Resp: 16 (07/03/23 0732)  BP: (!) 156/99 (notified nurse sedrick) (07/03/23 0717)  SpO2: 99 % (07/03/23 0732)    INTAKE AND OUTPUT (LAST 24 HOURS):No intake or output data in the 24 hours ending 07/03/23 1136      WEIGHT  Wt Readings from Last 1 Encounters:   06/30/23 69 kg (152 lb 1.9 oz)       PHYSICAL EXAM  GENERAL: A&O. NAD.  HEENT: Extraocular movements intact. Pharynx moist.  NECK: Supple. No JVD or hepatojugular reflux.  HEART: Regular rate and normal rhythm. No murmur or gallop auscultated.  LUNGS: Clear to auscultation and percussion. Lung excursion symmetrical.   ABDOMEN: Soft, non-tender, non-distended, no masses palpated.  EXTREMITIES: Normal muscle tone and joint movement, no cyanosis or clubbing.   LYMPHATICS: No adenopathy palpated, no edema.  SKIN: Dry, intact, no lesions.   NEURO: No gross deficit.  PSYCH: Appropriate affect    ACUTE PHASE REACTANT (LAST 24 HOURS)  No results for input(s): FERRITIN, CRP, LDH, DDIMER in the last 24 hours.    CBC LAST (LAST 24 HOURS)  Recent Labs   Lab 07/03/23  0452   WBC 11.10   RBC 5.08   HGB 14.3   HCT 43.6   MCV 86   MCH 28.1   MCHC 32.8   RDW 15.0*      MPV 10.5   GRAN 64.7  7.2   LYMPH 25.2  2.8   MONO 9.2  1.0   BASO 0.01   NRBC 0       CHEMISTRY LAST (LAST 24 HOURS)  Recent Labs   Lab 07/03/23  0452      K 4.5      CO2 31*   ANIONGAP 3*   BUN 15   CREATININE 0.7   GLU 93   CALCIUM 9.3   MG 2.4       COAGULATION LAST (LAST 24 HOURS)  No results for input(s): LABPT, INR, APTT in the last 24 hours.    CARDIAC PROFILE (LAST 24 HOURS)  Recent Labs   Lab 06/30/23  2018   BNP 9       LAST 7 DAYS MICROBIOLOGY   Microbiology Results (last 7 days)       Procedure Component Value Units Date/Time    Blood culture x two cultures. Draw prior to antibiotics. [[id number]]  Collected: 06/30/23 2020    Order Status: Completed Specimen: Blood Updated: 07/02/23 2232     Blood Culture, Routine No Growth to date      No Growth to date      No Growth to date    Narrative:      Aerobic and anaerobic    Blood culture x two cultures. Draw prior to antibiotics. [[id number]] Collected: 06/30/23 2021    Order Status: Completed Specimen: Blood Updated: 07/02/23 2232     Blood Culture, Routine No Growth to date      No Growth to date      No Growth to date    Narrative:      Aerobic and anaerobic    Culture, Respiratory with Gram Stain [[id number]] Collected: 07/02/23 0618    Order Status: Completed Specimen: Sputum Updated: 07/02/23 1616     Gram Stain (Respiratory) <10 epithelial cells per low power field.     Gram Stain (Respiratory) Few WBC's     Gram Stain (Respiratory) Few Gram positive cocci     Gram Stain (Respiratory) Rare Gram negative rods    AFB Culture & Smear [[id number]]     Order Status: No result Specimen: Respiratory     AFB Culture & Smear [[id number]] Collected: 07/02/23 0618    Order Status: Sent Specimen: Sputum Updated: 07/02/23 0623    AFB Culture & Smear [[id number]]     Order Status: Sent Specimen: Respiratory from Sputum             MOST RECENT IMAGING  Echo  · The left ventricle is normal in size with concentric remodeling and   normal systolic function.  · The estimated ejection fraction is 65%.  · Normal left ventricular diastolic function.  · Mild tricuspid regurgitation.  · Moderate left atrial enlargement.  · Mild mitral regurgitation.  · The estimated PA systolic pressure is 34 mmHg.  · Normal right ventricular size with normal right ventricular systolic   function.  · Normal central venous pressure (3 mmHg).     X-Ray Chest AP Portable  Narrative: EXAMINATION:  XR CHEST AP PORTABLE    CLINICAL HISTORY:  CHF;    FINDINGS:  Portable chest at 2033 compared with 06/26/2023 shows normal cardiomediastinal silhouette.    Diffuse pulmonary interstitial and ground-glass alveolar  opacities have not significantly changed.  Central pulmonary vasculature is unchanged.  No pneumothorax or pleural effusion.  No acute osseous abnormality.  Impression: Unchanged pulmonary interstitial and ground-glass alveolar opacities which can be seen with pulmonary edema, although internal bleed can be seen with pneumonia or alveolar hemorrhage.    Electronically signed by: Peng Desai MD  Date:    07/01/2023  Time:    08:57      CURRENT VISIT EKG  Results for orders placed or performed during the hospital encounter of 06/30/23   EKG 12-lead    Narrative    Test Reason : R06.02,    Vent. Rate : 079 BPM     Atrial Rate : 079 BPM     P-R Int : 130 ms          QRS Dur : 084 ms      QT Int : 350 ms       P-R-T Axes : 035 009 032 degrees     QTc Int : 401 ms    Normal sinus rhythm  Possible Left atrial enlargement  LVH  Abnormal ECG  When compared with ECG of 21-OCT-2022 09:32,  No significant change was found    Referred By: [email or web address]   SELF           Confirmed By:        ECHOCARDIOGRAM RESULTS  Results for orders placed during the hospital encounter of 06/30/23    Echo    Interpretation Summary  · The left ventricle is normal in size with concentric remodeling and normal systolic function.  · The estimated ejection fraction is 65%.  · Normal left ventricular diastolic function.  · Mild tricuspid regurgitation.  · Moderate left atrial enlargement.  · Mild mitral regurgitation.  · The estimated PA systolic pressure is 34 mmHg.  · Normal right ventricular size with normal right ventricular systolic function.  · Normal central venous pressure (3 mmHg).        RESPIRATORY SUPPORT              LAST ARTERIAL BLOOD GAS  ABG  No results for input(s): PH, PO2, PCO2, HCO3, BE in the last 168 hours.    CXR 6/30 and 6/26/23: bilateral lower lung predominant hazy interstitial opacities that were not present on CXR in 12/2021.    CT chest 6/30/23: micronodules in a miliary and subpleural/lymphatic distribution with occasional  areas of confluence into larger densities consistent with the patient's known history of sarcoidosis; upper-lobe predominant. This is similar to but much progressed from the findings on CT chest 5/12/2021.    IMPRESSION AND PLAN  Sumit Burger is a 52 y.o. female with a PMH of sarcoidosis, HTN, and tobacco use on whom we have been consulted for shortness of breath.    #Sarcoidosis with exacerbation  #Leukocytosis likely due to steroids  Procalcitonin low. No fevers. Dry cough.  - AFB x 3 and Quantiferon ordered, but I doubt MTB infection  - follow up sputum culture  - continue Abx for now -- may cancel tomorrow if cultures without growth  - airborne precautions for now, pending AFB smear or Quantiferon results  - steroids weaned to 30 mg prednisone daily starting tomorrow  - follow up in Dr. Rolon's office has been requested    OK to DC on prednisone 30 mg/d - to contact Dr Rolon's office on 7/5 for follow up      Jake Cohen MD  [facility] Pulmonary/Critical Care  07/03/2023

## 2023-07-03 NOTE — PROGRESS NOTES
Pulmonary/Critical Care Consult      PATIENT NAME: Sumit Burger  MRN: [id number]  TODAY'S DATE: 2023  ADMIT DATE: 2023  AGE: 52 y.o. : 1970    CONSULT REQUESTED BY: Betito Deleon MD    REASON FOR CONSULT:   Sarcoidosis    HISTORY OF PRESENT ILLNESS   Sumit Burger is a 52 y.o. female with a PMH of arcoidosis, HTN, and tobacco use on whom we have been consulted for shortness of breath.    RUL transbronchial biopsy showed granulomatous inflammation consistent with sarcoidosis. She was not seen in pulmonology from  until this year. She was given a course of doxycycline at an urgent care center 3 weeks ago when she developed cough, congestion, SOB, and excessive sweating. On recent pulm visit, ACE level was found to be elevated. She was started on a 16-day prednisone taper starting at 40 mg daily on 23 by Massiel Floyd in Dr. Rolon's office.    23: Breathing is getting better.      SMOKING HISTORY  Active smoker until a month ago of about 1/4 PPD.    EXPOSURE HISTORY  Never incarcerated or homeless or known TB (or other pneumonia) exposure). Lived in various other countries on  bases in [facility], [place], and others.      REVIEW OF SYSTEMS  GENERAL: Feeling better since starting treatment. Sweating and fatigue with any exertion, sometimes even talking for past 3 weeks. Not cyclical.  EYES: Vision is good.  ENT: No sinusitis or pharyngitis.   HEART: No chest pain or palpitations.  LUNGS: No cough, sputum, or wheezing.  GI: No abdominal pain, nausea, vomiting, or diarrhea.  : No dysuria, urgency, or frequency.  SKIN: No lesions or rashes.  MUSCULOSKELETAL: No joint pain or myalgias.  NEURO: No headaches or neuropathy.  LYMPH: No edema or adenopathy.  PSYCH: No anxiety or depression.  ENDO: No unexpected weight change.    ALLERGIES  Review of patient's allergies indicates:   Allergen Reactions    Sulfamethoxazole-trimethoprim Hives, Shortness Of Breath, Nausea  And Vomiting and Rash     Hives, nausea and vomiting, and shortness of breath.  Did not require admission  Hives, nausea and vomiting, and shortness of breath.  Did not require admission    Benadryl [diphenhydramine hcl]     Latex, natural rubber     Percocet [oxycodone-acetaminophen]     Shrimp     Codeine Itching and Nausea Only       INPATIENT SCHEDULED MEDICATIONS   albuterol-ipratropium  3 mL Nebulization Q4H    azithromycin  500 mg Intravenous Q24H    cefTRIAXone (ROCEPHIN) IVPB  2 g Intravenous Q24H    dextromethorphan-guaiFENesin  mg  1 tablet Oral BID    enoxparin  40 mg Subcutaneous Daily    hydrALAZINE  25 mg Oral Q8H    predniSONE  60 mg Oral Daily    valsartan  160 mg Oral QHS         MEDICAL AND SURGICAL HISTORY  Past Medical History:   Diagnosis Date    HTN (hypertension)     Pneumonia 10/2020    Sarcoidosis      Past Surgical History:   Procedure Laterality Date    BRONCHOSCOPY WITH FLUOROSCOPY Right 10/20/2020    Procedure: BRONCHOSCOPY, WITH FLUOROSCOPY;  Surgeon: Ava Rolon MD;  Location: [facility] ENDO;  Service: Pulmonary;  Laterality: Right;     SECTION      x2    FALLOPIAN TUBOPLASTY      LAPAROSCOPIC APPENDECTOMY N/A 8/10/2022    Procedure: APPENDECTOMY, LAPAROSCOPIC;  Surgeon: Jackson Vogel MD;  Location: [facility] OR;  Service: General;  Laterality: N/A;    TUBAL LIGATION      WRIST FRACTURE SURGERY Right     ganglion cyst       ALCOHOL, TOBACCO AND DRUG USE  Social History     Tobacco Use   Smoking Status Light Smoker    Packs/day: 0.25    Types: Cigarettes    Last attempt to quit: 2020    Years since quitting: 3.2   Smokeless Tobacco Never   Tobacco Comments    ocassionally never was qd     Social History     Substance and Sexual Activity   Alcohol Use Not Currently     Social History     Substance and Sexual Activity   Drug Use Never       FAMILY HISTORY  Family History   Problem Relation Age of Onset    Hypertension Mother        VITAL SIGNS (MOST RECENT)  Temp: 98 °F  (36.7 °C) (07/02/23 1518)  Pulse: 74 (07/02/23 1624)  Resp: 16 (07/02/23 1624)  BP: (!) 167/93 (notified nurse sedrick) (07/02/23 1518)  SpO2: 96 % (07/02/23 1624)    INTAKE AND OUTPUT (LAST 24 HOURS):No intake or output data in the 24 hours ending 07/02/23 2007      WEIGHT  Wt Readings from Last 1 Encounters:   06/30/23 69 kg (152 lb 1.9 oz)       PHYSICAL EXAM  GENERAL: A&O. NAD.  HEENT: Extraocular movements intact. Pharynx moist.  NECK: Supple. No JVD or hepatojugular reflux.  HEART: Regular rate and normal rhythm. No murmur or gallop auscultated.  LUNGS: Clear to auscultation and percussion. Lung excursion symmetrical.   ABDOMEN: Soft, non-tender, non-distended, no masses palpated.  EXTREMITIES: Normal muscle tone and joint movement, no cyanosis or clubbing.   LYMPHATICS: No adenopathy palpated, no edema.  SKIN: Dry, intact, no lesions.   NEURO: No gross deficit.  PSYCH: Appropriate affect    ACUTE PHASE REACTANT (LAST 24 HOURS)  No results for input(s): FERRITIN, CRP, LDH, DDIMER in the last 24 hours.    CBC LAST (LAST 24 HOURS)  Recent Labs   Lab 07/02/23  0612   WBC 15.75*   RBC 4.92   HGB 13.6   HCT 43.2   MCV 88   MCH 27.6   MCHC 31.5*   RDW 14.6*      MPV 10.7   GRAN 75.6*  11.9*   LYMPH 13.0*  2.0   MONO 10.7  1.7*   BASO 0.02   NRBC 0       CHEMISTRY LAST (LAST 24 HOURS)  Recent Labs   Lab 07/02/23  0612      K 4.2      CO2 30*   ANIONGAP 6*   BUN 15   CREATININE 0.7   GLU 93   CALCIUM 9.7   MG 2.3       COAGULATION LAST (LAST 24 HOURS)  No results for input(s): LABPT, INR, APTT in the last 24 hours.    CARDIAC PROFILE (LAST 24 HOURS)  Recent Labs   Lab 06/30/23  2018   BNP 9       LAST 7 DAYS MICROBIOLOGY   Microbiology Results (last 7 days)       Procedure Component Value Units Date/Time    Culture, Respiratory with Gram Stain [[id number]] Collected: 07/02/23 0618    Order Status: Completed Specimen: Sputum Updated: 07/02/23 1616     Gram Stain (Respiratory) <10 epithelial cells  per low power field.     Gram Stain (Respiratory) Few WBC's     Gram Stain (Respiratory) Few Gram positive cocci     Gram Stain (Respiratory) Rare Gram negative rods    AFB Culture & Smear [[id number]]     Order Status: No result Specimen: Respiratory     AFB Culture & Smear [[id number]] Collected: 07/02/23 0618    Order Status: Sent Specimen: Sputum Updated: 07/02/23 0623    Blood culture x two cultures. Draw prior to antibiotics. [[id number]] Collected: 06/30/23 2020    Order Status: Completed Specimen: Blood Updated: 07/01/23 2232     Blood Culture, Routine No Growth to date      No Growth to date    Narrative:      Aerobic and anaerobic    Blood culture x two cultures. Draw prior to antibiotics. [[id number]] Collected: 06/30/23 2021    Order Status: Completed Specimen: Blood Updated: 07/01/23 2232     Blood Culture, Routine No Growth to date      No Growth to date    Narrative:      Aerobic and anaerobic    AFB Culture & Smear [[id number]]     Order Status: Sent Specimen: Respiratory from Sputum             MOST RECENT IMAGING  Echo  · The left ventricle is normal in size with concentric remodeling and   normal systolic function.  · The estimated ejection fraction is 65%.  · Normal left ventricular diastolic function.  · Mild tricuspid regurgitation.  · Moderate left atrial enlargement.  · Mild mitral regurgitation.  · The estimated PA systolic pressure is 34 mmHg.  · Normal right ventricular size with normal right ventricular systolic   function.  · Normal central venous pressure (3 mmHg).     X-Ray Chest AP Portable  Narrative: EXAMINATION:  XR CHEST AP PORTABLE    CLINICAL HISTORY:  CHF;    FINDINGS:  Portable chest at 2033 compared with 06/26/2023 shows normal cardiomediastinal silhouette.    Diffuse pulmonary interstitial and ground-glass alveolar opacities have not significantly changed.  Central pulmonary vasculature is unchanged.  No pneumothorax or pleural effusion.  No acute osseous  abnormality.  Impression: Unchanged pulmonary interstitial and ground-glass alveolar opacities which can be seen with pulmonary edema, although internal bleed can be seen with pneumonia or alveolar hemorrhage.    Electronically signed by: Peng Desai MD  Date:    07/01/2023  Time:    08:57      CURRENT VISIT EKG  Results for orders placed or performed during the hospital encounter of 06/30/23   EKG 12-lead    Narrative    Test Reason : R06.02,    Vent. Rate : 079 BPM     Atrial Rate : 079 BPM     P-R Int : 130 ms          QRS Dur : 084 ms      QT Int : 350 ms       P-R-T Axes : 035 009 032 degrees     QTc Int : 401 ms    Normal sinus rhythm  Possible Left atrial enlargement  LVH  Abnormal ECG  When compared with ECG of 21-OCT-2022 09:32,  No significant change was found    Referred By: [email or web address]   SELF           Confirmed By:        ECHOCARDIOGRAM RESULTS  Results for orders placed during the hospital encounter of 06/30/23    Echo    Interpretation Summary  · The left ventricle is normal in size with concentric remodeling and normal systolic function.  · The estimated ejection fraction is 65%.  · Normal left ventricular diastolic function.  · Mild tricuspid regurgitation.  · Moderate left atrial enlargement.  · Mild mitral regurgitation.  · The estimated PA systolic pressure is 34 mmHg.  · Normal right ventricular size with normal right ventricular systolic function.  · Normal central venous pressure (3 mmHg).        RESPIRATORY SUPPORT              LAST ARTERIAL BLOOD GAS  ABG  No results for input(s): PH, PO2, PCO2, HCO3, BE in the last 168 hours.    CXR 6/30 and 6/26/23: bilateral lower lung predominant hazy interstitial opacities that were not present on CXR in 12/2021.    CT chest 6/30/23: micronodules in a miliary and subpleural/lymphatic distribution with occasional areas of confluence into larger densities consistent with the patient's known history of sarcoidosis; upper-lobe predominant. This is similar to  but much progressed from the findings on CT chest 5/12/2021.    IMPRESSION AND PLAN  Sumit Burger is a 52 y.o. female with a PMH of sarcoidosis, HTN, and tobacco use on whom we have been consulted for shortness of breath.    #Sarcoidosis with exacerbation  #Leukocytosis likely due to steroids  Procalcitonin low. No fevers. Dry cough.  - AFB x 3 and Quantiferon ordered, but I doubt MTB infection  - follow up sputum culture  - continue Abx for now -- may cancel tomorrow if cultures without growth  - airborne precautions for now, pending AFB smear or Quantiferon results  - steroids weaned to 30 mg prednisone daily starting tomorrow  - follow up in Dr. Rolon's office has been requested    Dispo: She can likely be discharged tomorrow on 30 mg prednisone daily until she follows up with Dr. Rolon.     Discussed with hospitalist. I have reviewed the patient's most recent CBC and serum chemistry, as well as the most recent chest x-ray and/or chest CT. My interpretation of the chest imaging is as above. The patient is at high risk of morbidity or death and should be admitted to the hospital.    Chris Garcia MD  [facility]  Department of Pulmonology  Date of Service: 07/02/2023  5:27 PM

## 2023-07-05 LAB
BACTERIA BLD CULT: NORMAL
BACTERIA BLD CULT: NORMAL

## 2023-07-06 LAB
BACTERIA SPEC AEROBE CULT: NORMAL
GRAM STN SPEC: NORMAL

## 2023-07-13 ENCOUNTER — HOSPITAL ENCOUNTER (EMERGENCY)
Facility: HOSPITAL | Age: 53
Discharge: HOME | End: 2023-07-13
Attending: EMERGENCY MEDICINE
Payer: OTHER GOVERNMENT

## 2023-07-13 VITALS
SYSTOLIC BLOOD PRESSURE: 157 MMHG | HEART RATE: 80 BPM | OXYGEN SATURATION: 100 % | RESPIRATION RATE: 16 BRPM | HEIGHT: 63 IN | DIASTOLIC BLOOD PRESSURE: 90 MMHG | TEMPERATURE: 98 F | WEIGHT: 150 LBS | BODY MASS INDEX: 26.58 KG/M2

## 2023-07-13 DIAGNOSIS — R07.9 CHEST PAIN: Primary | ICD-10-CM

## 2023-07-13 LAB
ALBUMIN SERPL BCP-MCNC: 3.7 G/DL (ref 3.5–5.2)
ALP SERPL-CCNC: 70 U/L (ref 55–135)
ALT SERPL W/O P-5'-P-CCNC: 48 U/L (ref 10–44)
ANION GAP SERPL CALC-SCNC: 10 MMOL/L (ref 8–16)
AST SERPL-CCNC: 23 U/L (ref 10–40)
BASOPHILS # BLD AUTO: 0.07 K/UL (ref 0–0.2)
BASOPHILS NFR BLD: 0.7 % (ref 0–1.9)
BILIRUB SERPL-MCNC: 0.9 MG/DL (ref 0.1–1)
BNP SERPL-MCNC: 4 PG/ML (ref 0–99)
BUN SERPL-MCNC: 16 MG/DL (ref 6–20)
CALCIUM SERPL-MCNC: 9.1 MG/DL (ref 8.7–10.5)
CHLORIDE SERPL-SCNC: 104 MMOL/L (ref 95–110)
CO2 SERPL-SCNC: 22 MMOL/L (ref 23–29)
CREAT SERPL-MCNC: 0.7 MG/DL (ref 0.5–1.4)
DIFFERENTIAL METHOD: ABNORMAL
EOSINOPHIL # BLD AUTO: 0.2 K/UL (ref 0–0.5)
EOSINOPHIL NFR BLD: 1.6 % (ref 0–8)
ERYTHROCYTE [DISTWIDTH] IN BLOOD BY AUTOMATED COUNT: 15 % (ref 11.5–14.5)
EST. GFR  (NO RACE VARIABLE): >60 ML/MIN/1.73 M^2
GLUCOSE SERPL-MCNC: 88 MG/DL (ref 70–110)
HCT VFR BLD AUTO: 42.1 % (ref 37–48.5)
HGB BLD-MCNC: 14 G/DL (ref 12–16)
IMM GRANULOCYTES # BLD AUTO: 0.02 K/UL (ref 0–0.04)
IMM GRANULOCYTES NFR BLD AUTO: 0.2 % (ref 0–0.5)
LYMPHOCYTES # BLD AUTO: 2.7 K/UL (ref 1–4.8)
LYMPHOCYTES NFR BLD: 28.1 % (ref 18–48)
MAGNESIUM SERPL-MCNC: 1.9 MG/DL (ref 1.6–2.6)
MCH RBC QN AUTO: 27.8 PG (ref 27–31)
MCHC RBC AUTO-ENTMCNC: 33.3 G/DL (ref 32–36)
MCV RBC AUTO: 84 FL (ref 82–98)
MONOCYTES # BLD AUTO: 0.5 K/UL (ref 0.3–1)
MONOCYTES NFR BLD: 4.8 % (ref 4–15)
NEUTROPHILS # BLD AUTO: 6.1 K/UL (ref 1.8–7.7)
NEUTROPHILS NFR BLD: 64.6 % (ref 38–73)
NRBC BLD-RTO: 0 /100 WBC
PLATELET # BLD AUTO: 256 K/UL (ref 150–450)
PMV BLD AUTO: 10.9 FL (ref 9.2–12.9)
POTASSIUM SERPL-SCNC: 3.6 MMOL/L (ref 3.5–5.1)
PROT SERPL-MCNC: 6.9 G/DL (ref 6–8.4)
RBC # BLD AUTO: 5.04 M/UL (ref 4–5.4)
SODIUM SERPL-SCNC: 136 MMOL/L (ref 136–145)
TROPONIN I SERPL HS-MCNC: 2.9 PG/ML (ref 0–14.9)
TROPONIN I SERPL HS-MCNC: 3.2 PG/ML (ref 0–14.9)
WBC # BLD AUTO: 9.45 K/UL (ref 3.9–12.7)

## 2023-07-13 PROCEDURE — 85025 COMPLETE CBC W/AUTO DIFF WBC: CPT | Performed by: EMERGENCY MEDICINE

## 2023-07-13 PROCEDURE — 84484 ASSAY OF TROPONIN QUANT: CPT | Mod: 91 | Performed by: EMERGENCY MEDICINE

## 2023-07-13 PROCEDURE — 80053 COMPREHEN METABOLIC PANEL: CPT | Performed by: EMERGENCY MEDICINE

## 2023-07-13 PROCEDURE — 93010 EKG 12-LEAD: ICD-10-PCS | Mod: ,,, | Performed by: INTERNAL MEDICINE

## 2023-07-13 PROCEDURE — 93005 ELECTROCARDIOGRAM TRACING: CPT | Performed by: INTERNAL MEDICINE

## 2023-07-13 PROCEDURE — 83880 ASSAY OF NATRIURETIC PEPTIDE: CPT | Performed by: EMERGENCY MEDICINE

## 2023-07-13 PROCEDURE — 93010 ELECTROCARDIOGRAM REPORT: CPT | Mod: ,,, | Performed by: INTERNAL MEDICINE

## 2023-07-13 PROCEDURE — 25000003 PHARM REV CODE 250: Performed by: STUDENT IN AN ORGANIZED HEALTH CARE EDUCATION/TRAINING PROGRAM

## 2023-07-13 PROCEDURE — 83735 ASSAY OF MAGNESIUM: CPT | Performed by: EMERGENCY MEDICINE

## 2023-07-13 PROCEDURE — 96374 THER/PROPH/DIAG INJ IV PUSH: CPT

## 2023-07-13 PROCEDURE — 99285 EMERGENCY DEPT VISIT HI MDM: CPT | Mod: 25

## 2023-07-13 RX ORDER — FAMOTIDINE 10 MG/ML
20 INJECTION INTRAVENOUS
Status: COMPLETED | OUTPATIENT
Start: 2023-07-13 | End: 2023-07-13

## 2023-07-13 RX ORDER — FAMOTIDINE 20 MG/1
20 TABLET, FILM COATED ORAL 2 TIMES DAILY PRN
Qty: 20 TABLET | Refills: 0 | Status: SHIPPED | OUTPATIENT
Start: 2023-07-13 | End: 2023-10-11

## 2023-07-13 RX ADMIN — FAMOTIDINE 20 MG: 10 INJECTION, SOLUTION INTRAVENOUS at 02:07

## 2023-07-19 ENCOUNTER — TELEPHONE (OUTPATIENT)
Dept: PULMONOLOGY | Facility: CLINIC | Age: 53
End: 2023-07-19

## 2023-07-19 RX ORDER — PREDNISONE 20 MG/1
20 TABLET ORAL DAILY
Qty: 30 TABLET | Refills: 0 | Status: SHIPPED | OUTPATIENT
Start: 2023-07-19 | End: 2023-07-19

## 2023-07-19 RX ORDER — PREDNISONE 10 MG/1
20 TABLET ORAL DAILY
Qty: 30 TABLET | Refills: 0 | Status: SHIPPED | OUTPATIENT
Start: 2023-07-19

## 2023-07-19 NOTE — TELEPHONE ENCOUNTER
I spoke with the patient.  She states she is overall feeling better not back to baseline but better. She is no longer coughing.  Will drop to 20mg daily until she see Dr. Rolon next week. She asked for something to help her sleep. Told her she can take melatonin 10mg to see if helps if not discuss at appointment.

## 2023-07-19 NOTE — TELEPHONE ENCOUNTER
Pt was given Prednisone 30mg for 15 days by ED and she will be out tomorrow.  She knows shes not supposed to just stop prednisone so pt wanted to know if you coudl send her in some so she can taper off.  She also said she tried taking melatonin to help her sleep but it does not work and wants to know if you recommend something else or can you send something in to her pharm.

## 2023-07-24 ENCOUNTER — OFFICE VISIT (OUTPATIENT)
Dept: PULMONOLOGY | Facility: CLINIC | Age: 53
End: 2023-07-24
Payer: OTHER GOVERNMENT

## 2023-07-24 VITALS
HEART RATE: 93 BPM | OXYGEN SATURATION: 94 % | HEIGHT: 63 IN | BODY MASS INDEX: 27.82 KG/M2 | DIASTOLIC BLOOD PRESSURE: 80 MMHG | SYSTOLIC BLOOD PRESSURE: 120 MMHG | WEIGHT: 157 LBS

## 2023-07-24 DIAGNOSIS — Z72.0 TOBACCO ABUSE: ICD-10-CM

## 2023-07-24 DIAGNOSIS — D86.9 SARCOIDOSIS: Primary | ICD-10-CM

## 2023-07-24 DIAGNOSIS — F19.982 DRUG-INDUCED INSOMNIA: ICD-10-CM

## 2023-07-24 PROCEDURE — 99214 PR OFFICE/OUTPT VISIT, EST, LEVL IV, 30-39 MIN: ICD-10-PCS | Mod: S$GLB,,, | Performed by: INTERNAL MEDICINE

## 2023-07-24 PROCEDURE — 99214 OFFICE O/P EST MOD 30 MIN: CPT | Mod: S$GLB,,, | Performed by: INTERNAL MEDICINE

## 2023-07-24 RX ORDER — ZOLPIDEM TARTRATE 5 MG/1
5 TABLET ORAL NIGHTLY PRN
Qty: 30 TABLET | Refills: 0 | Status: SHIPPED | OUTPATIENT
Start: 2023-07-24 | End: 2024-01-22

## 2023-07-24 RX ORDER — ZOLPIDEM TARTRATE 5 MG/1
5 TABLET ORAL NIGHTLY PRN
Qty: 30 TABLET | Refills: 0 | Status: SHIPPED | OUTPATIENT
Start: 2023-07-24 | End: 2023-07-24

## 2023-07-24 NOTE — PROGRESS NOTES
SUBJECTIVE:    Patient ID: Sumit Burger is a 52 y.o. female.    Chief Complaint: Follow-up and Sarcoidosis (Started down to 20mg prednisone as of today 23)  The patient is here after a 2 day hospitalization for a sarcoid flare.  She is smoking 5-6 cigarettes a day.  She is still on 20mg prednisone. She is not sleeping  Past Medical History:   Diagnosis Date    HTN (hypertension)     Pneumonia 10/2020    Sarcoidosis      Past Surgical History:   Procedure Laterality Date    BRONCHOSCOPY WITH FLUOROSCOPY Right 10/20/2020    Procedure: BRONCHOSCOPY, WITH FLUOROSCOPY;  Surgeon: Ava Rolon MD;  Location: [facility] ENDO;  Service: Pulmonary;  Laterality: Right;     SECTION      x2    FALLOPIAN TUBOPLASTY      LAPAROSCOPIC APPENDECTOMY N/A 8/10/2022    Procedure: APPENDECTOMY, LAPAROSCOPIC;  Surgeon: Jackson Vogel MD;  Location: [facility] OR;  Service: General;  Laterality: N/A;    TUBAL LIGATION      WRIST FRACTURE SURGERY Right     ganglion cyst     Family History   Problem Relation Age of Onset    Hypertension Mother         Social History:   Marital Status:   Occupation: Dental hygienist  Alcohol History:  reports that she does not currently use alcohol.  Tobacco History:  reports that she has been smoking cigarettes. She has been smoking an average of .25 packs per day. She has never used smokeless tobacco. She is smoking 5-6 cigarettes a day.  Drug History:  reports no history of drug use.    Review of patient's allergies indicates:   Allergen Reactions    Sulfamethoxazole-trimethoprim Hives, Shortness Of Breath, Nausea And Vomiting and Rash     Hives, nausea and vomiting, and shortness of breath.  Did not require admission  Hives, nausea and vomiting, and shortness of breath.  Did not require admission    Benadryl [diphenhydramine hcl]     Latex, natural rubber     Percocet [oxycodone-acetaminophen]     Shrimp     Codeine Itching and Nausea Only       Current Outpatient Medications    Medication Sig Dispense Refill    albuterol (PROVENTIL/VENTOLIN HFA) 90 mcg/actuation inhaler Inhale 2 puffs into the lungs every 4 to 6 hours as needed for Shortness of Breath. Rescue 6.7 g 0    benzonatate (TESSALON) 200 MG capsule Take 1 capsule (200 mg total) by mouth 3 (three) times daily as needed for Cough. 90 capsule 3    estradiol-norethindrone (COMBIPATCH) 0.05-0.14 mg/24 hr APPLY PATCH TOPICALLY TWICE WEEKLY HOT FLASHES      predniSONE (DELTASONE) 10 MG tablet Take 2 tablets (20 mg total) by mouth once daily. 30 tablet 0    senna-docusate 8.6-50 mg (PERICOLACE) 8.6-50 mg per tablet Take 1 tablet by mouth 2 (two) times daily as needed for Constipation. 60 tablet 0    valsartan (DIOVAN) 160 MG tablet Take 1 tablet (160 mg total) by mouth every evening. 90 tablet 0    amLODIPine (NORVASC) 10 MG tablet Take 10 mg by mouth once daily.      famotidine (PEPCID) 20 MG tablet Take 1 tablet (20 mg total) by mouth 2 (two) times daily as needed for Heartburn. (Patient not taking: Reported on 7/24/2023) 20 tablet 0    zolpidem (AMBIEN) 5 MG Tab Take 1 tablet (5 mg total) by mouth nightly as needed. 30 tablet 0     No current facility-administered medications for this visit.         Last PFT: 11/19/20  No obstruction, mild restriction, severe diffusion defect  Last CT chest: 6/30/23  IMPRESSION:     1.  Diffuse micronodular groundglass opacities are noted throughout the lungs. Additionally there are multiple ill-defined small areas of hypoenhancement in the liver. Constellation of these findings could reflect miliary tuberculosis with dissemination to the liver in the proper clinical setting. Correlate  2.  Other diagnostic considerations of lung findings include other lung infection, pneumoconiosis or pneumonitis.  3.  Mildly enlarged mediastinal lymph nodes.     Review of Systems  General: feeling very good but very fatigued from not sleeping with the prednisone  Eyes: Vision is good.  ENT:  Her throat is  "fine  Heart::no palpitations, some pressure  Lungs: breathing swell  GI: No Nausea, vomiting, constipation, diarrhea, or reflux.  : No dysuria, hesitancy, or nocturia.  Musculoskeletal: joint and muscle pain  Skin: No lesions or rashes.  Neuro: no headaches   Lymph: No edema or adenopathy.  Psych:  anxiety and depression  Endo: gained more weight    OBJECTIVE:      /80 (BP Location: Left arm, Patient Position: Sitting, BP Method: Medium (Manual))   Pulse 93   Ht 5' 3" (1.6 m)   Wt 71.2 kg (157 lb)   LMP 06/24/2022   SpO2 (!) 94%   BMI 27.81 kg/m²     Physical Exam  GENERAL: Midaged patient in no distress.  HEENT: Pupils equal and reactive. Extraocular movements intact. Nose intact.  Pharynx pink.  NECK: Supple.   HEART: Regular rate and rhythm. No murmur or gallop auscultated.  LUNGS: Clear to auscultation and percussion. Lung excursion symmetrical. No change in fremitus. No adventitial noises.  ABDOMEN: Bowel sounds present. Non-tender, no masses palpated.  EXTREMITIES: Normal muscle tone and joint movement, no cyanosis or clubbing.   LYMPHATICS: No adenopathy palpated, no edema.  SKIN: Dry, intact, no lesions.   NEURO: Cranial nerves II-XII intact. Motor strength 5/5 bilaterally, upper and lower extremities.  PSYCH: Appropriate affect.    Assessment:       1. Sarcoidosis    2. Drug-induced insomnia    3. Tobacco abuse            The patient smokes when she plays Candy Crush each evening.  Advised either changing to popcorn or stop playing the game.  Plan:       Sarcoidosis  -     CT Chest Without Contrast; Future; Expected date: 07/24/2023  -     Discontinue: zolpidem (AMBIEN) 5 MG Tab; Take 1 tablet (5 mg total) by mouth nightly as needed.  Dispense: 30 tablet; Refill: 0  -     zolpidem (AMBIEN) 5 MG Tab; Take 1 tablet (5 mg total) by mouth nightly as needed.  Dispense: 30 tablet; Refill: 0    Drug-induced insomnia    Tobacco abuse        Follow up in about 2 months (around " 9/24/2023).        Prednisone 20mg x 2 weeks, then 10mg x 2 weeks, then 5 mg x 2 weeks and stop.    CT chest in 6 weeks.  RTC in 8 weeks  Call if breathing worsens with decreasing prednisone  Ambien 5mg nightly secondary to insomnia from steroids

## 2023-08-16 LAB
ACID FAST MOD KINY STN SPEC: NORMAL
MYCOBACTERIUM SPEC QL CULT: NORMAL

## 2023-09-16 NOTE — ED PROVIDER NOTES
Encounter Date: 2023       History     Chief Complaint   Patient presents with    Chest Pain     Intermittant midsternal CP that radiates to the back, woke her up from sleep around 0100. 1010 at its worst, 610 at its best. No LUIS MANUEL noted on EMS EKG, VSS. Hx of HTN, 160s SBP with EMS. A&O, nauseated at this time, hx of anxiety, reports it does not feel like an anxiety attack and denies being stressed out recently. EMS gave 324 ASA.     HPI  Review of patient's allergies indicates:   Allergen Reactions    Sulfamethoxazole-trimethoprim Hives, Shortness Of Breath, Nausea And Vomiting and Rash     Hives, nausea and vomiting, and shortness of breath.  Did not require admission  Hives, nausea and vomiting, and shortness of breath.  Did not require admission    Benadryl [diphenhydramine hcl]     Latex, natural rubber     Percocet [oxycodone-acetaminophen]     Shrimp     Codeine Itching and Nausea Only     Past Medical History:   Diagnosis Date    HTN (hypertension)     Pneumonia 10/2020    Sarcoidosis      Past Surgical History:   Procedure Laterality Date    BRONCHOSCOPY WITH FLUOROSCOPY Right 10/20/2020    Procedure: BRONCHOSCOPY, WITH FLUOROSCOPY;  Surgeon: Ava Rolon MD;  Location: [facility] ENDO;  Service: Pulmonary;  Laterality: Right;     SECTION      x2    FALLOPIAN TUBOPLASTY      LAPAROSCOPIC APPENDECTOMY N/A 8/10/2022    Procedure: APPENDECTOMY, LAPAROSCOPIC;  Surgeon: Jackson Vogel MD;  Location: [facility] OR;  Service: General;  Laterality: N/A;    TUBAL LIGATION      WRIST FRACTURE SURGERY Right     ganglion cyst     Family History   Problem Relation Age of Onset    Hypertension Mother      Social History     Tobacco Use    Smoking status: Light Smoker     Packs/day: 0.25     Types: Cigarettes     Last attempt to quit: 2020     Years since quitting: 3.2    Smokeless tobacco: Never    Tobacco comments:     ocassionally never was qd   Substance Use Topics    Alcohol use: Not Currently    Drug  use: Never     Review of Systems   Respiratory:  Negative for cough and shortness of breath.    Cardiovascular:  Positive for chest pain.   All other systems reviewed and are negative.    Physical Exam     Initial Vitals [07/13/23 0142]   BP Pulse Resp Temp SpO2   (!) 143/81 86 (!) 22 98.4 °F (36.9 °C) 100 %      MAP       --         Physical Exam    Constitutional: She appears well-developed and well-nourished. No distress.   HENT:   Head: Normocephalic and atraumatic.   Right Ear: External ear normal.   Left Ear: External ear normal.   Mouth/Throat: Oropharynx is clear and moist.   Eyes: Conjunctivae and EOM are normal. Pupils are equal, round, and reactive to light.   Neck: Neck supple.   Normal range of motion.  Cardiovascular:  Normal rate, regular rhythm, normal heart sounds and intact distal pulses.           Pulmonary/Chest: Breath sounds normal. No respiratory distress.   Abdominal: Abdomen is soft. Bowel sounds are normal. There is no abdominal tenderness.   Musculoskeletal:         General: No edema. Normal range of motion.      Cervical back: Normal range of motion and neck supple.     Neurological: She is alert and oriented to person, place, and time. GCS score is 15. GCS eye subscore is 4. GCS verbal subscore is 5. GCS motor subscore is 6.   Skin: Skin is warm and dry. Capillary refill takes less than 2 seconds. No rash noted.   Psychiatric: She has a normal mood and affect. Her behavior is normal.       ED Course   Procedures  Labs Reviewed   CBC W/ AUTO DIFFERENTIAL - Abnormal; Notable for the following components:       Result Value    RDW 15.0 (*)     All other components within normal limits   COMPREHENSIVE METABOLIC PANEL - Abnormal; Notable for the following components:    CO2 22 (*)     ALT 48 (*)     All other components within normal limits   MAGNESIUM   TROPONIN I HIGH SENSITIVITY   TROPONIN I HIGH SENSITIVITY   B-TYPE NATRIURETIC PEPTIDE        ECG Results              EKG 12-lead (In  process)  Result time 07/13/23 05:05:07      In process by Interface, Lab In [facility] (07/13/23 05:05:07)                   Narrative:    Test Reason : R07.9,    Vent. Rate : 085 BPM     Atrial Rate : 085 BPM     P-R Int : 122 ms          QRS Dur : 088 ms      QT Int : 352 ms       P-R-T Axes : 039 008 026 degrees     QTc Int : 418 ms    Normal sinus rhythm  Minimal voltage criteria for LVH, may be normal variant ( [place] product )  Borderline Abnormal ECG  When compared with ECG of 02-JUL-2023 16:31,  No significant change was found    Referred By: [email or web address]   SELF           Confirmed By:                                   Imaging Results              X-Ray Chest AP Portable (Final result)  Result time 07/13/23 07:15:58      Final result by Jhoan Bruno MD (07/13/23 07:15:58)                   Narrative:    CLINICAL HISTORY:  52 years (1970) Female Chest Pain Chest pain    TECHNIQUE:  Portable AP radiograph the chest.    COMPARISON:  Most recent radiograph from June 30, 2023    FINDINGS:  Mild diffuse interstitial opacity throughout both lungs with a faint airspace opacity at the right lung base, similar to the previous exam. Costophrenic angles are seen without effusion. No pneumothorax is identified. The heart is normal in size. The mediastinum is within normal limits. Osseous structures appear unchanged. The visualized upper abdomen is unremarkable.    IMPRESSION:  Unchanged radiograph of the chest when accounting for differences in imaging technique.                  .            Electronically signed by:  Jhoan Bruno MD  7/13/2023 7:15 AM CDT Workstation: [id number]                                     Medications   famotidine (PF) injection 20 mg (20 mg Intravenous Given 7/13/23 0231)                Attending Attestation:   Physician Attestation Statement for Resident:  As the supervising MD   I agree with the above history.  -:   As the supervising MD I agree with the above PE.     As the  supervising MD I agree with the above treatment, course, plan, and disposition.    I have reviewed and agree with the residents interpretation of the following: lab data, x-rays and EKG.                            Clinical Impression:   Final diagnoses:  [R07.9] Chest pain (Primary)        ED Disposition Condition    Discharge Stable          ED Prescriptions       Medication Sig Dispense Start Date End Date Auth. Provider    famotidine (PEPCID) 20 MG tablet Take 1 tablet (20 mg total) by mouth 2 (two) times daily as needed for Heartburn. 20 tablet 7/13/2023 7/12/2024 Alexander Ireland MD          Follow-up Information       Follow up With Specialties Details Why Contact Info    [facility]  Schedule an appointment as soon as possible for a visit in 1 week For follow up and re-evalaution [address] [facility] [address]  [phone]               Luis You MD  07/14/23 0318     chest pain

## 2023-10-02 DIAGNOSIS — D86.9 SARCOIDOSIS: ICD-10-CM

## 2023-10-02 RX ORDER — ZOLPIDEM TARTRATE 5 MG/1
5 TABLET ORAL NIGHTLY PRN
Qty: 30 TABLET | Refills: 0 | OUTPATIENT
Start: 2023-10-02 | End: 2024-04-01

## 2023-10-11 ENCOUNTER — HOSPITAL ENCOUNTER (OUTPATIENT)
Dept: PREADMISSION TESTING | Facility: HOSPITAL | Age: 53
Discharge: HOME | End: 2023-10-11
Attending: NURSE PRACTITIONER
Payer: MEDICAID

## 2023-10-11 ENCOUNTER — OFFICE VISIT (OUTPATIENT)
Dept: PULMONOLOGY | Facility: CLINIC | Age: 53
End: 2023-10-11
Payer: MEDICAID

## 2023-10-11 ENCOUNTER — TELEPHONE (OUTPATIENT)
Dept: PULMONOLOGY | Facility: CLINIC | Age: 53
End: 2023-10-11

## 2023-10-11 ENCOUNTER — HOSPITAL ENCOUNTER (OUTPATIENT)
Dept: PULMONOLOGY | Facility: HOSPITAL | Age: 53
Discharge: HOME | End: 2023-10-11
Attending: NURSE PRACTITIONER
Payer: MEDICAID

## 2023-10-11 ENCOUNTER — HOSPITAL ENCOUNTER (OUTPATIENT)
Dept: RADIOLOGY | Facility: HOSPITAL | Age: 53
Discharge: HOME | End: 2023-10-11
Attending: INTERNAL MEDICINE
Payer: MEDICAID

## 2023-10-11 VITALS
HEART RATE: 105 BPM | WEIGHT: 162.19 LBS | OXYGEN SATURATION: 96 % | HEIGHT: 63 IN | TEMPERATURE: 98 F | BODY MASS INDEX: 28.74 KG/M2 | SYSTOLIC BLOOD PRESSURE: 122 MMHG | DIASTOLIC BLOOD PRESSURE: 80 MMHG

## 2023-10-11 DIAGNOSIS — F32.2 CURRENT SEVERE EPISODE OF MAJOR DEPRESSIVE DISORDER WITHOUT PSYCHOTIC FEATURES WITHOUT PRIOR EPISODE: Primary | ICD-10-CM

## 2023-10-11 DIAGNOSIS — R06.00 DYSPNEA, UNSPECIFIED TYPE: ICD-10-CM

## 2023-10-11 DIAGNOSIS — R05.9 COUGH, UNSPECIFIED TYPE: ICD-10-CM

## 2023-10-11 DIAGNOSIS — D86.9 SARCOIDOSIS: ICD-10-CM

## 2023-10-11 DIAGNOSIS — Z72.0 TOBACCO ABUSE: ICD-10-CM

## 2023-10-11 DIAGNOSIS — F19.982 DRUG-INDUCED INSOMNIA: ICD-10-CM

## 2023-10-11 DIAGNOSIS — R07.9 CHEST PAIN, UNSPECIFIED TYPE: ICD-10-CM

## 2023-10-11 DIAGNOSIS — R53.83 FATIGUE, UNSPECIFIED TYPE: ICD-10-CM

## 2023-10-11 PROCEDURE — 4010F ACE/ARB THERAPY RXD/TAKEN: CPT | Mod: CPTII,S$GLB,, | Performed by: INTERNAL MEDICINE

## 2023-10-11 PROCEDURE — 3008F PR BODY MASS INDEX (BMI) DOCUMENTED: ICD-10-PCS | Mod: CPTII,S$GLB,, | Performed by: INTERNAL MEDICINE

## 2023-10-11 PROCEDURE — 3079F PR MOST RECENT DIASTOLIC BLOOD PRESSURE 80-89 MM HG: ICD-10-PCS | Mod: CPTII,S$GLB,, | Performed by: INTERNAL MEDICINE

## 2023-10-11 PROCEDURE — 99214 OFFICE O/P EST MOD 30 MIN: CPT | Mod: 25,S$GLB,, | Performed by: INTERNAL MEDICINE

## 2023-10-11 PROCEDURE — 94727 GAS DIL/WSHOT DETER LNG VOL: CPT

## 2023-10-11 PROCEDURE — 71250 CT THORAX DX C-: CPT | Mod: TC

## 2023-10-11 PROCEDURE — 93005 ELECTROCARDIOGRAM TRACING: CPT | Performed by: GENERAL PRACTICE

## 2023-10-11 PROCEDURE — 94010 BREATHING CAPACITY TEST: CPT

## 2023-10-11 PROCEDURE — 99214 PR OFFICE/OUTPT VISIT, EST, LEVL IV, 30-39 MIN: ICD-10-PCS | Mod: 25,S$GLB,, | Performed by: INTERNAL MEDICINE

## 2023-10-11 PROCEDURE — 3008F BODY MASS INDEX DOCD: CPT | Mod: CPTII,S$GLB,, | Performed by: INTERNAL MEDICINE

## 2023-10-11 PROCEDURE — 93010 EKG 12-LEAD: ICD-10-PCS | Mod: ,,, | Performed by: GENERAL PRACTICE

## 2023-10-11 PROCEDURE — 3079F DIAST BP 80-89 MM HG: CPT | Mod: CPTII,S$GLB,, | Performed by: INTERNAL MEDICINE

## 2023-10-11 PROCEDURE — 1159F PR MEDICATION LIST DOCUMENTED IN MEDICAL RECORD: ICD-10-PCS | Mod: CPTII,S$GLB,, | Performed by: INTERNAL MEDICINE

## 2023-10-11 PROCEDURE — 3074F PR MOST RECENT SYSTOLIC BLOOD PRESSURE < 130 MM HG: ICD-10-PCS | Mod: CPTII,S$GLB,, | Performed by: INTERNAL MEDICINE

## 2023-10-11 PROCEDURE — 1159F MED LIST DOCD IN RCRD: CPT | Mod: CPTII,S$GLB,, | Performed by: INTERNAL MEDICINE

## 2023-10-11 PROCEDURE — 4010F PR ACE/ARB THEARPY RXD/TAKEN: ICD-10-PCS | Mod: CPTII,S$GLB,, | Performed by: INTERNAL MEDICINE

## 2023-10-11 PROCEDURE — 3074F SYST BP LT 130 MM HG: CPT | Mod: CPTII,S$GLB,, | Performed by: INTERNAL MEDICINE

## 2023-10-11 PROCEDURE — 93010 ELECTROCARDIOGRAM REPORT: CPT | Mod: ,,, | Performed by: GENERAL PRACTICE

## 2023-10-11 PROCEDURE — 94729 DIFFUSING CAPACITY: CPT

## 2023-10-11 RX ORDER — BUPROPION HYDROCHLORIDE 150 MG/1
150 TABLET ORAL DAILY
Qty: 30 TABLET | Refills: 11 | Status: SHIPPED | OUTPATIENT
Start: 2023-10-11 | End: 2024-10-10

## 2023-10-11 NOTE — PROGRESS NOTES
SUBJECTIVE:    Patient ID: Sumit Burger is a 53 y.o. female.    Chief Complaint: 2 month follow up visit for sarcoidosis  The patient is here complaining of fatigue. She is on 10mg Prednisone from Dr. Palomino, a rheumatologist at [facility] for her sarcoidosis.  She had a CT today which is bad but stable.  She had PFT's   the PFTs only show a diffusion defect which is now moderate.  This is improved.  She snores, no morning headaches, she's not feeling well.  She feels short of breath all the time.  She lost her job recently.  She had a hard time with the PFT's.  She found the blowing out so hard.  She is obviously very depressed. She is still smoking about 1ppd.  Past Medical History:   Diagnosis Date    HTN (hypertension)     Pneumonia 10/2020    Sarcoidosis      Past Surgical History:   Procedure Laterality Date    BRONCHOSCOPY WITH FLUOROSCOPY Right 10/20/2020    Procedure: BRONCHOSCOPY, WITH FLUOROSCOPY;  Surgeon: Ava Rolon MD;  Location: [facility] ENDO;  Service: Pulmonary;  Laterality: Right;     SECTION      x2    FALLOPIAN TUBOPLASTY      LAPAROSCOPIC APPENDECTOMY N/A 8/10/2022    Procedure: APPENDECTOMY, LAPAROSCOPIC;  Surgeon: Jackson Vogel MD;  Location: [facility] OR;  Service: General;  Laterality: N/A;    TUBAL LIGATION      WRIST FRACTURE SURGERY Right     ganglion cyst     Family History   Problem Relation Age of Onset    Hypertension Mother         Social History:   Marital Status:   Occupation: Dental hygienist  Alcohol History:  reports that she does not currently use alcohol.  Tobacco History:  reports that she has been smoking cigarettes. She has never used smokeless tobacco. She is smoking 5-6 cigarettes a day.  Drug History:  reports no history of drug use.    Review of patient's allergies indicates:   Allergen Reactions    Sulfamethoxazole-trimethoprim Hives, Shortness Of Breath, Nausea And Vomiting and Rash     Hives, nausea and vomiting, and shortness of breath.  Did  not require admission  Hives, nausea and vomiting, and shortness of breath.  Did not require admission    Benadryl [diphenhydramine hcl]     Latex, natural rubber     Percocet [oxycodone-acetaminophen]     Shrimp     Codeine Itching and Nausea Only       Current Outpatient Medications   Medication Sig Dispense Refill    benzonatate (TESSALON) 200 MG capsule Take 1 capsule (200 mg total) by mouth 3 (three) times daily as needed for Cough. 90 capsule 3    estradiol-norethindrone (COMBIPATCH) 0.05-0.14 mg/24 hr APPLY PATCH TOPICALLY TWICE WEEKLY HOT FLASHES      predniSONE (DELTASONE) 10 MG tablet Take 2 tablets (20 mg total) by mouth once daily. (Patient taking differently: Take 10 mg by mouth once daily. Patient states she takes 10 mg QD   10/11/23) 30 tablet 0    valsartan (DIOVAN) 160 MG tablet Take 1 tablet (160 mg total) by mouth every evening. 90 tablet 0    zolpidem (AMBIEN) 5 MG Tab Take 1 tablet (5 mg total) by mouth nightly as needed. 30 tablet 0    albuterol (PROVENTIL/VENTOLIN HFA) 90 mcg/actuation inhaler Inhale 2 puffs into the lungs every 4 to 6 hours as needed for Shortness of Breath. Rescue 6.7 g 0    amLODIPine (NORVASC) 10 MG tablet Take 10 mg by mouth once daily.      buPROPion (WELLBUTRIN XL) 150 MG TB24 tablet Take 1 tablet (150 mg total) by mouth once daily. 30 tablet 11    famotidine (PEPCID) 20 MG tablet Take 1 tablet (20 mg total) by mouth 2 (two) times daily as needed for Heartburn. (Patient not taking: Reported on 7/24/2023) 20 tablet 0     No current facility-administered medications for this visit.         Last PFT: 10/11/223   No obstruction, mild restriction, moderate diffusion defect, better than 2020  Last CT chest:10/11/23  IMPRESSION:  1. Stable appearance the chest and compared with evidence of diffuse micronodular opacities and areas of platelike scarring and nodular consolidation in the right upper lobe peripherally as well as left lower lobe subpleural region.  2. Mild diffuse  "bronchiectasis without evidence of any definable change upon comparison   Review of Systems  General: feeling sick, doesn;t feel well, achy, not well  Eyes: Vision is good.  ENT: Trouble swallowing sometimes  Heart:: palpitations all the time, intermittent sharp pains, some pressure  Lungs: breathing  is short of breath all the time  GI: constipation worsened by prednisone  : No dysuria, hesitancy, or nocturia.  Musculoskeletal: hips and knees and muscles ache everywhere  Skin: No lesions or rashes.  Neuro: no headaches   Lymph: No edema or adenopathy.  Psych:  anxiety and depression  Endo: gained more weight    OBJECTIVE:      /80 (BP Location: Right arm)   Pulse 105   Temp 97.6 °F (36.4 °C)   Ht 5' 3" (1.6 m)   Wt 73.6 kg (162 lb 3.2 oz)   LMP 06/24/2022   SpO2 96%   BMI 28.73 kg/m²     Physical Exam  GENERAL: Midaged patient in no distress.  HEENT: Pupils equal and reactive. Extraocular movements intact. Nose intact.  Pharynx pink.  Mallampati 4  NECK: Supple.  14 in  HEART: Regular rate and rhythm. No murmur or gallop auscultated.  LUNGS: Clear to auscultation and percussion. Lung excursion symmetrical. No change in fremitus. No adventitial noises.  ABDOMEN: Bowel sounds present. Non-tender, no masses palpated.  EXTREMITIES: Normal muscle tone and joint movement, no cyanosis or clubbing.   LYMPHATICS: No adenopathy palpated, no edema.  SKIN: Dry, intact, no lesions.   NEURO: Cranial nerves II-XII intact. Motor strength 5/5 bilaterally, upper and lower extremities.  PSYCH: Appropriate affect.  [place] Sleepiness is 6  Assessment:       1. Current severe episode of major depressive disorder without psychotic features without prior episode    2. Fatigue, unspecified type    3. Sarcoidosis    4. Tobacco abuse    5. Drug-induced insomnia    6. Chest pain, unspecified type    7. Dyspnea, unspecified type              Plan:       Current severe episode of major depressive disorder without psychotic " features without prior episode  -     buPROPion (WELLBUTRIN XL) 150 MG TB24 tablet; Take 1 tablet (150 mg total) by mouth once daily.  Dispense: 30 tablet; Refill: 11    Fatigue, unspecified type  -     Polysomnogram (CPAP will be added if patient meets diagnostic criteria.); Future; Expected date: 10/11/2023    Sarcoidosis    Tobacco abuse    Drug-induced insomnia    Chest pain, unspecified type    Dyspnea, unspecified type        Follow up in about 3 months (around 1/11/2024).        Continue prednisone as directed by rheumatology  PSG for probable BILL  Wellbutrin 150mg for depression  Ambien 5mg nightly secondary to insomnia from steroids

## 2023-10-12 ENCOUNTER — TELEPHONE (OUTPATIENT)
Dept: PULMONOLOGY | Facility: CLINIC | Age: 53
End: 2023-10-12

## 2023-10-12 NOTE — TELEPHONE ENCOUNTER
[facility] Staff  This request does not meet review criteria for an In-Lab Sleep Study (PSG) based on the answers in the Contraindications for Home Sleep Testing

## 2023-10-16 DIAGNOSIS — R53.83 FATIGUE, UNSPECIFIED TYPE: Primary | ICD-10-CM

## 2023-11-08 ENCOUNTER — PROCEDURE VISIT (OUTPATIENT)
Dept: SLEEP MEDICINE | Facility: HOSPITAL | Age: 53
End: 2023-11-08
Attending: INTERNAL MEDICINE
Payer: MEDICAID

## 2023-11-08 DIAGNOSIS — R53.83 FATIGUE, UNSPECIFIED TYPE: ICD-10-CM

## 2023-11-08 PROCEDURE — 95806 SLEEP STUDY UNATT&RESP EFFT: CPT

## 2023-11-13 ENCOUNTER — TELEPHONE (OUTPATIENT)
Dept: PULMONOLOGY | Facility: HOSPITAL | Age: 53
End: 2023-11-13

## 2023-11-13 NOTE — TELEPHONE ENCOUNTER
The patient's home sleep study shows an RDI of only 5.1.  It is unlikely that this is causing her hypersomnolence and fatigue.  Will discuss on her next visit.

## 2023-11-14 ENCOUNTER — TELEPHONE (OUTPATIENT)
Dept: PULMONOLOGY | Facility: HOSPITAL | Age: 53
End: 2023-11-14

## 2023-11-14 ENCOUNTER — TELEPHONE (OUTPATIENT)
Dept: PULMONOLOGY | Facility: CLINIC | Age: 53
End: 2023-11-14

## 2023-11-14 NOTE — TELEPHONE ENCOUNTER
----- Message from Jose L Monaco MA sent at 11/14/2023  9:07 AM CST -----  Regarding: sleep study results  Patient called - sleep study done on 11/8/2023 - pt concerned about the results - wanted to know what the next steps are. She can be reached at [phone] -pr

## 2023-11-14 NOTE — TELEPHONE ENCOUNTER
The patient's home sleep study shows an RDI of only 5.1.  It is unlikely that this is causing her hypersomnolence and fatigue.  Will discuss on her next visit --PER DR NINA NOTE

## 2024-01-10 ENCOUNTER — OFFICE VISIT (OUTPATIENT)
Dept: PULMONOLOGY | Facility: CLINIC | Age: 54
End: 2024-01-10
Payer: MEDICAID

## 2024-01-10 VITALS
HEIGHT: 63 IN | HEART RATE: 102 BPM | OXYGEN SATURATION: 98 % | WEIGHT: 155 LBS | BODY MASS INDEX: 27.46 KG/M2 | SYSTOLIC BLOOD PRESSURE: 110 MMHG | DIASTOLIC BLOOD PRESSURE: 80 MMHG

## 2024-01-10 DIAGNOSIS — D86.9 SARCOIDOSIS: Primary | ICD-10-CM

## 2024-01-10 PROCEDURE — 3079F DIAST BP 80-89 MM HG: CPT | Mod: CPTII,S$GLB,, | Performed by: INTERNAL MEDICINE

## 2024-01-10 PROCEDURE — 90686 IIV4 VACC NO PRSV 0.5 ML IM: CPT | Mod: S$GLB,,, | Performed by: INTERNAL MEDICINE

## 2024-01-10 PROCEDURE — 1159F MED LIST DOCD IN RCRD: CPT | Mod: CPTII,S$GLB,, | Performed by: INTERNAL MEDICINE

## 2024-01-10 PROCEDURE — 3008F BODY MASS INDEX DOCD: CPT | Mod: CPTII,S$GLB,, | Performed by: INTERNAL MEDICINE

## 2024-01-10 PROCEDURE — 3074F SYST BP LT 130 MM HG: CPT | Mod: CPTII,S$GLB,, | Performed by: INTERNAL MEDICINE

## 2024-01-10 PROCEDURE — 99214 OFFICE O/P EST MOD 30 MIN: CPT | Mod: 25,S$GLB,, | Performed by: INTERNAL MEDICINE

## 2024-01-10 PROCEDURE — 90471 IMMUNIZATION ADMIN: CPT | Mod: S$GLB,,, | Performed by: INTERNAL MEDICINE

## 2024-01-10 RX ORDER — SPIRONOLACTONE 25 MG/1
25 TABLET ORAL DAILY
COMMUNITY
Start: 2023-11-28

## 2024-01-10 RX ORDER — FOLIC ACID 1 MG/1
1 TABLET ORAL DAILY
COMMUNITY
Start: 2023-12-01

## 2024-01-10 NOTE — PROGRESS NOTES
SUBJECTIVE:    Patient ID: Sumit Burger is a 53 y.o. female.    Chief Complaint: Follow-up  The patient is here . She is on 10mg Prednisone from Dr. Palomino, a rheumatologist at [facility] for her sarcoidosis.   He had tried her on methotrexate but she could not tolerate that.  The prednisone is not helping her myalgias or her shortness of breath.  She still feels incredibly dyspneic when she moves.  Reviewed her PFTs which are improved with mild restriction and a moderate diffusion defect.  Explained J receptors to her.  This may be with the issue is.  Will try to get her into pulmonary rehab to work pass this dyspnea sensation and advised her to buy a pulse ox.Coughs a lot, but not coughing anything up.Still on Wellbutrin, now on 300mg.  Still has some Ambien.  Past Medical History:   Diagnosis Date    HTN (hypertension)     Pneumonia 10/2020    Sarcoidosis      Past Surgical History:   Procedure Laterality Date    BRONCHOSCOPY WITH FLUOROSCOPY Right 10/20/2020    Procedure: BRONCHOSCOPY, WITH FLUOROSCOPY;  Surgeon: Ava Rolon MD;  Location: [facility] ENDO;  Service: Pulmonary;  Laterality: Right;     SECTION      x2    FALLOPIAN TUBOPLASTY      LAPAROSCOPIC APPENDECTOMY N/A 8/10/2022    Procedure: APPENDECTOMY, LAPAROSCOPIC;  Surgeon: Jackson Vogel MD;  Location: [facility] OR;  Service: General;  Laterality: N/A;    TUBAL LIGATION      WRIST FRACTURE SURGERY Right     ganglion cyst     Family History   Problem Relation Age of Onset    Hypertension Mother         Social History:   Marital Status:   Occupation: Dental hygienist  Alcohol History:  reports that she does not currently use alcohol.  Tobacco History:  reports that she has been smoking cigarettes. She has never used smokeless tobacco. She has stopped smoking.  Drug History:  reports no history of drug use.    Review of patient's allergies indicates:   Allergen Reactions    Sulfamethoxazole-trimethoprim Hives, Shortness Of Breath,  Nausea And Vomiting and Rash     Hives, nausea and vomiting, and shortness of breath.  Did not require admission  Hives, nausea and vomiting, and shortness of breath.  Did not require admission    Benadryl [diphenhydramine hcl]     Latex, natural rubber     Percocet [oxycodone-acetaminophen]     Shrimp     Codeine Itching and Nausea Only       Current Outpatient Medications   Medication Sig Dispense Refill    albuterol (PROVENTIL/VENTOLIN HFA) 90 mcg/actuation inhaler Inhale 2 puffs into the lungs every 4 to 6 hours as needed for Shortness of Breath. Rescue 6.7 g 0    buPROPion (WELLBUTRIN XL) 150 MG TB24 tablet Take 1 tablet (150 mg total) by mouth once daily. 30 tablet 11    estradiol-norethindrone (COMBIPATCH) 0.05-0.14 mg/24 hr APPLY PATCH TOPICALLY TWICE WEEKLY HOT FLASHES      folic acid (FOLVITE) 1 MG tablet Take 1 tablet by mouth once daily.      predniSONE (DELTASONE) 10 MG tablet Take 2 tablets (20 mg total) by mouth once daily. (Patient taking differently: Take 10 mg by mouth once daily. Patient states she takes 10 mg QD   10/11/23) 30 tablet 0    spironolactone (ALDACTONE) 25 MG tablet Take 25 mg by mouth once daily.      valsartan (DIOVAN) 160 MG tablet Take 1 tablet (160 mg total) by mouth every evening. 90 tablet 0    zolpidem (AMBIEN) 5 MG Tab Take 1 tablet (5 mg total) by mouth nightly as needed. 30 tablet 0     No current facility-administered medications for this visit.         Last PFT: 10/11/223   No obstruction, mild restriction, moderate diffusion defect, better than 2020  Last CT chest:10/11/23  IMPRESSION:  1. Stable appearance the chest and compared with evidence of diffuse micronodular opacities and areas of platelike scarring and nodular consolidation in the right upper lobe peripherally as well as left lower lobe subpleural region.  2. Mild diffuse bronchiectasis without evidence of any definable change upon comparison     Review of Systems  General: feeling ok  Eyes: Vision is good.   "Bony mass on the inferior orbital rim which hurts  ENT: no sinusitis  Heart:: palpitations still  Lungs: breathing  is short of breath all the time with any exertion  GI: constipation   : No dysuria, hesitancy, or nocturia.  Musculoskeletal: hips and knees and muscles achy  Skin: No lesions or rashes.  Neuro: no headaches   Lymph: feet and hands swell at times  Psych:  anxiety and depression  Endo: lost some weight    OBJECTIVE:      /80 (BP Location: Left arm, Patient Position: Sitting, BP Method: Medium (Manual))   Pulse 102   Ht 5' 3" (1.6 m)   Wt 70.3 kg (155 lb)   LMP 06/24/2022   SpO2 98%   BMI 27.46 kg/m²     Physical Exam  GENERAL: Midaged patient in no distress.  HEENT: Pupils equal and reactive. Extraocular movements intact. Nose intact.  Pharynx pink.  Mallampati 4  The patient has what feels like a bony mass in the inferior orbital rim on the right which extends down onto the maxillary bone  NECK: Supple.  14 in  HEART: Regular rate and rhythm. No murmur or gallop auscultated.  LUNGS: Clear to auscultation and percussion. Lung excursion symmetrical. No change in fremitus. No adventitial noises.  ABDOMEN: Bowel sounds present. Non-tender, no masses palpated.  EXTREMITIES: Normal muscle tone and joint movement, no cyanosis or clubbing.   LYMPHATICS: No adenopathy palpated, no edema.  SKIN: Dry, intact, no lesions.   NEURO: Cranial nerves II-XII intact. Motor strength 5/5 bilaterally, upper and lower extremities.  PSYCH: Appropriate affect.    [place] Sleepiness is 6  Assessment:       1. Sarcoidosis                Plan:       Sarcoidosis  -     Ambulatory referral/consult to Pulmonary Rehab; Future; Expected date: 01/17/2024  -     CT Maxillofacial W WO Contrast; Future; Expected date: 01/10/2024          Follow up in about 3 months (around 4/10/2024).  Continue prednisone as directed by rheumatology  CT face for bony outgrowth  Pulmonary rehab to help her get over her " dyspnea

## 2024-01-12 ENCOUNTER — TELEPHONE (OUTPATIENT)
Dept: PULMONOLOGY | Facility: HOSPITAL | Age: 54
End: 2024-01-12

## 2024-01-17 DIAGNOSIS — M79.89 MASS OF SOFT TISSUE OF FACE: Primary | ICD-10-CM

## 2024-01-17 NOTE — PROGRESS NOTES
Patient has a 7 mm soft tissue mass anterior to the inferior right orbital rim.  I have referred her to oral maxillofacial surgery.

## 2024-01-23 ENCOUNTER — PATIENT MESSAGE (OUTPATIENT)
Dept: PULMONOLOGY | Facility: CLINIC | Age: 54
End: 2024-01-23

## 2024-01-30 ENCOUNTER — TELEPHONE (OUTPATIENT)
Dept: PULMONOLOGY | Facility: HOSPITAL | Age: 54
End: 2024-01-30

## 2024-01-30 NOTE — TELEPHONE ENCOUNTER
Was giving [facility]'s number at [phone] to discuss the denial of the pulmonary rehab for this patient

## 2024-02-14 ENCOUNTER — PATIENT MESSAGE (OUTPATIENT)
Dept: PULMONOLOGY | Facility: CLINIC | Age: 54
End: 2024-02-14

## 2024-03-27 ENCOUNTER — HOSPITAL ENCOUNTER (EMERGENCY)
Facility: HOSPITAL | Age: 54
Discharge: LEFT BEFORE DISCHARGE | End: 2024-03-27
Payer: OTHER GOVERNMENT

## 2024-03-27 VITALS
BODY MASS INDEX: 26.58 KG/M2 | SYSTOLIC BLOOD PRESSURE: 154 MMHG | HEART RATE: 90 BPM | TEMPERATURE: 98 F | WEIGHT: 150 LBS | RESPIRATION RATE: 19 BRPM | DIASTOLIC BLOOD PRESSURE: 103 MMHG | HEIGHT: 63 IN | OXYGEN SATURATION: 99 %

## 2024-03-27 DIAGNOSIS — Z53.21 ELOPED FROM EMERGENCY DEPARTMENT: Primary | ICD-10-CM

## 2024-03-27 DIAGNOSIS — R10.31 RIGHT GROIN PAIN: ICD-10-CM

## 2024-03-27 LAB
ALBUMIN SERPL BCP-MCNC: 4.3 G/DL (ref 3.5–5.2)
ALP SERPL-CCNC: 57 U/L (ref 55–135)
ALT SERPL W/O P-5'-P-CCNC: 31 U/L (ref 10–44)
ANION GAP SERPL CALC-SCNC: 6 MMOL/L (ref 8–16)
AST SERPL-CCNC: 25 U/L (ref 10–40)
BASOPHILS # BLD AUTO: 0.06 K/UL (ref 0–0.2)
BASOPHILS NFR BLD: 0.9 % (ref 0–1.9)
BILIRUB SERPL-MCNC: 0.4 MG/DL (ref 0.1–1)
BILIRUB UR QL STRIP: NEGATIVE
BUN SERPL-MCNC: 10 MG/DL (ref 6–20)
CALCIUM SERPL-MCNC: 10 MG/DL (ref 8.7–10.5)
CHLORIDE SERPL-SCNC: 103 MMOL/L (ref 95–110)
CLARITY UR: CLEAR
CO2 SERPL-SCNC: 28 MMOL/L (ref 23–29)
COLOR UR: NORMAL
CREAT SERPL-MCNC: 0.8 MG/DL (ref 0.5–1.4)
DIFFERENTIAL METHOD BLD: NORMAL
EOSINOPHIL # BLD AUTO: 0.2 K/UL (ref 0–0.5)
EOSINOPHIL NFR BLD: 2.3 % (ref 0–8)
ERYTHROCYTE [DISTWIDTH] IN BLOOD BY AUTOMATED COUNT: 14.1 % (ref 11.5–14.5)
EST. GFR  (NO RACE VARIABLE): >60 ML/MIN/1.73 M^2
GLUCOSE SERPL-MCNC: 92 MG/DL (ref 70–110)
GLUCOSE UR QL STRIP: NEGATIVE
HCT VFR BLD AUTO: 41.3 % (ref 37–48.5)
HGB BLD-MCNC: 13.4 G/DL (ref 12–16)
HGB UR QL STRIP: NEGATIVE
IMM GRANULOCYTES # BLD AUTO: 0.02 K/UL (ref 0–0.04)
IMM GRANULOCYTES NFR BLD AUTO: 0.3 % (ref 0–0.5)
KETONES UR QL STRIP: NEGATIVE
LEUKOCYTE ESTERASE UR QL STRIP: NEGATIVE
LIPASE SERPL-CCNC: 16 U/L (ref 4–60)
LYMPHOCYTES # BLD AUTO: 1.7 K/UL (ref 1–4.8)
LYMPHOCYTES NFR BLD: 26.7 % (ref 18–48)
MCH RBC QN AUTO: 28.2 PG (ref 27–31)
MCHC RBC AUTO-ENTMCNC: 32.4 G/DL (ref 32–36)
MCV RBC AUTO: 87 FL (ref 82–98)
MONOCYTES # BLD AUTO: 0.4 K/UL (ref 0.3–1)
MONOCYTES NFR BLD: 5.6 % (ref 4–15)
NEUTROPHILS # BLD AUTO: 4.1 K/UL (ref 1.8–7.7)
NEUTROPHILS NFR BLD: 64.2 % (ref 38–73)
NITRITE UR QL STRIP: NEGATIVE
NRBC BLD-RTO: 0 /100 WBC
PH UR STRIP: 6 [PH] (ref 5–8)
PLATELET # BLD AUTO: 286 K/UL (ref 150–450)
PMV BLD AUTO: 10.9 FL (ref 9.2–12.9)
POTASSIUM SERPL-SCNC: 3.8 MMOL/L (ref 3.5–5.1)
PROT SERPL-MCNC: 7.9 G/DL (ref 6–8.4)
PROT UR QL STRIP: NEGATIVE
RBC # BLD AUTO: 4.75 M/UL (ref 4–5.4)
SODIUM SERPL-SCNC: 137 MMOL/L (ref 136–145)
SP GR UR STRIP: 1 (ref 1–1.03)
URN SPEC COLLECT METH UR: NORMAL
UROBILINOGEN UR STRIP-ACNC: NEGATIVE EU/DL
WBC # BLD AUTO: 6.43 K/UL (ref 3.9–12.7)

## 2024-03-27 PROCEDURE — 99284 EMERGENCY DEPT VISIT MOD MDM: CPT | Mod: 25

## 2024-03-27 PROCEDURE — 85025 COMPLETE CBC W/AUTO DIFF WBC: CPT | Performed by: NURSE PRACTITIONER

## 2024-03-27 PROCEDURE — 83690 ASSAY OF LIPASE: CPT | Performed by: NURSE PRACTITIONER

## 2024-03-27 PROCEDURE — 80053 COMPREHEN METABOLIC PANEL: CPT | Performed by: NURSE PRACTITIONER

## 2024-03-27 PROCEDURE — 81003 URINALYSIS AUTO W/O SCOPE: CPT | Performed by: NURSE PRACTITIONER

## 2024-03-27 NOTE — FIRST PROVIDER EVALUATION
Emergency Department TeleTriage Encounter Note      CHIEF COMPLAINT    Chief Complaint   Patient presents with    Groin Pain     Pt had angiogram two weeks ago, she is having pain on the right side she said it feels like something is moving around.        VITAL SIGNS   Initial Vitals [03/27/24 1129]   BP Pulse Resp Temp SpO2   (!) 154/103 90 19 98.2 °F (36.8 °C) 99 %      MAP       --            ALLERGIES    Review of patient's allergies indicates:   Allergen Reactions    Sulfamethoxazole-trimethoprim Hives, Shortness Of Breath, Nausea And Vomiting and Rash     Hives, nausea and vomiting, and shortness of breath.  Did not require admission  Hives, nausea and vomiting, and shortness of breath.  Did not require admission    Benadryl [diphenhydramine hcl]     Latex, natural rubber     Percocet [oxycodone-acetaminophen]     Shrimp     Codeine Itching and Nausea Only       PROVIDER TRIAGE NOTE  TeleTriage Note: Sumit Burger, a nontoxic/well appearing, 53 y.o. female, presented to the ED with c/o right groin pain after angiogram 2 weeks. Feels like she is having a menstrual cycle. Taking extra strength tylenol without relief. Denies fevers, erythema, swelling or any changes to the surgical site. Bruising is still present.     All ED beds are full at present; patient notified of this status.  Patient seen and medically screened by Nurse Practitioner via teletriage. Orders initiated at triage to expedite care.  Patient is stable to return to the waiting room and will be placed in an ED bed when available.  Care will be transferred to an alternate provider when patient has been placed in an Exam Room from the [facility] for physical exam, additional orders, and disposition.  11:49 AM Patricia Tee DNP, FNP-C        ORDERS  Labs Reviewed   CBC W/ AUTO DIFFERENTIAL   COMPREHENSIVE METABOLIC PANEL   LIPASE   URINALYSIS, REFLEX TO URINE CULTURE       ED Orders (720h ago, onward)      Start Ordered     Status Ordering  Provider    03/27/24 1151 03/27/24 1150  Vital signs  Every 2 hours         Ordered SHAHLA, RAMONA MELISA    03/27/24 1151 03/27/24 1150  Diet NPO  Diet effective now         Ordered SHAHLA, RAMONA MELISA    03/27/24 1151 03/27/24 1150  Insert peripheral IV  Once         Ordered SHAHLA, RAMONA MELISA    03/27/24 1151 03/27/24 1150  CBC W/ AUTO DIFFERENTIAL  STAT         Ordered SHAHLA, RAMONA VINCENT    03/27/24 1151 03/27/24 1150  Comp. Metabolic Panel  STAT         Ordered SHAHLA, RAMONA VINCENT    03/27/24 1151 03/27/24 1150  Lipase  STAT         Ordered SHAHLA, RAMONA VINCENT    03/27/24 1151 03/27/24 1150  Urinalysis, Reflex to Urine Culture Urine, Clean Catch  STAT         Ordered SHAHLA, RAMONA MELISA    03/27/24 1151 03/27/24 1150  CT Abdomen Pelvis With IV Contrast NO Oral Contrast  1 time imaging         Ordered SHAHLARAMONA SAEED              Virtual Visit Note: The provider triage portion of this emergency department evaluation and documentation was performed via [email or web address], a HIPAA-compliant telemedicine application, in concert with a tele-presenter in the room. A face to face patient evaluation with one of my colleagues will occur once the patient is placed in an emergency department room.      DISCLAIMER: This note was prepared with [employer] voice recognition transcription software. Garbled syntax, mangled pronouns, and other bizarre constructions may be attributed to that software system.

## 2024-04-17 ENCOUNTER — OFFICE VISIT (OUTPATIENT)
Dept: PULMONOLOGY | Facility: CLINIC | Age: 54
End: 2024-04-17
Payer: OTHER GOVERNMENT

## 2024-04-17 VITALS
HEART RATE: 95 BPM | HEIGHT: 63 IN | OXYGEN SATURATION: 98 % | SYSTOLIC BLOOD PRESSURE: 140 MMHG | BODY MASS INDEX: 27.46 KG/M2 | WEIGHT: 155 LBS | DIASTOLIC BLOOD PRESSURE: 100 MMHG

## 2024-04-17 DIAGNOSIS — Z72.0 TOBACCO ABUSE: ICD-10-CM

## 2024-04-17 DIAGNOSIS — M79.89 MASS OF SOFT TISSUE OF FACE: ICD-10-CM

## 2024-04-17 DIAGNOSIS — D86.9 SARCOIDOSIS: Primary | ICD-10-CM

## 2024-04-17 PROCEDURE — 99214 OFFICE O/P EST MOD 30 MIN: CPT | Mod: S$PBB,,, | Performed by: INTERNAL MEDICINE

## 2024-04-17 NOTE — PROGRESS NOTES
SUBJECTIVE:    Patient ID: Sumit Burger is a 53 y.o. female.    Chief Complaint: Follow-up and Sarcoidosis  The patient is here with her breathing doing well.  She has not started pulm rehab. She did buy a pulse ox.  She is off of prednisone.  She took herself off.  She still has the orbital rim knot. She has not seen an oral maxillofacial surgeon.  She has not heard anything despite 3 calls.  She had an angiogram which was fine.   Past Medical History:   Diagnosis Date    HTN (hypertension)     Pneumonia 10/2020    Sarcoidosis      Past Surgical History:   Procedure Laterality Date    BRONCHOSCOPY WITH FLUOROSCOPY Right 10/20/2020    Procedure: BRONCHOSCOPY, WITH FLUOROSCOPY;  Surgeon: Ava Rolon MD;  Location: [facility] ENDO;  Service: Pulmonary;  Laterality: Right;     SECTION      x2    FALLOPIAN TUBOPLASTY      LAPAROSCOPIC APPENDECTOMY N/A 8/10/2022    Procedure: APPENDECTOMY, LAPAROSCOPIC;  Surgeon: Jackson Vogel MD;  Location: [facility] OR;  Service: General;  Laterality: N/A;    TUBAL LIGATION      WRIST FRACTURE SURGERY Right     ganglion cyst     Family History   Problem Relation Name Age of Onset    Hypertension Mother          Social History:   Marital Status:   Occupation: Dental hygienist  Alcohol History:  reports that she does not currently use alcohol.  Tobacco History:  reports that she has been smoking cigarettes. She has never used smokeless tobacco. She has stopped smoking.  Drug History:  reports no history of drug use.    Review of patient's allergies indicates:   Allergen Reactions    Sulfamethoxazole-trimethoprim Hives, Shortness Of Breath, Nausea And Vomiting and Rash     Hives, nausea and vomiting, and shortness of breath.  Did not require admission  Hives, nausea and vomiting, and shortness of breath.  Did not require admission    Benadryl [diphenhydramine hcl]     Latex, natural rubber     Percocet [oxycodone-acetaminophen]     Shrimp     Codeine Itching and  Nausea Only       Current Outpatient Medications   Medication Sig Dispense Refill    albuterol (PROVENTIL/VENTOLIN HFA) 90 mcg/actuation inhaler Inhale 2 puffs into the lungs every 4 to 6 hours as needed for Shortness of Breath. Rescue 6.7 g 0    buPROPion (WELLBUTRIN XL) 150 MG TB24 tablet Take 1 tablet (150 mg total) by mouth once daily. 30 tablet 11    estradiol-norethindrone (COMBIPATCH) 0.05-0.14 mg/24 hr APPLY PATCH TOPICALLY TWICE WEEKLY HOT FLASHES      folic acid (FOLVITE) 1 MG tablet Take 1 tablet by mouth once daily.      spironolactone (ALDACTONE) 25 MG tablet Take 25 mg by mouth once daily.      valsartan (DIOVAN) 160 MG tablet Take 1 tablet (160 mg total) by mouth every evening. 90 tablet 0    zolpidem (AMBIEN) 5 MG Tab Take 1 tablet (5 mg total) by mouth nightly as needed. 30 tablet 0    predniSONE (DELTASONE) 10 MG tablet Take 2 tablets (20 mg total) by mouth once daily. (Patient not taking: Reported on 4/17/2024) 30 tablet 0     No current facility-administered medications for this visit.         Last PFT: 10/11/223   No obstruction, mild restriction, moderate diffusion defect, better than 2020  Last CT chest:10/11/23  IMPRESSION:  1. Stable appearance the chest and compared with evidence of diffuse micronodular opacities and areas of platelike scarring and nodular consolidation in the right upper lobe peripherally as well as left lower lobe subpleural region.  2. Mild diffuse bronchiectasis without evidence of any definable change upon comparison     Review of Systems  General: feeling ok  Eyes: Vision is good.  Bony mass on the inferior orbital rim which hurts  ENT: no sinusitis  Heart:: palpitations still  Lungs: breathing is much better  GI: constipation   : No dysuria, hesitancy, or nocturia.  Musculoskeletal: feeling much better  Skin: No lesions or rashes.  Neuro: no headaches   Lymph: feet and hands swell at times  Psych:  anxiety and depression  Endo: trying to lose weight    OBJECTIVE:  "     BP (!) 140/100 (BP Location: Left arm, Patient Position: Sitting, BP Method: Medium (Manual))   Pulse 95   Ht 5' 3" (1.6 m)   Wt 70.3 kg (155 lb)   LMP 06/24/2022   SpO2 98%   BMI 27.46 kg/m²     Physical Exam  GENERAL: Midaged patient in no distress.  HEENT: Pupils equal and reactive. Extraocular movements intact. Nose intact.  Pharynx pink.  Mallampati 4  The patient has what feels like a bony mass in the inferior orbital rim on the right which extends down onto the maxillary bone  NECK: Supple.  14 in  HEART: Regular rate and rhythm. No murmur or gallop auscultated.  LUNGS: Clear to auscultation and percussion. Lung excursion symmetrical. No change in fremitus. No adventitial noises.  ABDOMEN: Bowel sounds present. Non-tender, no masses palpated.  EXTREMITIES: Normal muscle tone and joint movement, no cyanosis or clubbing.   LYMPHATICS: No adenopathy palpated, no edema.  SKIN: Dry, intact, no lesions.   NEURO: Cranial nerves II-XII intact. Motor strength 5/5 bilaterally, upper and lower extremities.  PSYCH: Appropriate affect.    [place] Sleepiness is 6  Assessment:       1. Sarcoidosis    2. Mass of soft tissue of face    3. Tobacco abuse                  Plan:       Sumit was seen today for follow-up and sarcoidosis.    Diagnoses and all orders for this visit:    Sarcoidosis    Mass of soft tissue of face    Tobacco abuse            Follow up in about 6 months (around 10/17/2024).  Pulmonary [facility] to help her get over her dyspnea[facility]  Call if you need to come in sooner  Start walking with your pulse ox keep sats greater than 90%                    "

## 2024-06-03 ENCOUNTER — TELEPHONE (OUTPATIENT)
Dept: OPHTHALMOLOGY | Facility: CLINIC | Age: 54
End: 2024-06-03
Payer: OTHER GOVERNMENT

## 2024-06-03 NOTE — TELEPHONE ENCOUNTER
----- Message from Shlomo Aguirre MD sent at 6/3/2024  1:21 PM CDT -----  She saw Dr. Perez last week and had a normal dilated eye exam.  I looked at CT scan as well.  She either needs ENT or oculoplastics for an evaluation of this.  I would recommend ENT.  I can't help her.    CC  ----- Message -----  From: Meka Tran  Sent: 6/3/2024  11:04 AM CDT  To: Slhomo Aguirre MD    Hi. Can you look into pt chart. Has appt booked on 9/13 for mass on face orbital bone? Should we send somewhere else?   Meka

## 2024-06-04 ENCOUNTER — TELEPHONE (OUTPATIENT)
Dept: OPHTHALMOLOGY | Facility: CLINIC | Age: 54
End: 2024-06-04
Payer: OTHER GOVERNMENT

## 2024-06-22 ENCOUNTER — HOSPITAL ENCOUNTER (INPATIENT)
Facility: HOSPITAL | Age: 54
LOS: 3 days | Discharge: HOME | DRG: 194 | End: 2024-06-26
Attending: STUDENT IN AN ORGANIZED HEALTH CARE EDUCATION/TRAINING PROGRAM | Admitting: INTERNAL MEDICINE
Payer: OTHER GOVERNMENT

## 2024-06-22 DIAGNOSIS — R07.9 CHEST PAIN, UNSPECIFIED TYPE: ICD-10-CM

## 2024-06-22 DIAGNOSIS — R06.02 SHORTNESS OF BREATH: Primary | ICD-10-CM

## 2024-06-22 DIAGNOSIS — J84.9 INTERSTITIAL LUNG DISEASE: ICD-10-CM

## 2024-06-22 DIAGNOSIS — R07.9 CHEST PAIN: ICD-10-CM

## 2024-06-22 DIAGNOSIS — D86.0 SARCOIDOSIS OF LUNG: ICD-10-CM

## 2024-06-22 PROBLEM — R79.89 ELEVATED D-DIMER: Status: ACTIVE | Noted: 2024-06-22

## 2024-06-22 PROBLEM — I16.0 HYPERTENSIVE URGENCY: Status: ACTIVE | Noted: 2024-06-22

## 2024-06-22 LAB
ALBUMIN SERPL BCP-MCNC: 4.5 G/DL (ref 3.5–5.2)
ALP SERPL-CCNC: 144 U/L (ref 55–135)
ALT SERPL W/O P-5'-P-CCNC: 34 U/L (ref 10–44)
ANION GAP SERPL CALC-SCNC: 10 MMOL/L (ref 8–16)
AST SERPL-CCNC: 32 U/L (ref 10–40)
BASOPHILS # BLD AUTO: 0.07 K/UL (ref 0–0.2)
BASOPHILS NFR BLD: 0.9 % (ref 0–1.9)
BILIRUB SERPL-MCNC: 0.4 MG/DL (ref 0.1–1)
BNP SERPL-MCNC: 6 PG/ML (ref 0–99)
BUN SERPL-MCNC: 12 MG/DL (ref 6–20)
CALCIUM SERPL-MCNC: 10.1 MG/DL (ref 8.7–10.5)
CHLORIDE SERPL-SCNC: 102 MMOL/L (ref 95–110)
CO2 SERPL-SCNC: 22 MMOL/L (ref 23–29)
CREAT SERPL-MCNC: 0.7 MG/DL (ref 0.5–1.4)
D DIMER PPP IA.FEU-MCNC: 1.95 MG/L FEU (ref 0–0.49)
DIFFERENTIAL METHOD BLD: NORMAL
EOSINOPHIL # BLD AUTO: 0.1 K/UL (ref 0–0.5)
EOSINOPHIL NFR BLD: 1.1 % (ref 0–8)
ERYTHROCYTE [DISTWIDTH] IN BLOOD BY AUTOMATED COUNT: 14.1 % (ref 11.5–14.5)
EST. GFR  (NO RACE VARIABLE): >60 ML/MIN/1.73 M^2
GLUCOSE SERPL-MCNC: 85 MG/DL (ref 70–110)
GROUP A STREP, MOLECULAR: NEGATIVE
HCT VFR BLD AUTO: 44.9 % (ref 37–48.5)
HGB BLD-MCNC: 14.5 G/DL (ref 12–16)
IMM GRANULOCYTES # BLD AUTO: 0.02 K/UL (ref 0–0.04)
IMM GRANULOCYTES NFR BLD AUTO: 0.3 % (ref 0–0.5)
INFLUENZA A, MOLECULAR: NEGATIVE
INFLUENZA B, MOLECULAR: NEGATIVE
LYMPHOCYTES # BLD AUTO: 2.1 K/UL (ref 1–4.8)
LYMPHOCYTES NFR BLD: 27.9 % (ref 18–48)
MAGNESIUM SERPL-MCNC: 1.9 MG/DL (ref 1.6–2.6)
MCH RBC QN AUTO: 27.5 PG (ref 27–31)
MCHC RBC AUTO-ENTMCNC: 32.3 G/DL (ref 32–36)
MCV RBC AUTO: 85 FL (ref 82–98)
MONOCYTES # BLD AUTO: 0.6 K/UL (ref 0.3–1)
MONOCYTES NFR BLD: 8 % (ref 4–15)
NEUTROPHILS # BLD AUTO: 4.6 K/UL (ref 1.8–7.7)
NEUTROPHILS NFR BLD: 61.8 % (ref 38–73)
NRBC BLD-RTO: 0 /100 WBC
PLATELET # BLD AUTO: 325 K/UL (ref 150–450)
PMV BLD AUTO: 11.1 FL (ref 9.2–12.9)
POTASSIUM SERPL-SCNC: 3.9 MMOL/L (ref 3.5–5.1)
PROT SERPL-MCNC: 8.5 G/DL (ref 6–8.4)
RBC # BLD AUTO: 5.28 M/UL (ref 4–5.4)
SARS-COV-2 RDRP RESP QL NAA+PROBE: NEGATIVE
SODIUM SERPL-SCNC: 134 MMOL/L (ref 136–145)
SPECIMEN SOURCE: NORMAL
TROPONIN I SERPL HS-MCNC: 4.2 PG/ML (ref 0–14.9)
TROPONIN I SERPL HS-MCNC: 5 PG/ML (ref 0–14.9)
WBC # BLD AUTO: 7.38 K/UL (ref 3.9–12.7)

## 2024-06-22 PROCEDURE — 80053 COMPREHEN METABOLIC PANEL: CPT

## 2024-06-22 PROCEDURE — 94799 UNLISTED PULMONARY SVC/PX: CPT

## 2024-06-22 PROCEDURE — 63600175 PHARM REV CODE 636 W HCPCS

## 2024-06-22 PROCEDURE — 96376 TX/PRO/DX INJ SAME DRUG ADON: CPT

## 2024-06-22 PROCEDURE — 96361 HYDRATE IV INFUSION ADD-ON: CPT

## 2024-06-22 PROCEDURE — 83735 ASSAY OF MAGNESIUM: CPT

## 2024-06-22 PROCEDURE — 36415 COLL VENOUS BLD VENIPUNCTURE: CPT

## 2024-06-22 PROCEDURE — 96365 THER/PROPH/DIAG IV INF INIT: CPT

## 2024-06-22 PROCEDURE — 96372 THER/PROPH/DIAG INJ SC/IM: CPT | Performed by: INTERNAL MEDICINE

## 2024-06-22 PROCEDURE — G0378 HOSPITAL OBSERVATION PER HR: HCPCS

## 2024-06-22 PROCEDURE — 93005 ELECTROCARDIOGRAM TRACING: CPT | Performed by: INTERNAL MEDICINE

## 2024-06-22 PROCEDURE — 25000242 PHARM REV CODE 250 ALT 637 W/ HCPCS

## 2024-06-22 PROCEDURE — 25000003 PHARM REV CODE 250

## 2024-06-22 PROCEDURE — 99900035 HC TECH TIME PER 15 MIN (STAT)

## 2024-06-22 PROCEDURE — 87651 STREP A DNA AMP PROBE: CPT

## 2024-06-22 PROCEDURE — U0002 COVID-19 LAB TEST NON-CDC: HCPCS

## 2024-06-22 PROCEDURE — 25000003 PHARM REV CODE 250: Performed by: INTERNAL MEDICINE

## 2024-06-22 PROCEDURE — 99285 EMERGENCY DEPT VISIT HI MDM: CPT | Mod: 25

## 2024-06-22 PROCEDURE — 85379 FIBRIN DEGRADATION QUANT: CPT

## 2024-06-22 PROCEDURE — 94640 AIRWAY INHALATION TREATMENT: CPT

## 2024-06-22 PROCEDURE — 84484 ASSAY OF TROPONIN QUANT: CPT | Mod: 91

## 2024-06-22 PROCEDURE — 87040 BLOOD CULTURE FOR BACTERIA: CPT

## 2024-06-22 PROCEDURE — 25500020 PHARM REV CODE 255: Performed by: STUDENT IN AN ORGANIZED HEALTH CARE EDUCATION/TRAINING PROGRAM

## 2024-06-22 PROCEDURE — 96367 TX/PROPH/DG ADDL SEQ IV INF: CPT

## 2024-06-22 PROCEDURE — 93010 ELECTROCARDIOGRAM REPORT: CPT | Mod: ,,, | Performed by: INTERNAL MEDICINE

## 2024-06-22 PROCEDURE — 85025 COMPLETE CBC W/AUTO DIFF WBC: CPT

## 2024-06-22 PROCEDURE — 63600175 PHARM REV CODE 636 W HCPCS: Performed by: INTERNAL MEDICINE

## 2024-06-22 PROCEDURE — 94761 N-INVAS EAR/PLS OXIMETRY MLT: CPT

## 2024-06-22 PROCEDURE — 87502 INFLUENZA DNA AMP PROBE: CPT

## 2024-06-22 PROCEDURE — 96375 TX/PRO/DX INJ NEW DRUG ADDON: CPT

## 2024-06-22 PROCEDURE — 83880 ASSAY OF NATRIURETIC PEPTIDE: CPT

## 2024-06-22 RX ORDER — AMOXICILLIN 250 MG
1 CAPSULE ORAL DAILY PRN
Status: DISCONTINUED | OUTPATIENT
Start: 2024-06-22 | End: 2024-06-26 | Stop reason: HOSPADM

## 2024-06-22 RX ORDER — SODIUM,POTASSIUM PHOSPHATES 280-250MG
2 POWDER IN PACKET (EA) ORAL
Status: DISCONTINUED | OUTPATIENT
Start: 2024-06-22 | End: 2024-06-26 | Stop reason: HOSPADM

## 2024-06-22 RX ORDER — FOLIC ACID 1 MG/1
1000 TABLET ORAL DAILY
Status: DISCONTINUED | OUTPATIENT
Start: 2024-06-23 | End: 2024-06-26 | Stop reason: HOSPADM

## 2024-06-22 RX ORDER — HYDROCODONE BITARTRATE AND ACETAMINOPHEN 5; 325 MG/1; MG/1
1 TABLET ORAL EVERY 6 HOURS PRN
Status: DISCONTINUED | OUTPATIENT
Start: 2024-06-22 | End: 2024-06-26 | Stop reason: HOSPADM

## 2024-06-22 RX ORDER — LANOLIN ALCOHOL/MO/W.PET/CERES
800 CREAM (GRAM) TOPICAL
Status: DISCONTINUED | OUTPATIENT
Start: 2024-06-22 | End: 2024-06-26 | Stop reason: HOSPADM

## 2024-06-22 RX ORDER — METHYLPREDNISOLONE SOD SUCC 125 MG
125 VIAL (EA) INJECTION
Status: DISCONTINUED | OUTPATIENT
Start: 2024-06-22 | End: 2024-06-23

## 2024-06-22 RX ORDER — SODIUM CHLORIDE 0.9 % (FLUSH) 0.9 %
10 SYRINGE (ML) INJECTION EVERY 12 HOURS PRN
Status: DISCONTINUED | OUTPATIENT
Start: 2024-06-22 | End: 2024-06-26 | Stop reason: HOSPADM

## 2024-06-22 RX ORDER — BENZONATATE 100 MG/1
100 CAPSULE ORAL 3 TIMES DAILY PRN
Status: DISCONTINUED | OUTPATIENT
Start: 2024-06-22 | End: 2024-06-26 | Stop reason: HOSPADM

## 2024-06-22 RX ORDER — VANCOMYCIN HCL IN 5 % DEXTROSE 1G/250ML
1000 PLASTIC BAG, INJECTION (ML) INTRAVENOUS
Status: DISCONTINUED | OUTPATIENT
Start: 2024-06-23 | End: 2024-06-24

## 2024-06-22 RX ORDER — TALC
6 POWDER (GRAM) TOPICAL NIGHTLY PRN
Status: DISCONTINUED | OUTPATIENT
Start: 2024-06-22 | End: 2024-06-26 | Stop reason: HOSPADM

## 2024-06-22 RX ORDER — ONDANSETRON HYDROCHLORIDE 2 MG/ML
4 INJECTION, SOLUTION INTRAVENOUS EVERY 6 HOURS PRN
Status: DISCONTINUED | OUTPATIENT
Start: 2024-06-22 | End: 2024-06-26 | Stop reason: HOSPADM

## 2024-06-22 RX ORDER — ONDANSETRON HYDROCHLORIDE 2 MG/ML
4 INJECTION, SOLUTION INTRAVENOUS
Status: COMPLETED | OUTPATIENT
Start: 2024-06-22 | End: 2024-06-22

## 2024-06-22 RX ORDER — IPRATROPIUM BROMIDE AND ALBUTEROL SULFATE 2.5; .5 MG/3ML; MG/3ML
3 SOLUTION RESPIRATORY (INHALATION) EVERY 4 HOURS PRN
Status: DISCONTINUED | OUTPATIENT
Start: 2024-06-22 | End: 2024-06-23

## 2024-06-22 RX ORDER — HYDRALAZINE HYDROCHLORIDE 20 MG/ML
10 INJECTION INTRAMUSCULAR; INTRAVENOUS EVERY 4 HOURS PRN
Status: DISCONTINUED | OUTPATIENT
Start: 2024-06-22 | End: 2024-06-26 | Stop reason: HOSPADM

## 2024-06-22 RX ORDER — METHYLPREDNISOLONE SOD SUCC 125 MG
125 VIAL (EA) INJECTION
Status: COMPLETED | OUTPATIENT
Start: 2024-06-22 | End: 2024-06-22

## 2024-06-22 RX ORDER — NALOXONE HCL 0.4 MG/ML
0.02 VIAL (ML) INJECTION
Status: DISCONTINUED | OUTPATIENT
Start: 2024-06-22 | End: 2024-06-26 | Stop reason: HOSPADM

## 2024-06-22 RX ORDER — LORAZEPAM 2 MG/ML
0.5 INJECTION INTRAMUSCULAR EVERY 6 HOURS PRN
Status: DISCONTINUED | OUTPATIENT
Start: 2024-06-22 | End: 2024-06-22

## 2024-06-22 RX ORDER — ENOXAPARIN SODIUM 100 MG/ML
40 INJECTION SUBCUTANEOUS EVERY 24 HOURS
Status: DISCONTINUED | OUTPATIENT
Start: 2024-06-22 | End: 2024-06-26 | Stop reason: HOSPADM

## 2024-06-22 RX ORDER — FAMOTIDINE 20 MG/1
20 TABLET, FILM COATED ORAL 2 TIMES DAILY
Status: DISCONTINUED | OUTPATIENT
Start: 2024-06-22 | End: 2024-06-26 | Stop reason: HOSPADM

## 2024-06-22 RX ORDER — ALUMINUM HYDROXIDE, MAGNESIUM HYDROXIDE, AND SIMETHICONE 1200; 120; 1200 MG/30ML; MG/30ML; MG/30ML
30 SUSPENSION ORAL 4 TIMES DAILY PRN
Status: DISCONTINUED | OUTPATIENT
Start: 2024-06-22 | End: 2024-06-26 | Stop reason: HOSPADM

## 2024-06-22 RX ORDER — ACETAMINOPHEN 325 MG/1
650 TABLET ORAL EVERY 4 HOURS PRN
Status: DISCONTINUED | OUTPATIENT
Start: 2024-06-22 | End: 2024-06-26 | Stop reason: HOSPADM

## 2024-06-22 RX ORDER — ALBUTEROL SULFATE 90 UG/1
2 AEROSOL, METERED RESPIRATORY (INHALATION) EVERY 6 HOURS PRN
Status: DISCONTINUED | OUTPATIENT
Start: 2024-06-22 | End: 2024-06-22

## 2024-06-22 RX ORDER — MORPHINE SULFATE 2 MG/ML
2 INJECTION, SOLUTION INTRAMUSCULAR; INTRAVENOUS
Status: COMPLETED | OUTPATIENT
Start: 2024-06-22 | End: 2024-06-22

## 2024-06-22 RX ORDER — ALBUTEROL SULFATE 0.83 MG/ML
2.5 SOLUTION RESPIRATORY (INHALATION) EVERY 6 HOURS PRN
Status: DISCONTINUED | OUTPATIENT
Start: 2024-06-22 | End: 2024-06-24

## 2024-06-22 RX ORDER — ALBUTEROL SULFATE 0.83 MG/ML
2.5 SOLUTION RESPIRATORY (INHALATION)
Status: COMPLETED | OUTPATIENT
Start: 2024-06-22 | End: 2024-06-22

## 2024-06-22 RX ORDER — IPRATROPIUM BROMIDE AND ALBUTEROL SULFATE 2.5; .5 MG/3ML; MG/3ML
3 SOLUTION RESPIRATORY (INHALATION)
Status: COMPLETED | OUTPATIENT
Start: 2024-06-22 | End: 2024-06-22

## 2024-06-22 RX ORDER — MORPHINE SULFATE 4 MG/ML
4 INJECTION, SOLUTION INTRAMUSCULAR; INTRAVENOUS EVERY 4 HOURS PRN
Status: DISCONTINUED | OUTPATIENT
Start: 2024-06-22 | End: 2024-06-26 | Stop reason: HOSPADM

## 2024-06-22 RX ORDER — VALSARTAN 80 MG/1
160 TABLET ORAL NIGHTLY
Status: DISCONTINUED | OUTPATIENT
Start: 2024-06-22 | End: 2024-06-26 | Stop reason: HOSPADM

## 2024-06-22 RX ADMIN — METHYLPREDNISOLONE SODIUM SUCCINATE 125 MG: 125 INJECTION, POWDER, FOR SOLUTION INTRAMUSCULAR; INTRAVENOUS at 07:06

## 2024-06-22 RX ADMIN — IPRATROPIUM BROMIDE AND ALBUTEROL SULFATE 3 ML: .5; 3 SOLUTION RESPIRATORY (INHALATION) at 10:06

## 2024-06-22 RX ADMIN — MORPHINE SULFATE 4 MG: 4 INJECTION, SOLUTION INTRAMUSCULAR; INTRAVENOUS at 09:06

## 2024-06-22 RX ADMIN — ENOXAPARIN SODIUM 40 MG: 40 INJECTION SUBCUTANEOUS at 09:06

## 2024-06-22 RX ADMIN — HYDROCODONE BITARTRATE AND ACETAMINOPHEN 1 TABLET: 5; 325 TABLET ORAL at 07:06

## 2024-06-22 RX ADMIN — CEFTRIAXONE SODIUM 1 G: 1 INJECTION, POWDER, FOR SOLUTION INTRAMUSCULAR; INTRAVENOUS at 02:06

## 2024-06-22 RX ADMIN — IOHEXOL 100 ML: 350 INJECTION, SOLUTION INTRAVENOUS at 12:06

## 2024-06-22 RX ADMIN — Medication 6 MG: at 11:06

## 2024-06-22 RX ADMIN — VANCOMYCIN HYDROCHLORIDE 1250 MG: 1.25 INJECTION, POWDER, LYOPHILIZED, FOR SOLUTION INTRAVENOUS at 03:06

## 2024-06-22 RX ADMIN — SODIUM CHLORIDE 1000 ML: 9 INJECTION, SOLUTION INTRAVENOUS at 12:06

## 2024-06-22 RX ADMIN — ONDANSETRON 4 MG: 2 INJECTION INTRAMUSCULAR; INTRAVENOUS at 10:06

## 2024-06-22 RX ADMIN — ALBUTEROL SULFATE 2.5 MG: 2.5 SOLUTION RESPIRATORY (INHALATION) at 10:06

## 2024-06-22 RX ADMIN — MORPHINE SULFATE 2 MG: 2 INJECTION, SOLUTION INTRAMUSCULAR; INTRAVENOUS at 10:06

## 2024-06-22 RX ADMIN — FAMOTIDINE 20 MG: 20 TABLET ORAL at 09:06

## 2024-06-22 RX ADMIN — METHYLPREDNISOLONE SODIUM SUCCINATE 125 MG: 125 INJECTION, POWDER, FOR SOLUTION INTRAMUSCULAR; INTRAVENOUS at 10:06

## 2024-06-22 RX ADMIN — VALSARTAN 160 MG: 80 TABLET, FILM COATED ORAL at 09:06

## 2024-06-22 NOTE — PHARMACY MED REC
"Admission Medication History     The home medication history was taken by Bernard Guzman.    You may go to "Admission" then "Reconcile Home Medications" tabs to review and/or act upon these items.     The home medication list has been updated by the Pharmacy department.   Please read ALL comments highlighted in yellow.   Please address this information as you see fit.    Feel free to contact us if you have any questions or require assistance.      The medications listed below were removed from the home medication list. Please reorder if appropriate:  Patient reports no longer taking the following medication(s):  Estradiol-Norethindrone Patch  Spironolactone 25 mg  Zolpidem 5 mg    Medications listed below were obtained from: Patient/family and Analytic software- [email or web address]  No current facility-administered medications on file prior to encounter.     Current Outpatient Medications on File Prior to Encounter   Medication Sig Dispense Refill    albuterol (PROVENTIL/VENTOLIN HFA) 90 mcg/actuation inhaler Inhale 2 puffs into the lungs every 4 to 6 hours as needed for Shortness of Breath. Rescue 6.7 g 0    folic acid (FOLVITE) 1 MG tablet Take 1 tablet by mouth once daily.      predniSONE (DELTASONE) 10 MG tablet Take 2 tablets (20 mg total) by mouth once daily. 30 tablet 0    valsartan (DIOVAN) 160 MG tablet Take 1 tablet (160 mg total) by mouth every evening. 90 tablet 0    buPROPion (WELLBUTRIN XL) 150 MG TB24 tablet Take 1 tablet (150 mg total) by mouth once daily. (Patient not taking: Reported on 6/22/2024) 30 tablet 11    [DISCONTINUED] estradiol-norethindrone (COMBIPATCH) 0.05-0.14 mg/24 hr APPLY PATCH TOPICALLY TWICE WEEKLY HOT FLASHES      [DISCONTINUED] spironolactone (ALDACTONE) 25 MG tablet Take 25 mg by mouth once daily.      [DISCONTINUED] zolpidem (AMBIEN) 5 MG Tab Take 1 tablet (5 mg total) by mouth nightly as needed. 30 tablet 0           Bernard Guzman  EXT 1924                .          "

## 2024-06-22 NOTE — SUBJECTIVE & OBJECTIVE
Past Medical History:   Diagnosis Date    HTN (hypertension)     Pneumonia 10/2020    Sarcoidosis        Past Surgical History:   Procedure Laterality Date    BRONCHOSCOPY WITH FLUOROSCOPY Right 10/20/2020    Procedure: BRONCHOSCOPY, WITH FLUOROSCOPY;  Surgeon: Ava Rolon MD;  Location: [facility] ENDO;  Service: Pulmonary;  Laterality: Right;     SECTION      x2    FALLOPIAN TUBOPLASTY      LAPAROSCOPIC APPENDECTOMY N/A 8/10/2022    Procedure: APPENDECTOMY, LAPAROSCOPIC;  Surgeon: Jackson Vogel MD;  Location: [facility] OR;  Service: General;  Laterality: N/A;    TUBAL LIGATION      WRIST FRACTURE SURGERY Right     ganglion cyst       Review of patient's allergies indicates:   Allergen Reactions    Sulfamethoxazole-trimethoprim Hives, Shortness Of Breath, Nausea And Vomiting and Rash     Hives, nausea and vomiting, and shortness of breath.  Did not require admission  Hives, nausea and vomiting, and shortness of breath.  Did not require admission    Benadryl [diphenhydramine hcl]     Latex, natural rubber     Percocet [oxycodone-acetaminophen]     Shrimp     Codeine Itching and Nausea Only       No current facility-administered medications on file prior to encounter.     Current Outpatient Medications on File Prior to Encounter   Medication Sig    albuterol (PROVENTIL/VENTOLIN HFA) 90 mcg/actuation inhaler Inhale 2 puffs into the lungs every 4 to 6 hours as needed for Shortness of Breath. Rescue    folic acid (FOLVITE) 1 MG tablet Take 1 tablet by mouth once daily.    predniSONE (DELTASONE) 10 MG tablet Take 2 tablets (20 mg total) by mouth once daily.    valsartan (DIOVAN) 160 MG tablet Take 1 tablet (160 mg total) by mouth every evening.    buPROPion (WELLBUTRIN XL) 150 MG TB24 tablet Take 1 tablet (150 mg total) by mouth once daily. (Patient not taking: Reported on 2024)    [DISCONTINUED] estradiol-norethindrone (COMBIPATCH) 0.05-0.14 mg/24 hr APPLY PATCH TOPICALLY TWICE WEEKLY HOT FLASHES     [DISCONTINUED] spironolactone (ALDACTONE) 25 MG tablet Take 25 mg by mouth once daily.    [DISCONTINUED] zolpidem (AMBIEN) 5 MG Tab Take 1 tablet (5 mg total) by mouth nightly as needed.     Family History       Problem Relation (Age of Onset)    Hypertension Mother          Tobacco Use    Smoking status: Light Smoker     Current packs/day: 0.00     Types: Cigarettes     Last attempt to quit: 2020     Years since quittin.1    Smokeless tobacco: Never    Tobacco comments:     ocassionally never was qd   Substance and Sexual Activity    Alcohol use: Not Currently    Drug use: Never    Sexual activity: Not Currently     Review of Systems   Constitutional:  Negative for chills and fever.   Respiratory:  Positive for cough, chest tightness and shortness of breath.    Cardiovascular:  Positive for chest pain.   Gastrointestinal:  Positive for abdominal pain, constipation, diarrhea, nausea and vomiting.   Neurological:  Positive for light-headedness. Negative for syncope.     Objective:     Vital Signs (Most Recent):  Temp: 98.3 °F (36.8 °C) (24 0915)  Pulse: 98 (24 1230)  Resp: 20 (24 1230)  BP: (!) 156/90 (24 1230)  SpO2: 98 % (24 1230) Vital Signs (24h Range):  Temp:  [98.3 °F (36.8 °C)] 98.3 °F (36.8 °C)  Pulse:  [] 98  Resp:  [16-27] 20  SpO2:  [95 %-98 %] 98 %  BP: (143-202)/() 156/90     Weight: 65.8 kg (145 lb)  Body mass index is 25.69 kg/m².     Physical Exam  Vitals reviewed.   Constitutional:       Interventions: Nasal cannula in place.   HENT:      Head: Normocephalic and atraumatic.   Cardiovascular:      Rate and Rhythm: Regular rhythm. Tachycardia present.   Pulmonary:      Effort: Pulmonary effort is normal. No respiratory distress.      Breath sounds: Decreased air movement present.   Abdominal:      General: Bowel sounds are normal.      Palpations: Abdomen is soft.      Tenderness: There is no abdominal tenderness.   Musculoskeletal:         General:  "Normal range of motion.      Right lower leg: No edema.      Left lower leg: No edema.   Skin:     General: Skin is warm and dry.   Neurological:      Mental Status: She is alert and oriented to person, place, and time.   Psychiatric:         Mood and Affect: Affect is tearful.                Significant Labs: All pertinent labs within the past 24 hours have been reviewed.  Blood Culture: No results for input(s): "LABBLOO" in the last 48 hours.  CBC:   Recent Labs   Lab 06/22/24  1003   WBC 7.38   HGB 14.5   HCT 44.9        CMP:   Recent Labs   Lab 06/22/24  1003   *   K 3.9      CO2 22*   GLU 85   BUN 12   CREATININE 0.7   CALCIUM 10.1   PROT 8.5*   ALBUMIN 4.5   BILITOT 0.4   ALKPHOS 144*   AST 32   ALT 34   ANIONGAP 10     Cardiac Markers:   Recent Labs   Lab 06/22/24  1003   BNP 6     Magnesium:   Recent Labs   Lab 06/22/24  1003   MG 1.9     Troponin:   Recent Labs   Lab 06/22/24  1003 06/22/24  1218   TROPONINIHS 4.2 5.0       Significant Imaging: I have reviewed all pertinent imaging results/findings within the past 24 hours.  "

## 2024-06-22 NOTE — ASSESSMENT & PLAN NOTE
Patient has a current diagnosis of hypertensive urgency (without evidence of end organ damage) which is controlled.  Latest blood pressure and vitals reviewed-   Temp:  [98.3 °F (36.8 °C)]   Pulse:  []   Resp:  [16-27]   BP: (143-202)/()   SpO2:  [95 %-98 %] .   Patient currently off IV antihypertensives.   Home meds for hypertension were reviewed and noted below.   Hypertension Medications               valsartan (DIOVAN) 160 MG tablet Take 1 tablet (160 mg total) by mouth every evening.            Medication adjustment for hospital antihypertensives is as follows- Hydralazine PRN SBP>180, resume home meds    Will aim for controlled BP reduction by medications noted above. Monitor and mitigate end organ damage as indicated.

## 2024-06-22 NOTE — PROGRESS NOTES
Automatic Inhaler to Nebulizer Interchange    albuterol (Ventolin, ProAir, or Proventil)  mcg given multiple times per day changed to albuterol 2.5 mg Q6H PRN Shortness of Breath  per [facility] Automatic Therapeutic Substitutions Protocol.    Please contact pharmacy at extension [phone] with any questions.     Thank you,   Jesus Alberto Grant

## 2024-06-22 NOTE — ASSESSMENT & PLAN NOTE
"Patient has a diagnosis of pneumonia. The cause of the pneumonia is unknown at this time. The pneumonia is improving. The patient has the following signs/symptoms of pneumonia: cough, shortness of breath, and chest pain. The patient does have a current oxygen requirement and the patient does not have a home oxygen requirement. I have reviewed the pertinent imaging. The following cultures have been collected: Blood cultures The culture results are listed below.     Current antimicrobial regimen consists of the antibiotics listed below. Will monitor patient closely and continue current treatment plan unchanged.    Antibiotics (From admission, onward)      Start     Stop Route Frequency Ordered    06/23/24 1500  cefTRIAXone (ROCEPHIN) 2 g in dextrose 5 % 100 mL IVPB (ready to mix)         -- IV Every 24 hours (non-standard times) 06/22/24 1707    06/22/24 1742  vancomycin - pharmacy to dose  (vancomycin IVPB (PEDS and ADULTS))        Placed in "And" Linked Group    -- IV pharmacy to manage frequency 06/22/24 1707            Microbiology Results (last 7 days)       Procedure Component Value Units Date/Time    Blood culture (site 1) [[id number]]     Order Status: Sent Specimen: Blood     Blood culture (site 2) [[id number]]     Order Status: Sent Specimen: Blood     Group A Strep, Molecular [[id number]] Collected: 06/22/24 1009    Order Status: Completed Specimen: Throat Updated: 06/22/24 1047     Group A Strep, Molecular Negative     Comment: Arcanobacterium haemolyticum and Beta Streptococcus group C   and G will not be detected by this test method.  Please order   Throat Culture ([id number]) if suspected.               "

## 2024-06-22 NOTE — ED PROVIDER NOTES
"Encounter Date: 2024       History     Chief Complaint   Patient presents with    Cough     Cough x 6 days, increasing the last couple of days. Pt lung problems and states increasing sob and "just not getting better". Pt on steroids daily      Patient is a 53 y.o. female with past medical history of sarcoidosis and hypertension who presents to ED via self for concern for cough and shortness of breath which began 5 day(s) ago.  Patient reports for the last 5 days she was had cough that is progressively getting worse.  Patient reports last 3 days she has been having chest pain and shortness of breath.  Patient reports she can not walk a few steps without getting winded.  Patient denies fever, vomiting, or diarrhea.  Patient reports chills.  Patient reports she feels like the pain is in her lungs.  Patient reports she was on chronic steroids and usually takes prednisone 10 mg a day but has been taking 20 mg a day for the last 5 days.  Patient reports Dr. Rolon is her pulmonologist.  Patient is awake and alert in moderate distress due to pain.         Review of patient's allergies indicates:   Allergen Reactions    Sulfamethoxazole-trimethoprim Hives, Shortness Of Breath, Nausea And Vomiting and Rash     Hives, nausea and vomiting, and shortness of breath.  Did not require admission  Hives, nausea and vomiting, and shortness of breath.  Did not require admission    Benadryl [diphenhydramine hcl]     Latex, natural rubber     Percocet [oxycodone-acetaminophen]     Shrimp     Codeine Itching and Nausea Only     Past Medical History:   Diagnosis Date    HTN (hypertension)     Pneumonia 10/2020    Sarcoidosis      Past Surgical History:   Procedure Laterality Date    BRONCHOSCOPY WITH FLUOROSCOPY Right 10/20/2020    Procedure: BRONCHOSCOPY, WITH FLUOROSCOPY;  Surgeon: Ava Rolon MD;  Location: [facility];  Service: Pulmonary;  Laterality: Right;     SECTION      x2    FALLOPIAN TUBOPLASTY      " LAPAROSCOPIC APPENDECTOMY N/A 8/10/2022    Procedure: APPENDECTOMY, LAPAROSCOPIC;  Surgeon: Jackson Vogel MD;  Location: [facility];  Service: General;  Laterality: N/A;    TUBAL LIGATION      WRIST FRACTURE SURGERY Right     ganglion cyst     Family History   Problem Relation Name Age of Onset    Hypertension Mother       Social History     Tobacco Use    Smoking status: Light Smoker     Current packs/day: 0.00     Types: Cigarettes     Last attempt to quit: 2020     Years since quittin.1    Smokeless tobacco: Never    Tobacco comments:     ocassionally never was qd   Substance Use Topics    Alcohol use: Not Currently    Drug use: Never     Review of Systems   Constitutional:  Positive for chills. Negative for fever.   HENT: Negative.  Negative for sore throat, trouble swallowing and voice change.    Eyes: Negative.    Respiratory:  Positive for cough, chest tightness and shortness of breath. Negative for apnea, choking and stridor.    Cardiovascular:  Positive for chest pain. Negative for leg swelling.   Gastrointestinal: Negative.  Negative for abdominal pain, diarrhea, nausea and vomiting.   Genitourinary: Negative.  Negative for dysuria.   Musculoskeletal:  Negative for back pain, neck pain and neck stiffness.   Skin: Negative.  Negative for color change, pallor and rash.   Neurological: Negative.  Negative for weakness.   Hematological:  Does not bruise/bleed easily.   Psychiatric/Behavioral: Negative.         Physical Exam     Initial Vitals [24 0915]   BP Pulse Resp Temp SpO2   (!) 160/113 107 20 98.3 °F (36.8 °C) 98 %      MAP       --         Physical Exam    Nursing note and vitals reviewed.  Constitutional: She appears well-developed and well-nourished. She is not diaphoretic. No distress.   HENT:   Head: Normocephalic and atraumatic.   Right Ear: External ear normal.   Left Ear: External ear normal.   Nose: Nose normal.   Eyes: Conjunctivae and EOM are normal.   Neck:   Normal range of  motion.  Cardiovascular:  Normal rate, regular rhythm, normal heart sounds and intact distal pulses.     Exam reveals no gallop and no friction rub.       No murmur heard.  Pulmonary/Chest: Breath sounds normal. No accessory muscle usage. No tachypnea. No respiratory distress. She has no decreased breath sounds. She has no wheezes. She has no rhonchi. She has no rales. She exhibits no tenderness.   Patient has persistent cough.   Musculoskeletal:         General: Normal range of motion.      Cervical back: Normal range of motion.     Neurological: She is alert and oriented to person, place, and time. She has normal strength. GCS score is 15. GCS eye subscore is 4. GCS verbal subscore is 5. GCS motor subscore is 6.   Skin: Skin is warm and dry. Capillary refill takes less than 2 seconds.   Psychiatric: She has a normal mood and affect. Her behavior is normal. Judgment and thought content normal.         ED Course   Procedures  Labs Reviewed   COMPREHENSIVE METABOLIC PANEL - Abnormal; Notable for the following components:       Result Value    Sodium 134 (*)     CO2 22 (*)     Total Protein 8.5 (*)     Alkaline Phosphatase 144 (*)     All other components within normal limits   D DIMER, QUANTITATIVE - Abnormal; Notable for the following components:    D-Dimer 1.95 (*)     All other components within normal limits    Narrative:     ddim   critical result(s) called and verbal readback obtained from   nasim ami rn by [facility] 06/22/2024 11:09   GROUP A STREP, MOLECULAR   MAGNESIUM   CBC W/ AUTO DIFFERENTIAL   TROPONIN I HIGH SENSITIVITY   B-TYPE NATRIURETIC PEPTIDE   INFLUENZA A AND B ANTIGEN    Narrative:     Specimen Source->Nasopharyngeal Swab   SARS-COV-2 RNA AMPLIFICATION, QUAL   TROPONIN I HIGH SENSITIVITY        ECG Results              EKG 12-lead (In process)        Collection Time Result Time QRS Duration [facility] QTC Calculation    06/22/24 11:08:36 06/22/24 12:54:57 84 444                     In process by  Interface, Lab In [facility] (06/22/24 12:55:05)                   Narrative:    Test Reason : R06.02,    Vent. Rate : 108 BPM     Atrial Rate : 108 BPM     P-R Int : 124 ms          QRS Dur : 084 ms      QT Int : 332 ms       P-R-T Axes : 048 -04 021 degrees     QTc Int : 444 ms    Sinus tachycardia  Biatrial enlargement  LVH  Abnormal ECG  When compared with ECG of 11-OCT-2023 07:00,  No significant change was found    Referred By: [email or web address]   SELF           Confirmed By:                                   Imaging Results              CTA Chest Non-Coronary (PE Studies) (Final result)  Result time 06/22/24 12:37:25      Final result by Ced Dillon MD (06/22/24 12:37:25)                   Impression:      1. No evidence of pulmonary thromboembolic disease.  2. Significantly increasing bilateral diffuse ground-glass opacities and interstitial prominence, with underlying bronchiectasis.  There is also increasing mediastinal lymphadenopathy.  Findings are least in part related to patient's provided history of sarcoidosis.  Increasing pulmonary opacities may be on the basis of superimposed pneumonia, although worsening lung disease secondary to sarcoidosis could also give this appearance.  3. Additional details as above.      Electronically signed by: Ced Dillon  Date:    06/22/2024  Time:    12:37               Narrative:    EXAMINATION:  CTA CHEST NON CORONARY (PE STUDIES)    CLINICAL HISTORY:  Pulmonary embolism (PE) suspected, positive D-dimer;.    TECHNIQUE:  CT angiography targeted to the pulmonary arteries was performed. One hundred cc nonionic contrast was administered and the bolus was timed for optimal opacification of the pulmonary arteries. 3-D reformatted images were obtained on an independent workstation and stored as a part of patient's permanent medical record.    COMPARISON:  October 2023    FINDINGS:  Heart size is normal.  The thoracic aorta is normal in caliber.  The pulmonary arteries  are fairly well opacified.  There are no filling defects to indicate pulmonary thromboembolic disease.    There is mildly increasing mediastinal and bilateral hilar lymphadenopathy.  Images at lung windows demonstrate bilateral bronchiectasis diffuse bilateral ground-glass opacities and interstitial prominence appear increased compared to the prior study.  No pulmonary mass lesions or pleural effusions are identified.  The upper abdomen is unremarkable.                                       X-Ray Chest PA And Lateral (Final result)  Result time 06/22/24 10:38:51      Final result by Ced Dillon MD (06/22/24 10:38:51)                   Impression:      1. Increasing bilateral interstitial and ground-glass pulmonary opacities.  Findings may reflect progression of sarcoidosis.  Superimposed pneumonia or edema is not excluded.      Electronically signed by: Ced Dillon  Date:    06/22/2024  Time:    10:38               Narrative:    EXAMINATION:  XR CHEST PA AND LATERAL    CLINICAL HISTORY:  shortness of breath; sarcoidosis.    COMPARISON:  July 13, 2023    FINDINGS:  PA and lateral view show the heart and mediastinum to be within normal limits.    Diffuse bilateral abnormal interstitial prominence has increased compared to the prior study, with faint bilateral ground-glass opacities also increased.  There are no pleural effusions.  No acute osseous abnormalities are identified.                                       Medications   vancomycin - pharmacy to dose (has no administration in time range)   vancomycin 1.25 g in dextrose 5% 250 mL IVPB (ready to mix) (1,250 mg Intravenous New Bag 6/22/24 1500)   albuterol nebulizer solution 2.5 mg (2.5 mg Nebulization Given 6/22/24 1032)   albuterol-ipratropium 2.5 mg-0.5 mg/3 mL nebulizer solution 3 mL (3 mLs Nebulization Given 6/22/24 1032)   methylPREDNISolone sodium succinate injection 125 mg (125 mg Intravenous Given 6/22/24 1054)   morphine injection 2 mg (2  mg Intravenous Given 6/22/24 1054)   ondansetron injection 4 mg (4 mg Intravenous Given 6/22/24 1054)   sodium chloride 0.9% bolus 1,000 mL 1,000 mL (0 mLs Intravenous Stopped 6/22/24 1313)   iohexoL (OMNIPAQUE 350) injection 100 mL (100 mLs Intravenous Given 6/22/24 1206)   cefTRIAXone (ROCEPHIN) 1 g in dextrose 5 % 100 mL IVPB (ready to mix) (0 g Intravenous Stopped 6/22/24 1451)     Medical Decision Making  [facility]    Patient presents for emergent evaluation of acute cough and shortness of breath that poses a possible threat to life and/or bodily function.    Differential diagnosis included but not limited to pneumonia, PE, worsening sarcoidosis, upper viral respiratory illness, COVID, influenza.  In the ED patient found to have acute clear lung sounds with persistent cough.  Patient unable to control her cough on initial exam.  Patient has no tachypnea on exam.  Patient reports pain with in her lungs.  Patient was awake and alert and oriented x3.  Patient has a erythematous posterior pharynx.  Patient gets winded on conversation.  Patient placed on nasal cannula by RN comfort.  Patient's oxygenation sats maintained in the 90s..  Patient looks in moderate distress due to pain and difficulty breathing.    CTA chest significant for significantly increasing bilateral diffuse ground-glass of pace if he was in interstitial prominence with underlying bronchiectasis.  Overlying pneumonia can not be excluded.  Will admit patient to the hospital with IV steroid and antibiotic treatment and consult pulmonology.    Admit MDM  I discussed the patient presentation labs, ekg, X-rays, CT findings with my attending Dr. Deutsch.    I discussed the patient presentation labs, ekg, X-rays, CT findings with the consultant Dr. Zuluaga (pulmonology).    I discussed the patient presentation labs, ekg, X-rays, CT findings with the consultant Soo Thomas NP (hospital medicine).   Patient was managed in the ED with IV Solu-Medrol,  morphine, saline, Rocephin, and albuterol treatments.    The response to treatment was good.    Patient was admitted in stable condition.     NP uses Epic and voice recognition software prone to occasional and minor errors that may persist in the medical record.     Amount and/or Complexity of Data Reviewed  Labs: ordered. Decision-making details documented in ED Course.  Radiology: ordered. Decision-making details documented in ED Course.  ECG/medicine tests: ordered and independent interpretation performed.     Details: EKG reviewed with my attending.  EKG significant for sinus tachycardia, ventricular rate 108 beats per minute, no signs of occlusive MI    Risk  OTC drugs.  Prescription drug management.  Decision regarding hospitalization.               ED Course as of 06/22/24 1551   Sat Jun 22, 2024   1114 D-Dimer(!!): 1.95 [MP]   1114 X-Ray Chest PA And Lateral  Impression:     1. Increasing bilateral interstitial and ground-glass pulmonary opacities.  Findings may reflect progression of sarcoidosis.  Superimposed pneumonia or edema is not excluded.   [MP]   1244 CTA Chest Non-Coronary (PE Studies)  Impression:  1. No evidence of pulmonary thromboembolic disease.  2. Significantly increasing bilateral diffuse ground-glass opacities and interstitial prominence, with underlying bronchiectasis.  There is also increasing mediastinal lymphadenopathy.  Findings are least in part related to patient's provided history of sarcoidosis.  Increasing pulmonary opacities may be on the basis of superimposed pneumonia, although worsening lung disease secondary to sarcoidosis could also give this appearance. [MP]      ED Course User Index  [MP] Kitty Blanca NP                           Clinical Impression:  Final diagnoses:  [R06.02] Shortness of breath (Primary)  [R07.9] Chest pain, unspecified type  [D86.0] Sarcoidosis of lung          ED Disposition Condition    Observation                 Kitty Blanca  NP  06/22/24 1551

## 2024-06-22 NOTE — Clinical Note
Diagnosis: Shortness of breath [786.05.ICD-9-CM]   Future Attending Provider: CHINA RAINES [[id number]]   Place in Observation: [facility] [[id number]]

## 2024-06-22 NOTE — HPI
53-year-old female presented to ED for eval of cough and shortness of breath. Patient reported over the last 5-6 days she has had increased progressively worsening shortness of breath and nonproductive cough, with associated pleuritic pain. Patient reported shortness of breath is worse with exertion and chest discomfort is worse with coughing. Reports shortness of breath gets so bad it makes her feel dizzy and diaphoretic. Denies fever. Denies abd pain, NVD. Patient has a hx of sarcoidosis, follows with pulmology, on maintenance prednisone 10mg daily. Patient reports she increased her PO prednisone to 20mg when her symptoms started without significant relief. Denies use of home O2. She is tearful and anxious on exam. DDI elevated. CTA chest impression with significantly increasing bilateral diffuse ground-glass opacities and interstitial prominence, with underlying bronchiectasis, with increasing mediastinal lymphadenopathy; findings are least in part related to patient's provided history of sarcoidosis, increasing pulmonary opacities may be on the basis of superimposed pneumonia, although worsening lung disease secondary to sarcoidosis could also give this appearance. Pulmonology consulted in ED, recs abx and steroids. Admit to hospital medicine for further eval.

## 2024-06-22 NOTE — H&P
"  [facility] - Emergency Dept  Hospital Medicine  History & Physical    Patient Name: Sumit Burger  MRN: [id number]  Patient Class: OP- Observation  Admission Date: 6/22/2024  Attending Physician: Siddharth Pulido MD   Primary Care Provider: Administration, [facility]         Patient information was obtained from patient, past medical records, and ER records.     Subjective:     Principal Problem:Pneumonia    Chief Complaint:   Chief Complaint   Patient presents with    Cough     Cough x 6 days, increasing the last couple of days. Pt lung problems and states increasing sob and "just not getting better". Pt on steroids daily         HPI: 53-year-old female presented to ED for eval of cough and shortness of breath. Patient reported over the last 5-6 days she has had increased progressively worsening shortness of breath and nonproductive cough, with associated pleuritic pain. Patient reported shortness of breath is worse with exertion and chest discomfort is worse with coughing. Reports shortness of breath gets so bad it makes her feel dizzy and diaphoretic. Denies fever. Denies abd pain, NVD. Patient has a hx of sarcoidosis, follows with pulmology, on maintenance prednisone 10mg daily. Patient reports she increased her PO prednisone to 20mg when her symptoms started without significant relief. Denies use of home O2. She is tearful and anxious on exam. DDI elevated. CTA chest impression with significantly increasing bilateral diffuse ground-glass opacities and interstitial prominence, with underlying bronchiectasis, with increasing mediastinal lymphadenopathy; findings are least in part related to patient's provided history of sarcoidosis, increasing pulmonary opacities may be on the basis of superimposed pneumonia, although worsening lung disease secondary to sarcoidosis could also give this appearance. Pulmonology consulted in ED, recs abx and steroids. Admit to hospital medicine for further eval. "     Past Medical History:   Diagnosis Date    HTN (hypertension)     Pneumonia 10/2020    Sarcoidosis        Past Surgical History:   Procedure Laterality Date    BRONCHOSCOPY WITH FLUOROSCOPY Right 10/20/2020    Procedure: BRONCHOSCOPY, WITH FLUOROSCOPY;  Surgeon: Ava Rolon MD;  Location: [facility] ENDO;  Service: Pulmonary;  Laterality: Right;     SECTION      x2    FALLOPIAN TUBOPLASTY      LAPAROSCOPIC APPENDECTOMY N/A 8/10/2022    Procedure: APPENDECTOMY, LAPAROSCOPIC;  Surgeon: Jackson Vogel MD;  Location: [facility] OR;  Service: General;  Laterality: N/A;    TUBAL LIGATION      WRIST FRACTURE SURGERY Right     ganglion cyst       Review of patient's allergies indicates:   Allergen Reactions    Sulfamethoxazole-trimethoprim Hives, Shortness Of Breath, Nausea And Vomiting and Rash     Hives, nausea and vomiting, and shortness of breath.  Did not require admission  Hives, nausea and vomiting, and shortness of breath.  Did not require admission    Benadryl [diphenhydramine hcl]     Latex, natural rubber     Percocet [oxycodone-acetaminophen]     Shrimp     Codeine Itching and Nausea Only       No current facility-administered medications on file prior to encounter.     Current Outpatient Medications on File Prior to Encounter   Medication Sig    albuterol (PROVENTIL/VENTOLIN HFA) 90 mcg/actuation inhaler Inhale 2 puffs into the lungs every 4 to 6 hours as needed for Shortness of Breath. Rescue    folic acid (FOLVITE) 1 MG tablet Take 1 tablet by mouth once daily.    predniSONE (DELTASONE) 10 MG tablet Take 2 tablets (20 mg total) by mouth once daily.    valsartan (DIOVAN) 160 MG tablet Take 1 tablet (160 mg total) by mouth every evening.    buPROPion (WELLBUTRIN XL) 150 MG TB24 tablet Take 1 tablet (150 mg total) by mouth once daily. (Patient not taking: Reported on 2024)    [DISCONTINUED] estradiol-norethindrone (COMBIPATCH) 0.05-0.14 mg/24 hr APPLY PATCH TOPICALLY TWICE WEEKLY HOT FLASHES     [DISCONTINUED] spironolactone (ALDACTONE) 25 MG tablet Take 25 mg by mouth once daily.    [DISCONTINUED] zolpidem (AMBIEN) 5 MG Tab Take 1 tablet (5 mg total) by mouth nightly as needed.     Family History       Problem Relation (Age of Onset)    Hypertension Mother          Tobacco Use    Smoking status: Light Smoker     Current packs/day: 0.00     Types: Cigarettes     Last attempt to quit: 2020     Years since quittin.1    Smokeless tobacco: Never    Tobacco comments:     ocassionally never was qd   Substance and Sexual Activity    Alcohol use: Not Currently    Drug use: Never    Sexual activity: Not Currently     Review of Systems   Constitutional:  Negative for chills and fever.   Respiratory:  Positive for cough, chest tightness and shortness of breath.    Cardiovascular:  Positive for chest pain.   Gastrointestinal:  Positive for abdominal pain, constipation, diarrhea, nausea and vomiting.   Neurological:  Positive for light-headedness. Negative for syncope.     Objective:     Vital Signs (Most Recent):  Temp: 98.3 °F (36.8 °C) (24 0915)  Pulse: 98 (24 1230)  Resp: 20 (24 1230)  BP: (!) 156/90 (24 1230)  SpO2: 98 % (24 1230) Vital Signs (24h Range):  Temp:  [98.3 °F (36.8 °C)] 98.3 °F (36.8 °C)  Pulse:  [] 98  Resp:  [16-27] 20  SpO2:  [95 %-98 %] 98 %  BP: (143-202)/() 156/90     Weight: 65.8 kg (145 lb)  Body mass index is 25.69 kg/m².     Physical Exam  Vitals reviewed.   Constitutional:       Interventions: Nasal cannula in place.   HENT:      Head: Normocephalic and atraumatic.   Cardiovascular:      Rate and Rhythm: Regular rhythm. Tachycardia present.   Pulmonary:      Effort: Pulmonary effort is normal. No respiratory distress.      Breath sounds: Decreased air movement present.   Abdominal:      General: Bowel sounds are normal.      Palpations: Abdomen is soft.      Tenderness: There is no abdominal tenderness.   Musculoskeletal:         General:  "Normal range of motion.      Right lower leg: No edema.      Left lower leg: No edema.   Skin:     General: Skin is warm and dry.   Neurological:      Mental Status: She is alert and oriented to person, place, and time.   Psychiatric:         Mood and Affect: Affect is tearful.                Significant Labs: All pertinent labs within the past 24 hours have been reviewed.  Blood Culture: No results for input(s): "LABBLOO" in the last 48 hours.  CBC:   Recent Labs   Lab 06/22/24  1003   WBC 7.38   HGB 14.5   HCT 44.9        CMP:   Recent Labs   Lab 06/22/24  1003   *   K 3.9      CO2 22*   GLU 85   BUN 12   CREATININE 0.7   CALCIUM 10.1   PROT 8.5*   ALBUMIN 4.5   BILITOT 0.4   ALKPHOS 144*   AST 32   ALT 34   ANIONGAP 10     Cardiac Markers:   Recent Labs   Lab 06/22/24  1003   BNP 6     Magnesium:   Recent Labs   Lab 06/22/24  1003   MG 1.9     Troponin:   Recent Labs   Lab 06/22/24  1003 06/22/24  1218   TROPONINIHS 4.2 5.0       Significant Imaging: I have reviewed all pertinent imaging results/findings within the past 24 hours.  Assessment/Plan:     * Pneumonia  Patient has a diagnosis of pneumonia. The cause of the pneumonia is unknown at this time. The pneumonia is improving. The patient has the following signs/symptoms of pneumonia: cough, shortness of breath, and chest pain. The patient does have a current oxygen requirement and the patient does not have a home oxygen requirement. I have reviewed the pertinent imaging. The following cultures have been collected: Blood cultures The culture results are listed below.     Current antimicrobial regimen consists of the antibiotics listed below. Will monitor patient closely and continue current treatment plan unchanged.    Antibiotics (From admission, onward)      Start     Stop Route Frequency Ordered    06/23/24 1500  cefTRIAXone (ROCEPHIN) 2 g in dextrose 5 % 100 mL IVPB (ready to mix)         -- IV Every 24 hours (non-standard times) " "06/22/24 1707 06/22/24 1742  vancomycin - pharmacy to dose  (vancomycin IVPB (PEDS and ADULTS))        Placed in "And" Linked Group    -- IV pharmacy to manage frequency 06/22/24 1707            Microbiology Results (last 7 days)       Procedure Component Value Units Date/Time    Blood culture (site 1) [[id number]]     Order Status: Sent Specimen: Blood     Blood culture (site 2) [[id number]]     Order Status: Sent Specimen: Blood     Group A Strep, Molecular [[id number]] Collected: 06/22/24 1009    Order Status: Completed Specimen: Throat Updated: 06/22/24 1047     Group A Strep, Molecular Negative     Comment: Arcanobacterium haemolyticum and Beta Streptococcus group C   and G will not be detected by this test method.  Please order   Throat Culture ([id number]) if suspected.                 Sarcoidosis of lung  Continuous pulse ox  Consult pulmonology  Per pulm recs steroids and abx    Hypertensive urgency  Patient has a current diagnosis of hypertensive urgency (without evidence of end organ damage) which is controlled.  Latest blood pressure and vitals reviewed-   Temp:  [98.3 °F (36.8 °C)]   Pulse:  []   Resp:  [16-27]   BP: (143-202)/()   SpO2:  [95 %-98 %] .   Patient currently off IV antihypertensives.   Home meds for hypertension were reviewed and noted below.   Hypertension Medications               valsartan (DIOVAN) 160 MG tablet Take 1 tablet (160 mg total) by mouth every evening.            Medication adjustment for hospital antihypertensives is as follows- Hydralazine PRN SBP>180, resume home meds    Will aim for controlled BP reduction by medications noted above. Monitor and mitigate end organ damage as indicated.    Elevated d-dimer  BLE venous US        VTE Risk Mitigation (From admission, onward)           Ordered     IP VTE LOW RISK PATIENT  Once         06/22/24 1707     Place sequential compression device  Until discontinued         06/22/24 1707                         On " 06/22/2024, patient should be placed in hospital observation services under my care in collaboration with Dr. Pulido.      Automatic Inhaler to Nebulizer Interchange    albuterol (Ventolin, ProAir, or Proventil)  mcg given multiple times per day changed to albuterol 2.5 mg Q6H PRN Shortness of Breath  per [facility] Automatic Therapeutic Substitutions Protocol.    Please contact pharmacy at extension [phone] with any questions.     Thank you,   Jesus Alberto Walters NP  Department of Hospital Medicine  [facility] - Emergency Dept

## 2024-06-23 LAB
ALBUMIN SERPL BCP-MCNC: 4 G/DL (ref 3.5–5.2)
ALP SERPL-CCNC: 115 U/L (ref 55–135)
ALT SERPL W/O P-5'-P-CCNC: 26 U/L (ref 10–44)
ANION GAP SERPL CALC-SCNC: 8 MMOL/L (ref 8–16)
AST SERPL-CCNC: 20 U/L (ref 10–40)
BASOPHILS # BLD AUTO: 0.01 K/UL (ref 0–0.2)
BASOPHILS NFR BLD: 0.1 % (ref 0–1.9)
BILIRUB SERPL-MCNC: 0.3 MG/DL (ref 0.1–1)
BUN SERPL-MCNC: 14 MG/DL (ref 6–20)
CALCIUM SERPL-MCNC: 9.9 MG/DL (ref 8.7–10.5)
CHLORIDE SERPL-SCNC: 104 MMOL/L (ref 95–110)
CO2 SERPL-SCNC: 24 MMOL/L (ref 23–29)
CREAT SERPL-MCNC: 0.7 MG/DL (ref 0.5–1.4)
DIFFERENTIAL METHOD BLD: ABNORMAL
EOSINOPHIL # BLD AUTO: 0 K/UL (ref 0–0.5)
EOSINOPHIL NFR BLD: 0 % (ref 0–8)
ERYTHROCYTE [DISTWIDTH] IN BLOOD BY AUTOMATED COUNT: 14.3 % (ref 11.5–14.5)
EST. GFR  (NO RACE VARIABLE): >60 ML/MIN/1.73 M^2
GLUCOSE SERPL-MCNC: 197 MG/DL (ref 70–110)
HCT VFR BLD AUTO: 43.7 % (ref 37–48.5)
HGB BLD-MCNC: 13.6 G/DL (ref 12–16)
IMM GRANULOCYTES # BLD AUTO: 0.04 K/UL (ref 0–0.04)
IMM GRANULOCYTES NFR BLD AUTO: 0.5 % (ref 0–0.5)
LYMPHOCYTES # BLD AUTO: 1.1 K/UL (ref 1–4.8)
LYMPHOCYTES NFR BLD: 14.7 % (ref 18–48)
MAGNESIUM SERPL-MCNC: 1.9 MG/DL (ref 1.6–2.6)
MCH RBC QN AUTO: 27.6 PG (ref 27–31)
MCHC RBC AUTO-ENTMCNC: 31.1 G/DL (ref 32–36)
MCV RBC AUTO: 89 FL (ref 82–98)
MONOCYTES # BLD AUTO: 0.2 K/UL (ref 0.3–1)
MONOCYTES NFR BLD: 2.5 % (ref 4–15)
MRSA SCREEN BY PCR: NEGATIVE
NEUTROPHILS # BLD AUTO: 6.2 K/UL (ref 1.8–7.7)
NEUTROPHILS NFR BLD: 82.2 % (ref 38–73)
NRBC BLD-RTO: 0 /100 WBC
PLATELET # BLD AUTO: 258 K/UL (ref 150–450)
PMV BLD AUTO: 11.7 FL (ref 9.2–12.9)
POTASSIUM SERPL-SCNC: 4 MMOL/L (ref 3.5–5.1)
PROCALCITONIN SERPL IA-MCNC: <0.05 NG/ML (ref 0–0.5)
PROT SERPL-MCNC: 7.5 G/DL (ref 6–8.4)
RBC # BLD AUTO: 4.93 M/UL (ref 4–5.4)
SODIUM SERPL-SCNC: 136 MMOL/L (ref 136–145)
T4 FREE SERPL-MCNC: 0.68 NG/DL (ref 0.71–1.51)
T4 FREE SERPL-MCNC: 0.72 NG/DL (ref 0.71–1.51)
TSH SERPL DL<=0.005 MIU/L-ACNC: 0.15 UIU/ML (ref 0.34–5.6)
WBC # BLD AUTO: 7.53 K/UL (ref 3.9–12.7)

## 2024-06-23 PROCEDURE — 63600175 PHARM REV CODE 636 W HCPCS: Performed by: INTERNAL MEDICINE

## 2024-06-23 PROCEDURE — 36415 COLL VENOUS BLD VENIPUNCTURE: CPT | Performed by: INTERNAL MEDICINE

## 2024-06-23 PROCEDURE — 84439 ASSAY OF FREE THYROXINE: CPT | Mod: 91

## 2024-06-23 PROCEDURE — 84443 ASSAY THYROID STIM HORMONE: CPT

## 2024-06-23 PROCEDURE — 94761 N-INVAS EAR/PLS OXIMETRY MLT: CPT

## 2024-06-23 PROCEDURE — 84439 ASSAY OF FREE THYROXINE: CPT | Performed by: INTERNAL MEDICINE

## 2024-06-23 PROCEDURE — 85025 COMPLETE CBC W/AUTO DIFF WBC: CPT

## 2024-06-23 PROCEDURE — 96366 THER/PROPH/DIAG IV INF ADDON: CPT

## 2024-06-23 PROCEDURE — 84145 PROCALCITONIN (PCT): CPT | Performed by: INTERNAL MEDICINE

## 2024-06-23 PROCEDURE — 83735 ASSAY OF MAGNESIUM: CPT

## 2024-06-23 PROCEDURE — 36415 COLL VENOUS BLD VENIPUNCTURE: CPT

## 2024-06-23 PROCEDURE — 99900035 HC TECH TIME PER 15 MIN (STAT)

## 2024-06-23 PROCEDURE — 87641 MR-STAPH DNA AMP PROBE: CPT | Performed by: INTERNAL MEDICINE

## 2024-06-23 PROCEDURE — 25000242 PHARM REV CODE 250 ALT 637 W/ HCPCS: Performed by: INTERNAL MEDICINE

## 2024-06-23 PROCEDURE — 80053 COMPREHEN METABOLIC PANEL: CPT

## 2024-06-23 PROCEDURE — 25000003 PHARM REV CODE 250: Performed by: INTERNAL MEDICINE

## 2024-06-23 PROCEDURE — 27000221 HC OXYGEN, UP TO 24 HOURS

## 2024-06-23 PROCEDURE — 99233 SBSQ HOSP IP/OBS HIGH 50: CPT | Mod: ,,, | Performed by: STUDENT IN AN ORGANIZED HEALTH CARE EDUCATION/TRAINING PROGRAM

## 2024-06-23 PROCEDURE — 99900031 HC PATIENT EDUCATION (STAT)

## 2024-06-23 PROCEDURE — 94640 AIRWAY INHALATION TREATMENT: CPT

## 2024-06-23 PROCEDURE — 12000002 HC ACUTE/MED SURGE SEMI-PRIVATE ROOM

## 2024-06-23 PROCEDURE — 96376 TX/PRO/DX INJ SAME DRUG ADON: CPT

## 2024-06-23 PROCEDURE — 63600175 PHARM REV CODE 636 W HCPCS

## 2024-06-23 PROCEDURE — 25000003 PHARM REV CODE 250

## 2024-06-23 PROCEDURE — 87449 NOS EACH ORGANISM AG IA: CPT | Performed by: INTERNAL MEDICINE

## 2024-06-23 PROCEDURE — 25000003 PHARM REV CODE 250: Performed by: STUDENT IN AN ORGANIZED HEALTH CARE EDUCATION/TRAINING PROGRAM

## 2024-06-23 PROCEDURE — 63700000 PHARM REV CODE 250 ALT 637 W/O HCPCS: Performed by: INTERNAL MEDICINE

## 2024-06-23 RX ORDER — AZITHROMYCIN 250 MG/1
500 TABLET, FILM COATED ORAL DAILY
Status: DISCONTINUED | OUTPATIENT
Start: 2024-06-23 | End: 2024-06-26 | Stop reason: HOSPADM

## 2024-06-23 RX ORDER — IPRATROPIUM BROMIDE AND ALBUTEROL SULFATE 2.5; .5 MG/3ML; MG/3ML
3 SOLUTION RESPIRATORY (INHALATION) EVERY 4 HOURS
Status: DISCONTINUED | OUTPATIENT
Start: 2024-06-23 | End: 2024-06-23

## 2024-06-23 RX ORDER — ZOLPIDEM TARTRATE 5 MG/1
5 TABLET ORAL NIGHTLY PRN
Status: DISCONTINUED | OUTPATIENT
Start: 2024-06-23 | End: 2024-06-26 | Stop reason: HOSPADM

## 2024-06-23 RX ORDER — IPRATROPIUM BROMIDE AND ALBUTEROL SULFATE 2.5; .5 MG/3ML; MG/3ML
3 SOLUTION RESPIRATORY (INHALATION) EVERY 4 HOURS PRN
Status: DISCONTINUED | OUTPATIENT
Start: 2024-06-23 | End: 2024-06-26 | Stop reason: HOSPADM

## 2024-06-23 RX ORDER — HYDROXYZINE HYDROCHLORIDE 25 MG/1
25 TABLET, FILM COATED ORAL 3 TIMES DAILY PRN
Status: DISCONTINUED | OUTPATIENT
Start: 2024-06-23 | End: 2024-06-26 | Stop reason: HOSPADM

## 2024-06-23 RX ORDER — ALPRAZOLAM 0.25 MG/1
0.25 TABLET ORAL 3 TIMES DAILY PRN
Status: DISCONTINUED | OUTPATIENT
Start: 2024-06-23 | End: 2024-06-26 | Stop reason: HOSPADM

## 2024-06-23 RX ADMIN — HYDROCODONE BITARTRATE AND ACETAMINOPHEN 1 TABLET: 5; 325 TABLET ORAL at 08:06

## 2024-06-23 RX ADMIN — FOLIC ACID 1000 MCG: 1 TABLET ORAL at 08:06

## 2024-06-23 RX ADMIN — HYDROCODONE BITARTRATE AND ACETAMINOPHEN 1 TABLET: 5; 325 TABLET ORAL at 07:06

## 2024-06-23 RX ADMIN — VANCOMYCIN HYDROCHLORIDE 1000 MG: 1 INJECTION, POWDER, LYOPHILIZED, FOR SOLUTION INTRAVENOUS at 02:06

## 2024-06-23 RX ADMIN — CEFTRIAXONE SODIUM 2 G: 2 INJECTION, POWDER, FOR SOLUTION INTRAMUSCULAR; INTRAVENOUS at 01:06

## 2024-06-23 RX ADMIN — HYDROXYZINE HYDROCHLORIDE 25 MG: 25 TABLET ORAL at 03:06

## 2024-06-23 RX ADMIN — VANCOMYCIN HYDROCHLORIDE 1000 MG: 1 INJECTION, POWDER, LYOPHILIZED, FOR SOLUTION INTRAVENOUS at 04:06

## 2024-06-23 RX ADMIN — FAMOTIDINE 20 MG: 20 TABLET ORAL at 08:06

## 2024-06-23 RX ADMIN — HYDROXYZINE HYDROCHLORIDE 25 MG: 25 TABLET ORAL at 10:06

## 2024-06-23 RX ADMIN — IPRATROPIUM BROMIDE AND ALBUTEROL SULFATE 3 ML: .5; 3 SOLUTION RESPIRATORY (INHALATION) at 09:06

## 2024-06-23 RX ADMIN — AZITHROMYCIN DIHYDRATE 500 MG: 250 TABLET ORAL at 08:06

## 2024-06-23 RX ADMIN — METHYLPREDNISOLONE SODIUM SUCCINATE 40 MG: 40 INJECTION, POWDER, FOR SOLUTION INTRAMUSCULAR; INTRAVENOUS at 01:06

## 2024-06-23 RX ADMIN — METHYLPREDNISOLONE SODIUM SUCCINATE 125 MG: 125 INJECTION, POWDER, FOR SOLUTION INTRAMUSCULAR; INTRAVENOUS at 05:06

## 2024-06-23 RX ADMIN — FAMOTIDINE 20 MG: 20 TABLET ORAL at 07:06

## 2024-06-23 RX ADMIN — ZOLPIDEM TARTRATE 5 MG: 5 TABLET, COATED ORAL at 10:06

## 2024-06-23 RX ADMIN — CEFTRIAXONE SODIUM 2 G: 2 INJECTION, POWDER, FOR SOLUTION INTRAMUSCULAR; INTRAVENOUS at 02:06

## 2024-06-23 RX ADMIN — VALSARTAN 160 MG: 80 TABLET, FILM COATED ORAL at 07:06

## 2024-06-23 RX ADMIN — ENOXAPARIN SODIUM 40 MG: 40 INJECTION SUBCUTANEOUS at 06:06

## 2024-06-23 RX ADMIN — ALPRAZOLAM 0.25 MG: 0.25 TABLET ORAL at 01:06

## 2024-06-23 NOTE — CONSULTS
Pulmonary/Critical Care Consult      PATIENT NAME: Sumit Burger  MRN: [id number]  TODAY'S DATE: 2024  1:50 PM  ADMIT DATE: 2024  AGE: 53 y.o. : 1970    CONSULT REQUESTED BY: Tammy Yoder MD    REASON FOR CONSULT:   ILD exacerbation     HPI:  Ms Burger is a 53 year old woman who presents with acute shortness of breath.  She reports that her symptoms have been going on for about 3 weeks when she started feeling bad.  She has prednisone at home and she took 10 mg then increased to 20 mg and just started feeling worse.  She has had worsening cough over that time span and she thinks that it was related to difficulty breathing.  She has not currently on home oxygen.  She reports that she was previously diagnosed with sarcoid after having multiple and recurrent pneumonias for 2-3 years and then eventually got a bronchoscopy which showed granulomatous inflammation that was more suggestive of sarcoid than it was HP.    She reports that she used to smoke occasionally in the past but it was not a daily habit.  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    She reports no history of skin lesions.  [facility] and she is still waiting on the results of that.  She has no history of red hot painful eyes.  She has been seen by Ophthalmology previously.    She reports that her symptoms started sometime after COVID and has progressively worsened.  Over the last   1-1/2 year she has had 2-3 hospitalizations.    Review of Systems   Constitutional:  Negative for appetite change, chills, fever and unexpected weight change.   HENT:  Negative for nosebleeds, postnasal drip, trouble swallowing and voice change.    Eyes:  Negative for visual disturbance.   Respiratory:  Positive for cough and shortness of breath. Negative for wheezing.    Cardiovascular:  Negative for chest pain and  leg swelling.   Gastrointestinal:  Negative for diarrhea, nausea and vomiting.   Endocrine: Negative for cold intolerance and heat intolerance.   Genitourinary:  Negative for dysuria, flank pain and frequency.   Musculoskeletal:  Negative for neck pain and neck stiffness.   Allergic/Immunologic: Negative for environmental allergies and immunocompromised state.   Neurological:  Negative for syncope, speech difficulty and weakness.   Hematological:  Negative for adenopathy.   Psychiatric/Behavioral:  Negative for confusion.            ALLERGIES  Review of patient's allergies indicates:   Allergen Reactions    Sulfamethoxazole-trimethoprim Hives, Shortness Of Breath, Nausea And Vomiting and Rash     Hives, nausea and vomiting, and shortness of breath.  Did not require admission  Hives, nausea and vomiting, and shortness of breath.  Did not require admission    Benadryl [diphenhydramine hcl]     Latex, natural rubber     Percocet [oxycodone-acetaminophen]     Shrimp     Codeine Itching and Nausea Only       INPATIENT SCHEDULED MEDICATIONS   azithromycin  500 mg Oral Daily    cefTRIAXone (Rocephin) IV (PEDS and ADULTS)  2 g Intravenous Q12H    enoxparin  40 mg Subcutaneous Daily    famotidine  20 mg Oral BID    folic acid  1,000 mcg Oral Daily    methylPREDNISolone sodium succinate injection  40 mg Intravenous Q6H    valsartan  160 mg Oral QHS    vancomycin (VANCOCIN) IV (PEDS and ADULTS)  1,000 mg Intravenous Q12H         MEDICAL AND SURGICAL HISTORY  Past Medical History:   Diagnosis Date    HTN (hypertension)     Pneumonia 10/2020    Sarcoidosis      Past Surgical History:   Procedure Laterality Date    BRONCHOSCOPY WITH FLUOROSCOPY Right 10/20/2020    Procedure: BRONCHOSCOPY, WITH FLUOROSCOPY;  Surgeon: Ava Rolon MD;  Location: [facility];  Service: Pulmonary;  Laterality: Right;     SECTION      x2    FALLOPIAN TUBOPLASTY      LAPAROSCOPIC APPENDECTOMY N/A 8/10/2022    Procedure: APPENDECTOMY,  LAPAROSCOPIC;  Surgeon: Jackson Vogel MD;  Location: [facility];  Service: General;  Laterality: N/A;    TUBAL LIGATION      WRIST FRACTURE SURGERY Right     ganglion cyst       ALCOHOL, TOBACCO AND DRUG USE  Social History     Tobacco Use   Smoking Status Light Smoker    Current packs/day: 0.00    Types: Cigarettes    Last attempt to quit: 2020    Years since quittin.1   Smokeless Tobacco Never   Tobacco Comments    ocassionally never was qd     Social History     Substance and Sexual Activity   Alcohol Use Not Currently     Social History     Substance and Sexual Activity   Drug Use Never       FAMILY HISTORY  Family History   Problem Relation Name Age of Onset    Hypertension Mother         VITAL SIGNS (MOST RECENT)  Temp: 97.6 °F (36.4 °C) (24 0738)  Pulse: 75 (24 0738)  Resp: 16 (24 0835)  BP: (!) 143/78 (24 0738)  SpO2: 99 % (24 0741)    INTAKE AND OUTPUT (LAST 24 HOURS):  Intake/Output Summary (Last 24 hours) at 2024 1350  Last data filed at 2024 0835  Gross per 24 hour   Intake 1140.1 ml   Output --   Net 1140.1 ml       WEIGHT  Wt Readings from Last 1 Encounters:   24 65.7 kg (144 lb 13.5 oz)       Physical Exam  Vitals reviewed.   Constitutional:       General: She is not in acute distress.     Appearance: She is well-developed. She is ill-appearing. She is not diaphoretic.   HENT:      Head: Normocephalic and atraumatic.      Mouth/Throat:      Pharynx: No oropharyngeal exudate or posterior oropharyngeal erythema.   Eyes:      General: No scleral icterus.     Pupils: Pupils are equal, round, and reactive to light.   Neck:      Vascular: No JVD.   Cardiovascular:      Rate and Rhythm: Normal rate and regular rhythm.      Heart sounds: Normal heart sounds. No murmur heard.  Pulmonary:      Effort: Pulmonary effort is normal. No respiratory distress.      Breath sounds: Wheezing and rales present.   Abdominal:      General: Bowel sounds are normal. There  "is no distension.      Palpations: Abdomen is soft.      Tenderness: There is no abdominal tenderness.   Musculoskeletal:         General: No swelling.      Cervical back: Normal range of motion and neck supple. No rigidity.   Skin:     General: Skin is warm and dry.      Capillary Refill: Capillary refill takes less than 2 seconds.      Coloration: Skin is not pale.      Findings: No rash.   Neurological:      General: No focal deficit present.      Mental Status: She is alert and oriented to person, place, and time.      Cranial Nerves: No cranial nerve deficit.      Motor: No weakness or abnormal muscle tone.           ACUTE PHASE REACTANT (LAST 24 HOURS)  No results for input(s): "FERRITIN", "CRP", "LDH", "DDIMER" in the last 24 hours.    CBC LAST (LAST 24 HOURS)  Recent Labs   Lab 06/23/24  0535   WBC 7.53   RBC 4.93   HGB 13.6   HCT 43.7   MCV 89   MCH 27.6   MCHC 31.1*   RDW 14.3      MPV 11.7   GRAN 82.2*  6.2   LYMPH 14.7*  1.1   MONO 2.5*  0.2*   BASO 0.01   NRBC 0       CHEMISTRY LAST (LAST 24 HOURS)  Recent Labs   Lab 06/23/24  0535      K 4.0      CO2 24   ANIONGAP 8   BUN 14   CREATININE 0.7   *   CALCIUM 9.9   MG 1.9   ALBUMIN 4.0   PROT 7.5   ALKPHOS 115   ALT 26   AST 20   BILITOT 0.3       COAGULATION LAST (LAST 24 HOURS)  No results for input(s): "LABPT", "INR", "APTT" in the last 24 hours.    CARDIAC PROFILE (LAST 24 HOURS)  Recent Labs   Lab 06/22/24  1003 06/22/24  1218   BNP 6  --    TROPONINIHS 4.2 5.0       LAST 7 DAYS MICROBIOLOGY   Microbiology Results (last 7 days)       Procedure Component Value Units Date/Time    MRSA Screen by PCR [[id number]] Collected: 06/23/24 1336    Order Status: Sent Specimen: Nasal Swab Updated: 06/23/24 1343    Culture, Respiratory with Gram Stain [[id number]]     Order Status: No result Specimen: Respiratory     Blood culture (site 1) [[id number]] Collected: 06/22/24 1802    Order Status: Completed Specimen: Blood from Peripheral, " Antecubital, Right Updated: 06/23/24 0117     Blood Culture, Routine No Growth to date    Narrative:      Site # 1, aerobic and anaerobic    Blood culture (site 2) [[id number]] Collected: 06/22/24 1803    Order Status: Completed Specimen: Blood from Peripheral, Antecubital, Right Updated: 06/23/24 0117     Blood Culture, Routine No Growth to date    Narrative:      Site # 2, aerobic only    Group A Strep, Molecular [[id number]] Collected: 06/22/24 1009    Order Status: Completed Specimen: Throat Updated: 06/22/24 1047     Group A Strep, Molecular Negative     Comment: Arcanobacterium haemolyticum and Beta Streptococcus group C   and G will not be detected by this test method.  Please order   Throat Culture ([id number]) if suspected.                 MOST RECENT IMAGING  US Lower Extremity Veins Bilateral  Narrative: EXAMINATION:  US LOWER EXTREMITY VEINS BILATERAL    CLINICAL HISTORY:  elevated ddi;    TECHNIQUE:  Duplex and color flow Doppler and dynamic compression was performed of the bilateral lower extremity veins was performed.    COMPARISON:  None    FINDINGS:  Right thigh veins: The common femoral, femoral, popliteal, upper greater saphenous, and deep femoral veins are patent and free of thrombus. The veins are normally compressible and have normal phasic flow and augmentation response.    Right calf veins: The visualized calf veins are patent.    Left thigh veins: The common femoral, femoral, popliteal, upper greater saphenous, and deep femoral veins are patent and free of thrombus. The veins are normally compressible and have normal phasic flow and augmentation response.    Left calf veins: The visualized calf veins are patent.    Miscellaneous: None  Impression: No evidence of deep venous thrombosis in either lower extremity.    Electronically signed by: Ortiz Matias  Date:    06/22/2024  Time:    17:57  CTA Chest Non-Coronary (PE Studies)  Narrative: EXAMINATION:  CTA CHEST NON CORONARY (PE  STUDIES)    CLINICAL HISTORY:  Pulmonary embolism (PE) suspected, positive D-dimer;.    TECHNIQUE:  CT angiography targeted to the pulmonary arteries was performed. One hundred cc nonionic contrast was administered and the bolus was timed for optimal opacification of the pulmonary arteries. 3-D reformatted images were obtained on an independent workstation and stored as a part of patient's permanent medical record.    COMPARISON:  October 2023    FINDINGS:  Heart size is normal.  The thoracic aorta is normal in caliber.  The pulmonary arteries are fairly well opacified.  There are no filling defects to indicate pulmonary thromboembolic disease.    There is mildly increasing mediastinal and bilateral hilar lymphadenopathy.  Images at lung windows demonstrate bilateral bronchiectasis diffuse bilateral ground-glass opacities and interstitial prominence appear increased compared to the prior study.  No pulmonary mass lesions or pleural effusions are identified.  The upper abdomen is unremarkable.  Impression: 1. No evidence of pulmonary thromboembolic disease.  2. Significantly increasing bilateral diffuse ground-glass opacities and interstitial prominence, with underlying bronchiectasis.  There is also increasing mediastinal lymphadenopathy.  Findings are least in part related to patient's provided history of sarcoidosis.  Increasing pulmonary opacities may be on the basis of superimposed pneumonia, although worsening lung disease secondary to sarcoidosis could also give this appearance.  3. Additional details as above.    Electronically signed by: Ced Dillon  Date:    06/22/2024  Time:    12:37  X-Ray Chest PA And Lateral  Narrative: EXAMINATION:  XR CHEST PA AND LATERAL    CLINICAL HISTORY:  shortness of breath; sarcoidosis.    COMPARISON:  July 13, 2023    FINDINGS:  PA and lateral view show the heart and mediastinum to be within normal limits.    Diffuse bilateral abnormal interstitial prominence has  "increased compared to the prior study, with faint bilateral ground-glass opacities also increased.  There are no pleural effusions.  No acute osseous abnormalities are identified.  Impression: 1. Increasing bilateral interstitial and ground-glass pulmonary opacities.  Findings may reflect progression of sarcoidosis.  Superimposed pneumonia or edema is not excluded.    Electronically signed by: Ced Dillon  Date:    06/22/2024  Time:    10:38      CURRENT VISIT EKG  Results for orders placed or performed during the hospital encounter of 06/22/24   EKG 12-lead   Result Value Ref Range    QRS Duration 84 ms    OHS QTC Calculation 444 ms    Narrative    Test Reason : R06.02,    Vent. Rate : 108 BPM     Atrial Rate : 108 BPM     P-R Int : 124 ms          QRS Dur : 084 ms      QT Int : 332 ms       P-R-T Axes : 048 -04 021 degrees     QTc Int : 444 ms    Sinus tachycardia  Biatrial enlargement  LVH  Abnormal ECG  When compared with ECG of 11-OCT-2023 07:00,  No significant change was found    Referred By: [email or web address]   SELF           Confirmed By:        ECHOCARDIOGRAM RESULTS  Results for orders placed during the hospital encounter of 06/30/23    Echo    Interpretation Summary  · The left ventricle is normal in size with concentric remodeling and normal systolic function.  · The estimated ejection fraction is 65%.  · Normal left ventricular diastolic function.  · Mild tricuspid regurgitation.  · Moderate left atrial enlargement.  · Mild mitral regurgitation.  · The estimated PA systolic pressure is 34 mmHg.  · Normal right ventricular size with normal right ventricular systolic function.  · Normal central venous pressure (3 mmHg).        VENTILATOR INFORMATION              LAST ARTERIAL BLOOD GAS  ABG  No results for input(s): "PH", "PO2", "PCO2", "HCO3", "BE" in the last 168 hours.    ASSESSMENT:   ILD exacerbation    Ms. Burger is a 53-year-old who has underlying interstitial lung disease presumed to be related to " sarcoidosis.  She reports subacute worsening of her symptoms for the last 3 weeks despite increasing her steroids at home.  She reports being on immune suppression in the past including various dosing of prednisone and methotrexate.  She stopped immune suppression about 5 weeks ago.    ID exacerbation may be related to discontinuation of immune suppression.  Or alternatively she may have infection.    PLAN:   - keep NPO tonight for possible bronch tomorrow  - Continue current antibiotics  - We did empirically start pulse dose steroids- if no singificant improvement we may decide on bronchoscopy  - Follow up sputum and blood cultures    Chilango Zuluaga MD  Date of Service: 06/23/2024  1:50 PM

## 2024-06-23 NOTE — ASSESSMENT & PLAN NOTE
Patient has a current diagnosis of hypertensive urgency (without evidence of end organ damage) which is controlled.  Latest blood pressure and vitals reviewed-     Home meds for hypertension were reviewed and noted below.   Hypertension Medications               valsartan (DIOVAN) 160 MG tablet Take 1 tablet (160 mg total) by mouth every evening.            Medication adjustment for hospital antihypertensives is as follows- Hydralazine PRN SBP>180, resume home meds

## 2024-06-23 NOTE — CARE UPDATE
06/22/24 2204   Patient Assessment/Suction   Level of Consciousness (AVPU) alert   Respiratory Effort Normal;Unlabored   Expansion/Accessory Muscles/Retractions no retractions;no use of accessory muscles;expansion symmetric   All Lung Fields Breath Sounds diminished;clear   Rhythm/Pattern, Respiratory shallow;unlabored;pattern regular   Cough Frequency frequent   PRE-TX-O2   Device (Oxygen Therapy) nasal cannula   Flow (L/min) (Oxygen Therapy) 2   SpO2 (!) 94 %   Pulse Oximetry Type Intermittent   Pulse 91   Resp 18   Aerosol Therapy   $ Aerosol Therapy Charges PRN treatment not required   Daily Review of Necessity (SVN) completed   Respiratory Treatment Status (SVN) PRN treatment not required

## 2024-06-23 NOTE — ASSESSMENT & PLAN NOTE
CTA chest reviewed. Diffuse interstitial opacifications   Pulmonary consulted, planning for bronch AM   Ordered procalcitonin, MRSA nares, Legionella antigen and sputum culture  Changed Solumedrol to 40 mg Q6H  Cont breathing Rx  Cont Rocephin and Vancomycin. Added Azithromycin

## 2024-06-23 NOTE — PLAN OF CARE
Problem: Adult Inpatient Plan of Care  Goal: Plan of Care Review  Outcome: Progressing     Problem: Pneumonia  Goal: Effective Oxygenation and Ventilation  Outcome: Progressing

## 2024-06-23 NOTE — CARE UPDATE
06/23/24 0741   Patient Assessment/Suction   Level of Consciousness (AVPU) alert   Respiratory Effort Unlabored;Normal   Expansion/Accessory Muscles/Retractions no use of accessory muscles;no retractions;expansion symmetric   All Lung Fields Breath Sounds diminished   PRE-TX-O2   Device (Oxygen Therapy) nasal cannula  (weaned to ra)   $ Is the patient on Low Flow Oxygen? Yes   Flow (L/min) (Oxygen Therapy) 2   SpO2 99 %   Pulse Oximetry Type Intermittent   $ Pulse Oximetry - Multiple Charge Pulse Oximetry - Multiple   Aerosol Therapy   $ Aerosol Therapy Charges PRN treatment not required

## 2024-06-23 NOTE — PROGRESS NOTES
"Pharmacokinetic Assessment: IV Vancomycin    Indication & Goal: CAP - Trough 10 - 15    NAME:   Sumit Burger                AGE:  53 y.o.  ..............................                .............................              Visit ID:   [id number]                  SEX:    female  ...........................  Med Rec:  [id number]                   Ht: 5' 3" (1.6 m)    Patient Applicable Demographics:  Actual Body Weight:65.8 kg (145 lb)  Estimated Creatinine Clearance: 86.8 mL/min (based on SCr of 0.7 mg/dL).    Recent Labs   Lab 06/22/24  1003   BUN 12   CREATININE 0.7   WBC 7.38      Estimated Creatinine Clearance: 86.8 mL/min (based on SCr of 0.7 mg/dL).       Drug levels (last 2 results):  No results for input as of 6/22/2024.      Vancomycin Administrations:  vancomycin given in the last 96 hours                     vancomycin 1.25 g in dextrose 5% 250 mL IVPB (ready to mix) (mg) 1,250 mg New Bag 06/22/24 1500                    Assessment/Plan:  Initiate intravenous vancomycin with a dose of 1.25g x1 then 1g mg administered every 12 hours.  Desired empiric serum trough concentration is: 10-15 mcg/mL  Vancomycin trough has been scheduled for 6/24 at 2:00 am, which is 30 minutes prior to 4th dose      Thank you,  Rocky Molina PharmD 6/22/2024 8:27 PM     "

## 2024-06-23 NOTE — PLAN OF CARE
[facility]  Initial Discharge Assessment       Primary Care Provider: Yasmany Beckford    Admission Diagnosis: Shortness of breath [R06.02]  Sarcoidosis of lung [D86.0]    Admission Date: 6/22/2024  Expected Discharge Date:     Presented at pt's bedside to complete discharge assessment. Pt AAOx4s. Demographics, PCP, and insurance verified. Pt informed No home health, No dialysis, No Coumadin therapy. Pt reports ability to complete ADLs without any assistance Pt verbalized plan to discharge home via family transport. Pt has no other needs to be addressed at this time.        Transition of Care Barriers: None    Payor: [facility] ADMINISTRATION / Plan: [facility] OPTUM / Product Type: Government /     Extended Emergency Contact Information  Primary Emergency Contact: Susan Burger  Mobile Phone: [phone]  Relation: Daughter  Preferred language: English   needed? No  Secondary Emergency Contact: Rayray Burger  Address: [address] [place] [address] [place] Ch[place] [address] [facility]  Home Phone: [phone]  Mobile Phone: [phone]  Relation: Other    Discharge Plan A: Home with family  Discharge Plan B: Home with family      [facility]  [address]  [facility] [address]  Phone: [phone] Fax: [phone]    [id number] - [place]  [address] AT [facility] [address] [facility] [address]  [address]  [facility] [address]  Phone: [phone] Fax: [phone]    [id number] - [place]  [address] AT [facility]  [address] [facility] [address]  Phone: [phone] Fax: [phone]      Initial Assessment (most recent)       Adult Discharge Assessment - 06/23/24 0941          Discharge Assessment    Assessment Type Discharge Planning Assessment     Confirmed/corrected address, phone number and insurance Yes     Confirmed Demographics Correct on Facesheet     Source of Information  patient;health record     Reason For Admission Pneumonia     People in Home child(carter), adult     Facility Arrived From: Home     Do you expect to return to your current living situation? Yes     Do you have help at home or someone to help you manage your care at home? Yes     Who are your caregiver(s) and their phone number(s)? Susan Burger  Daughter  [phone]    2  Rayray Burger  Other  [phone]     Prior to hospitilization cognitive status: Alert/Oriented     Current cognitive status: Alert/Oriented     Walking or Climbing Stairs Difficulty no     Dressing/Bathing Difficulty no     Home Accessibility wheelchair accessible     Equipment Currently Used at Home none     Readmission within 30 days? No     Patient currently being followed by outpatient case management? No     Do you currently have service(s) that help you manage your care at home? No     Do you take prescription medications? Yes     Do you have prescription coverage? Yes     Coverage [employer] and [employer]     Do you have any problems affording any of your prescribed medications? No     Is the patient taking medications as prescribed? yes     Who is going to help you get home at discharge? Susan Burger Daughter [phone] 2 Rayray Burger Other [phone]     How do you get to doctors appointments? car, drives self     Are you on dialysis? No     Do you take coumadin? No     Discharge Plan A Home with family     Discharge Plan B Home with family     DME Needed Upon Discharge  none     Discharge Plan discussed with: Patient     Transition of Care Barriers None

## 2024-06-23 NOTE — SUBJECTIVE & OBJECTIVE
Interval History: Patient is very anxious, states her chest hurts with breathing. She has non productive cough. Afebrile.     Review of Systems  Objective:     Vital Signs (Most Recent):  Temp: 97.6 °F (36.4 °C) (06/23/24 0738)  Pulse: 75 (06/23/24 0738)  Resp: 16 (06/23/24 0835)  BP: (!) 143/78 (06/23/24 0738)  SpO2: 99 % (06/23/24 0741) Vital Signs (24h Range):  Temp:  [97.6 °F (36.4 °C)-98.3 °F (36.8 °C)] 97.6 °F (36.4 °C)  Pulse:  [] 75  Resp:  [16-25] 16  SpO2:  [93 %-100 %] 99 %  BP: (143-193)/() 143/78     Weight: 65.7 kg (144 lb 13.5 oz)  Body mass index is 25.66 kg/m².    Intake/Output Summary (Last 24 hours) at 6/23/2024 1150  Last data filed at 6/23/2024 0835  Gross per 24 hour   Intake 2139.1 ml   Output --   Net 2139.1 ml         Physical Exam  Vitals reviewed.   Constitutional:       Interventions: Nasal cannula in place.   HENT:      Head: Normocephalic and atraumatic.   Cardiovascular:      Rate and Rhythm: Regular rhythm. Tachycardia present.   Pulmonary:      Effort: Pulmonary effort is normal. No respiratory distress.      Breath sounds: Decreased air movement present.   Abdominal:      General: Bowel sounds are normal.      Palpations: Abdomen is soft.      Tenderness: There is no abdominal tenderness.   Musculoskeletal:         General: Normal range of motion.      Right lower leg: No edema.      Left lower leg: No edema.   Skin:     General: Skin is warm and dry.   Neurological:      Mental Status: She is alert and oriented to person, place, and time.   Psychiatric:         Mood and Affect: Affect is tearful.         Significant Labs: All pertinent labs within the past 24 hours have been reviewed.  CBC:   Recent Labs   Lab 06/22/24  1003 06/23/24  0535   WBC 7.38 7.53   HGB 14.5 13.6   HCT 44.9 43.7    258     CMP:   Recent Labs   Lab 06/22/24  1003 06/23/24  0535   * 136   K 3.9 4.0    104   CO2 22* 24   GLU 85 197*   BUN 12 14   CREATININE 0.7 0.7   CALCIUM 10.1  9.9   PROT 8.5* 7.5   ALBUMIN 4.5 4.0   BILITOT 0.4 0.3   ALKPHOS 144* 115   AST 32 20   ALT 34 26   ANIONGAP 10 8       Significant Imaging: I have reviewed all pertinent imaging results/findings within the past 24 hours.

## 2024-06-23 NOTE — HOSPITAL COURSE
Patient is a 53 year old female with history of chronic sarcoidosis on PO prednisone at home, admitted with chills and non productive cough and worsening SOB. Patient was started on Rocephin and Vancomycin. CT chest shows diffuse interstitial infiltrates. Patient is also on IV steroids. Pulmonary is consulted. Patient is going for bronchoscopy 6/24.  Patient is weaned off oxygen. Bal culture negative so far. PCP is pending. Patient was discharged on 5 more days of PO Keflex and prednisone taper. She will resume her chronic prednisone once taper is complete. Azithromycin completed for 5 days.

## 2024-06-23 NOTE — NURSING
Nurses Note -- 4 Eyes      6/22/2024   10:24 PM      Skin assessed during: Admit      [x] No Altered Skin Integrity Present    []Prevention Measures Documented      [] Yes- Altered Skin Integrity Present or Discovered   [] LDA Added if Not in Epic (Describe Wound)   [] New Altered Skin Integrity was Present on Admit and Documented in LDA   [] Wound Image Taken    Wound Care Consulted? No    Attending Nurse:  Soo Jimenez RN/Staff Member:   sue

## 2024-06-23 NOTE — PROGRESS NOTES
[facility] Medicine  Progress Note    Patient Name: Sumit Burger  MRN: [id number]  Patient Class: OP- Observation   Admission Date: 6/22/2024  Length of Stay: 0 days  Attending Physician: Tammy Yoder MD  Primary Care Provider: Yasmany Beckford        Subjective:     Principal Problem:Pneumonia        HPI:  53-year-old female presented to ED for eval of cough and shortness of breath. Patient reported over the last 5-6 days she has had increased progressively worsening shortness of breath and nonproductive cough, with associated pleuritic pain. Patient reported shortness of breath is worse with exertion and chest discomfort is worse with coughing. Reports shortness of breath gets so bad it makes her feel dizzy and diaphoretic. Denies fever. Denies abd pain, NVD. Patient has a hx of sarcoidosis, follows with pulmology, on maintenance prednisone 10mg daily. Patient reports she increased her PO prednisone to 20mg when her symptoms started without significant relief. Denies use of home O2. She is tearful and anxious on exam. DDI elevated. CTA chest impression with significantly increasing bilateral diffuse ground-glass opacities and interstitial prominence, with underlying bronchiectasis, with increasing mediastinal lymphadenopathy; findings are least in part related to patient's provided history of sarcoidosis, increasing pulmonary opacities may be on the basis of superimposed pneumonia, although worsening lung disease secondary to sarcoidosis could also give this appearance. Pulmonology consulted in ED, recs abx and steroids. Admit to hospital medicine for further eval.     Overview/Hospital Course:  Patient is a 53 year old female with history of chronic sarcoidosis on PO prednisone at home, admitted with chills and non productive cough and worsening SOB. Patient was started on Rocephin and Vancomycin. CT chest shows diffuse interstitial infiltrates. Patient is also on  IV steroids. Pulmonary is consulted.     Interval History: Patient is very anxious, states her chest hurts with breathing. She has non productive cough. Afebrile.     Review of Systems  Objective:     Vital Signs (Most Recent):  Temp: 97.6 °F (36.4 °C) (06/23/24 0738)  Pulse: 75 (06/23/24 0738)  Resp: 16 (06/23/24 0835)  BP: (!) 143/78 (06/23/24 0738)  SpO2: 99 % (06/23/24 0741) Vital Signs (24h Range):  Temp:  [97.6 °F (36.4 °C)-98.3 °F (36.8 °C)] 97.6 °F (36.4 °C)  Pulse:  [] 75  Resp:  [16-25] 16  SpO2:  [93 %-100 %] 99 %  BP: (143-193)/() 143/78     Weight: 65.7 kg (144 lb 13.5 oz)  Body mass index is 25.66 kg/m².    Intake/Output Summary (Last 24 hours) at 6/23/2024 1150  Last data filed at 6/23/2024 0835  Gross per 24 hour   Intake 2139.1 ml   Output --   Net 2139.1 ml         Physical Exam  Vitals reviewed.   Constitutional:       Interventions: Nasal cannula in place.   HENT:      Head: Normocephalic and atraumatic.   Cardiovascular:      Rate and Rhythm: Regular rhythm. Tachycardia present.   Pulmonary:      Effort: Pulmonary effort is normal. No respiratory distress.      Breath sounds: Decreased air movement present.   Abdominal:      General: Bowel sounds are normal.      Palpations: Abdomen is soft.      Tenderness: There is no abdominal tenderness.   Musculoskeletal:         General: Normal range of motion.      Right lower leg: No edema.      Left lower leg: No edema.   Skin:     General: Skin is warm and dry.   Neurological:      Mental Status: She is alert and oriented to person, place, and time.   Psychiatric:         Mood and Affect: Affect is tearful.         Significant Labs: All pertinent labs within the past 24 hours have been reviewed.  CBC:   Recent Labs   Lab 06/22/24  1003 06/23/24  0535   WBC 7.38 7.53   HGB 14.5 13.6   HCT 44.9 43.7    258     CMP:   Recent Labs   Lab 06/22/24  1003 06/23/24  0535   * 136   K 3.9 4.0    104   CO2 22* 24   GLU 85 197*   BUN  12 14   CREATININE 0.7 0.7   CALCIUM 10.1 9.9   PROT 8.5* 7.5   ALBUMIN 4.5 4.0   BILITOT 0.4 0.3   ALKPHOS 144* 115   AST 32 20   ALT 34 26   ANIONGAP 10 8       Significant Imaging: I have reviewed all pertinent imaging results/findings within the past 24 hours.    Assessment/Plan:      * Pneumonia  CTA chest reviewed. Diffuse interstitial opacifications   Pulmonary consulted, planning for bronch AM   Ordered procalcitonin, MRSA nares, Legionella antigen and sputum culture  Changed Solumedrol to 40 mg Q6H  Cont breathing Rx  Cont Rocephin and Vancomycin. Added Azithromycin       Sarcoidosis of lung  Continuous pulse ox  Consult pulmonology  Per pulm recs steroids and abx    Elevated d-dimer  CTA chest and US DVT negative       Hypertensive urgency  Patient has a current diagnosis of hypertensive urgency (without evidence of end organ damage) which is controlled.  Latest blood pressure and vitals reviewed-     Home meds for hypertension were reviewed and noted below.   Hypertension Medications               valsartan (DIOVAN) 160 MG tablet Take 1 tablet (160 mg total) by mouth every evening.            Medication adjustment for hospital antihypertensives is as follows- Hydralazine PRN SBP>180, resume home meds        VTE Risk Mitigation (From admission, onward)           Ordered     enoxaparin injection 40 mg  Daily         06/22/24 1958     IP VTE LOW RISK PATIENT  Once         06/22/24 1707     Place sequential compression device  Until discontinued         06/22/24 1707                    Discharge Planning   SHAKIRA:      Code Status: Full Code   Is the patient medically ready for discharge?:     Reason for patient still in hospital (select all that apply): Treatment  Discharge Plan A: Home with family                  Tammy Yoder MD  Department of Hospital Medicine   [facility]

## 2024-06-24 ENCOUNTER — ANESTHESIA EVENT (OUTPATIENT)
Dept: SURGERY | Facility: HOSPITAL | Age: 54
End: 2024-06-24
Payer: OTHER GOVERNMENT

## 2024-06-24 ENCOUNTER — ANESTHESIA (OUTPATIENT)
Dept: SURGERY | Facility: HOSPITAL | Age: 54
End: 2024-06-24
Payer: OTHER GOVERNMENT

## 2024-06-24 PROBLEM — J84.9 INTERSTITIAL LUNG DISEASE: Status: ACTIVE | Noted: 2024-06-24

## 2024-06-24 LAB
ALBUMIN SERPL BCP-MCNC: 4.3 G/DL (ref 3.5–5.2)
ALP SERPL-CCNC: 128 U/L (ref 55–135)
ALT SERPL W/O P-5'-P-CCNC: 23 U/L (ref 10–44)
ANION GAP SERPL CALC-SCNC: 9 MMOL/L (ref 8–16)
APPEARANCE FLD: ABNORMAL
APPEARANCE FLD: CLEAR
AST SERPL-CCNC: 23 U/L (ref 10–40)
BASOPHILS # BLD AUTO: 0.01 K/UL (ref 0–0.2)
BASOPHILS NFR BLD: 0.1 % (ref 0–1.9)
BILIRUB SERPL-MCNC: 0.1 MG/DL (ref 0.1–1)
BODY FLD TYPE: ABNORMAL
BODY FLD TYPE: ABNORMAL
BODY FLUID COMMENTS: ABNORMAL
BODY FLUID COMMENTS: ABNORMAL
BUN SERPL-MCNC: 16 MG/DL (ref 6–20)
CALCIUM SERPL-MCNC: 9.9 MG/DL (ref 8.7–10.5)
CHLORIDE SERPL-SCNC: 104 MMOL/L (ref 95–110)
CO2 SERPL-SCNC: 23 MMOL/L (ref 23–29)
COLOR FLD: COLORLESS
COLOR FLD: COLORLESS
CREAT SERPL-MCNC: 0.7 MG/DL (ref 0.5–1.4)
DIFFERENTIAL METHOD BLD: ABNORMAL
EOSINOPHIL # BLD AUTO: 0 K/UL (ref 0–0.5)
EOSINOPHIL NFR BLD: 0 % (ref 0–8)
ERYTHROCYTE [DISTWIDTH] IN BLOOD BY AUTOMATED COUNT: 14.5 % (ref 11.5–14.5)
EST. GFR  (NO RACE VARIABLE): >60 ML/MIN/1.73 M^2
GLUCOSE SERPL-MCNC: 146 MG/DL (ref 70–110)
HCT VFR BLD AUTO: 45.6 % (ref 37–48.5)
HGB BLD-MCNC: 14.3 G/DL (ref 12–16)
IMM GRANULOCYTES # BLD AUTO: 0.13 K/UL (ref 0–0.04)
IMM GRANULOCYTES NFR BLD AUTO: 0.7 % (ref 0–0.5)
KOH PREP SPEC: NORMAL
KOH PREP SPEC: NORMAL
LYMPHOCYTES # BLD AUTO: 1.6 K/UL (ref 1–4.8)
LYMPHOCYTES NFR BLD: 8.5 % (ref 18–48)
LYMPHOCYTES NFR FLD MANUAL: 10 %
LYMPHOCYTES NFR FLD MANUAL: 37 %
MAGNESIUM SERPL-MCNC: 2.2 MG/DL (ref 1.6–2.6)
MCH RBC QN AUTO: 28 PG (ref 27–31)
MCHC RBC AUTO-ENTMCNC: 31.4 G/DL (ref 32–36)
MCV RBC AUTO: 89 FL (ref 82–98)
MESOTHL CELL NFR FLD MANUAL: 22 %
MESOTHL CELL NFR FLD MANUAL: 66 %
MONOCYTES # BLD AUTO: 1 K/UL (ref 0.3–1)
MONOCYTES NFR BLD: 5.2 % (ref 4–15)
MONOS+MACROS NFR FLD MANUAL: 10 %
MONOS+MACROS NFR FLD MANUAL: 16 %
NEUTROPHILS # BLD AUTO: 15.8 K/UL (ref 1.8–7.7)
NEUTROPHILS NFR BLD: 85.5 % (ref 38–73)
NEUTROPHILS NFR FLD MANUAL: 12 %
NEUTROPHILS NFR FLD MANUAL: 28 %
NRBC BLD-RTO: 0 /100 WBC
OTHER CELLS FLD MANUAL: 2 %
OTHER CELLS FLD MANUAL: 3 %
PLATELET # BLD AUTO: 273 K/UL (ref 150–450)
PMV BLD AUTO: 11.4 FL (ref 9.2–12.9)
POTASSIUM SERPL-SCNC: 4.5 MMOL/L (ref 3.5–5.1)
PROT SERPL-MCNC: 8.3 G/DL (ref 6–8.4)
RBC # BLD AUTO: 5.1 M/UL (ref 4–5.4)
SODIUM SERPL-SCNC: 136 MMOL/L (ref 136–145)
VANCOMYCIN TROUGH SERPL-MCNC: 7 UG/ML
WBC # BLD AUTO: 18.43 K/UL (ref 3.9–12.7)
WBC # FLD: 263 /CU MM
WBC # FLD: 423 /CU MM

## 2024-06-24 PROCEDURE — 99900035 HC TECH TIME PER 15 MIN (STAT)

## 2024-06-24 PROCEDURE — 0B9F8ZX DRAINAGE OF RIGHT LOWER LUNG LOBE, VIA NATURAL OR ARTIFICIAL OPENING ENDOSCOPIC, DIAGNOSTIC: ICD-10-PCS | Performed by: STUDENT IN AN ORGANIZED HEALTH CARE EDUCATION/TRAINING PROGRAM

## 2024-06-24 PROCEDURE — 63600175 PHARM REV CODE 636 W HCPCS: Performed by: STUDENT IN AN ORGANIZED HEALTH CARE EDUCATION/TRAINING PROGRAM

## 2024-06-24 PROCEDURE — 25000003 PHARM REV CODE 250: Performed by: INTERNAL MEDICINE

## 2024-06-24 PROCEDURE — 85025 COMPLETE CBC W/AUTO DIFF WBC: CPT

## 2024-06-24 PROCEDURE — D9220A PRA ANESTHESIA: Mod: ANES,,, | Performed by: ANESTHESIOLOGY

## 2024-06-24 PROCEDURE — 25000003 PHARM REV CODE 250: Performed by: STUDENT IN AN ORGANIZED HEALTH CARE EDUCATION/TRAINING PROGRAM

## 2024-06-24 PROCEDURE — 87281 PNEUMOCYSTIS CARINII AG IF: CPT | Mod: 91 | Performed by: STUDENT IN AN ORGANIZED HEALTH CARE EDUCATION/TRAINING PROGRAM

## 2024-06-24 PROCEDURE — 87206 SMEAR FLUORESCENT/ACID STAI: CPT | Mod: 91 | Performed by: STUDENT IN AN ORGANIZED HEALTH CARE EDUCATION/TRAINING PROGRAM

## 2024-06-24 PROCEDURE — 27201114 HC TRAP (ANY): Performed by: STUDENT IN AN ORGANIZED HEALTH CARE EDUCATION/TRAINING PROGRAM

## 2024-06-24 PROCEDURE — 31624 DX BRONCHOSCOPE/LAVAGE: CPT | Performed by: STUDENT IN AN ORGANIZED HEALTH CARE EDUCATION/TRAINING PROGRAM

## 2024-06-24 PROCEDURE — 94640 AIRWAY INHALATION TREATMENT: CPT

## 2024-06-24 PROCEDURE — 87116 MYCOBACTERIA CULTURE: CPT | Mod: 59 | Performed by: STUDENT IN AN ORGANIZED HEALTH CARE EDUCATION/TRAINING PROGRAM

## 2024-06-24 PROCEDURE — 87102 FUNGUS ISOLATION CULTURE: CPT | Performed by: STUDENT IN AN ORGANIZED HEALTH CARE EDUCATION/TRAINING PROGRAM

## 2024-06-24 PROCEDURE — 27000221 HC OXYGEN, UP TO 24 HOURS

## 2024-06-24 PROCEDURE — 63600175 PHARM REV CODE 636 W HCPCS: Performed by: INTERNAL MEDICINE

## 2024-06-24 PROCEDURE — 25000003 PHARM REV CODE 250

## 2024-06-24 PROCEDURE — 25000242 PHARM REV CODE 250 ALT 637 W/ HCPCS: Performed by: STUDENT IN AN ORGANIZED HEALTH CARE EDUCATION/TRAINING PROGRAM

## 2024-06-24 PROCEDURE — 94761 N-INVAS EAR/PLS OXIMETRY MLT: CPT

## 2024-06-24 PROCEDURE — D9220A PRA ANESTHESIA: Mod: CRNA,,, | Performed by: STUDENT IN AN ORGANIZED HEALTH CARE EDUCATION/TRAINING PROGRAM

## 2024-06-24 PROCEDURE — 87205 SMEAR GRAM STAIN: CPT | Mod: 59 | Performed by: STUDENT IN AN ORGANIZED HEALTH CARE EDUCATION/TRAINING PROGRAM

## 2024-06-24 PROCEDURE — 0B9J8ZX DRAINAGE OF LEFT LOWER LUNG LOBE, VIA NATURAL OR ARTIFICIAL OPENING ENDOSCOPIC, DIAGNOSTIC: ICD-10-PCS | Performed by: STUDENT IN AN ORGANIZED HEALTH CARE EDUCATION/TRAINING PROGRAM

## 2024-06-24 PROCEDURE — 37000008 HC ANESTHESIA 1ST 15 MINUTES: Performed by: STUDENT IN AN ORGANIZED HEALTH CARE EDUCATION/TRAINING PROGRAM

## 2024-06-24 PROCEDURE — 83735 ASSAY OF MAGNESIUM: CPT

## 2024-06-24 PROCEDURE — 37000009 HC ANESTHESIA EA ADD 15 MINS: Performed by: STUDENT IN AN ORGANIZED HEALTH CARE EDUCATION/TRAINING PROGRAM

## 2024-06-24 PROCEDURE — 87210 SMEAR WET MOUNT SALINE/INK: CPT | Performed by: STUDENT IN AN ORGANIZED HEALTH CARE EDUCATION/TRAINING PROGRAM

## 2024-06-24 PROCEDURE — 80053 COMPREHEN METABOLIC PANEL: CPT

## 2024-06-24 PROCEDURE — 80202 ASSAY OF VANCOMYCIN: CPT | Performed by: INTERNAL MEDICINE

## 2024-06-24 PROCEDURE — 87070 CULTURE OTHR SPECIMN AEROBIC: CPT | Performed by: STUDENT IN AN ORGANIZED HEALTH CARE EDUCATION/TRAINING PROGRAM

## 2024-06-24 PROCEDURE — 31624 DX BRONCHOSCOPE/LAVAGE: CPT | Mod: ,,, | Performed by: STUDENT IN AN ORGANIZED HEALTH CARE EDUCATION/TRAINING PROGRAM

## 2024-06-24 PROCEDURE — 99233 SBSQ HOSP IP/OBS HIGH 50: CPT | Mod: GT,,, | Performed by: STUDENT IN AN ORGANIZED HEALTH CARE EDUCATION/TRAINING PROGRAM

## 2024-06-24 PROCEDURE — 63700000 PHARM REV CODE 250 ALT 637 W/O HCPCS: Performed by: INTERNAL MEDICINE

## 2024-06-24 PROCEDURE — 89051 BODY FLUID CELL COUNT: CPT | Mod: 91 | Performed by: STUDENT IN AN ORGANIZED HEALTH CARE EDUCATION/TRAINING PROGRAM

## 2024-06-24 PROCEDURE — 12000002 HC ACUTE/MED SURGE SEMI-PRIVATE ROOM

## 2024-06-24 PROCEDURE — 99900031 HC PATIENT EDUCATION (STAT)

## 2024-06-24 PROCEDURE — 36415 COLL VENOUS BLD VENIPUNCTURE: CPT | Performed by: INTERNAL MEDICINE

## 2024-06-24 PROCEDURE — 87281 PNEUMOCYSTIS CARINII AG IF: CPT | Performed by: STUDENT IN AN ORGANIZED HEALTH CARE EDUCATION/TRAINING PROGRAM

## 2024-06-24 PROCEDURE — 25000242 PHARM REV CODE 250 ALT 637 W/ HCPCS: Performed by: INTERNAL MEDICINE

## 2024-06-24 RX ORDER — LIDOCAINE HYDROCHLORIDE 10 MG/ML
INJECTION, SOLUTION EPIDURAL; INFILTRATION; INTRACAUDAL; PERINEURAL
Status: COMPLETED | OUTPATIENT
Start: 2024-06-24 | End: 2024-06-24

## 2024-06-24 RX ORDER — SODIUM CHLORIDE, SODIUM LACTATE, POTASSIUM CHLORIDE, CALCIUM CHLORIDE 600; 310; 30; 20 MG/100ML; MG/100ML; MG/100ML; MG/100ML
INJECTION, SOLUTION INTRAVENOUS CONTINUOUS PRN
Status: DISCONTINUED | OUTPATIENT
Start: 2024-06-24 | End: 2024-06-24

## 2024-06-24 RX ORDER — LIDOCAINE HYDROCHLORIDE 40 MG/ML
5 INJECTION, SOLUTION RETROBULBAR ONCE AS NEEDED
Status: DISCONTINUED | OUTPATIENT
Start: 2024-06-24 | End: 2024-06-26 | Stop reason: HOSPADM

## 2024-06-24 RX ORDER — ALBUTEROL SULFATE 0.83 MG/ML
2.5 SOLUTION RESPIRATORY (INHALATION) ONCE
Status: COMPLETED | OUTPATIENT
Start: 2024-06-24 | End: 2024-06-24

## 2024-06-24 RX ORDER — MIDAZOLAM HYDROCHLORIDE 1 MG/ML
INJECTION INTRAMUSCULAR; INTRAVENOUS
Status: DISCONTINUED | OUTPATIENT
Start: 2024-06-24 | End: 2024-06-24

## 2024-06-24 RX ORDER — ONDANSETRON HYDROCHLORIDE 2 MG/ML
INJECTION, SOLUTION INTRAVENOUS
Status: DISCONTINUED | OUTPATIENT
Start: 2024-06-24 | End: 2024-06-24

## 2024-06-24 RX ORDER — DEXAMETHASONE SODIUM PHOSPHATE 4 MG/ML
INJECTION, SOLUTION INTRA-ARTICULAR; INTRALESIONAL; INTRAMUSCULAR; INTRAVENOUS; SOFT TISSUE
Status: DISCONTINUED | OUTPATIENT
Start: 2024-06-24 | End: 2024-06-24

## 2024-06-24 RX ORDER — LIDOCAINE HYDROCHLORIDE 40 MG/ML
5 INJECTION, SOLUTION RETROBULBAR ONCE
Status: COMPLETED | OUTPATIENT
Start: 2024-06-24 | End: 2024-06-24

## 2024-06-24 RX ORDER — FAMOTIDINE 10 MG/ML
INJECTION INTRAVENOUS
Status: DISCONTINUED | OUTPATIENT
Start: 2024-06-24 | End: 2024-06-24

## 2024-06-24 RX ORDER — PROPOFOL 10 MG/ML
VIAL (ML) INTRAVENOUS
Status: DISCONTINUED | OUTPATIENT
Start: 2024-06-24 | End: 2024-06-24

## 2024-06-24 RX ORDER — ONDANSETRON HYDROCHLORIDE 2 MG/ML
4 INJECTION, SOLUTION INTRAVENOUS DAILY PRN
Status: DISCONTINUED | OUTPATIENT
Start: 2024-06-24 | End: 2024-06-26 | Stop reason: HOSPADM

## 2024-06-24 RX ORDER — FENTANYL CITRATE 50 UG/ML
INJECTION, SOLUTION INTRAMUSCULAR; INTRAVENOUS
Status: DISCONTINUED | OUTPATIENT
Start: 2024-06-24 | End: 2024-06-24

## 2024-06-24 RX ORDER — LIDOCAINE HYDROCHLORIDE 20 MG/ML
INJECTION, SOLUTION EPIDURAL; INFILTRATION; INTRACAUDAL; PERINEURAL
Status: DISCONTINUED | OUTPATIENT
Start: 2024-06-24 | End: 2024-06-24

## 2024-06-24 RX ORDER — MEPERIDINE HYDROCHLORIDE 50 MG/ML
12.5 INJECTION INTRAMUSCULAR; INTRAVENOUS; SUBCUTANEOUS EVERY 10 MIN PRN
Status: ACTIVE | OUTPATIENT
Start: 2024-06-24 | End: 2024-06-24

## 2024-06-24 RX ORDER — PROMETHAZINE HYDROCHLORIDE AND CODEINE PHOSPHATE 6.25; 1 MG/5ML; MG/5ML
5 SOLUTION ORAL ONCE
Status: COMPLETED | OUTPATIENT
Start: 2024-06-24 | End: 2024-06-24

## 2024-06-24 RX ORDER — FENTANYL CITRATE 50 UG/ML
25 INJECTION, SOLUTION INTRAMUSCULAR; INTRAVENOUS EVERY 5 MIN PRN
Status: DISCONTINUED | OUTPATIENT
Start: 2024-06-24 | End: 2024-06-26 | Stop reason: HOSPADM

## 2024-06-24 RX ORDER — PROPOFOL 10 MG/ML
VIAL (ML) INTRAVENOUS CONTINUOUS PRN
Status: DISCONTINUED | OUTPATIENT
Start: 2024-06-24 | End: 2024-06-24

## 2024-06-24 RX ORDER — VANCOMYCIN HCL IN 5 % DEXTROSE 1G/250ML
1000 PLASTIC BAG, INJECTION (ML) INTRAVENOUS
Status: DISCONTINUED | OUTPATIENT
Start: 2024-06-24 | End: 2024-06-24

## 2024-06-24 RX ADMIN — HYDROCODONE BITARTRATE AND ACETAMINOPHEN 1 TABLET: 5; 325 TABLET ORAL at 10:06

## 2024-06-24 RX ADMIN — Medication 50 MG: at 10:06

## 2024-06-24 RX ADMIN — IPRATROPIUM BROMIDE AND ALBUTEROL SULFATE 3 ML: .5; 3 SOLUTION RESPIRATORY (INHALATION) at 08:06

## 2024-06-24 RX ADMIN — FAMOTIDINE 20 MG: 10 INJECTION, SOLUTION INTRAVENOUS at 10:06

## 2024-06-24 RX ADMIN — VANCOMYCIN HYDROCHLORIDE 1000 MG: 1 INJECTION, POWDER, LYOPHILIZED, FOR SOLUTION INTRAVENOUS at 03:06

## 2024-06-24 RX ADMIN — HYDROCODONE BITARTRATE AND ACETAMINOPHEN 1 TABLET: 5; 325 TABLET ORAL at 02:06

## 2024-06-24 RX ADMIN — PROPOFOL 50 MCG/KG/MIN: 10 INJECTION, EMULSION INTRAVENOUS at 10:06

## 2024-06-24 RX ADMIN — LIDOCAINE HYDROCHLORIDE 100 MG: 20 INJECTION, SOLUTION INTRAVENOUS at 10:06

## 2024-06-24 RX ADMIN — METHYLPREDNISOLONE SODIUM SUCCINATE 40 MG: 40 INJECTION, POWDER, FOR SOLUTION INTRAMUSCULAR; INTRAVENOUS at 12:06

## 2024-06-24 RX ADMIN — ALBUTEROL SULFATE 2.5 MG: 2.5 SOLUTION RESPIRATORY (INHALATION) at 09:06

## 2024-06-24 RX ADMIN — CEFTRIAXONE SODIUM 2 G: 2 INJECTION, POWDER, FOR SOLUTION INTRAMUSCULAR; INTRAVENOUS at 02:06

## 2024-06-24 RX ADMIN — FENTANYL CITRATE 25 MCG: 50 INJECTION, SOLUTION INTRAMUSCULAR; INTRAVENOUS at 10:06

## 2024-06-24 RX ADMIN — AZITHROMYCIN DIHYDRATE 500 MG: 250 TABLET ORAL at 08:06

## 2024-06-24 RX ADMIN — ENOXAPARIN SODIUM 40 MG: 40 INJECTION SUBCUTANEOUS at 05:06

## 2024-06-24 RX ADMIN — ALPRAZOLAM 0.25 MG: 0.25 TABLET ORAL at 12:06

## 2024-06-24 RX ADMIN — CEFTRIAXONE SODIUM 2 G: 2 INJECTION, POWDER, FOR SOLUTION INTRAMUSCULAR; INTRAVENOUS at 01:06

## 2024-06-24 RX ADMIN — ZOLPIDEM TARTRATE 5 MG: 5 TABLET, COATED ORAL at 10:06

## 2024-06-24 RX ADMIN — MIDAZOLAM HYDROCHLORIDE 2 MG: 1 INJECTION, SOLUTION INTRAMUSCULAR; INTRAVENOUS at 10:06

## 2024-06-24 RX ADMIN — METHYLPREDNISOLONE SODIUM SUCCINATE 40 MG: 40 INJECTION, POWDER, FOR SOLUTION INTRAMUSCULAR; INTRAVENOUS at 01:06

## 2024-06-24 RX ADMIN — ALPRAZOLAM 0.25 MG: 0.25 TABLET ORAL at 09:06

## 2024-06-24 RX ADMIN — LIDOCAINE HYDROCHLORIDE 5 ML: 40 INJECTION, SOLUTION RETROBULBAR; TOPICAL at 09:06

## 2024-06-24 RX ADMIN — FAMOTIDINE 20 MG: 20 TABLET ORAL at 09:06

## 2024-06-24 RX ADMIN — DEXAMETHASONE SODIUM PHOSPHATE 4 MG: 4 INJECTION, SOLUTION INTRAMUSCULAR; INTRAVENOUS at 10:06

## 2024-06-24 RX ADMIN — VALSARTAN 160 MG: 80 TABLET, FILM COATED ORAL at 09:06

## 2024-06-24 RX ADMIN — Medication 75 MG: at 10:06

## 2024-06-24 RX ADMIN — HYDROXYZINE HYDROCHLORIDE 25 MG: 25 TABLET ORAL at 11:06

## 2024-06-24 RX ADMIN — SODIUM CHLORIDE, SODIUM LACTATE, POTASSIUM CHLORIDE, AND CALCIUM CHLORIDE: .6; .31; .03; .02 INJECTION, SOLUTION INTRAVENOUS at 10:06

## 2024-06-24 RX ADMIN — FOLIC ACID 1000 MCG: 1 TABLET ORAL at 08:06

## 2024-06-24 RX ADMIN — ONDANSETRON 4 MG: 2 INJECTION INTRAMUSCULAR; INTRAVENOUS at 10:06

## 2024-06-24 RX ADMIN — PROMETHAZINE HYDROCHLORIDE AND CODEINE PHOSPHATE 5 ML: 6.25; 1 SOLUTION ORAL at 11:06

## 2024-06-24 RX ADMIN — BENZONATATE 100 MG: 100 CAPSULE ORAL at 11:06

## 2024-06-24 RX ADMIN — FAMOTIDINE 20 MG: 20 TABLET ORAL at 08:06

## 2024-06-24 RX ADMIN — FENTANYL CITRATE 50 MCG: 50 INJECTION, SOLUTION INTRAMUSCULAR; INTRAVENOUS at 10:06

## 2024-06-24 NOTE — ANESTHESIA POSTPROCEDURE EVALUATION
Anesthesia Post Evaluation    Patient: Sumit Burger    Procedure(s) Performed: Procedure(s) (LRB):  LAVAGE, BRONCHOALVEOLAR (Bilateral)    Final Anesthesia Type: general      Patient location during evaluation: GI PACU  Patient participation: Yes- Able to Participate  Level of consciousness: awake and alert and oriented  Post-procedure vital signs: reviewed and stable  Pain management: adequate  Airway patency: patent    PONV status at discharge: No PONV  Anesthetic complications: no      Cardiovascular status: blood pressure returned to baseline  Respiratory status: unassisted, spontaneous ventilation and room air  Hydration status: euvolemic  Follow-up not needed.              Vitals Value Taken Time   /67 06/24/24 1210   Temp 36.6 °C (97.9 °F) 06/24/24 1200   Pulse 75 06/24/24 1211   Resp 20 06/24/24 1211   SpO2 98 % 06/24/24 1211   Vitals shown include unfiled device data.      Event Time   Out of Recovery 06/24/2024 12:11:31         Pain/Kimberly Score: Pain Rating Prior to Med Admin: 6 (6/23/2024  8:56 PM)  Pain Rating Post Med Admin: 3 (6/23/2024  8:56 PM)

## 2024-06-24 NOTE — PROCEDURES
The bronchoscope was introduced through the mouth. The hypopharynx and larynx were unremarkable. The vocal cords were midline and opposed well. The trachea was midline. The seferino was sharp. The right upper lobe, right middle lobe, and right lower lobe subdivided into the usual subsegments. There were no endobronchial or submucosal abnormalities noted. The left upper lobe and left lower lobe subdivided into the usual subsegments. There were no endobronchial or submucosal abnormalities noted.     A BAL was performed in the right lower lobe about 60 cc of sterile normal saline was instilled, 40 cc return of clear fluid tinged with mucus.    Then a BAL was performed in the left lower lobe about 60 cc of sterile normal saline was instilled, 40 cc return of clear fluid that was tinged with mucus.      The specimens were sent for C and S, fungal culture, AFB. The patient tolerated the procedure well.    Chilango Zuluaga MD

## 2024-06-24 NOTE — PROGRESS NOTES
[facility] Medicine  Progress Note    Patient Name: Sumit Burger  MRN: [id number]  Patient Class: IP- Inpatient   Admission Date: 6/22/2024  Length of Stay: 1 days  Attending Physician: Tammy Yoder MD  Primary Care Provider: Yasmany Beckford        Subjective:     Principal Problem:Pneumonia        HPI:  53-year-old female presented to ED for eval of cough and shortness of breath. Patient reported over the last 5-6 days she has had increased progressively worsening shortness of breath and nonproductive cough, with associated pleuritic pain. Patient reported shortness of breath is worse with exertion and chest discomfort is worse with coughing. Reports shortness of breath gets so bad it makes her feel dizzy and diaphoretic. Denies fever. Denies abd pain, NVD. Patient has a hx of sarcoidosis, follows with pulmology, on maintenance prednisone 10mg daily. Patient reports she increased her PO prednisone to 20mg when her symptoms started without significant relief. Denies use of home O2. She is tearful and anxious on exam. DDI elevated. CTA chest impression with significantly increasing bilateral diffuse ground-glass opacities and interstitial prominence, with underlying bronchiectasis, with increasing mediastinal lymphadenopathy; findings are least in part related to patient's provided history of sarcoidosis, increasing pulmonary opacities may be on the basis of superimposed pneumonia, although worsening lung disease secondary to sarcoidosis could also give this appearance. Pulmonology consulted in ED, recs abx and steroids. Admit to hospital medicine for further eval.     Overview/Hospital Course:  Patient is a 53 year old female with history of chronic sarcoidosis on PO prednisone at home, admitted with chills and non productive cough and worsening SOB. Patient was started on Rocephin and Vancomycin. CT chest shows diffuse interstitial infiltrates. Patient is also on IV  steroids. Pulmonary is consulted. Patient is going for bronchoscopy 6/24.      Interval History: Patient is going for bronchoscopy today. No overnight issues.     Review of Systems  Objective:     Vital Signs (Most Recent):  Temp: 97.9 °F (36.6 °C) (06/24/24 1000)  Pulse: 76 (06/24/24 1000)  Resp: 14 (06/24/24 1000)  BP: (!) 175/95 (06/24/24 1000)  SpO2: 100 % (06/24/24 1000) Vital Signs (24h Range):  Temp:  [97.7 °F (36.5 °C)-98.4 °F (36.9 °C)] 97.9 °F (36.6 °C)  Pulse:  [63-81] 76  Resp:  [14-18] 14  SpO2:  [97 %-100 %] 100 %  BP: (153-178)/() 175/95     Weight: 65.7 kg (144 lb 13.5 oz)  Body mass index is 25.66 kg/m².    Intake/Output Summary (Last 24 hours) at 6/24/2024 1023  Last data filed at 6/24/2024 0029  Gross per 24 hour   Intake 835 ml   Output --   Net 835 ml         Physical Exam  Vitals reviewed.   Constitutional:       Interventions: Nasal cannula in place.   HENT:      Head: Normocephalic and atraumatic.   Cardiovascular:      Rate and Rhythm: Regular rhythm.   Pulmonary:      Effort: Pulmonary effort is normal. No respiratory distress.      Breath sounds: Decreased air movement present.   Abdominal:      General: Bowel sounds are normal.      Palpations: Abdomen is soft.      Tenderness: There is no abdominal tenderness.   Musculoskeletal:         General: Normal range of motion.      Right lower leg: No edema.      Left lower leg: No edema.   Skin:     General: Skin is warm and dry.   Neurological:      Mental Status: She is alert and oriented to person, place, and time.   Psychiatric:         Mood and Affect: anxious         Significant Labs: All pertinent labs within the past 24 hours have been reviewed.  CBC:   Recent Labs   Lab 06/23/24  0535 06/24/24  0300   WBC 7.53 18.43*   HGB 13.6 14.3   HCT 43.7 45.6    273     CMP:   Recent Labs   Lab 06/23/24  0535 06/24/24  0300    136   K 4.0 4.5    104   CO2 24 23   * 146*   BUN 14 16   CREATININE 0.7 0.7   CALCIUM  9.9 9.9   PROT 7.5 8.3   ALBUMIN 4.0 4.3   BILITOT 0.3 0.1   ALKPHOS 115 128   AST 20 23   ALT 26 23   ANIONGAP 8 9       Significant Imaging: I have reviewed all pertinent imaging results/findings within the past 24 hours.    Assessment/Plan:      * Pneumonia  CTA chest reviewed. Diffuse interstitial opacifications   Pulmonary consulted  Bronchoscopy 6/24, follow BAL   Procalcitonin negative, MRSA nares negative   Weaning solumedrol 40 mg Q12H   Cont breathing Rx  Cont Rocephin and Azithromycin. DC Vancomycin        Sarcoidosis of lung  Continuous pulse ox  Consult pulmonology  Per pulm recs steroids and abx    Elevated d-dimer  CTA chest and US DVT negative       Hypertensive urgency  Patient has a current diagnosis of hypertensive urgency (without evidence of end organ damage) which is controlled.  Latest blood pressure and vitals reviewed-     Home meds for hypertension were reviewed and noted below.   Hypertension Medications               valsartan (DIOVAN) 160 MG tablet Take 1 tablet (160 mg total) by mouth every evening.            Medication adjustment for hospital antihypertensives is as follows- Hydralazine PRN SBP>180, resume home meds        VTE Risk Mitigation (From admission, onward)           Ordered     enoxaparin injection 40 mg  Daily         06/22/24 1958     IP VTE LOW RISK PATIENT  Once         06/22/24 1707     Place sequential compression device  Until discontinued         06/22/24 1707                    Discharge Planning   SHAKIRA: 6/26/2024     Code Status: Full Code   Is the patient medically ready for discharge?:     Reason for patient still in hospital (select all that apply): Treatment  Discharge Plan A: Home with family                  Tammy Yoder MD  Department of Hospital Medicine   [facility]

## 2024-06-24 NOTE — PLAN OF CARE
Problem: Adult Inpatient Plan of Care  Goal: Plan of Care Review  Outcome: Progressing     Problem: Pneumonia  Goal: Resolution of Infection Signs and Symptoms  Outcome: Progressing

## 2024-06-24 NOTE — PROGRESS NOTES
Pharmacokinetic Assessment Follow Up: IV Vancomycin    Vancomycin serum concentration assessment(s):    The trough level was drawn correctly and can be used to guide therapy at this time. The measurement is below the desired definitive target range of 10 to 20 mcg/mL.    Vancomycin Regimen Plan:    Change regimen to Vancomycin 1000 mg IV every 10 hours with next serum trough concentration measured at 0800 prior to 4th dose on 06/25    Drug levels (last 3 results):  Recent Labs   Lab Result Units 06/24/24  0300   Vancomycin-Trough ug/mL 7.0*       Pharmacy will continue to follow and monitor vancomycin.    Please contact pharmacy at extension [phone] for questions regarding this assessment.    Thank you for the consult,   Jesus Alberto Grant       Patient brief summary:  Sumit Burger is a 53 y.o. female initiated on antimicrobial therapy with IV Vancomycin for treatment of lower respiratory infection    Drug Allergies:   Review of patient's allergies indicates:   Allergen Reactions    Sulfamethoxazole-trimethoprim Hives, Shortness Of Breath, Nausea And Vomiting and Rash     Hives, nausea and vomiting, and shortness of breath.  Did not require admission  Hives, nausea and vomiting, and shortness of breath.  Did not require admission    Benadryl [diphenhydramine hcl]     Latex, natural rubber     Percocet [oxycodone-acetaminophen]     Shrimp     Codeine Itching and Nausea Only       Actual Body Weight:   65.7 kg    Renal Function:   Estimated Creatinine Clearance: 86.6 mL/min (based on SCr of 0.7 mg/dL).,     CBC (last 72 hours):  Recent Labs   Lab Result Units 06/22/24  1003 06/23/24  0535 06/24/24  0300   WBC K/uL 7.38 7.53 18.43*   Hemoglobin g/dL 14.5 13.6 14.3   Hematocrit % 44.9 43.7 45.6   Platelets K/uL 325 258 273   Gran % % 61.8 82.2* 85.5*   Lymph % % 27.9 14.7* 8.5*   Mono % % 8.0 2.5* 5.2   Eosinophil % % 1.1 0.0 0.0   Basophil % % 0.9 0.1 0.1   Differential Method  Automated Automated Automated        Metabolic Panel (last 72 hours):  Recent Labs   Lab Result Units 06/22/24  1003 06/23/24  0535 06/24/24  0300   Sodium mmol/L 134* 136 136   Potassium mmol/L 3.9 4.0 4.5   Chloride mmol/L 102 104 104   CO2 mmol/L 22* 24 23   Glucose mg/dL 85 197* 146*   BUN mg/dL 12 14 16   Creatinine mg/dL 0.7 0.7 0.7   Albumin g/dL 4.5 4.0 4.3   Total Bilirubin mg/dL 0.4 0.3 0.1   Alkaline Phosphatase U/L 144* 115 128   AST U/L 32 20 23   ALT U/L 34 26 23   Magnesium mg/dL 1.9 1.9 2.2       Vancomycin Administrations:  vancomycin given in the last 96 hours                     vancomycin in dextrose 5 % 1 gram/250 mL IVPB 1,000 mg (mg) 1,000 mg New Bag 06/24/24 0332     1,000 mg New Bag 06/23/24 1620     1,000 mg New Bag  0258    vancomycin 1.25 g in dextrose 5% 250 mL IVPB (ready to mix) (mg) 1,250 mg New Bag 06/22/24 1500                    Microbiologic Results:  Microbiology Results (last 7 days)       Procedure Component Value Units Date/Time    Blood culture (site 1) [[id number]] Collected: 06/22/24 1802    Order Status: Completed Specimen: Blood from Peripheral, Antecubital, Right Updated: 06/23/24 1832     Blood Culture, Routine No Growth to date      No Growth to date    Narrative:      Site # 1, aerobic and anaerobic    Blood culture (site 2) [[id number]] Collected: 06/22/24 1803    Order Status: Completed Specimen: Blood from Peripheral, Antecubital, Right Updated: 06/23/24 1832     Blood Culture, Routine No Growth to date      No Growth to date    Narrative:      Site # 2, aerobic only    MRSA Screen by PCR [[id number]] Collected: 06/23/24 1336    Order Status: Completed Specimen: Nasal Swab Updated: 06/23/24 1648     MRSA SCREEN BY PCR Negative    Culture, Respiratory with Gram Stain [[id number]]     Order Status: No result Specimen: Respiratory     Group A Strep, Molecular [[id number]] Collected: 06/22/24 1009    Order Status: Completed Specimen: Throat Updated: 06/22/24 1047     Group A Strep, Molecular  Negative     Comment: Arcanobacterium haemolyticum and Beta Streptococcus group C   and G will not be detected by this test method.  Please order   Throat Culture ([id number]) if suspected.

## 2024-06-24 NOTE — TRANSFER OF CARE
"Anesthesia Transfer of Care Note    Patient: Sumit Burger    Procedure(s) Performed: Procedure(s) (LRB):  LAVAGE, BRONCHOALVEOLAR (Bilateral)    Patient location: GI    Anesthesia Type: general    Transport from OR: Transported from OR on room air with adequate spontaneous ventilation    Post pain: adequate analgesia    Post assessment: no apparent anesthetic complications and tolerated procedure well    Post vital signs: stable    Level of consciousness: awake and alert    Nausea/Vomiting: no nausea/vomiting    Complications: none    Transfer of care protocol was followed    Last vitals: Visit Vitals  BP (!) 175/95   Pulse 76   Temp 36.6 °C (97.9 °F)   Resp 14   Ht 5' 3" (1.6 m)   Wt 65.7 kg (144 lb 13.5 oz)   LMP 06/24/2022   SpO2 100%   BMI 25.66 kg/m²     "

## 2024-06-24 NOTE — NURSING
Pt arrived back on you from endo in stable condition. Vs bp: 163/90  spo2- % - 97.4 oral- HR 75. Pt resting in bed with eyes open, family at bedside. Aaox4. Call light and personal items within reach, bed locked and in lowest position. Pt denies any further needs at this time.

## 2024-06-24 NOTE — PROGRESS NOTES
.amproPulmonary/Critical Care Progress Note      PATIENT NAME: Sumit Burger  MRN: [id number]  TODAY'S DATE: 2024  1:50 PM  ADMIT DATE: 2024  AGE: 53 y.o. : 1970    CONSULT REQUESTED BY: Tammy Yoder MD    REASON FOR CONSULT:   ILD exacerbation     HPI:  Ms Burger is a 53 year old woman who presents with acute shortness of breath.  She reports that her symptoms have been going on for about 3 weeks when she started feeling bad.  She has prednisone at home and she took 10 mg then increased to 20 mg and just started feeling worse.  She has had worsening cough over that time span and she thinks that it was related to difficulty breathing.  She has not currently on home oxygen.  She reports that she was previously diagnosed with sarcoid after having multiple and recurrent pneumonias for 2-3 years and then eventually got a bronchoscopy which showed granulomatous inflammation that was more suggestive of sarcoid than it was HP.    She reports that she used to smoke occasionally in the past but it was not a daily habit.  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    She reports no history of skin lesions.  [facility] and she is still waiting on the results of that.  She has no history of red hot painful eyes.  She has been seen by Ophthalmology previously.    She reports that her symptoms started sometime after COVID and has progressively worsened.  Over the last   1-1/2 year she has had 2-3 hospitalizations.      Today:     Doing about the same today. Plan for bronch.   Review of Systems   Constitutional:  Negative for appetite change, chills, fever and unexpected weight change.   HENT:  Negative for nosebleeds, postnasal drip, trouble swallowing and voice change.    Eyes:  Negative for visual disturbance.   Respiratory:  Positive for cough and shortness of breath.  Negative for wheezing.    Cardiovascular:  Negative for chest pain and leg swelling.   Gastrointestinal:  Negative for diarrhea, nausea and vomiting.   Endocrine: Negative for cold intolerance and heat intolerance.   Genitourinary:  Negative for dysuria, flank pain and frequency.   Musculoskeletal:  Negative for neck pain and neck stiffness.   Allergic/Immunologic: Negative for environmental allergies and immunocompromised state.   Neurological:  Negative for syncope, speech difficulty and weakness.   Hematological:  Negative for adenopathy.   Psychiatric/Behavioral:  Negative for confusion.            ALLERGIES  Review of patient's allergies indicates:   Allergen Reactions    Sulfamethoxazole-trimethoprim Hives, Shortness Of Breath, Nausea And Vomiting and Rash     Hives, nausea and vomiting, and shortness of breath.  Did not require admission  Hives, nausea and vomiting, and shortness of breath.  Did not require admission    Benadryl [diphenhydramine hcl]     Latex, natural rubber     Percocet [oxycodone-acetaminophen]     Shrimp     Codeine Itching and Nausea Only       INPATIENT SCHEDULED MEDICATIONS   azithromycin  500 mg Oral Daily    cefTRIAXone (Rocephin) IV (PEDS and ADULTS)  2 g Intravenous Q12H    enoxparin  40 mg Subcutaneous Daily    famotidine  20 mg Oral BID    folic acid  1,000 mcg Oral Daily    methylPREDNISolone sodium succinate injection  40 mg Intravenous Q12H    valsartan  160 mg Oral QHS         MEDICAL AND SURGICAL HISTORY  Past Medical History:   Diagnosis Date    HTN (hypertension)     Pneumonia 10/2020    Sarcoidosis      Past Surgical History:   Procedure Laterality Date    BRONCHOSCOPY WITH FLUOROSCOPY Right 10/20/2020    Procedure: BRONCHOSCOPY, WITH FLUOROSCOPY;  Surgeon: Ava Rolon MD;  Location: [facility];  Service: Pulmonary;  Laterality: Right;     SECTION      x2    FALLOPIAN TUBOPLASTY      LAPAROSCOPIC APPENDECTOMY N/A 8/10/2022    Procedure: APPENDECTOMY,  LAPAROSCOPIC;  Surgeon: Jackson Vogel MD;  Location: [facility];  Service: General;  Laterality: N/A;    TUBAL LIGATION      WRIST FRACTURE SURGERY Right     ganglion cyst       ALCOHOL, TOBACCO AND DRUG USE  Social History     Tobacco Use   Smoking Status Light Smoker    Current packs/day: 0.00    Types: Cigarettes    Last attempt to quit: 2020    Years since quittin.1   Smokeless Tobacco Never   Tobacco Comments    ocassionally never was qd     Social History     Substance and Sexual Activity   Alcohol Use Not Currently     Social History     Substance and Sexual Activity   Drug Use Never       FAMILY HISTORY  Family History   Problem Relation Name Age of Onset    Hypertension Mother         VITAL SIGNS (MOST RECENT)  Temp: 97.9 °F (36.6 °C) (24 1200)  Pulse: 84 (24 1200)  Resp: 15 (24 1200)  BP: 131/80 (24 1200)  SpO2: 98 % (24 1200)    INTAKE AND OUTPUT (LAST 24 HOURS):  Intake/Output Summary (Last 24 hours) at 2024 1229  Last data filed at 2024 1053  Gross per 24 hour   Intake 1035 ml   Output --   Net 1035 ml       WEIGHT  Wt Readings from Last 1 Encounters:   24 65.7 kg (144 lb 13.5 oz)       Physical Exam  Vitals reviewed.   Constitutional:       General: She is not in acute distress.     Appearance: She is well-developed. She is ill-appearing. She is not diaphoretic.   HENT:      Head: Normocephalic and atraumatic.      Mouth/Throat:      Pharynx: No oropharyngeal exudate or posterior oropharyngeal erythema.   Eyes:      General: No scleral icterus.     Pupils: Pupils are equal, round, and reactive to light.   Neck:      Vascular: No JVD.   Cardiovascular:      Rate and Rhythm: Normal rate and regular rhythm.      Heart sounds: Normal heart sounds. No murmur heard.  Pulmonary:      Effort: Pulmonary effort is normal. No respiratory distress.      Breath sounds: Wheezing and rales present.   Abdominal:      General: Bowel sounds are normal. There is no  "distension.      Palpations: Abdomen is soft.      Tenderness: There is no abdominal tenderness.   Musculoskeletal:         General: No swelling.      Cervical back: Normal range of motion and neck supple. No rigidity.   Skin:     General: Skin is warm and dry.      Capillary Refill: Capillary refill takes less than 2 seconds.      Coloration: Skin is not pale.      Findings: No rash.   Neurological:      General: No focal deficit present.      Mental Status: She is alert and oriented to person, place, and time.      Cranial Nerves: No cranial nerve deficit.      Motor: No weakness or abnormal muscle tone.           ACUTE PHASE REACTANT (LAST 24 HOURS)  No results for input(s): "FERRITIN", "CRP", "LDH", "DDIMER" in the last 24 hours.    CBC LAST (LAST 24 HOURS)  Recent Labs   Lab 06/24/24  0300   WBC 18.43*   RBC 5.10   HGB 14.3   HCT 45.6   MCV 89   MCH 28.0   MCHC 31.4*   RDW 14.5      MPV 11.4   GRAN 85.5*  15.8*   LYMPH 8.5*  1.6   MONO 5.2  1.0   BASO 0.01   NRBC 0       CHEMISTRY LAST (LAST 24 HOURS)  Recent Labs   Lab 06/24/24  0300      K 4.5      CO2 23   ANIONGAP 9   BUN 16   CREATININE 0.7   *   CALCIUM 9.9   MG 2.2   ALBUMIN 4.3   PROT 8.3   ALKPHOS 128   ALT 23   AST 23   BILITOT 0.1       COAGULATION LAST (LAST 24 HOURS)  No results for input(s): "LABPT", "INR", "APTT" in the last 24 hours.    CARDIAC PROFILE (LAST 24 HOURS)  Recent Labs   Lab 06/22/24  1003 06/22/24  1218   BNP 6  --    TROPONINIHS 4.2 5.0       LAST 7 DAYS MICROBIOLOGY   Microbiology Results (last 7 days)       Procedure Component Value Units Date/Time    Culture, Respiratory [[id number]]     Order Status: No result Specimen: Respiratory     Fungus culture [[id number]]     Order Status: No result Specimen: Bronchial Alveolar Lavage from Lung, Left     Culture, Respiratory [[id number]]     Order Status: No result Specimen: Respiratory from Lung, Right     Gram stain [[id number]]     Order Status: No result " Specimen: Body Fluid from Lung, Right     AFB Culture & Smear [[id number]]     Order Status: No result Specimen: Bronchial Alveolar Lavage from Bronchial Wash     Fungus culture [[id number]]     Order Status: No result Specimen: Body Fluid from BAL, RLL     Blood culture (site 1) [[id number]] Collected: 06/22/24 1802    Order Status: Completed Specimen: Blood from Peripheral, Antecubital, Right Updated: 06/23/24 1832     Blood Culture, Routine No Growth to date      No Growth to date    Narrative:      Site # 1, aerobic and anaerobic    Blood culture (site 2) [[id number]] Collected: 06/22/24 1803    Order Status: Completed Specimen: Blood from Peripheral, Antecubital, Right Updated: 06/23/24 1832     Blood Culture, Routine No Growth to date      No Growth to date    Narrative:      Site # 2, aerobic only    MRSA Screen by PCR [[id number]] Collected: 06/23/24 1336    Order Status: Completed Specimen: Nasal Swab Updated: 06/23/24 1648     MRSA SCREEN BY PCR Negative    Culture, Respiratory with Gram Stain [[id number]]     Order Status: No result Specimen: Respiratory     Group A Strep, Molecular [[id number]] Collected: 06/22/24 1009    Order Status: Completed Specimen: Throat Updated: 06/22/24 1047     Group A Strep, Molecular Negative     Comment: Arcanobacterium haemolyticum and Beta Streptococcus group C   and G will not be detected by this test method.  Please order   Throat Culture ([id number]) if suspected.                 MOST RECENT IMAGING  US Lower Extremity Veins Bilateral  Narrative: EXAMINATION:  US LOWER EXTREMITY VEINS BILATERAL    CLINICAL HISTORY:  elevated ddi;    TECHNIQUE:  Duplex and color flow Doppler and dynamic compression was performed of the bilateral lower extremity veins was performed.    COMPARISON:  None    FINDINGS:  Right thigh veins: The common femoral, femoral, popliteal, upper greater saphenous, and deep femoral veins are patent and free of thrombus. The veins are normally compressible  and have normal phasic flow and augmentation response.    Right calf veins: The visualized calf veins are patent.    Left thigh veins: The common femoral, femoral, popliteal, upper greater saphenous, and deep femoral veins are patent and free of thrombus. The veins are normally compressible and have normal phasic flow and augmentation response.    Left calf veins: The visualized calf veins are patent.    Miscellaneous: None  Impression: No evidence of deep venous thrombosis in either lower extremity.    Electronically signed by: Ortiz Matias  Date:    06/22/2024  Time:    17:57  CTA Chest Non-Coronary (PE Studies)  Narrative: EXAMINATION:  CTA CHEST NON CORONARY (PE STUDIES)    CLINICAL HISTORY:  Pulmonary embolism (PE) suspected, positive D-dimer;.    TECHNIQUE:  CT angiography targeted to the pulmonary arteries was performed. One hundred cc nonionic contrast was administered and the bolus was timed for optimal opacification of the pulmonary arteries. 3-D reformatted images were obtained on an independent workstation and stored as a part of patient's permanent medical record.    COMPARISON:  October 2023    FINDINGS:  Heart size is normal.  The thoracic aorta is normal in caliber.  The pulmonary arteries are fairly well opacified.  There are no filling defects to indicate pulmonary thromboembolic disease.    There is mildly increasing mediastinal and bilateral hilar lymphadenopathy.  Images at lung windows demonstrate bilateral bronchiectasis diffuse bilateral ground-glass opacities and interstitial prominence appear increased compared to the prior study.  No pulmonary mass lesions or pleural effusions are identified.  The upper abdomen is unremarkable.  Impression: 1. No evidence of pulmonary thromboembolic disease.  2. Significantly increasing bilateral diffuse ground-glass opacities and interstitial prominence, with underlying bronchiectasis.  There is also increasing mediastinal lymphadenopathy.   Findings are least in part related to patient's provided history of sarcoidosis.  Increasing pulmonary opacities may be on the basis of superimposed pneumonia, although worsening lung disease secondary to sarcoidosis could also give this appearance.  3. Additional details as above.    Electronically signed by: Ced Dillon  Date:    06/22/2024  Time:    12:37  X-Ray Chest PA And Lateral  Narrative: EXAMINATION:  XR CHEST PA AND LATERAL    CLINICAL HISTORY:  shortness of breath; sarcoidosis.    COMPARISON:  July 13, 2023    FINDINGS:  PA and lateral view show the heart and mediastinum to be within normal limits.    Diffuse bilateral abnormal interstitial prominence has increased compared to the prior study, with faint bilateral ground-glass opacities also increased.  There are no pleural effusions.  No acute osseous abnormalities are identified.  Impression: 1. Increasing bilateral interstitial and ground-glass pulmonary opacities.  Findings may reflect progression of sarcoidosis.  Superimposed pneumonia or edema is not excluded.    Electronically signed by: Ced Dillon  Date:    06/22/2024  Time:    10:38      CURRENT VISIT EKG  Results for orders placed or performed during the hospital encounter of 06/22/24   EKG 12-lead   Result Value Ref Range    QRS Duration 84 ms    OHS QTC Calculation 444 ms    Narrative    Test Reason : R06.02,    Vent. Rate : 108 BPM     Atrial Rate : 108 BPM     P-R Int : 124 ms          QRS Dur : 084 ms      QT Int : 332 ms       P-R-T Axes : 048 -04 021 degrees     QTc Int : 444 ms    Sinus tachycardia  Biatrial enlargement  LVH  Abnormal ECG  When compared with ECG of 11-OCT-2023 07:00,  No significant change was found    Referred By: [email or web address]   SELF           Confirmed By:        ECHOCARDIOGRAM RESULTS  Results for orders placed during the hospital encounter of 06/30/23    Echo    Interpretation Summary  · The left ventricle is normal in size with concentric remodeling and  "normal systolic function.  · The estimated ejection fraction is 65%.  · Normal left ventricular diastolic function.  · Mild tricuspid regurgitation.  · Moderate left atrial enlargement.  · Mild mitral regurgitation.  · The estimated PA systolic pressure is 34 mmHg.  · Normal right ventricular size with normal right ventricular systolic function.  · Normal central venous pressure (3 mmHg).        VENTILATOR INFORMATION              LAST ARTERIAL BLOOD GAS  ABG  No results for input(s): "PH", "PO2", "PCO2", "HCO3", "BE" in the last 168 hours.    ASSESSMENT:   ILD exacerbation    Ms. Burger is a 53-year-old who has underlying interstitial lung disease presumed to be related to sarcoidosis.  She reports subacute worsening of her symptoms for the last 3 weeks despite increasing her steroids at home.  She reports being on immune suppression in the past including various dosing of prednisone and methotrexate.  She stopped immune suppression about 5 weeks ago.    ID exacerbation may be related to discontinuation of immune suppression.  Or alternatively she may have infection.    PLAN:   - bronch today  - Continue current antibiotics  - We did empirically start pulse dose steroids, continue day 2 of steroids today  - Follow up sputum and blood cultures    Chilango Zuluaga MD  Date of Service: 06/24/2024  1:50 PM    "

## 2024-06-24 NOTE — PROGRESS NOTES
Pharmacy Consult Discontinuation: Vancomycin    Sumit Burger [id number] is a 53 y.o. female was consulted for vancomycin pharmacotherapy management by pharmacy.    Pharmacy consult for vancomycin dosing is no longer required.  Vancomycin was discontinued 06/24/2024.    Thank you for allowing us to participate in this patient's care. Should you have any questions or concerns please feel free to contact the pharmacy department at [phone].    Jarek Peña, CadenD

## 2024-06-24 NOTE — CARE UPDATE
06/24/24 0708   Patient Assessment/Suction   Level of Consciousness (AVPU) alert   Respiratory Effort Normal;Unlabored   Expansion/Accessory Muscles/Retractions no use of accessory muscles;no retractions;expansion symmetric   All Lung Fields Breath Sounds clear   Rhythm/Pattern, Respiratory unlabored;pattern regular;depth regular;chest wiggle adequate;no shortness of breath reported   Cough Frequency no cough   PRE-TX-O2   Device (Oxygen Therapy) room air   $ Is the patient on Low Flow Oxygen? Yes   SpO2 97 %   Pulse Oximetry Type Intermittent   $ Pulse Oximetry - Multiple Charge Pulse Oximetry - Multiple   Pulse 70   Resp 18   Aerosol Therapy   $ Aerosol Therapy Charges PRN treatment not required   Education   $ Education Bronchodilator;15 min

## 2024-06-24 NOTE — ANESTHESIA PREPROCEDURE EVALUATION
2024  Sumit Burger is a 53 y.o., female.      Patient Active Problem List   Diagnosis    Carpal tunnel syndrome on right    Wrist pain    Bursitis/tendonitis, shoulder    Cervical radiculopathy    Cervical strain    Carpal tunnel syndrome, bilateral    Pneumonia    Sarcoidosis    Shortness of breath    Cough    Globus pharyngeus    Palpitations    Tobacco abuse    Essential hypertension    Drug-induced insomnia    Hypertensive urgency    Elevated d-dimer    Sarcoidosis of lung       Past Surgical History:   Procedure Laterality Date    BRONCHOSCOPY WITH FLUOROSCOPY Right 10/20/2020    Procedure: BRONCHOSCOPY, WITH FLUOROSCOPY;  Surgeon: Ava Rolon MD;  Location: [facility] ENDO;  Service: Pulmonary;  Laterality: Right;     SECTION      x2    FALLOPIAN TUBOPLASTY      LAPAROSCOPIC APPENDECTOMY N/A 8/10/2022    Procedure: APPENDECTOMY, LAPAROSCOPIC;  Surgeon: Jackson Vogel MD;  Location: [facility] OR;  Service: General;  Laterality: N/A;    TUBAL LIGATION      WRIST FRACTURE SURGERY Right     ganglion cyst        Tobacco Use:  The patient  reports that she has been smoking cigarettes. She has never used smokeless tobacco.     Results for orders placed or performed during the hospital encounter of 24   EKG 12-lead    Collection Time: 24 11:08 AM   Result Value Ref Range    QRS Duration 84 ms    [facility] QTC Calculation 444 ms    Narrative    Test Reason : R06.02,    Vent. Rate : 108 BPM     Atrial Rate : 108 BPM     P-R Int : 124 ms          QRS Dur : 084 ms      QT Int : 332 ms       P-R-T Axes : 048 -04 021 degrees     QTc Int : 444 ms    Sinus tachycardia  Biatrial enlargement  LVH  Abnormal ECG  When compared with ECG of 11-OCT-2023 07:00,  No significant change was found    Referred By: [email or web address]   SELF           Confirmed By:         Imaging Results              US Lower  Extremity Veins Bilateral (Final result)  Result time 06/22/24 17:57:58      Final result by rOtiz Matias MD (06/22/24 17:57:58)                   Impression:      No evidence of deep venous thrombosis in either lower extremity.      Electronically signed by: Ortiz Matias  Date:    06/22/2024  Time:    17:57               Narrative:    EXAMINATION:  US LOWER EXTREMITY VEINS BILATERAL    CLINICAL HISTORY:  elevated ddi;    TECHNIQUE:  Duplex and color flow Doppler and dynamic compression was performed of the bilateral lower extremity veins was performed.    COMPARISON:  None    FINDINGS:  Right thigh veins: The common femoral, femoral, popliteal, upper greater saphenous, and deep femoral veins are patent and free of thrombus. The veins are normally compressible and have normal phasic flow and augmentation response.    Right calf veins: The visualized calf veins are patent.    Left thigh veins: The common femoral, femoral, popliteal, upper greater saphenous, and deep femoral veins are patent and free of thrombus. The veins are normally compressible and have normal phasic flow and augmentation response.    Left calf veins: The visualized calf veins are patent.    Miscellaneous: None                                       CTA Chest Non-Coronary (PE Studies) (Final result)  Result time 06/22/24 12:37:25      Final result by Ced Dillon MD (06/22/24 12:37:25)                   Impression:      1. No evidence of pulmonary thromboembolic disease.  2. Significantly increasing bilateral diffuse ground-glass opacities and interstitial prominence, with underlying bronchiectasis.  There is also increasing mediastinal lymphadenopathy.  Findings are least in part related to patient's provided history of sarcoidosis.  Increasing pulmonary opacities may be on the basis of superimposed pneumonia, although worsening lung disease secondary to sarcoidosis could also give this appearance.  3. Additional details as  above.      Electronically signed by: Ced Dillon  Date:    06/22/2024  Time:    12:37               Narrative:    EXAMINATION:  CTA CHEST NON CORONARY (PE STUDIES)    CLINICAL HISTORY:  Pulmonary embolism (PE) suspected, positive D-dimer;.    TECHNIQUE:  CT angiography targeted to the pulmonary arteries was performed. One hundred cc nonionic contrast was administered and the bolus was timed for optimal opacification of the pulmonary arteries. 3-D reformatted images were obtained on an independent workstation and stored as a part of patient's permanent medical record.    COMPARISON:  October 2023    FINDINGS:  Heart size is normal.  The thoracic aorta is normal in caliber.  The pulmonary arteries are fairly well opacified.  There are no filling defects to indicate pulmonary thromboembolic disease.    There is mildly increasing mediastinal and bilateral hilar lymphadenopathy.  Images at lung windows demonstrate bilateral bronchiectasis diffuse bilateral ground-glass opacities and interstitial prominence appear increased compared to the prior study.  No pulmonary mass lesions or pleural effusions are identified.  The upper abdomen is unremarkable.                                       X-Ray Chest PA And Lateral (Final result)  Result time 06/22/24 10:38:51      Final result by Ced Dillon MD (06/22/24 10:38:51)                   Impression:      1. Increasing bilateral interstitial and ground-glass pulmonary opacities.  Findings may reflect progression of sarcoidosis.  Superimposed pneumonia or edema is not excluded.      Electronically signed by: Ced Dillon  Date:    06/22/2024  Time:    10:38               Narrative:    EXAMINATION:  XR CHEST PA AND LATERAL    CLINICAL HISTORY:  shortness of breath; sarcoidosis.    COMPARISON:  July 13, 2023    FINDINGS:  PA and lateral view show the heart and mediastinum to be within normal limits.    Diffuse bilateral abnormal interstitial prominence has  increased compared to the prior study, with faint bilateral ground-glass opacities also increased.  There are no pleural effusions.  No acute osseous abnormalities are identified.                                       Lab Results   Component Value Date    WBC 18.43 (H) 06/24/2024    HGB 14.3 06/24/2024    HCT 45.6 06/24/2024    MCV 89 06/24/2024     06/24/2024     BMP  Lab Results   Component Value Date     06/24/2024    K 4.5 06/24/2024     06/24/2024    CO2 23 06/24/2024    BUN 16 06/24/2024    CREATININE 0.7 06/24/2024    CALCIUM 9.9 06/24/2024    ANIONGAP 9 06/24/2024     (H) 06/24/2024     (H) 06/23/2024    GLU 85 06/22/2024       Results for orders placed during the hospital encounter of 06/30/23    Echo    Interpretation Summary  · The left ventricle is normal in size with concentric remodeling and normal systolic function.  · The estimated ejection fraction is 65%.  · Normal left ventricular diastolic function.  · Mild tricuspid regurgitation.  · Moderate left atrial enlargement.  · Mild mitral regurgitation.  · The estimated PA systolic pressure is 34 mmHg.  · Normal right ventricular size with normal right ventricular systolic function.  · Normal central venous pressure (3 mmHg).            Pre-op Assessment    I have reviewed the Patient Summary Reports.     I have reviewed the Nursing Notes. I have reviewed the NPO Status.   I have reviewed the Medications.     Review of Systems  Anesthesia Hx:  No problems with previous Anesthesia             Denies Family Hx of Anesthesia complications.    Denies Personal Hx of Anesthesia complications.                    Social:  Smoker       Hematology/Oncology:  Hematology Normal   Oncology Normal                                   EENT/Dental:  EENT/Dental Normal           Cardiovascular:     Hypertension              ECG has been reviewed.                          Pulmonary:      Shortness of breath                   Renal/:  Renal/ Normal                 Hepatic/GI:  Hepatic/GI Normal                 Musculoskeletal:         Spine Disorders: cervical            Neurological:    Neuromuscular Disease,                                   Endocrine:  Endocrine Normal            Psych:  Psychiatric Normal                    Physical Exam  General: Well nourished, Cooperative, Alert and Oriented    Airway:  Mallampati: II   Mouth Opening: Normal  TM Distance: Normal  Tongue: Normal  Neck ROM: Normal ROM    Dental:  Intact    Chest/Lungs:  Clear to auscultation    Heart:  Rate: Normal  Rhythm: Regular Rhythm  Sounds: Normal        Anesthesia Plan  Type of Anesthesia, risks & benefits discussed:    Anesthesia Type: Gen ETT  Intra-op Monitoring Plan: Standard ASA Monitors  Post Op Pain Control Plan: multimodal analgesia  Induction:  IV  Airway Plan: Video and Direct  Informed Consent: Informed consent signed with the Patient and all parties understand the risks and agree with anesthesia plan.  All questions answered.   ASA Score: 2    Ready For Surgery From Anesthesia Perspective.     .

## 2024-06-24 NOTE — PLAN OF CARE
06/23/24 2140   Patient Assessment/Suction   Level of Consciousness (AVPU) alert   Respiratory Effort Normal;Unlabored   Expansion/Accessory Muscles/Retractions no use of accessory muscles   All Lung Fields Breath Sounds clear;diminished   Rhythm/Pattern, Respiratory no shortness of breath reported;pattern regular;unlabored;shallow   Cough Frequency frequent;other (see comments)  (improving today)   Cough Type dry;nonproductive   PRE-TX-O2   Device (Oxygen Therapy) room air   SpO2 97 %   Pulse Oximetry Type Intermittent   $ Pulse Oximetry - Multiple Charge Pulse Oximetry - Multiple   Pulse 75   Resp 18   Aerosol Therapy   $ Aerosol Therapy Charges Aerosol Treatment   Daily Review of Necessity (SVN) completed   Respiratory Treatment Status (SVN) given   Treatment Route (SVN) mask;oxygen   Patient Position HOB elevated   Post Treatment Assessment (SVN) increased aeration   Signs of Intolerance (SVN) none   Education   $ Education Bronchodilator;15 min

## 2024-06-24 NOTE — SUBJECTIVE & OBJECTIVE
Interval History: Patient is going for bronchoscopy today. No overnight issues.     Review of Systems  Objective:     Vital Signs (Most Recent):  Temp: 97.9 °F (36.6 °C) (06/24/24 1000)  Pulse: 76 (06/24/24 1000)  Resp: 14 (06/24/24 1000)  BP: (!) 175/95 (06/24/24 1000)  SpO2: 100 % (06/24/24 1000) Vital Signs (24h Range):  Temp:  [97.7 °F (36.5 °C)-98.4 °F (36.9 °C)] 97.9 °F (36.6 °C)  Pulse:  [63-81] 76  Resp:  [14-18] 14  SpO2:  [97 %-100 %] 100 %  BP: (153-178)/() 175/95     Weight: 65.7 kg (144 lb 13.5 oz)  Body mass index is 25.66 kg/m².    Intake/Output Summary (Last 24 hours) at 6/24/2024 1023  Last data filed at 6/24/2024 0029  Gross per 24 hour   Intake 835 ml   Output --   Net 835 ml         Physical Exam  Vitals reviewed.   Constitutional:       Interventions: Nasal cannula in place.   HENT:      Head: Normocephalic and atraumatic.   Cardiovascular:      Rate and Rhythm: Regular rhythm.   Pulmonary:      Effort: Pulmonary effort is normal. No respiratory distress.      Breath sounds: Decreased air movement present.   Abdominal:      General: Bowel sounds are normal.      Palpations: Abdomen is soft.      Tenderness: There is no abdominal tenderness.   Musculoskeletal:         General: Normal range of motion.      Right lower leg: No edema.      Left lower leg: No edema.   Skin:     General: Skin is warm and dry.   Neurological:      Mental Status: She is alert and oriented to person, place, and time.   Psychiatric:         Mood and Affect: anxious         Significant Labs: All pertinent labs within the past 24 hours have been reviewed.  CBC:   Recent Labs   Lab 06/23/24  0535 06/24/24  0300   WBC 7.53 18.43*   HGB 13.6 14.3   HCT 43.7 45.6    273     CMP:   Recent Labs   Lab 06/23/24  0535 06/24/24  0300    136   K 4.0 4.5    104   CO2 24 23   * 146*   BUN 14 16   CREATININE 0.7 0.7   CALCIUM 9.9 9.9   PROT 7.5 8.3   ALBUMIN 4.0 4.3   BILITOT 0.3 0.1   ALKPHOS 115 128    AST 20 23   ALT 26 23   ANIONGAP 8 9       Significant Imaging: I have reviewed all pertinent imaging results/findings within the past 24 hours.

## 2024-06-24 NOTE — ASSESSMENT & PLAN NOTE
CTA chest reviewed. Diffuse interstitial opacifications   Pulmonary consulted  Bronchoscopy 6/24, follow BAL   Procalcitonin negative, MRSA nares negative   Weaning solumedrol 40 mg Q12H   Cont breathing Rx  Cont Rocephin and Azithromycin. DC Vancomycin

## 2024-06-25 PROBLEM — J84.9 INTERSTITIAL LUNG DISEASE: Status: RESOLVED | Noted: 2024-06-24 | Resolved: 2024-06-25

## 2024-06-25 LAB
ALBUMIN SERPL BCP-MCNC: 3.9 G/DL (ref 3.5–5.2)
ALP SERPL-CCNC: 112 U/L (ref 55–135)
ALT SERPL W/O P-5'-P-CCNC: 23 U/L (ref 10–44)
ANION GAP SERPL CALC-SCNC: 7 MMOL/L (ref 8–16)
AST SERPL-CCNC: 14 U/L (ref 10–40)
BASOPHILS # BLD AUTO: 0.03 K/UL (ref 0–0.2)
BASOPHILS NFR BLD: 0.2 % (ref 0–1.9)
BILIRUB SERPL-MCNC: 0.2 MG/DL (ref 0.1–1)
BUN SERPL-MCNC: 11 MG/DL (ref 6–20)
CALCIUM SERPL-MCNC: 9.7 MG/DL (ref 8.7–10.5)
CHLORIDE SERPL-SCNC: 105 MMOL/L (ref 95–110)
CO2 SERPL-SCNC: 27 MMOL/L (ref 23–29)
CREAT SERPL-MCNC: 0.6 MG/DL (ref 0.5–1.4)
DIFFERENTIAL METHOD BLD: ABNORMAL
EOSINOPHIL # BLD AUTO: 0 K/UL (ref 0–0.5)
EOSINOPHIL NFR BLD: 0 % (ref 0–8)
ERYTHROCYTE [DISTWIDTH] IN BLOOD BY AUTOMATED COUNT: 14.4 % (ref 11.5–14.5)
EST. GFR  (NO RACE VARIABLE): >60 ML/MIN/1.73 M^2
GLUCOSE SERPL-MCNC: 132 MG/DL (ref 70–110)
HCT VFR BLD AUTO: 44.9 % (ref 37–48.5)
HGB BLD-MCNC: 14 G/DL (ref 12–16)
IMM GRANULOCYTES # BLD AUTO: 0.15 K/UL (ref 0–0.04)
IMM GRANULOCYTES NFR BLD AUTO: 1.1 % (ref 0–0.5)
LYMPHOCYTES # BLD AUTO: 1.5 K/UL (ref 1–4.8)
LYMPHOCYTES NFR BLD: 11.2 % (ref 18–48)
MAGNESIUM SERPL-MCNC: 2.1 MG/DL (ref 1.6–2.6)
MCH RBC QN AUTO: 27.5 PG (ref 27–31)
MCHC RBC AUTO-ENTMCNC: 31.2 G/DL (ref 32–36)
MCV RBC AUTO: 88 FL (ref 82–98)
MONOCYTES # BLD AUTO: 0.5 K/UL (ref 0.3–1)
MONOCYTES NFR BLD: 3.7 % (ref 4–15)
NEUTROPHILS # BLD AUTO: 11.4 K/UL (ref 1.8–7.7)
NEUTROPHILS NFR BLD: 83.8 % (ref 38–73)
NRBC BLD-RTO: 0 /100 WBC
OHS QRS DURATION: 84 MS
OHS QTC CALCULATION: 444 MS
PLATELET # BLD AUTO: 262 K/UL (ref 150–450)
PMV BLD AUTO: 11.9 FL (ref 9.2–12.9)
POTASSIUM SERPL-SCNC: 4.2 MMOL/L (ref 3.5–5.1)
PROT SERPL-MCNC: 7.3 G/DL (ref 6–8.4)
RBC # BLD AUTO: 5.09 M/UL (ref 4–5.4)
SODIUM SERPL-SCNC: 139 MMOL/L (ref 136–145)
WBC # BLD AUTO: 13.57 K/UL (ref 3.9–12.7)

## 2024-06-25 PROCEDURE — 94761 N-INVAS EAR/PLS OXIMETRY MLT: CPT

## 2024-06-25 PROCEDURE — 83735 ASSAY OF MAGNESIUM: CPT

## 2024-06-25 PROCEDURE — 12000002 HC ACUTE/MED SURGE SEMI-PRIVATE ROOM

## 2024-06-25 PROCEDURE — 85025 COMPLETE CBC W/AUTO DIFF WBC: CPT

## 2024-06-25 PROCEDURE — 27000221 HC OXYGEN, UP TO 24 HOURS

## 2024-06-25 PROCEDURE — 25000003 PHARM REV CODE 250: Performed by: STUDENT IN AN ORGANIZED HEALTH CARE EDUCATION/TRAINING PROGRAM

## 2024-06-25 PROCEDURE — 63600175 PHARM REV CODE 636 W HCPCS

## 2024-06-25 PROCEDURE — 63600175 PHARM REV CODE 636 W HCPCS: Performed by: INTERNAL MEDICINE

## 2024-06-25 PROCEDURE — 99900031 HC PATIENT EDUCATION (STAT)

## 2024-06-25 PROCEDURE — 36415 COLL VENOUS BLD VENIPUNCTURE: CPT

## 2024-06-25 PROCEDURE — 63600175 PHARM REV CODE 636 W HCPCS: Performed by: STUDENT IN AN ORGANIZED HEALTH CARE EDUCATION/TRAINING PROGRAM

## 2024-06-25 PROCEDURE — 63700000 PHARM REV CODE 250 ALT 637 W/O HCPCS: Performed by: INTERNAL MEDICINE

## 2024-06-25 PROCEDURE — 25000003 PHARM REV CODE 250: Performed by: INTERNAL MEDICINE

## 2024-06-25 PROCEDURE — 80053 COMPREHEN METABOLIC PANEL: CPT

## 2024-06-25 PROCEDURE — 99900035 HC TECH TIME PER 15 MIN (STAT)

## 2024-06-25 PROCEDURE — 25000003 PHARM REV CODE 250

## 2024-06-25 RX ORDER — AMLODIPINE BESYLATE 5 MG/1
5 TABLET ORAL DAILY
Status: DISCONTINUED | OUTPATIENT
Start: 2024-06-25 | End: 2024-06-26 | Stop reason: HOSPADM

## 2024-06-25 RX ADMIN — METHYLPREDNISOLONE SODIUM SUCCINATE 40 MG: 40 INJECTION, POWDER, FOR SOLUTION INTRAMUSCULAR; INTRAVENOUS at 11:06

## 2024-06-25 RX ADMIN — FAMOTIDINE 20 MG: 20 TABLET ORAL at 08:06

## 2024-06-25 RX ADMIN — METHYLPREDNISOLONE SODIUM SUCCINATE 40 MG: 40 INJECTION, POWDER, FOR SOLUTION INTRAMUSCULAR; INTRAVENOUS at 12:06

## 2024-06-25 RX ADMIN — HYDROCODONE BITARTRATE AND ACETAMINOPHEN 1 TABLET: 5; 325 TABLET ORAL at 05:06

## 2024-06-25 RX ADMIN — VALSARTAN 160 MG: 80 TABLET, FILM COATED ORAL at 08:06

## 2024-06-25 RX ADMIN — HYDROXYZINE HYDROCHLORIDE 25 MG: 25 TABLET ORAL at 11:06

## 2024-06-25 RX ADMIN — HYDROCODONE BITARTRATE AND ACETAMINOPHEN 1 TABLET: 5; 325 TABLET ORAL at 11:06

## 2024-06-25 RX ADMIN — MORPHINE SULFATE 4 MG: 4 INJECTION, SOLUTION INTRAMUSCULAR; INTRAVENOUS at 10:06

## 2024-06-25 RX ADMIN — HYDROCODONE BITARTRATE AND ACETAMINOPHEN 1 TABLET: 5; 325 TABLET ORAL at 07:06

## 2024-06-25 RX ADMIN — CEFTRIAXONE SODIUM 2 G: 2 INJECTION, POWDER, FOR SOLUTION INTRAMUSCULAR; INTRAVENOUS at 02:06

## 2024-06-25 RX ADMIN — ZOLPIDEM TARTRATE 5 MG: 5 TABLET, COATED ORAL at 08:06

## 2024-06-25 RX ADMIN — AZITHROMYCIN DIHYDRATE 500 MG: 250 TABLET ORAL at 08:06

## 2024-06-25 RX ADMIN — FOLIC ACID 1000 MCG: 1 TABLET ORAL at 08:06

## 2024-06-25 RX ADMIN — HYDROXYZINE HYDROCHLORIDE 25 MG: 25 TABLET ORAL at 05:06

## 2024-06-25 RX ADMIN — HYDROXYZINE HYDROCHLORIDE 25 MG: 25 TABLET ORAL at 08:06

## 2024-06-25 RX ADMIN — ALPRAZOLAM 0.25 MG: 0.25 TABLET ORAL at 11:06

## 2024-06-25 RX ADMIN — ENOXAPARIN SODIUM 40 MG: 40 INJECTION SUBCUTANEOUS at 05:06

## 2024-06-25 RX ADMIN — AMLODIPINE BESYLATE 5 MG: 5 TABLET ORAL at 12:06

## 2024-06-25 RX ADMIN — HYDRALAZINE HYDROCHLORIDE 10 MG: 20 INJECTION INTRAMUSCULAR; INTRAVENOUS at 12:06

## 2024-06-25 RX ADMIN — HYDRALAZINE HYDROCHLORIDE 10 MG: 20 INJECTION INTRAMUSCULAR; INTRAVENOUS at 07:06

## 2024-06-25 NOTE — ASSESSMENT & PLAN NOTE
CTA chest reviewed. Diffuse interstitial opacifications   Pulmonary consulted  Bronchoscopy 6/24, follow BAL   Procalcitonin negative, MRSA nares negative   Weaning solumedrol 40 mg Q12H   Cont breathing Rx  Cont Rocephin and Azithromycin D3

## 2024-06-25 NOTE — CARE UPDATE
06/24/24 2010   Patient Assessment/Suction   Level of Consciousness (AVPU) alert   Respiratory Effort Normal   Expansion/Accessory Muscles/Retractions no use of accessory muscles   All Lung Fields Breath Sounds clear;diminished   Rhythm/Pattern, Respiratory unlabored   Cough Frequency frequent   Cough Type dry;nonproductive   Skin Integrity   $ Wound Care Tech Time 15 min   Area Observed Left;Right;Cheek;Nares   Skin Appearance without discoloration   PRE-TX-O2   Device (Oxygen Therapy) room air   $ Is the patient on Low Flow Oxygen? Yes  (QHS)   Flow (L/min) (Oxygen Therapy) 2  (QHS)   SpO2 98 %   Pulse Oximetry Type Intermittent   $ Pulse Oximetry - Multiple Charge Pulse Oximetry - Multiple   Pulse 82   Resp 19   Aerosol Therapy   $ Aerosol Therapy Charges Aerosol Treatment   Daily Review of Necessity (SVN) completed   Respiratory Treatment Status (SVN) given   Treatment Route (SVN) mask   Patient Position Montague's   Post Treatment Assessment (SVN) breath sounds unchanged;vital signs unchanged   Signs of Intolerance (SVN) none   Education   $ Education Bronchodilator;15 min   Respiratory Evaluation   $ Care Plan Tech Time 15 min

## 2024-06-25 NOTE — SUBJECTIVE & OBJECTIVE
Interval History: Patient is still tearful. Complains of pleuritic chest pain. Cough is still present but better. Afebrile.     Review of Systems  Objective:     Vital Signs (Most Recent):  Temp: 98 °F (36.7 °C) (06/25/24 0743)  Pulse: 73 (06/25/24 0900)  Resp: 17 (06/25/24 0756)  BP: 124/80 (rechecked after medication administration) (06/25/24 0900)  SpO2: 100 % (06/25/24 0743) Vital Signs (24h Range):  Temp:  [97.4 °F (36.3 °C)-98.2 °F (36.8 °C)] 98 °F (36.7 °C)  Pulse:  [] 73  Resp:  [15-24] 17  SpO2:  [98 %-100 %] 100 %  BP: (124-190)/() 124/80     Weight: 65.7 kg (144 lb 13.5 oz)  Body mass index is 25.66 kg/m².    Intake/Output Summary (Last 24 hours) at 6/25/2024 1052  Last data filed at 6/25/2024 0848  Gross per 24 hour   Intake 640 ml   Output --   Net 640 ml         Physical Exam  Vitals reviewed.   Constitutional:       Interventions: Nasal cannula in place.   HENT:      Head: Normocephalic and atraumatic.   Cardiovascular:      Rate and Rhythm: Regular rhythm.   Pulmonary:      Effort: Pulmonary effort is normal. No respiratory distress.      Breath sounds: clear    Abdominal:      General: Bowel sounds are normal.      Palpations: Abdomen is soft.      Tenderness: There is no abdominal tenderness.   Musculoskeletal:         General: Normal range of motion.      Right lower leg: No edema.      Left lower leg: No edema.   Skin:     General: Skin is warm and dry.   Neurological:      Mental Status: She is alert and oriented to person, place, and time.   Psychiatric:         Mood and Affect: anxious         Significant Labs: All pertinent labs within the past 24 hours have been reviewed.  CBC:   Recent Labs   Lab 06/24/24  0300 06/25/24  0733   WBC 18.43* 13.57*   HGB 14.3 14.0   HCT 45.6 44.9    262     CMP:   Recent Labs   Lab 06/24/24  0300 06/25/24  0733    139   K 4.5 4.2    105   CO2 23 27   * 132*   BUN 16 11   CREATININE 0.7 0.6   CALCIUM 9.9 9.7   PROT 8.3 7.3    ALBUMIN 4.3 3.9   BILITOT 0.1 0.2   ALKPHOS 128 112   AST 23 14   ALT 23 23   ANIONGAP 9 7*       Significant Imaging: I have reviewed all pertinent imaging results/findings within the past 24 hours.

## 2024-06-25 NOTE — CARE UPDATE
06/25/24 0707   Patient Assessment/Suction   Level of Consciousness (AVPU) alert   Respiratory Effort Normal;Unlabored   Expansion/Accessory Muscles/Retractions no use of accessory muscles;no retractions;expansion symmetric   All Lung Fields Breath Sounds clear   Rhythm/Pattern, Respiratory unlabored;pattern regular;depth regular;chest wiggle adequate;no shortness of breath reported   Cough Frequency no cough   PRE-TX-O2   Device (Oxygen Therapy) nasal cannula   $ Is the patient on Low Flow Oxygen? Yes   Flow (L/min) (Oxygen Therapy) 2   SpO2 99 %   Pulse Oximetry Type Intermittent   $ Pulse Oximetry - Multiple Charge Pulse Oximetry - Multiple   Pulse 75   Resp 19   Aerosol Therapy   $ Aerosol Therapy Charges PRN treatment not required   Education   $ Education Bronchodilator;15 min

## 2024-06-25 NOTE — PROGRESS NOTES
[facility] Medicine  Progress Note    Patient Name: Sumit Burger  MRN: [id number]  Patient Class: IP- Inpatient   Admission Date: 6/22/2024  Length of Stay: 2 days  Attending Physician: Tammy Yoder MD  Primary Care Provider: Yasmany Beckford        Subjective:     Principal Problem:Pneumonia        HPI:  53-year-old female presented to ED for eval of cough and shortness of breath. Patient reported over the last 5-6 days she has had increased progressively worsening shortness of breath and nonproductive cough, with associated pleuritic pain. Patient reported shortness of breath is worse with exertion and chest discomfort is worse with coughing. Reports shortness of breath gets so bad it makes her feel dizzy and diaphoretic. Denies fever. Denies abd pain, NVD. Patient has a hx of sarcoidosis, follows with pulmology, on maintenance prednisone 10mg daily. Patient reports she increased her PO prednisone to 20mg when her symptoms started without significant relief. Denies use of home O2. She is tearful and anxious on exam. DDI elevated. CTA chest impression with significantly increasing bilateral diffuse ground-glass opacities and interstitial prominence, with underlying bronchiectasis, with increasing mediastinal lymphadenopathy; findings are least in part related to patient's provided history of sarcoidosis, increasing pulmonary opacities may be on the basis of superimposed pneumonia, although worsening lung disease secondary to sarcoidosis could also give this appearance. Pulmonology consulted in ED, recs abx and steroids. Admit to hospital medicine for further eval.     Overview/Hospital Course:  Patient is a 53 year old female with history of chronic sarcoidosis on PO prednisone at home, admitted with chills and non productive cough and worsening SOB. Patient was started on Rocephin and Vancomycin. CT chest shows diffuse interstitial infiltrates. Patient is also on IV  steroids. Pulmonary is consulted. Patient is going for bronchoscopy 6/24.  Patient is weaned off oxygen.     Interval History: Patient is still tearful. Complains of pleuritic chest pain. Cough is still present but better. Afebrile.     Review of Systems  Objective:     Vital Signs (Most Recent):  Temp: 98 °F (36.7 °C) (06/25/24 0743)  Pulse: 73 (06/25/24 0900)  Resp: 17 (06/25/24 0756)  BP: 124/80 (rechecked after medication administration) (06/25/24 0900)  SpO2: 100 % (06/25/24 0743) Vital Signs (24h Range):  Temp:  [97.4 °F (36.3 °C)-98.2 °F (36.8 °C)] 98 °F (36.7 °C)  Pulse:  [] 73  Resp:  [15-24] 17  SpO2:  [98 %-100 %] 100 %  BP: (124-190)/() 124/80     Weight: 65.7 kg (144 lb 13.5 oz)  Body mass index is 25.66 kg/m².    Intake/Output Summary (Last 24 hours) at 6/25/2024 1052  Last data filed at 6/25/2024 0848  Gross per 24 hour   Intake 640 ml   Output --   Net 640 ml         Physical Exam  Vitals reviewed.   Constitutional:       Interventions: Nasal cannula in place.   HENT:      Head: Normocephalic and atraumatic.   Cardiovascular:      Rate and Rhythm: Regular rhythm.   Pulmonary:      Effort: Pulmonary effort is normal. No respiratory distress.      Breath sounds: clear    Abdominal:      General: Bowel sounds are normal.      Palpations: Abdomen is soft.      Tenderness: There is no abdominal tenderness.   Musculoskeletal:         General: Normal range of motion.      Right lower leg: No edema.      Left lower leg: No edema.   Skin:     General: Skin is warm and dry.   Neurological:      Mental Status: She is alert and oriented to person, place, and time.   Psychiatric:         Mood and Affect: anxious         Significant Labs: All pertinent labs within the past 24 hours have been reviewed.  CBC:   Recent Labs   Lab 06/24/24  0300 06/25/24  0733   WBC 18.43* 13.57*   HGB 14.3 14.0   HCT 45.6 44.9    262     CMP:   Recent Labs   Lab 06/24/24  0300 06/25/24  0733    139   K 4.5  4.2    105   CO2 23 27   * 132*   BUN 16 11   CREATININE 0.7 0.6   CALCIUM 9.9 9.7   PROT 8.3 7.3   ALBUMIN 4.3 3.9   BILITOT 0.1 0.2   ALKPHOS 128 112   AST 23 14   ALT 23 23   ANIONGAP 9 7*       Significant Imaging: I have reviewed all pertinent imaging results/findings within the past 24 hours.    Assessment/Plan:      * Pneumonia  CTA chest reviewed. Diffuse interstitial opacifications   Pulmonary consulted  Bronchoscopy 6/24, follow BAL   Procalcitonin negative, MRSA nares negative   Weaning solumedrol 40 mg Q12H   Cont breathing Rx  Cont Rocephin and Azithromycin D3      Sarcoidosis of lung  Continuous pulse ox  Consult pulmonology  Per pulm recs steroids and abx    Elevated d-dimer  CTA chest and US DVT negative       Hypertensive urgency  Patient has a current diagnosis of hypertensive urgency (without evidence of end organ damage) which is controlled.  Latest blood pressure and vitals reviewed-     Home meds for hypertension were reviewed and noted below.   Hypertension Medications               valsartan (DIOVAN) 160 MG tablet Take 1 tablet (160 mg total) by mouth every evening.            Medication adjustment for hospital antihypertensives is as follows- Hydralazine PRN SBP>180, resume home meds        VTE Risk Mitigation (From admission, onward)           Ordered     enoxaparin injection 40 mg  Daily         06/22/24 1958     IP VTE LOW RISK PATIENT  Once         06/22/24 1707     Place sequential compression device  Until discontinued         06/22/24 1707                    Discharge Planning   SHAKIRA: 6/26/2024     Code Status: Full Code   Is the patient medically ready for discharge?:     Reason for patient still in hospital (select all that apply): Treatment  Discharge Plan A: Home with family                  Tammy Yoder MD  Department of Hospital Medicine   [facility]

## 2024-06-26 ENCOUNTER — TELEPHONE (OUTPATIENT)
Dept: PULMONOLOGY | Facility: CLINIC | Age: 54
End: 2024-06-26
Payer: OTHER GOVERNMENT

## 2024-06-26 VITALS
SYSTOLIC BLOOD PRESSURE: 161 MMHG | OXYGEN SATURATION: 100 % | WEIGHT: 144.81 LBS | HEIGHT: 63 IN | HEART RATE: 72 BPM | DIASTOLIC BLOOD PRESSURE: 99 MMHG | TEMPERATURE: 98 F | BODY MASS INDEX: 25.66 KG/M2 | RESPIRATION RATE: 18 BRPM

## 2024-06-26 LAB
ALBUMIN SERPL BCP-MCNC: 3.9 G/DL (ref 3.5–5.2)
ALP SERPL-CCNC: 93 U/L (ref 55–135)
ALT SERPL W/O P-5'-P-CCNC: 34 U/L (ref 10–44)
ANION GAP SERPL CALC-SCNC: 6 MMOL/L (ref 8–16)
AST SERPL-CCNC: 24 U/L (ref 10–40)
BACTERIA SPEC AEROBE CULT: NORMAL
BACTERIA SPEC AEROBE CULT: NORMAL
BASOPHILS # BLD AUTO: 0.01 K/UL (ref 0–0.2)
BASOPHILS NFR BLD: 0.1 % (ref 0–1.9)
BILIRUB SERPL-MCNC: 0.2 MG/DL (ref 0.1–1)
BUN SERPL-MCNC: 13 MG/DL (ref 6–20)
CALCIUM SERPL-MCNC: 9.3 MG/DL (ref 8.7–10.5)
CHLORIDE SERPL-SCNC: 104 MMOL/L (ref 95–110)
CO2 SERPL-SCNC: 27 MMOL/L (ref 23–29)
CREAT SERPL-MCNC: 0.6 MG/DL (ref 0.5–1.4)
DIFFERENTIAL METHOD BLD: ABNORMAL
EOSINOPHIL # BLD AUTO: 0 K/UL (ref 0–0.5)
EOSINOPHIL NFR BLD: 0 % (ref 0–8)
ERYTHROCYTE [DISTWIDTH] IN BLOOD BY AUTOMATED COUNT: 14.3 % (ref 11.5–14.5)
EST. GFR  (NO RACE VARIABLE): >60 ML/MIN/1.73 M^2
GLUCOSE SERPL-MCNC: 134 MG/DL (ref 70–110)
GRAM STN SPEC: NORMAL
HCT VFR BLD AUTO: 45.4 % (ref 37–48.5)
HGB BLD-MCNC: 14.5 G/DL (ref 12–16)
IMM GRANULOCYTES # BLD AUTO: 0.07 K/UL (ref 0–0.04)
IMM GRANULOCYTES NFR BLD AUTO: 0.7 % (ref 0–0.5)
L PNEUMO1 AG UR QL IA: NEGATIVE
LEGIONELLA SPECIMEN SOURCE: NORMAL
LYMPHOCYTES # BLD AUTO: 1.4 K/UL (ref 1–4.8)
LYMPHOCYTES NFR BLD: 13.8 % (ref 18–48)
MAGNESIUM SERPL-MCNC: 2.1 MG/DL (ref 1.6–2.6)
MCH RBC QN AUTO: 28.2 PG (ref 27–31)
MCHC RBC AUTO-ENTMCNC: 31.9 G/DL (ref 32–36)
MCV RBC AUTO: 88 FL (ref 82–98)
MONOCYTES # BLD AUTO: 0.2 K/UL (ref 0.3–1)
MONOCYTES NFR BLD: 2.2 % (ref 4–15)
NEUTROPHILS # BLD AUTO: 8.4 K/UL (ref 1.8–7.7)
NEUTROPHILS NFR BLD: 83.2 % (ref 38–73)
NRBC BLD-RTO: 0 /100 WBC
PLATELET # BLD AUTO: 266 K/UL (ref 150–450)
PMV BLD AUTO: 10.6 FL (ref 9.2–12.9)
POTASSIUM SERPL-SCNC: 4.7 MMOL/L (ref 3.5–5.1)
PROT SERPL-MCNC: 7.3 G/DL (ref 6–8.4)
RBC # BLD AUTO: 5.14 M/UL (ref 4–5.4)
SODIUM SERPL-SCNC: 137 MMOL/L (ref 136–145)
WBC # BLD AUTO: 10.07 K/UL (ref 3.9–12.7)

## 2024-06-26 PROCEDURE — 94761 N-INVAS EAR/PLS OXIMETRY MLT: CPT

## 2024-06-26 PROCEDURE — 99233 SBSQ HOSP IP/OBS HIGH 50: CPT | Mod: GT,,, | Performed by: STUDENT IN AN ORGANIZED HEALTH CARE EDUCATION/TRAINING PROGRAM

## 2024-06-26 PROCEDURE — 25000003 PHARM REV CODE 250: Performed by: INTERNAL MEDICINE

## 2024-06-26 PROCEDURE — 63600175 PHARM REV CODE 636 W HCPCS: Performed by: INTERNAL MEDICINE

## 2024-06-26 PROCEDURE — 36415 COLL VENOUS BLD VENIPUNCTURE: CPT

## 2024-06-26 PROCEDURE — 99900035 HC TECH TIME PER 15 MIN (STAT)

## 2024-06-26 PROCEDURE — 83735 ASSAY OF MAGNESIUM: CPT

## 2024-06-26 PROCEDURE — 63600175 PHARM REV CODE 636 W HCPCS: Performed by: STUDENT IN AN ORGANIZED HEALTH CARE EDUCATION/TRAINING PROGRAM

## 2024-06-26 PROCEDURE — 85025 COMPLETE CBC W/AUTO DIFF WBC: CPT

## 2024-06-26 PROCEDURE — 63700000 PHARM REV CODE 250 ALT 637 W/O HCPCS: Performed by: INTERNAL MEDICINE

## 2024-06-26 PROCEDURE — 80053 COMPREHEN METABOLIC PANEL: CPT

## 2024-06-26 PROCEDURE — 25000003 PHARM REV CODE 250: Performed by: STUDENT IN AN ORGANIZED HEALTH CARE EDUCATION/TRAINING PROGRAM

## 2024-06-26 PROCEDURE — 25000003 PHARM REV CODE 250

## 2024-06-26 RX ORDER — BENZONATATE 100 MG/1
100 CAPSULE ORAL 3 TIMES DAILY PRN
Qty: 10 CAPSULE | Refills: 0 | Status: SHIPPED | OUTPATIENT
Start: 2024-06-26 | End: 2024-07-06

## 2024-06-26 RX ORDER — CEPHALEXIN 500 MG/1
500 CAPSULE ORAL EVERY 6 HOURS
Qty: 20 CAPSULE | Refills: 0 | Status: SHIPPED | OUTPATIENT
Start: 2024-06-26 | End: 2024-07-01

## 2024-06-26 RX ORDER — PREDNISONE 20 MG/1
TABLET ORAL
Qty: 46 TABLET | Refills: 0 | Status: SHIPPED | OUTPATIENT
Start: 2024-06-26 | End: 2024-07-27

## 2024-06-26 RX ORDER — AMLODIPINE BESYLATE 5 MG/1
5 TABLET ORAL DAILY
Qty: 30 TABLET | Refills: 0 | Status: SHIPPED | OUTPATIENT
Start: 2024-06-27 | End: 2024-07-27

## 2024-06-26 RX ADMIN — METHYLPREDNISOLONE SODIUM SUCCINATE 40 MG: 40 INJECTION, POWDER, FOR SOLUTION INTRAMUSCULAR; INTRAVENOUS at 12:06

## 2024-06-26 RX ADMIN — HYDROXYZINE HYDROCHLORIDE 25 MG: 25 TABLET ORAL at 01:06

## 2024-06-26 RX ADMIN — HYDROXYZINE HYDROCHLORIDE 25 MG: 25 TABLET ORAL at 05:06

## 2024-06-26 RX ADMIN — FOLIC ACID 1000 MCG: 1 TABLET ORAL at 08:06

## 2024-06-26 RX ADMIN — HYDROCODONE BITARTRATE AND ACETAMINOPHEN 1 TABLET: 5; 325 TABLET ORAL at 01:06

## 2024-06-26 RX ADMIN — HYDROCODONE BITARTRATE AND ACETAMINOPHEN 1 TABLET: 5; 325 TABLET ORAL at 05:06

## 2024-06-26 RX ADMIN — AMLODIPINE BESYLATE 5 MG: 5 TABLET ORAL at 08:06

## 2024-06-26 RX ADMIN — CEFTRIAXONE SODIUM 2 G: 2 INJECTION, POWDER, FOR SOLUTION INTRAMUSCULAR; INTRAVENOUS at 01:06

## 2024-06-26 RX ADMIN — ALPRAZOLAM 0.25 MG: 0.25 TABLET ORAL at 08:06

## 2024-06-26 RX ADMIN — FAMOTIDINE 20 MG: 20 TABLET ORAL at 08:06

## 2024-06-26 RX ADMIN — AZITHROMYCIN DIHYDRATE 500 MG: 250 TABLET ORAL at 08:06

## 2024-06-26 NOTE — DISCHARGE SUMMARY
[facility] Medicine  Discharge Summary      Patient Name: Sumit Burger  MRN: [id number]  [facility]: [id number]  Patient Class: IP- Inpatient  Admission Date: 6/22/2024  Hospital Length of Stay: 3 days  Discharge Date and Time:  06/26/2024   Attending Physician: Tammy Yoder MD   Discharging Provider: Tammy Yoder MD  Primary Care Provider: Administration, [facility]    Primary Care Team: Networked reference to record PCT     HPI:   53-year-old female presented to ED for eval of cough and shortness of breath. Patient reported over the last 5-6 days she has had increased progressively worsening shortness of breath and nonproductive cough, with associated pleuritic pain. Patient reported shortness of breath is worse with exertion and chest discomfort is worse with coughing. Reports shortness of breath gets so bad it makes her feel dizzy and diaphoretic. Denies fever. Denies abd pain, NVD. Patient has a hx of sarcoidosis, follows with pulmology, on maintenance prednisone 10mg daily. Patient reports she increased her PO prednisone to 20mg when her symptoms started without significant relief. Denies use of home O2. She is tearful and anxious on exam. DDI elevated. CTA chest impression with significantly increasing bilateral diffuse ground-glass opacities and interstitial prominence, with underlying bronchiectasis, with increasing mediastinal lymphadenopathy; findings are least in part related to patient's provided history of sarcoidosis, increasing pulmonary opacities may be on the basis of superimposed pneumonia, although worsening lung disease secondary to sarcoidosis could also give this appearance. Pulmonology consulted in ED, recs abx and steroids. Admit to hospital medicine for further eval.     Procedure(s) (LRB):  LAVAGE, BRONCHOALVEOLAR (Bilateral)      Hospital Course:   Patient is a 53 year old female with history of chronic sarcoidosis on PO prednisone at home,  admitted with chills and non productive cough and worsening SOB. Patient was started on Rocephin and Vancomycin. CT chest shows diffuse interstitial infiltrates. Patient is also on IV steroids. Pulmonary is consulted. Patient is going for bronchoscopy 6/24.  Patient is weaned off oxygen. Bal culture negative so far. PCP is pending. Patient was discharged on 5 more days of PO Keflex and prednisone taper. She will resume her chronic prednisone once taper is complete. Azithromycin completed for 5 days.      Goals of Care Treatment Preferences:  Code Status: Full Code    Physical Exam  Vitals reviewed.   Constitutional:       Interventions: Nasal cannula in place.   HENT:      Head: Normocephalic and atraumatic.   Cardiovascular:      Rate and Rhythm: Regular rhythm.   Pulmonary:      Effort: Pulmonary effort is normal. No respiratory distress.      Breath sounds: clear    Abdominal:      General: Bowel sounds are normal.      Palpations: Abdomen is soft.      Tenderness: There is no abdominal tenderness.   Musculoskeletal:         General: Normal range of motion.      Right lower leg: No edema.      Left lower leg: No edema.   Skin:     General: Skin is warm and dry.   Neurological:      Mental Status: She is alert and oriented to person, place, and time.   Psychiatric:         Mood and Affect: anxious     Consults:   Consults (From admission, onward)          Status Ordering Provider     Inpatient consult to Pulmonology  Once        Provider:  Chilango Zuluaga MD    Completed LAKSHMI RUIZ            Pulmonary  * Pneumonia  CTA chest reviewed. Diffuse interstitial opacifications   Pulmonary consulted  Bronchoscopy 6/24, BAL negative so far   Procalcitonin negative, MRSA nares negative   Weaning IV Solumedrol >> PO Prednisone taper on discharge   Completed 5 days of Rocephin and Azithromycin, 5 more days of Keflex on discharge       Sarcoidosis of lung  Outpatient pulmonary follow up  Prednisone taper as above      Cardiac/Vascular  Hypertensive urgency  Patient has a current diagnosis of hypertensive urgency (without evidence of end organ damage) which is controlled.  Latest blood pressure and vitals reviewed-     Home meds for hypertension were reviewed and noted below.   Hypertension Medications               valsartan (DIOVAN) 160 MG tablet Take 1 tablet (160 mg total) by mouth every evening.          Cont valsartan. Added Norvasc       Other  Elevated d-dimer  CTA chest and US DVT negative         Final Active Diagnoses:    Diagnosis Date Noted POA    PRINCIPAL PROBLEM:  Pneumonia [J18.9] 10/19/2020 Yes    Hypertensive urgency [I16.0] 06/22/2024 Yes    Elevated d-dimer [R79.89] 06/22/2024 Yes    Sarcoidosis of lung [D86.0] 06/22/2024 Yes      Problems Resolved During this Admission:    Diagnosis Date Noted Date Resolved POA    Interstitial lung disease [J84.9] 06/24/2024 06/25/2024 Yes       Discharged Condition: good    Disposition: Home or Self Care    Follow Up:   Follow-up Information       Administration, [facility] Follow up.    Why: case management unable to schedule - please call clinic to schedule appointment  Contact information:  [address] [facility] [id number] [phone]               Ava Rolon MD Follow up.    Specialties: Pulmonary Disease, Sleep Medicine  Why: message sent to Dr. Rolon's clinic to notify of hospital discharge on today - clinic to contact patient with appointment date/time  Contact information:  [address] [facility] [facility]  [address]  [place] [address] [phone]                           Patient Instructions:   No discharge procedures on file.    Significant Diagnostic Studies: Labs: CMP   Recent Labs   Lab 06/25/24  0733 06/26/24  0527    137   K 4.2 4.7    104   CO2 27 27   * 134*   BUN 11 13   CREATININE 0.6 0.6   CALCIUM 9.7 9.3   PROT 7.3 7.3   ALBUMIN 3.9 3.9   BILITOT 0.2 0.2   ALKPHOS 112 93   AST 14 24   ALT 23 34    ANIONGAP 7* 6*    and CBC   Recent Labs   Lab 06/25/24  0733 06/26/24  0527   WBC 13.57* 10.07   HGB 14.0 14.5   HCT 44.9 45.4    266       Pending Diagnostic Studies:       None           Medications:  Reconciled Home Medications:      Medication List        START taking these medications      amLODIPine 5 MG tablet  Commonly known as: NORVASC  Take 1 tablet (5 mg total) by mouth once daily.  Start taking on: June 27, 2024     benzonatate 100 MG capsule  Commonly known as: TESSALON  Take 1 capsule (100 mg total) by mouth 3 (three) times daily as needed for Cough.     cephALEXin 500 MG capsule  Commonly known as: KEFLEX  Take 1 capsule (500 mg total) by mouth every 6 (six) hours. for 5 days            CHANGE how you take these medications      predniSONE 20 MG tablet  Commonly known as: DELTASONE  Take 3 tablets (60 mg total) by mouth once daily for 3 days, THEN 2.5 tablets (50 mg total) once daily for 3 days, THEN 2 tablets (40 mg total) once daily for 3 days, THEN 1.5 tablets (30 mg total) once daily for 3 days, THEN 1 tablet (20 mg total) once daily for 19 days.  Start taking on: June 26, 2024  What changed:   medication strength  See the new instructions.            CONTINUE taking these medications      albuterol 90 mcg/actuation inhaler  Commonly known as: PROVENTIL/VENTOLIN HFA  Inhale 2 puffs into the lungs every 4 to 6 hours as needed for Shortness of Breath. Rescue     folic acid 1 MG tablet  Commonly known as: FOLVITE  Take 1 tablet by mouth once daily.     valsartan 160 MG tablet  Commonly known as: DIOVAN  Take 1 tablet (160 mg total) by mouth every evening.            STOP taking these medications      buPROPion 150 MG TB24 tablet  Commonly known as: WELLBUTRIN XL              Indwelling Lines/Drains at time of discharge:   Lines/Drains/Airways       None                   Time spent on the discharge of patient: 40 minutes         Tammy Yoder MD  Department of Hospital  Medicine  [facility]

## 2024-06-26 NOTE — PROGRESS NOTES
.amproPulmonary/Critical Care Progress Note      PATIENT NAME: Sumit Burger  MRN: [id number]  TODAY'S DATE: 2024  1:50 PM  ADMIT DATE: 2024  AGE: 53 y.o. : 1970    CONSULT REQUESTED BY: Tammy Yoder MD    REASON FOR CONSULT:   ILD exacerbation     HPI:  Ms Burger is a 53 year old woman who presents with acute shortness of breath.  She reports that her symptoms have been going on for about 3 weeks when she started feeling bad.  She has prednisone at home and she took 10 mg then increased to 20 mg and just started feeling worse.  She has had worsening cough over that time span and she thinks that it was related to difficulty breathing.  She has not currently on home oxygen.  She reports that she was previously diagnosed with sarcoid after having multiple and recurrent pneumonias for 2-3 years and then eventually got a bronchoscopy which showed granulomatous inflammation that was more suggestive of sarcoid than it was HP.    She reports that she used to smoke occasionally in the past but it was not a daily habit.  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    She reports no history of skin lesions.  [facility] and she is still waiting on the results of that.  She has no history of red hot painful eyes.  She has been seen by Ophthalmology previously.    She reports that her symptoms started sometime after COVID and has progressively worsened.  Over the last   1-1/2 year she has had 2-3 hospitalizations.      Today:     Doing much better. Off oxgyen. COugh is greatly improved.       Review of Systems   Constitutional:  Negative for appetite change, chills, fever and unexpected weight change.   HENT:  Negative for nosebleeds, postnasal drip, trouble swallowing and voice change.    Eyes:  Negative for visual disturbance.   Respiratory:  Positive for cough and  shortness of breath. Negative for wheezing.    Cardiovascular:  Negative for chest pain and leg swelling.   Gastrointestinal:  Negative for diarrhea, nausea and vomiting.   Endocrine: Negative for cold intolerance and heat intolerance.   Genitourinary:  Negative for dysuria, flank pain and frequency.   Musculoskeletal:  Negative for neck pain and neck stiffness.   Allergic/Immunologic: Negative for environmental allergies and immunocompromised state.   Neurological:  Negative for syncope, speech difficulty and weakness.   Hematological:  Negative for adenopathy.   Psychiatric/Behavioral:  Negative for confusion.            ALLERGIES  Review of patient's allergies indicates:   Allergen Reactions    Sulfamethoxazole-trimethoprim Hives, Shortness Of Breath, Nausea And Vomiting and Rash     Hives, nausea and vomiting, and shortness of breath.  Did not require admission  Hives, nausea and vomiting, and shortness of breath.  Did not require admission    Benadryl [diphenhydramine hcl]     Latex, natural rubber     Percocet [oxycodone-acetaminophen]     Shrimp     Codeine Itching and Nausea Only       INPATIENT SCHEDULED MEDICATIONS   amLODIPine  5 mg Oral Daily    azithromycin  500 mg Oral Daily    cefTRIAXone (Rocephin) IV (PEDS and ADULTS)  2 g Intravenous Q24H    enoxparin  40 mg Subcutaneous Daily    famotidine  20 mg Oral BID    folic acid  1,000 mcg Oral Daily    methylPREDNISolone sodium succinate injection  40 mg Intravenous Q12H    valsartan  160 mg Oral QHS         MEDICAL AND SURGICAL HISTORY  Past Medical History:   Diagnosis Date    HTN (hypertension)     Pneumonia 10/2020    Sarcoidosis      Past Surgical History:   Procedure Laterality Date    BRONCHOALVEOLAR LAVAGE Bilateral 6/24/2024    Procedure: LAVAGE, BRONCHOALVEOLAR;  Surgeon: Chilango Zuluaga MD;  Location: [facility];  Service: Pulmonary;  Laterality: Bilateral;    BRONCHOSCOPY WITH FLUOROSCOPY Right 10/20/2020    Procedure: BRONCHOSCOPY, WITH  FLUOROSCOPY;  Surgeon: Ava Rolon MD;  Location: [facility] ENDO;  Service: Pulmonary;  Laterality: Right;     SECTION      x2    FALLOPIAN TUBOPLASTY      LAPAROSCOPIC APPENDECTOMY N/A 8/10/2022    Procedure: APPENDECTOMY, LAPAROSCOPIC;  Surgeon: Jackson Vogel MD;  Location: [facility] OR;  Service: General;  Laterality: N/A;    TUBAL LIGATION      WRIST FRACTURE SURGERY Right     ganglion cyst       ALCOHOL, TOBACCO AND DRUG USE  Social History     Tobacco Use   Smoking Status Light Smoker    Current packs/day: 0.00    Types: Cigarettes    Last attempt to quit: 2020    Years since quittin.1   Smokeless Tobacco Never   Tobacco Comments    ocassionally never was qd     Social History     Substance and Sexual Activity   Alcohol Use Not Currently     Social History     Substance and Sexual Activity   Drug Use Never       FAMILY HISTORY  Family History   Problem Relation Name Age of Onset    Hypertension Mother         VITAL SIGNS (MOST RECENT)  Temp: 98 °F (36.7 °C) (24 1055)  Pulse: 72 (24 1055)  Resp: 18 (24 1300)  BP: (!) 161/99 (24 1055)  SpO2: 100 % (24 1055)    INTAKE AND OUTPUT (LAST 24 HOURS):  Intake/Output Summary (Last 24 hours) at 2024 1443  Last data filed at 2024 1349  Gross per 24 hour   Intake 1200 ml   Output --   Net 1200 ml       WEIGHT  Wt Readings from Last 1 Encounters:   24 65.7 kg (144 lb 13.5 oz)       Physical Exam  Vitals reviewed.   Constitutional:       General: She is not in acute distress.     Appearance: She is well-developed. She is ill-appearing. She is not diaphoretic.   HENT:      Head: Normocephalic and atraumatic.      Mouth/Throat:      Pharynx: No oropharyngeal exudate or posterior oropharyngeal erythema.   Eyes:      General: No scleral icterus.     Pupils: Pupils are equal, round, and reactive to light.   Neck:      Vascular: No JVD.   Cardiovascular:      Rate and Rhythm: Normal rate and regular rhythm.      Heart  "sounds: Normal heart sounds. No murmur heard.  Pulmonary:      Effort: Pulmonary effort is normal. No respiratory distress.      Breath sounds: Wheezing and rales present.   Abdominal:      General: Bowel sounds are normal. There is no distension.      Palpations: Abdomen is soft.      Tenderness: There is no abdominal tenderness.   Musculoskeletal:         General: No swelling.      Cervical back: Normal range of motion and neck supple. No rigidity.   Skin:     General: Skin is warm and dry.      Capillary Refill: Capillary refill takes less than 2 seconds.      Coloration: Skin is not pale.      Findings: No rash.   Neurological:      General: No focal deficit present.      Mental Status: She is alert and oriented to person, place, and time.      Cranial Nerves: No cranial nerve deficit.      Motor: No weakness or abnormal muscle tone.           ACUTE PHASE REACTANT (LAST 24 HOURS)  No results for input(s): "FERRITIN", "CRP", "LDH", "DDIMER" in the last 24 hours.    CBC LAST (LAST 24 HOURS)  Recent Labs   Lab 06/26/24  0527   WBC 10.07   RBC 5.14   HGB 14.5   HCT 45.4   MCV 88   MCH 28.2   MCHC 31.9*   RDW 14.3      MPV 10.6   GRAN 83.2*  8.4*   LYMPH 13.8*  1.4   MONO 2.2*  0.2*   BASO 0.01   NRBC 0       CHEMISTRY LAST (LAST 24 HOURS)  Recent Labs   Lab 06/26/24  0527      K 4.7      CO2 27   ANIONGAP 6*   BUN 13   CREATININE 0.6   *   CALCIUM 9.3   MG 2.1   ALBUMIN 3.9   PROT 7.3   ALKPHOS 93   ALT 34   AST 24   BILITOT 0.2       COAGULATION LAST (LAST 24 HOURS)  No results for input(s): "LABPT", "INR", "APTT" in the last 24 hours.    CARDIAC PROFILE (LAST 24 HOURS)  Recent Labs   Lab 06/22/24  1003 06/22/24  1218   BNP 6  --    TROPONINIHS 4.2 5.0       LAST 7 DAYS MICROBIOLOGY   Microbiology Results (last 7 days)       Procedure Component Value Units Date/Time    Culture, Respiratory [[id number]] Collected: 06/24/24 1309    Order Status: Completed Specimen: Respiratory from Lung, " Right Updated: 06/26/24 0917     Respiratory Culture Normal respiratory lashell     Gram Stain (Respiratory) Many WBC's     Gram Stain (Respiratory) <10 epithelial cells per low power field.     Gram Stain (Respiratory) Rare Gram positive cocci    Narrative:      Left lower lobe    Culture, Respiratory [[id number]] Collected: 06/24/24 1309    Order Status: Completed Specimen: Respiratory from Lung, Right Updated: 06/26/24 0917     Respiratory Culture Reduced normal respiratory lashell     Gram Stain (Respiratory) Moderate WBC's     Gram Stain (Respiratory) <10 epithelial cells per low power field.     Gram Stain (Respiratory) Rare Gram positive cocci    Blood culture (site 2) [[id number]] Collected: 06/22/24 1803    Order Status: Completed Specimen: Blood from Peripheral, Antecubital, Right Updated: 06/25/24 1832     Blood Culture, Routine No Growth to date      No Growth to date      No Growth to date      No Growth to date    Narrative:      Site # 2, aerobic only    Blood culture (site 1) [[id number]] Collected: 06/22/24 1802    Order Status: Completed Specimen: Blood from Peripheral, Antecubital, Right Updated: 06/25/24 1832     Blood Culture, Routine No Growth to date      No Growth to date      No Growth to date      No Growth to date    Narrative:      Site # 1, aerobic and anaerobic    Pneumocystis smear by DFA [[id number]] Collected: 06/24/24 1346    Order Status: No result Updated: 06/25/24 0836    KOH prep [[id number]] Collected: 06/24/24 1352    Order Status: Completed Specimen: Respiratory from BAL, LLL Updated: 06/24/24 1612     KOH Prep No yeast or fungal elements seen    KOH prep [[id number]] Collected: 06/24/24 1352    Order Status: Completed Specimen: Respiratory from BAL, RLL Updated: 06/24/24 1612     KOH Prep No yeast or fungal elements seen    AFB Culture & Smear [[id number]] Collected: 06/24/24 1355    Order Status: Sent Specimen: Respiratory from BAL, LLL Updated: 06/24/24 1410    Pneumocystis  smear by DFA Bronchial Alveolar Lavage (BAL) [[id number]] Collected: 06/24/24 1346    Order Status: Sent Updated: 06/24/24 1409    Pneumocystis smear by DFA Bronchial Alveolar Lavage (BAL) [[id number]] Collected: 06/24/24 1346    Order Status: Sent Updated: 06/24/24 1409    Fungus culture [[id number]] Collected: 06/24/24 1309    Order Status: Sent Specimen: Respiratory from Lung, Right Updated: 06/24/24 1408    Fungus culture [[id number]] Collected: 06/24/24 1309    Order Status: Sent Specimen: Respiratory from Lung, Right Updated: 06/24/24 1408    AFB Culture & Smear [[id number]] Collected: 06/24/24 1309    Order Status: Sent Specimen: Respiratory from Lung, Right Updated: 06/24/24 1408    Culture, Respiratory with Gram Stain [[id number]] Collected: 06/24/24 1355    Order Status: Canceled Specimen: Respiratory from Bronchial Wash, LLL     Gram stain [[id number]] Collected: 06/24/24 1309    Order Status: Canceled Specimen: Respiratory from Lung, Right     MRSA Screen by PCR [[id number]] Collected: 06/23/24 1336    Order Status: Completed Specimen: Nasal Swab Updated: 06/23/24 1648     MRSA SCREEN BY PCR Negative    Group A Strep, Molecular [[id number]] Collected: 06/22/24 1009    Order Status: Completed Specimen: Throat Updated: 06/22/24 1047     Group A Strep, Molecular Negative     Comment: Arcanobacterium haemolyticum and Beta Streptococcus group C   and G will not be detected by this test method.  Please order   Throat Culture ([id number]) if suspected.                 MOST RECENT IMAGING  US Lower Extremity Veins Bilateral  Narrative: EXAMINATION:  US LOWER EXTREMITY VEINS BILATERAL    CLINICAL HISTORY:  elevated ddi;    TECHNIQUE:  Duplex and color flow Doppler and dynamic compression was performed of the bilateral lower extremity veins was performed.    COMPARISON:  None    FINDINGS:  Right thigh veins: The common femoral, femoral, popliteal, upper greater saphenous, and deep femoral veins are patent and free of  thrombus. The veins are normally compressible and have normal phasic flow and augmentation response.    Right calf veins: The visualized calf veins are patent.    Left thigh veins: The common femoral, femoral, popliteal, upper greater saphenous, and deep femoral veins are patent and free of thrombus. The veins are normally compressible and have normal phasic flow and augmentation response.    Left calf veins: The visualized calf veins are patent.    Miscellaneous: None  Impression: No evidence of deep venous thrombosis in either lower extremity.    Electronically signed by: Ortiz Matias  Date:    06/22/2024  Time:    17:57  CTA Chest Non-Coronary (PE Studies)  Narrative: EXAMINATION:  CTA CHEST NON CORONARY (PE STUDIES)    CLINICAL HISTORY:  Pulmonary embolism (PE) suspected, positive D-dimer;.    TECHNIQUE:  CT angiography targeted to the pulmonary arteries was performed. One hundred cc nonionic contrast was administered and the bolus was timed for optimal opacification of the pulmonary arteries. 3-D reformatted images were obtained on an independent workstation and stored as a part of patient's permanent medical record.    COMPARISON:  October 2023    FINDINGS:  Heart size is normal.  The thoracic aorta is normal in caliber.  The pulmonary arteries are fairly well opacified.  There are no filling defects to indicate pulmonary thromboembolic disease.    There is mildly increasing mediastinal and bilateral hilar lymphadenopathy.  Images at lung windows demonstrate bilateral bronchiectasis diffuse bilateral ground-glass opacities and interstitial prominence appear increased compared to the prior study.  No pulmonary mass lesions or pleural effusions are identified.  The upper abdomen is unremarkable.  Impression: 1. No evidence of pulmonary thromboembolic disease.  2. Significantly increasing bilateral diffuse ground-glass opacities and interstitial prominence, with underlying bronchiectasis.  There is also  increasing mediastinal lymphadenopathy.  Findings are least in part related to patient's provided history of sarcoidosis.  Increasing pulmonary opacities may be on the basis of superimposed pneumonia, although worsening lung disease secondary to sarcoidosis could also give this appearance.  3. Additional details as above.    Electronically signed by: Ced Dillon  Date:    06/22/2024  Time:    12:37  X-Ray Chest PA And Lateral  Narrative: EXAMINATION:  XR CHEST PA AND LATERAL    CLINICAL HISTORY:  shortness of breath; sarcoidosis.    COMPARISON:  July 13, 2023    FINDINGS:  PA and lateral view show the heart and mediastinum to be within normal limits.    Diffuse bilateral abnormal interstitial prominence has increased compared to the prior study, with faint bilateral ground-glass opacities also increased.  There are no pleural effusions.  No acute osseous abnormalities are identified.  Impression: 1. Increasing bilateral interstitial and ground-glass pulmonary opacities.  Findings may reflect progression of sarcoidosis.  Superimposed pneumonia or edema is not excluded.    Electronically signed by: Ced Dillon  Date:    06/22/2024  Time:    10:38      CURRENT VISIT EKG  Results for orders placed or performed during the hospital encounter of 06/22/24   EKG 12-lead   Result Value Ref Range    QRS Duration 84 ms    OHS QTC Calculation 444 ms    Narrative    Test Reason : R06.02,    Vent. Rate : 108 BPM     Atrial Rate : 108 BPM     P-R Int : 124 ms          QRS Dur : 084 ms      QT Int : 332 ms       P-R-T Axes : 048 -04 021 degrees     QTc Int : 444 ms    Sinus tachycardia  Biatrial enlargement  LVH  Abnormal ECG  When compared with ECG of 11-OCT-2023 07:00,  No significant change was found    Referred By: [email or web address]   SELF           Confirmed By:        ECHOCARDIOGRAM RESULTS  Results for orders placed during the hospital encounter of 06/30/23    Echo    Interpretation Summary  · The left ventricle is normal  "in size with concentric remodeling and normal systolic function.  · The estimated ejection fraction is 65%.  · Normal left ventricular diastolic function.  · Mild tricuspid regurgitation.  · Moderate left atrial enlargement.  · Mild mitral regurgitation.  · The estimated PA systolic pressure is 34 mmHg.  · Normal right ventricular size with normal right ventricular systolic function.  · Normal central venous pressure (3 mmHg).        VENTILATOR INFORMATION              LAST ARTERIAL BLOOD GAS  ABG  No results for input(s): "PH", "PO2", "PCO2", "HCO3", "BE" in the last 168 hours.    ASSESSMENT:   ILD exacerbation    Ms. Burger is a 53-year-old who has underlying interstitial lung disease presumed to be related to sarcoidosis.  She reports subacute worsening of her symptoms for the last 3 weeks despite increasing her steroids at home.  She reports being on immune suppression in the past including various dosing of prednisone and methotrexate.  She stopped immune suppression about 5 weeks ago.    I suspect ILD exacerbation related to the lung disease itself. I don't see strong evidence of infection. She improved with steroid therapy. SHe should continue outpatinet steroid taper. I think reasonable to complete course of antibiotics outpatient.     PLAN:   - Outpatient steroid taper- agree   - Complete antibitoic outpatient  - Follow up with Dr Rolon in clinic. May need to restart on immune suppression again.     Chilango Zuluaga MD  Date of Service: 06/26/2024  1:50 PM    "

## 2024-06-26 NOTE — ASSESSMENT & PLAN NOTE
CTA chest reviewed. Diffuse interstitial opacifications   Pulmonary consulted  Bronchoscopy 6/24, BAL negative so far   Procalcitonin negative, MRSA nares negative   Weaning IV Solumedrol >> PO Prednisone taper on discharge   Completed 5 days of Rocephin and Azithromycin, 5 more days of Keflex on discharge

## 2024-06-26 NOTE — TELEPHONE ENCOUNTER
----- Message from Chico Wright sent at 6/26/2024  9:30 AM CDT -----  Ms. Arana, called she is in the hospital ([facility]) and wants Massiel to look over and make sure her treatment plan is correct. She fells like she does not know what's going on. She has her cell phone please call her at [phone].  Thank you

## 2024-06-26 NOTE — PLAN OF CARE
Problem: Adult Inpatient Plan of Care  Goal: Plan of Care Review  Outcome: Met  Goal: Patient-Specific Goal (Individualized)  Outcome: Met  Goal: Absence of Hospital-Acquired Illness or Injury  Outcome: Met  Goal: Optimal Comfort and Wellbeing  Outcome: Met  Goal: Readiness for Transition of Care  Outcome: Met     Problem: Pneumonia  Goal: Fluid Balance  Outcome: Met  Goal: Resolution of Infection Signs and Symptoms  Outcome: Met  Goal: Effective Oxygenation and Ventilation  Outcome: Met

## 2024-06-26 NOTE — CARE UPDATE
06/25/24 2023   Patient Assessment/Suction   Level of Consciousness (AVPU) alert   Respiratory Effort Normal   Expansion/Accessory Muscles/Retractions no use of accessory muscles   All Lung Fields Breath Sounds clear   Skin Integrity   $ Wound Care Tech Time 15 min   Area Observed Left;Right;Cheek;Nares   Skin Appearance without discoloration   PRE-TX-O2   Device (Oxygen Therapy) room air   Flow (L/min) (Oxygen Therapy) 2  (QHS)   SpO2 99 %   Pulse Oximetry Type Intermittent   $ Pulse Oximetry - Multiple Charge Pulse Oximetry - Multiple   Pulse 70   Resp 19   Aerosol Therapy   $ Aerosol Therapy Charges PRN treatment not required   Education   $ Education 15 min   Respiratory Evaluation   $ Care Plan Tech Time 15 min

## 2024-06-26 NOTE — ASSESSMENT & PLAN NOTE
Patient has a current diagnosis of hypertensive urgency (without evidence of end organ damage) which is controlled.  Latest blood pressure and vitals reviewed-     Home meds for hypertension were reviewed and noted below.   Hypertension Medications               valsartan (DIOVAN) 160 MG tablet Take 1 tablet (160 mg total) by mouth every evening.          Cont valsartan. Added Norvasc

## 2024-06-26 NOTE — PLAN OF CARE
Discharge orders and chart reviewed. No other discharge needs noted at this time. Pt is clear for discharge from case management, after seen by Dr. Zuluaga. Pt is discharging to home.    [facility] [employer] message sent to Dr. Rolon's clinic requesting hospital follow up appointment. Clinic to contact patient with appointment date/time    Mother/family to transport home     06/26/24 4089   Final Note   Assessment Type Final Discharge Note   Anticipated Discharge Disposition Home   What phone number can be called within the next 1-3 days to see how you are doing after discharge? [phone]   Hospital Resources/Appts/Education Provided Appointments scheduled and added to AVS   Post-Acute Status   Discharge Delays (!) Waiting for Provider to Speak to Patient

## 2024-06-27 LAB
ACID FAST MOD KINY STN SPEC: NORMAL
BACTERIA BLD CULT: NORMAL
BACTERIA BLD CULT: NORMAL

## 2024-06-30 LAB
P JIROVECII AG SPEC QL IF: NEGATIVE

## 2024-07-02 LAB
FUNGUS SPEC CULT: NORMAL
FUNGUS SPEC CULT: NORMAL

## 2024-07-16 ENCOUNTER — TELEPHONE (OUTPATIENT)
Dept: PULMONOLOGY | Facility: HOSPITAL | Age: 54
End: 2024-07-16

## 2024-07-16 DIAGNOSIS — D86.9 SARCOIDOSIS: Primary | ICD-10-CM

## 2024-07-17 NOTE — TELEPHONE ENCOUNTER
Patient hospitalized with what appears to be a sarcoid flare.  Will get a chest x-ray before her exam.  She needs an appointment this week or next.

## 2024-07-19 ENCOUNTER — TELEPHONE (OUTPATIENT)
Dept: OPHTHALMOLOGY | Facility: CLINIC | Age: 54
End: 2024-07-19
Payer: OTHER GOVERNMENT

## 2024-07-23 ENCOUNTER — HOSPITAL ENCOUNTER (OUTPATIENT)
Dept: RADIOLOGY | Facility: HOSPITAL | Age: 54
Discharge: HOME | End: 2024-07-23
Attending: INTERNAL MEDICINE
Payer: MEDICAID

## 2024-07-23 DIAGNOSIS — D86.9 SARCOIDOSIS: ICD-10-CM

## 2024-07-23 PROCEDURE — 71046 X-RAY EXAM CHEST 2 VIEWS: CPT | Mod: TC

## 2024-07-23 PROCEDURE — 71046 X-RAY EXAM CHEST 2 VIEWS: CPT | Mod: 26,,, | Performed by: RADIOLOGY

## 2024-07-24 ENCOUNTER — TELEPHONE (OUTPATIENT)
Dept: PULMONOLOGY | Facility: CLINIC | Age: 54
End: 2024-07-24

## 2024-07-24 ENCOUNTER — OFFICE VISIT (OUTPATIENT)
Dept: PULMONOLOGY | Facility: CLINIC | Age: 54
End: 2024-07-24
Payer: MEDICAID

## 2024-07-24 VITALS
WEIGHT: 155 LBS | HEART RATE: 100 BPM | BODY MASS INDEX: 27.46 KG/M2 | SYSTOLIC BLOOD PRESSURE: 140 MMHG | DIASTOLIC BLOOD PRESSURE: 80 MMHG | HEIGHT: 63 IN | OXYGEN SATURATION: 99 %

## 2024-07-24 DIAGNOSIS — F19.982 DRUG-INDUCED INSOMNIA: Primary | ICD-10-CM

## 2024-07-24 DIAGNOSIS — D86.9 SARCOIDOSIS: Primary | ICD-10-CM

## 2024-07-24 DIAGNOSIS — R05.9 COUGH, UNSPECIFIED TYPE: ICD-10-CM

## 2024-07-24 DIAGNOSIS — D86.0 SARCOIDOSIS, STAGE 3: ICD-10-CM

## 2024-07-24 DIAGNOSIS — R53.83 FATIGUE, UNSPECIFIED TYPE: ICD-10-CM

## 2024-07-24 DIAGNOSIS — R06.00 DYSPNEA, UNSPECIFIED TYPE: ICD-10-CM

## 2024-07-24 DIAGNOSIS — F19.982 DRUG-INDUCED INSOMNIA: ICD-10-CM

## 2024-07-24 PROCEDURE — 99213 OFFICE O/P EST LOW 20 MIN: CPT | Mod: PBBFAC,PN | Performed by: INTERNAL MEDICINE

## 2024-07-24 PROCEDURE — 99999 PR PBB SHADOW E&M-EST. PATIENT-LVL III: CPT | Mod: PBBFAC,,, | Performed by: INTERNAL MEDICINE

## 2024-07-24 RX ORDER — ZOLPIDEM TARTRATE 5 MG/1
5 TABLET ORAL NIGHTLY PRN
Qty: 30 TABLET | Refills: 5 | Status: SHIPPED | OUTPATIENT
Start: 2024-07-24 | End: 2025-01-22

## 2024-07-24 RX ORDER — PREDNISONE 10 MG/1
40 TABLET ORAL DAILY
Qty: 100 TABLET | Refills: 5 | Status: SHIPPED | OUTPATIENT
Start: 2024-07-24

## 2024-07-24 NOTE — TELEPHONE ENCOUNTER
Patient called said she needed a refill on her Ambien.  She said you gave it to her over a year ago.  Wants to know if you can send her another RX since she is having trouble sleeping.

## 2024-07-24 NOTE — PROGRESS NOTES
SUBJECTIVE:    Patient ID: Sumit Burger is a 53 y.o. female.    Chief Complaint: Hospital Follow Up and Sarcoidosis  The patient is here with continuing cough and fatigue following her sarcoid flare in .  She is on 20mg prednisone.  She is 50-60 % better than she was.  She still hasn't seen [facility] for her orbital rim bump.She feels it is a little smaller with taking prednisone.  The patient is coughing.  She is coughing especially whenever she smells any scents.  She is fatigued.  She can not exercise or do much.  She is basically staying home.  She is upset that she is sick again and she worries that she will never get better.    She was feeling better when she was on 40 of prednisone.  She states she even felt okay on 30 mg of prednisone.  But now at 22 she is not doing well.  We had tried methotrexate in the past and she did not tolerate this.  Past Medical History:   Diagnosis Date    HTN (hypertension)     Pneumonia 10/2020    Sarcoidosis      Past Surgical History:   Procedure Laterality Date    BRONCHOALVEOLAR LAVAGE Bilateral 2024    Procedure: LAVAGE, BRONCHOALVEOLAR;  Surgeon: Chilango Zuluaga MD;  Location: [facility] ENDO;  Service: Pulmonary;  Laterality: Bilateral;    BRONCHOSCOPY WITH FLUOROSCOPY Right 10/20/2020    Procedure: BRONCHOSCOPY, WITH FLUOROSCOPY;  Surgeon: Ava Rolon MD;  Location: [facility] ENDO;  Service: Pulmonary;  Laterality: Right;     SECTION      x2    FALLOPIAN TUBOPLASTY      LAPAROSCOPIC APPENDECTOMY N/A 8/10/2022    Procedure: APPENDECTOMY, LAPAROSCOPIC;  Surgeon: Jackson Vogel MD;  Location: [facility] OR;  Service: General;  Laterality: N/A;    TUBAL LIGATION      WRIST FRACTURE SURGERY Right     ganglion cyst     Family History   Problem Relation Name Age of Onset    Hypertension Mother          Social History:   Marital Status:   Occupation: Dental hygienist  Alcohol History:  reports that she does not currently use alcohol.  Tobacco History:   reports that she quit smoking about 4 years ago. Her smoking use included cigarettes. She has never used smokeless tobacco. She has stopped smoking.  Drug History:  reports no history of drug use.    Review of patient's allergies indicates:   Allergen Reactions    Sulfamethoxazole-trimethoprim Hives, Shortness Of Breath, Nausea And Vomiting and Rash     Hives, nausea and vomiting, and shortness of breath.  Did not require admission  Hives, nausea and vomiting, and shortness of breath.  Did not require admission    Benadryl [diphenhydramine hcl]     Latex, natural rubber     Percocet [oxycodone-acetaminophen]     Shrimp     Codeine Itching and Nausea Only       Current Outpatient Medications   Medication Sig Dispense Refill    amLODIPine (NORVASC) 5 MG tablet Take 1 tablet (5 mg total) by mouth once daily. 30 tablet 0    folic acid (FOLVITE) 1 MG tablet Take 1 tablet by mouth once daily.      predniSONE (DELTASONE) 20 MG tablet Take 3 tablets (60 mg total) by mouth once daily for 3 days, THEN 2.5 tablets (50 mg total) once daily for 3 days, THEN 2 tablets (40 mg total) once daily for 3 days, THEN 1.5 tablets (30 mg total) once daily for 3 days, THEN 1 tablet (20 mg total) once daily for 19 days. 46 tablet 0    valsartan (DIOVAN) 160 MG tablet Take 1 tablet (160 mg total) by mouth every evening. 90 tablet 0    albuterol (PROVENTIL/VENTOLIN HFA) 90 mcg/actuation inhaler Inhale 2 puffs into the lungs every 4 to 6 hours as needed for Shortness of Breath. Rescue 6.7 g 0    predniSONE (DELTASONE) 10 MG tablet Take 4 tablets (40 mg total) by mouth once daily. With food 100 tablet 5     No current facility-administered medications for this visit.         Last PFT: 10/11/223   No obstruction, mild restriction, moderate diffusion defect, better than 2020  Last CT chest:  06/22/2024  Heart size is normal.  The thoracic aorta is normal in caliber.  The pulmonary arteries are fairly well opacified.  There are no filling defects  "to indicate pulmonary thromboembolic disease.     There is mildly increasing mediastinal and bilateral hilar lymphadenopathy.  Images at lung windows demonstrate bilateral bronchiectasis diffuse bilateral ground-glass opacities and interstitial prominence appear increased compared to the prior study.  No pulmonary mass lesions or pleural effusions are identified.  The upper abdomen is unremarkable.     Review of Systems  General: feeling poorly.  She is very fatigued.  Eyes: Vision is good.  Bony mass on the inferior orbital rim which hurts  ENT: no sinusitis  Heart:: palpitations still  Lungs: breathing is not good.  She is coughing a great deal.  GI: constipation   : No dysuria, hesitancy, or nocturia.  Musculoskeletal:  Very fatigued.  Skin: No lesions or rashes.  Neuro: no headaches   Lymph: feet and hands swell at times  Psych:  anxiety and depression  Endo:  Gaining weight with prednisone.    OBJECTIVE:      BP (!) 140/80 (BP Location: Left arm, Patient Position: Sitting, BP Method: Medium (Manual))   Pulse 100   Ht 5' 3" (1.6 m)   Wt 70.3 kg (155 lb)   LMP 06/24/2022   SpO2 99%   BMI 27.46 kg/m²     Physical Exam  GENERAL: Midaged patient in no distress.  HEENT: Pupils equal and reactive. Extraocular movements intact. Nose intact.  Pharynx pink.  Mallampati 4  The patient has what feels like a bony mass in the inferior orbital rim on the right which extends down onto the maxillary bone  NECK: Supple.  14 in  HEART: Regular rate and rhythm. No murmur or gallop auscultated.  LUNGS: Clear to auscultation and percussion. Lung excursion symmetrical. No change in fremitus. No adventitial noises.  ABDOMEN: Bowel sounds present. Non-tender, no masses palpated.  EXTREMITIES: Normal muscle tone and joint movement, no cyanosis or clubbing.   LYMPHATICS: No adenopathy palpated, no edema.  SKIN: Dry, intact, no lesions.  Perspiring.  NEURO: Cranial nerves II-XII intact. Motor strength 5/5 bilaterally, upper and " lower extremities.  PSYCH: Appropriate affect.    Buchanan Sleepiness is 6  Assessment:       1. Sarcoidosis    2. Sarcoidosis, stage 3    3. Fatigue, unspecified type    4. Cough, unspecified type    5. Dyspnea, unspecified type    6. Drug-induced insomnia          Patient's sarcoid flare has not settled.  She had a chest x-ray which shows no improvement in the increasing ground-glass opacities.  Reviewed her chest x-ray and CT with her.  Her sats are okay and she sounds good but she obviously is not feeling well.  Reviewed the need for her to resume her antidepressant.  Will go up on her prednisone back to 40 to get her feeling better and settle her sarcoid flare and then will taper.  If were not able to taper the prednisone in 2 weeks will start PCP prophylaxis with Bactrim.  If we can not get her settled down we may need to move to further immunotherapy but methotrexate is not it for her.            Plan:       Sarcoidosis  -     predniSONE (DELTASONE) 10 MG tablet; Take 4 tablets (40 mg total) by mouth once daily. With food  Dispense: 100 tablet; Refill: 5    Sarcoidosis, stage 3    Fatigue, unspecified type    Cough, unspecified type    Dyspnea, unspecified type    Drug-induced insomnia              Follow up in about 1 month (around 8/24/2024).      Increase  to 40mg prednisone  Call me in 2 weeks  If we can't decrease dose at that time, will need to add PCP prophylaxis  Resume Wellbutrin

## 2024-07-31 ENCOUNTER — TELEPHONE (OUTPATIENT)
Dept: PULMONOLOGY | Facility: CLINIC | Age: 54
End: 2024-07-31
Payer: MEDICAID

## 2024-07-31 NOTE — TELEPHONE ENCOUNTER
Patient has an AFB growing from a sputum when she was hospitalized. No ID as yet. Not a bronch specimen.

## 2024-08-08 ENCOUNTER — PATIENT MESSAGE (OUTPATIENT)
Dept: ADMINISTRATIVE | Facility: OTHER | Age: 54
End: 2024-08-08
Payer: MEDICAID

## 2024-09-12 ENCOUNTER — TELEPHONE (OUTPATIENT)
Dept: PULMONOLOGY | Facility: HOSPITAL | Age: 54
End: 2024-09-12

## 2024-09-12 NOTE — TELEPHONE ENCOUNTER
The patient is growing an SUZIE from her bronch wash.  Will need to treat.  Will get her in the office.

## 2024-09-16 ENCOUNTER — LAB VISIT (OUTPATIENT)
Dept: LAB | Facility: HOSPITAL | Age: 54
End: 2024-09-16
Attending: INTERNAL MEDICINE
Payer: MEDICAID

## 2024-09-16 ENCOUNTER — OFFICE VISIT (OUTPATIENT)
Dept: PULMONOLOGY | Facility: CLINIC | Age: 54
End: 2024-09-16
Payer: MEDICAID

## 2024-09-16 VITALS
HEIGHT: 63 IN | WEIGHT: 154 LBS | DIASTOLIC BLOOD PRESSURE: 100 MMHG | OXYGEN SATURATION: 98 % | HEART RATE: 103 BPM | BODY MASS INDEX: 27.29 KG/M2 | SYSTOLIC BLOOD PRESSURE: 150 MMHG

## 2024-09-16 DIAGNOSIS — R53.83 FATIGUE, UNSPECIFIED TYPE: ICD-10-CM

## 2024-09-16 DIAGNOSIS — M79.89 MASS OF SOFT TISSUE OF FACE: ICD-10-CM

## 2024-09-16 DIAGNOSIS — A31.0 MAI (MYCOBACTERIUM AVIUM-INTRACELLULARE): ICD-10-CM

## 2024-09-16 DIAGNOSIS — R05.9 COUGH, UNSPECIFIED TYPE: ICD-10-CM

## 2024-09-16 DIAGNOSIS — D86.0 SARCOIDOSIS, STAGE 3: ICD-10-CM

## 2024-09-16 DIAGNOSIS — A31.0 MAI (MYCOBACTERIUM AVIUM-INTRACELLULARE): Primary | ICD-10-CM

## 2024-09-16 LAB
ALT SERPL W/O P-5'-P-CCNC: 35 U/L (ref 10–44)
AST SERPL-CCNC: 27 U/L (ref 10–40)

## 2024-09-16 PROCEDURE — 3077F SYST BP >= 140 MM HG: CPT | Mod: CPTII,,, | Performed by: INTERNAL MEDICINE

## 2024-09-16 PROCEDURE — 84460 ALANINE AMINO (ALT) (SGPT): CPT | Performed by: INTERNAL MEDICINE

## 2024-09-16 PROCEDURE — 84450 TRANSFERASE (AST) (SGOT): CPT | Performed by: INTERNAL MEDICINE

## 2024-09-16 PROCEDURE — 99214 OFFICE O/P EST MOD 30 MIN: CPT | Mod: S$PBB,,, | Performed by: INTERNAL MEDICINE

## 2024-09-16 PROCEDURE — 1159F MED LIST DOCD IN RCRD: CPT | Mod: CPTII,,, | Performed by: INTERNAL MEDICINE

## 2024-09-16 PROCEDURE — 3008F BODY MASS INDEX DOCD: CPT | Mod: CPTII,,, | Performed by: INTERNAL MEDICINE

## 2024-09-16 PROCEDURE — 36415 COLL VENOUS BLD VENIPUNCTURE: CPT | Performed by: INTERNAL MEDICINE

## 2024-09-16 PROCEDURE — 3080F DIAST BP >= 90 MM HG: CPT | Mod: CPTII,,, | Performed by: INTERNAL MEDICINE

## 2024-09-16 PROCEDURE — 99999 PR PBB SHADOW E&M-EST. PATIENT-LVL IV: CPT | Mod: PBBFAC,,, | Performed by: INTERNAL MEDICINE

## 2024-09-16 PROCEDURE — 99214 OFFICE O/P EST MOD 30 MIN: CPT | Mod: PBBFAC,PN | Performed by: INTERNAL MEDICINE

## 2024-09-16 RX ORDER — AZITHROMYCIN 500 MG/1
500 TABLET, FILM COATED ORAL
Qty: 12 TABLET | Refills: 17 | Status: SHIPPED | OUTPATIENT
Start: 2024-09-16

## 2024-09-16 RX ORDER — ETHAMBUTOL HYDROCHLORIDE 400 MG/1
1200 TABLET, FILM COATED ORAL
Qty: 36 TABLET | Refills: 17 | Status: SHIPPED | OUTPATIENT
Start: 2024-09-16

## 2024-09-16 RX ORDER — LANOLIN ALCOHOL/MO/W.PET/CERES
50 CREAM (GRAM) TOPICAL
Qty: 12 TABLET | Refills: 17 | Status: SHIPPED | OUTPATIENT
Start: 2024-09-16

## 2024-09-16 RX ORDER — SPIRONOLACTONE 25 MG/1
25 TABLET ORAL
COMMUNITY
Start: 2023-11-28

## 2024-09-16 RX ORDER — BUPROPION HYDROCHLORIDE 300 MG/1
300 TABLET ORAL DAILY
COMMUNITY
Start: 2023-12-20

## 2024-09-16 RX ORDER — ISONIAZID 300 MG/1
600 TABLET ORAL
Qty: 24 TABLET | Refills: 17 | Status: SHIPPED | OUTPATIENT
Start: 2024-09-16

## 2024-09-16 NOTE — PROGRESS NOTES
SUBJECTIVE:    Patient ID: Sumit Burger is a 54 y.o. female.    Chief Complaint: Follow-up and Sarcoidosis  The patient is here not feeling well.  She feels angry on 40 mg of prednisone.  The patient's bronchoscopy wash grew and SUZIE.  She states she does not think all of her symptoms that she is having a her sarcoid because normally her sarcoid clears up fairly rapidly with steroids.  She does not feel like she has cleared up.  She is having a hard time with the 40 mg of prednisone.  Past Medical History:   Diagnosis Date    HTN (hypertension)     Pneumonia 10/2020    Sarcoidosis      Past Surgical History:   Procedure Laterality Date    BRONCHOALVEOLAR LAVAGE Bilateral 2024    Procedure: LAVAGE, BRONCHOALVEOLAR;  Surgeon: Chilango Zuluaga MD;  Location: [facility] ENDO;  Service: Pulmonary;  Laterality: Bilateral;    BRONCHOSCOPY WITH FLUOROSCOPY Right 10/20/2020    Procedure: BRONCHOSCOPY, WITH FLUOROSCOPY;  Surgeon: Ava Rolon MD;  Location: [facility] ENDO;  Service: Pulmonary;  Laterality: Right;     SECTION      x2    FALLOPIAN TUBOPLASTY      LAPAROSCOPIC APPENDECTOMY N/A 8/10/2022    Procedure: APPENDECTOMY, LAPAROSCOPIC;  Surgeon: Jackson Vogel MD;  Location: [facility] OR;  Service: General;  Laterality: N/A;    TUBAL LIGATION      WRIST FRACTURE SURGERY Right     ganglion cyst     Family History   Problem Relation Name Age of Onset    Hypertension Mother          Social History:   Marital Status:   Occupation: Dental hygienist  Alcohol History:  reports that she does not currently use alcohol.  Tobacco History:  reports that she quit smoking about 4 years ago. Her smoking use included cigarettes. She has never used smokeless tobacco. She has stopped smoking.  Drug History:  reports no history of drug use.    Review of patient's allergies indicates:   Allergen Reactions    Sulfamethoxazole-trimethoprim Hives, Shortness Of Breath, Nausea And Vomiting and Rash     Hives, nausea and  vomiting, and shortness of breath.  Did not require admission  Hives, nausea and vomiting, and shortness of breath.  Did not require admission    Benadryl [diphenhydramine hcl]     Latex, natural rubber     Percocet [oxycodone-acetaminophen]     Shrimp     Codeine Itching and Nausea Only       Current Outpatient Medications   Medication Sig Dispense Refill    albuterol (PROVENTIL/VENTOLIN HFA) 90 mcg/actuation inhaler Inhale 2 puffs into the lungs every 4 to 6 hours as needed for Shortness of Breath. Rescue 6.7 g 0    amLODIPine (NORVASC) 5 MG tablet Take 1 tablet (5 mg total) by mouth once daily. 30 tablet 0    buPROPion (WELLBUTRIN XL) 300 MG 24 hr tablet Take 300 mg by mouth once daily.      folic acid (FOLVITE) 1 MG tablet Take 1 tablet by mouth once daily.      predniSONE (DELTASONE) 10 MG tablet Take 4 tablets (40 mg total) by mouth once daily. With food 100 tablet 5    spironolactone (ALDACTONE) 25 MG tablet Take 25 mg by mouth.      valsartan (DIOVAN) 160 MG tablet Take 1 tablet (160 mg total) by mouth every evening. 90 tablet 0    zolpidem (AMBIEN) 5 MG Tab Take 1 tablet (5 mg total) by mouth nightly as needed (insomnia). 30 tablet 5    azithromycin (ZITHROMAX) 500 MG tablet Take 1 tablet (500 mg total) by mouth every Mon, Wed, Fri. 12 tablet 17    ethambutoL (MYAMBUTOL) 400 MG Tab Take 3 tablets (1,200 mg total) by mouth every Mon, Wed, Fri. Take every Monday Wednesday and Friday 36 tablet 17    isoniazid (NYDRAZID) 300 MG Tab Take 2 tablets (600 mg total) by mouth every Mon, Wed, Fri. 24 tablet 17    pyridoxine, vitamin B6, (B-6) 50 MG Tab Take 1 tablet (50 mg total) by mouth every Mon, Wed, Fri. 12 tablet 17     No current facility-administered medications for this visit.         Last PFT: 10/11/223   No obstruction, mild restriction, moderate diffusion defect, better than 2020  Last CT chest:  06/22/2024  Heart size is normal.  The thoracic aorta is normal in caliber.  The pulmonary arteries are fairly  "well opacified.  There are no filling defects to indicate pulmonary thromboembolic disease.     There is mildly increasing mediastinal and bilateral hilar lymphadenopathy.  Images at lung windows demonstrate bilateral bronchiectasis diffuse bilateral ground-glass opacities and interstitial prominence appear increased compared to the prior study.  No pulmonary mass lesions or pleural effusions are identified.  The upper abdomen is unremarkable.     Review of Systems  General: feeling poorly.  She is very fatigued.  Eyes: Vision is good.  Bony mass on the inferior orbital rim which hurts  ENT: no sinusitis  Heart:: palpitations still  Lungs: breathing is not good.  She is coughing less but is still feeling dyspneic with exertion.  GI: constipation   : No dysuria, hesitancy, or nocturia.  Musculoskeletal:  Very fatigued.  Skin: No lesions or rashes.  Neuro: no headaches   Lymph: feet and hands swell at times  Psych:  anxiety and depression  Endo:  Gaining weight with prednisone.    OBJECTIVE:      BP (!) 150/100 (BP Location: Right arm, Patient Position: Sitting, BP Method: Medium (Manual))   Pulse 103   Ht 5' 3" (1.6 m)   Wt 69.9 kg (154 lb)   LMP 06/24/2022   SpO2 98%   BMI 27.28 kg/m²     Physical Exam  GENERAL: Midaged patient in no distress.  HEENT: Pupils equal and reactive. Extraocular movements intact. Nose intact.  Pharynx pink.  Mallampati 4  The patient has what feels like a bony mass in the inferior orbital rim on the right which extends down onto the maxillary bone  NECK: Supple.  14 in  HEART: Regular rate and rhythm. No murmur or gallop auscultated.  LUNGS: Clear to auscultation and percussion. Lung excursion symmetrical. No change in fremitus. No adventitial noises.  ABDOMEN: Bowel sounds present. Non-tender, no masses palpated.  EXTREMITIES: Normal muscle tone and joint movement, no cyanosis or clubbing.   LYMPHATICS: No adenopathy palpated, no edema.  SKIN: Dry, intact, no lesions.  " Perspiring.  NEURO: Cranial nerves II-XII intact. Motor strength 5/5 bilaterally, upper and lower extremities.  PSYCH: Appropriate affect.      Assessment:       1. SUZIE (mycobacterium avium-intracellulare)    2. Sarcoidosis, stage 3    3. Fatigue, unspecified type    4. Mass of soft tissue of face    5. Cough, unspecified type            The patient is growing an SUZIE in her lungs.  This was proven on bronch wash.  Given her continued symptoms.  I think we need to treat this.  She also has more findings on her CT.  Will wean the prednisone down and off and start 18 months of triple therapy with INH, ethambutol, and azithromycin.  Will give her B6 as she does like to drink alcohol and have counseled on alcohol consumption with INH.  Reviewed the need to have her labs drawn monthly at least at [facility] with the INH.  Have instructed her on limiting her alcohol intake to 1 drink a day.  Discussed the need to check her red green vision with the ethambutol.  She will see her ophthalmologist so that he can track her in case there any signs of optic neuritis.  Have discussed taking all of the pills of 1 antibiotic at the same time and taking these with food.  Answered all of her questions.  As she is not producing any sputum, we may need to do a bronchoscopy at the end of treatment to prove that we have eradicated the SUZIE.            Plan:       SUZIE (mycobacterium avium-intracellulare)  -     isoniazid (NYDRAZID) 300 MG Tab; Take 2 tablets (600 mg total) by mouth every Mon, Wed, Fri.  Dispense: 24 tablet; Refill: 17  -     ethambutoL (MYAMBUTOL) 400 MG Tab; Take 3 tablets (1,200 mg total) by mouth every Mon, Wed, Fri. Take every Monday Wednesday and Friday  Dispense: 36 tablet; Refill: 17  -     azithromycin (ZITHROMAX) 500 MG tablet; Take 1 tablet (500 mg total) by mouth every Mon, Wed, Fri.  Dispense: 12 tablet; Refill: 17  -     pyridoxine, vitamin B6, (B-6) 50 MG Tab; Take 1 tablet (50 mg total) by mouth every Mon, Wed, Fri.   Dispense: 12 tablet; Refill: 17  -     AST (SGOT); Standing  -     ALT (SGPT); Standing    Sarcoidosis, stage 3    Fatigue, unspecified type    Mass of soft tissue of face    Cough, unspecified type                Follow up in about 1 month (around 10/16/2024).      Decrease to 30mg x 2 weeks then 20mg prednisone  Return in 4 weeks  Continue Wellbutrin  INH 300mg M, W, F  B6 50 mg M, W, F  Ethambutol 1200mg M, W, F  Azithromycin 500mg M, W, F  See ophthalmologist  Look at red green every day and call if it looks gray  AST, ALT now and monthly  Limit alcohol intake to 1 drink/day

## 2024-09-23 PROBLEM — J18.9 PNEUMONIA: Status: RESOLVED | Noted: 2020-10-19 | Resolved: 2024-09-23

## 2024-10-15 ENCOUNTER — TELEPHONE (OUTPATIENT)
Dept: PULMONOLOGY | Facility: CLINIC | Age: 54
End: 2024-10-15
Payer: MEDICAID

## 2024-10-15 NOTE — TELEPHONE ENCOUNTER
----- Message from Ava Rolon MD sent at 10/15/2024  3:32 PM CDT -----  Regarding: RE: chest xray order  She does not need a CXR  ----- Message -----  From: Jose L Monaco MA  Sent: 10/15/2024   3:13 PM CDT  To: Ava Rolon MD  Subject: chest xray order                                 Patient called regarding cxr... states she gets one prior to coming to see you. I do not see an xray order active for this patient. Do you need her to have cxr done prior to 10/23/2024 appointment?     Thanks  Jose L

## 2024-10-21 ENCOUNTER — LAB VISIT (OUTPATIENT)
Dept: LAB | Facility: HOSPITAL | Age: 54
End: 2024-10-21
Attending: INTERNAL MEDICINE
Payer: MEDICAID

## 2024-10-21 DIAGNOSIS — A31.0 MAI (MYCOBACTERIUM AVIUM-INTRACELLULARE): ICD-10-CM

## 2024-10-21 LAB
ALT SERPL W/O P-5'-P-CCNC: 26 U/L (ref 10–44)
AST SERPL-CCNC: 27 U/L (ref 10–40)

## 2024-10-21 PROCEDURE — 36415 COLL VENOUS BLD VENIPUNCTURE: CPT | Performed by: INTERNAL MEDICINE

## 2024-10-21 PROCEDURE — 84450 TRANSFERASE (AST) (SGOT): CPT | Performed by: INTERNAL MEDICINE

## 2024-10-21 PROCEDURE — 84460 ALANINE AMINO (ALT) (SGPT): CPT | Performed by: INTERNAL MEDICINE

## 2024-10-23 ENCOUNTER — OFFICE VISIT (OUTPATIENT)
Dept: PULMONOLOGY | Facility: CLINIC | Age: 54
End: 2024-10-23
Payer: MEDICAID

## 2024-10-23 VITALS
WEIGHT: 155 LBS | SYSTOLIC BLOOD PRESSURE: 140 MMHG | BODY MASS INDEX: 27.46 KG/M2 | DIASTOLIC BLOOD PRESSURE: 100 MMHG | OXYGEN SATURATION: 98 % | HEART RATE: 80 BPM | HEIGHT: 63 IN

## 2024-10-23 DIAGNOSIS — D86.0 SARCOIDOSIS, STAGE 3: ICD-10-CM

## 2024-10-23 DIAGNOSIS — A31.0 MAI (MYCOBACTERIUM AVIUM-INTRACELLULARE): Primary | ICD-10-CM

## 2024-10-23 PROCEDURE — 99214 OFFICE O/P EST MOD 30 MIN: CPT | Mod: PBBFAC,PN | Performed by: INTERNAL MEDICINE

## 2024-10-23 PROCEDURE — 99999 PR PBB SHADOW E&M-EST. PATIENT-LVL IV: CPT | Mod: PBBFAC,,, | Performed by: INTERNAL MEDICINE

## 2024-10-23 RX ORDER — RIFAMPIN 300 MG/1
600 CAPSULE ORAL
Qty: 24 CAPSULE | Refills: 17 | Status: SHIPPED | OUTPATIENT
Start: 2024-10-23

## 2024-10-23 NOTE — PROGRESS NOTES
SUBJECTIVE:    Patient ID: Sumit Burger is a 54 y.o. female.    Chief Complaint: Sarcoidosis  The patient is here feeling better.  She is having diarrhea.  Will change INH to rifampin, which is appropriate.  Will continue the ethambutol and azithromycin and she will let me know if the diarrhea continues.  Her breathing is doing well.  She is on 20 of prednisone.  Her primary says that she is becoming prediabetic likely from the steroids.  As she is doing well, wean these down and off.  Past Medical History:   Diagnosis Date    HTN (hypertension)     Pneumonia 10/2020    Sarcoidosis      Past Surgical History:   Procedure Laterality Date    BRONCHOALVEOLAR LAVAGE Bilateral 2024    Procedure: LAVAGE, BRONCHOALVEOLAR;  Surgeon: Chilango Zuluaga MD;  Location: [facility] ENDO;  Service: Pulmonary;  Laterality: Bilateral;    BRONCHOSCOPY WITH FLUOROSCOPY Right 10/20/2020    Procedure: BRONCHOSCOPY, WITH FLUOROSCOPY;  Surgeon: Ava Rolon MD;  Location: [facility] ENDO;  Service: Pulmonary;  Laterality: Right;     SECTION      x2    FALLOPIAN TUBOPLASTY      LAPAROSCOPIC APPENDECTOMY N/A 8/10/2022    Procedure: APPENDECTOMY, LAPAROSCOPIC;  Surgeon: Jackson Vogel MD;  Location: [facility] OR;  Service: General;  Laterality: N/A;    TUBAL LIGATION      WRIST FRACTURE SURGERY Right     ganglion cyst     Family History   Problem Relation Name Age of Onset    Hypertension Mother          Social History:   Marital Status:   Occupation: Dental hygienist  Alcohol History:  reports that she does not currently use alcohol.  Tobacco History:  reports that she quit smoking about 4 years ago. Her smoking use included cigarettes. She has never used smokeless tobacco. She has stopped smoking.  Drug History:  reports no history of drug use.    Review of patient's allergies indicates:   Allergen Reactions    Sulfamethoxazole-trimethoprim Hives, Shortness Of Breath, Nausea And Vomiting and Rash     Hives, nausea  and vomiting, and shortness of breath.  Did not require admission  Hives, nausea and vomiting, and shortness of breath.  Did not require admission    Benadryl [diphenhydramine hcl]     Latex, natural rubber     Percocet [oxycodone-acetaminophen]     Shrimp     Codeine Itching and Nausea Only       Current Outpatient Medications   Medication Sig Dispense Refill    azithromycin (ZITHROMAX) 500 MG tablet Take 1 tablet (500 mg total) by mouth every Mon, Wed, Fri. 12 tablet 17    buPROPion (WELLBUTRIN XL) 300 MG 24 hr tablet Take 300 mg by mouth once daily.      ethambutoL (MYAMBUTOL) 400 MG Tab Take 3 tablets (1,200 mg total) by mouth every Mon, Wed, Fri. Take every Monday Wednesday and Friday 36 tablet 17    folic acid (FOLVITE) 1 MG tablet Take 1 tablet by mouth once daily.      isoniazid (NYDRAZID) 300 MG Tab Take 2 tablets (600 mg total) by mouth every Mon, Wed, Fri. 24 tablet 17    predniSONE (DELTASONE) 10 MG tablet Take 4 tablets (40 mg total) by mouth once daily. With food (Patient taking differently: Take 20 mg by mouth once daily. With food) 100 tablet 5    pyridoxine, vitamin B6, (B-6) 50 MG Tab Take 1 tablet (50 mg total) by mouth every Mon, Wed, Fri. 12 tablet 17    spironolactone (ALDACTONE) 25 MG tablet Take 25 mg by mouth.      zolpidem (AMBIEN) 5 MG Tab Take 1 tablet (5 mg total) by mouth nightly as needed (insomnia). 30 tablet 5    albuterol (PROVENTIL/VENTOLIN HFA) 90 mcg/actuation inhaler Inhale 2 puffs into the lungs every 4 to 6 hours as needed for Shortness of Breath. Rescue 6.7 g 0    amLODIPine (NORVASC) 5 MG tablet Take 1 tablet (5 mg total) by mouth once daily. 30 tablet 0    rifAMpin (RIFADIN) 300 MG capsule Take 2 capsules (600 mg total) by mouth every Mon, Wed, Fri. Every Monday Wednesday and Friday 24 capsule 17    valsartan (DIOVAN) 160 MG tablet Take 1 tablet (160 mg total) by mouth every evening. 90 tablet 0     No current facility-administered medications for this visit.         Last  "PFT: 10/11/223   No obstruction, mild restriction, moderate diffusion defect, better than 2020  Last CT chest:  06/22/2024  Heart size is normal.  The thoracic aorta is normal in caliber.  The pulmonary arteries are fairly well opacified.  There are no filling defects to indicate pulmonary thromboembolic disease.     There is mildly increasing mediastinal and bilateral hilar lymphadenopathy.  Images at lung windows demonstrate bilateral bronchiectasis diffuse bilateral ground-glass opacities and interstitial prominence appear increased compared to the prior study.  No pulmonary mass lesions or pleural effusions are identified.  The upper abdomen is unremarkable.     Review of Systems  General: feeling poorly.  She is very fatigued.  Eyes: Vision is good.  Bony mass on the inferior orbital rim which hurts  ENT: no sinusitis  Heart:: palpitations still  Lungs: breathing is not good.  She is coughing less but is still feeling dyspneic with exertion.  GI: constipation   : No dysuria, hesitancy, or nocturia.  Musculoskeletal:  Very fatigued.  Skin: No lesions or rashes.  Neuro: no headaches   Lymph: feet and hands swell at times  Psych:  anxiety and depression  Endo:  Gaining weight with prednisone.    OBJECTIVE:      BP (!) 140/100 (BP Location: Right arm, Patient Position: Sitting)   Pulse 80   Ht 5' 3" (1.6 m)   Wt 70.3 kg (155 lb)   LMP 06/24/2022   SpO2 98%   BMI 27.46 kg/m²     Physical Exam  GENERAL: Midaged patient in no distress.  HEENT: Pupils equal and reactive. Extraocular movements intact. Nose intact.  Pharynx pink.  Mallampati 4    NECK: Supple.  14 in  HEART: Regular rate and rhythm. No murmur or gallop auscultated.  LUNGS: Clear to auscultation and percussion. Lung excursion symmetrical. No change in fremitus. No adventitial noises.  ABDOMEN: Bowel sounds present. Non-tender, no masses palpated.  EXTREMITIES: Normal muscle tone and joint movement, no cyanosis or clubbing.   LYMPHATICS: No " adenopathy palpated, no edema.  SKIN: Dry, intact, no lesions.    NEURO: Cranial nerves II-XII intact. Motor strength 5/5 bilaterally, upper and lower extremities.  PSYCH: Appropriate affect.      Assessment:       1. SUZIE (mycobacterium avium-intracellulare)    2. Sarcoidosis, stage 3              The patient's sarcoidosis flares settling.  She is on 20 of prednisone and doing well.  Will drop down to 10 and see if we can not get down to 5 and then off.  She is treating her SUZIE.  Her AST and ALT are normal.  Her red green vision is intact.  She has an appointment with the ophthalmologist in December.  INH replaced with rifampin, continue ethambutol and azithromycin.  She knows to continue watching her alcohol intake.  She knows to continue with her labs.  She is aware that the rifampin will cause orange urine and tears.            Plan:       SUZIE (mycobacterium avium-intracellulare)  -     rifAMpin (RIFADIN) 300 MG capsule; Take 2 capsules (600 mg total) by mouth every Mon, Wed, Fri. Every Monday Wednesday and Friday  Dispense: 24 capsule; Refill: 17    Sarcoidosis, stage 3                  Follow up in about 2 months (around 12/23/2024).      Decrease to 10mg x 2 weeks then 5mg prednisone then off in 2 weeks  Return in 2 months  Continue Wellbutrin  Rifampin 600 mg M, W, F  Ethambutol 1200mg M, W, F  Azithromycin 500mg M, W, F  See ophthalmologist  Look at red green every day and call if it looks gray  AST, ALT now and monthly  Limit alcohol intake to 1 drink/day

## 2024-10-24 DIAGNOSIS — Z01.89 ENCOUNTER FOR OTHER SPECIFIED SPECIAL EXAMINATIONS: ICD-10-CM

## 2024-10-24 DIAGNOSIS — R10.30 PAIN IN THE GROIN: Primary | ICD-10-CM

## 2024-10-30 ENCOUNTER — HOSPITAL ENCOUNTER (OUTPATIENT)
Dept: RADIOLOGY | Facility: HOSPITAL | Age: 54
Discharge: HOME | End: 2024-10-30
Attending: FAMILY MEDICINE
Payer: MEDICAID

## 2024-10-30 DIAGNOSIS — R10.30 PAIN IN THE GROIN: ICD-10-CM

## 2024-10-30 DIAGNOSIS — Z01.89 ENCOUNTER FOR OTHER SPECIFIED SPECIAL EXAMINATIONS: ICD-10-CM

## 2024-10-30 PROCEDURE — 74177 CT ABD & PELVIS W/CONTRAST: CPT | Mod: TC

## 2024-10-30 PROCEDURE — 74177 CT ABD & PELVIS W/CONTRAST: CPT | Mod: 26,,, | Performed by: RADIOLOGY

## 2024-10-30 PROCEDURE — 25500020 PHARM REV CODE 255: Performed by: FAMILY MEDICINE

## 2024-10-30 RX ADMIN — IOHEXOL 100 ML: 350 INJECTION, SOLUTION INTRAVENOUS at 08:10

## 2024-11-01 DIAGNOSIS — Z01.89 ENCOUNTER FOR OTHER SPECIFIED SPECIAL EXAMINATIONS: Primary | ICD-10-CM

## 2024-11-18 ENCOUNTER — TELEPHONE (OUTPATIENT)
Dept: OPHTHALMOLOGY | Facility: CLINIC | Age: 54
End: 2024-11-18
Payer: MEDICAID

## 2024-11-18 NOTE — TELEPHONE ENCOUNTER
----- Message from Khushi sent at 11/18/2024 12:25 PM CST -----  Contact: Self  Type:  Sooner Appointment Request    Caller is requesting a sooner appointment.  Caller declined first available appointment listed below.  Caller will not accept being placed on the waitlist and is requesting a message be sent to doctor.    Name of Caller:  Patient   When is the first available appointment?  N/A  Symptoms:  Dr Rolon Pulmonologist wants her to get her eyes checked monthly due to the medications she is taking  Would the patient rather a call back or a response via [facility]? Call  Best Call Back Number:  [phone]  Additional Information:  Can we please check on this and call pt back to advise. Thank You

## 2024-11-22 DIAGNOSIS — Z01.89 ENCOUNTER FOR OTHER SPECIFIED SPECIAL EXAMINATIONS: Primary | ICD-10-CM

## 2024-11-26 ENCOUNTER — TELEPHONE (OUTPATIENT)
Dept: RADIOLOGY | Facility: HOSPITAL | Age: 54
End: 2024-11-26

## 2024-11-27 ENCOUNTER — HOSPITAL ENCOUNTER (OUTPATIENT)
Dept: RADIOLOGY | Facility: HOSPITAL | Age: 54
Discharge: HOME | End: 2024-11-27
Attending: FAMILY MEDICINE
Payer: OTHER GOVERNMENT

## 2024-11-27 DIAGNOSIS — Z01.89 ENCOUNTER FOR OTHER SPECIFIED SPECIAL EXAMINATIONS: ICD-10-CM

## 2024-11-27 PROCEDURE — 70481 CT ORBIT/EAR/FOSSA W/DYE: CPT | Mod: TC

## 2024-11-27 PROCEDURE — 70481 CT ORBIT/EAR/FOSSA W/DYE: CPT | Mod: 26,,, | Performed by: RADIOLOGY

## 2024-11-27 PROCEDURE — 25500020 PHARM REV CODE 255: Performed by: FAMILY MEDICINE

## 2024-11-27 RX ADMIN — IOHEXOL 75 ML: 350 INJECTION, SOLUTION INTRAVENOUS at 02:11

## 2024-12-05 ENCOUNTER — HOSPITAL ENCOUNTER (OUTPATIENT)
Dept: RADIOLOGY | Facility: HOSPITAL | Age: 54
Discharge: HOME | End: 2024-12-05
Attending: FAMILY MEDICINE
Payer: OTHER GOVERNMENT

## 2024-12-05 DIAGNOSIS — Z01.89 ENCOUNTER FOR OTHER SPECIFIED SPECIAL EXAMINATIONS: ICD-10-CM

## 2024-12-05 PROCEDURE — 76830 TRANSVAGINAL US NON-OB: CPT | Mod: 26,,, | Performed by: RADIOLOGY

## 2024-12-05 PROCEDURE — 76856 US EXAM PELVIC COMPLETE: CPT | Mod: TC,PO

## 2024-12-05 PROCEDURE — 76856 US EXAM PELVIC COMPLETE: CPT | Mod: 26,,, | Performed by: RADIOLOGY

## 2024-12-08 ENCOUNTER — HOSPITAL ENCOUNTER (INPATIENT)
Facility: HOSPITAL | Age: 54
LOS: 1 days | Discharge: HOME | DRG: 197 | End: 2024-12-10
Attending: STUDENT IN AN ORGANIZED HEALTH CARE EDUCATION/TRAINING PROGRAM | Admitting: INTERNAL MEDICINE
Payer: OTHER GOVERNMENT

## 2024-12-08 DIAGNOSIS — R06.02 SOB (SHORTNESS OF BREATH): ICD-10-CM

## 2024-12-08 DIAGNOSIS — R06.00 DYSPNEA, UNSPECIFIED TYPE: ICD-10-CM

## 2024-12-08 DIAGNOSIS — I16.0 HYPERTENSIVE URGENCY: Primary | ICD-10-CM

## 2024-12-08 DIAGNOSIS — R06.03 RESPIRATORY DISTRESS: ICD-10-CM

## 2024-12-08 DIAGNOSIS — R07.9 CHEST PAIN, UNSPECIFIED TYPE: ICD-10-CM

## 2024-12-08 DIAGNOSIS — D86.9 SARCOID: ICD-10-CM

## 2024-12-08 PROBLEM — A31.0 MAI (MYCOBACTERIUM AVIUM-INTRACELLULARE): Status: ACTIVE | Noted: 2024-12-08

## 2024-12-08 LAB
ALBUMIN SERPL BCP-MCNC: 4.5 G/DL (ref 3.5–5.2)
ALP SERPL-CCNC: 79 U/L (ref 55–135)
ALT SERPL W/O P-5'-P-CCNC: 26 U/L (ref 10–44)
ANION GAP SERPL CALC-SCNC: 11 MMOL/L (ref 8–16)
ANISOCYTOSIS BLD QL SMEAR: SLIGHT
APTT PPP: 33.3 SEC (ref 21–32)
AST SERPL-CCNC: 25 U/L (ref 10–40)
BASOPHILS # BLD AUTO: 0.08 K/UL (ref 0–0.2)
BASOPHILS NFR BLD: 0.9 % (ref 0–1.9)
BILIRUB SERPL-MCNC: 0.4 MG/DL (ref 0.1–1)
BILIRUB UR QL STRIP: NEGATIVE
BNP SERPL-MCNC: 26 PG/ML (ref 0–99)
BUN SERPL-MCNC: 10 MG/DL (ref 6–20)
CALCIUM SERPL-MCNC: 10.4 MG/DL (ref 8.7–10.5)
CHLORIDE SERPL-SCNC: 102 MMOL/L (ref 95–110)
CK SERPL-CCNC: 112 U/L (ref 20–180)
CLARITY UR: CLEAR
CO2 SERPL-SCNC: 23 MMOL/L (ref 23–29)
COLOR UR: YELLOW
CREAT SERPL-MCNC: 0.8 MG/DL (ref 0.5–1.4)
DIFFERENTIAL METHOD BLD: NORMAL
EOSINOPHIL # BLD AUTO: 0.1 K/UL (ref 0–0.5)
EOSINOPHIL NFR BLD: 1.3 % (ref 0–8)
ERYTHROCYTE [DISTWIDTH] IN BLOOD BY AUTOMATED COUNT: 13.5 % (ref 11.5–14.5)
EST. GFR  (NO RACE VARIABLE): >60 ML/MIN/1.73 M^2
GLUCOSE SERPL-MCNC: 101 MG/DL (ref 70–110)
GLUCOSE UR QL STRIP: NEGATIVE
HCT VFR BLD AUTO: 45.3 % (ref 37–48.5)
HCV AB SERPL QL IA: NEGATIVE
HGB BLD-MCNC: 15.3 G/DL (ref 12–16)
HGB UR QL STRIP: ABNORMAL
HIV 1+2 AB+HIV1 P24 AG SERPL QL IA: NEGATIVE
IMM GRANULOCYTES # BLD AUTO: 0.02 K/UL (ref 0–0.04)
IMM GRANULOCYTES NFR BLD AUTO: 0.2 % (ref 0–0.5)
INFLUENZA A, MOLECULAR: NEGATIVE
INFLUENZA B, MOLECULAR: NEGATIVE
INR PPP: 1 (ref 0.8–1.2)
KETONES UR QL STRIP: NEGATIVE
LACTATE SERPL-SCNC: 1.2 MMOL/L (ref 0.5–1.9)
LDH SERPL L TO P-CCNC: 2.18 MMOL/L (ref 0.5–2.2)
LEUKOCYTE ESTERASE UR QL STRIP: NEGATIVE
LIPASE SERPL-CCNC: 26 U/L (ref 4–60)
LYMPHOCYTES # BLD AUTO: 2.7 K/UL (ref 1–4.8)
LYMPHOCYTES NFR BLD: 31.7 % (ref 18–48)
MAGNESIUM SERPL-MCNC: 2 MG/DL (ref 1.6–2.6)
MCH RBC QN AUTO: 28.8 PG (ref 27–31)
MCHC RBC AUTO-ENTMCNC: 33.8 G/DL (ref 32–36)
MCV RBC AUTO: 85 FL (ref 82–98)
MONOCYTES # BLD AUTO: 0.7 K/UL (ref 0.3–1)
MONOCYTES NFR BLD: 8.2 % (ref 4–15)
NEUTROPHILS # BLD AUTO: 5 K/UL (ref 1.8–7.7)
NEUTROPHILS NFR BLD: 57.7 % (ref 38–73)
NITRITE UR QL STRIP: NEGATIVE
NRBC BLD-RTO: 0 /100 WBC
OHS QRS DURATION: 80 MS
OHS QTC CALCULATION: 463 MS
PH UR STRIP: 6 [PH] (ref 5–8)
PLATELET # BLD AUTO: 322 K/UL (ref 150–450)
PLATELET BLD QL SMEAR: NORMAL
PMV BLD AUTO: 11.5 FL (ref 9.2–12.9)
POTASSIUM SERPL-SCNC: 4.3 MMOL/L (ref 3.5–5.1)
PROT SERPL-MCNC: 9 G/DL (ref 6–8.4)
PROT UR QL STRIP: NEGATIVE
PROTHROMBIN TIME: 11.1 SEC (ref 9–12.5)
RBC # BLD AUTO: 5.32 M/UL (ref 4–5.4)
SAMPLE: NORMAL
SARS-COV-2 RDRP RESP QL NAA+PROBE: NEGATIVE
SODIUM SERPL-SCNC: 136 MMOL/L (ref 136–145)
SP GR UR STRIP: >1.03 (ref 1–1.03)
SPECIMEN SOURCE: NORMAL
TROPONIN I SERPL HS-MCNC: 4.1 PG/ML (ref 0–14.9)
TSH SERPL DL<=0.005 MIU/L-ACNC: 1.08 UIU/ML (ref 0.34–5.6)
URN SPEC COLLECT METH UR: ABNORMAL
UROBILINOGEN UR STRIP-ACNC: NEGATIVE EU/DL
WBC # BLD AUTO: 8.64 K/UL (ref 3.9–12.7)

## 2024-12-08 PROCEDURE — 83605 ASSAY OF LACTIC ACID: CPT | Performed by: STUDENT IN AN ORGANIZED HEALTH CARE EDUCATION/TRAINING PROGRAM

## 2024-12-08 PROCEDURE — 96375 TX/PRO/DX INJ NEW DRUG ADDON: CPT

## 2024-12-08 PROCEDURE — 84484 ASSAY OF TROPONIN QUANT: CPT | Performed by: STUDENT IN AN ORGANIZED HEALTH CARE EDUCATION/TRAINING PROGRAM

## 2024-12-08 PROCEDURE — 94640 AIRWAY INHALATION TREATMENT: CPT | Mod: XB

## 2024-12-08 PROCEDURE — 25000003 PHARM REV CODE 250: Performed by: STUDENT IN AN ORGANIZED HEALTH CARE EDUCATION/TRAINING PROGRAM

## 2024-12-08 PROCEDURE — 80053 COMPREHEN METABOLIC PANEL: CPT | Performed by: STUDENT IN AN ORGANIZED HEALTH CARE EDUCATION/TRAINING PROGRAM

## 2024-12-08 PROCEDURE — 87040 BLOOD CULTURE FOR BACTERIA: CPT | Performed by: STUDENT IN AN ORGANIZED HEALTH CARE EDUCATION/TRAINING PROGRAM

## 2024-12-08 PROCEDURE — 63600175 PHARM REV CODE 636 W HCPCS: Performed by: INTERNAL MEDICINE

## 2024-12-08 PROCEDURE — 96366 THER/PROPH/DIAG IV INF ADDON: CPT

## 2024-12-08 PROCEDURE — 25000003 PHARM REV CODE 250: Performed by: NURSE PRACTITIONER

## 2024-12-08 PROCEDURE — 82550 ASSAY OF CK (CPK): CPT | Performed by: STUDENT IN AN ORGANIZED HEALTH CARE EDUCATION/TRAINING PROGRAM

## 2024-12-08 PROCEDURE — 93005 ELECTROCARDIOGRAM TRACING: CPT | Performed by: INTERNAL MEDICINE

## 2024-12-08 PROCEDURE — G0378 HOSPITAL OBSERVATION PER HR: HCPCS

## 2024-12-08 PROCEDURE — 94761 N-INVAS EAR/PLS OXIMETRY MLT: CPT

## 2024-12-08 PROCEDURE — 83690 ASSAY OF LIPASE: CPT | Performed by: STUDENT IN AN ORGANIZED HEALTH CARE EDUCATION/TRAINING PROGRAM

## 2024-12-08 PROCEDURE — 25000242 PHARM REV CODE 250 ALT 637 W/ HCPCS: Performed by: STUDENT IN AN ORGANIZED HEALTH CARE EDUCATION/TRAINING PROGRAM

## 2024-12-08 PROCEDURE — 94799 UNLISTED PULMONARY SVC/PX: CPT

## 2024-12-08 PROCEDURE — 93010 ELECTROCARDIOGRAM REPORT: CPT | Mod: ,,, | Performed by: INTERNAL MEDICINE

## 2024-12-08 PROCEDURE — 85730 THROMBOPLASTIN TIME PARTIAL: CPT | Performed by: STUDENT IN AN ORGANIZED HEALTH CARE EDUCATION/TRAINING PROGRAM

## 2024-12-08 PROCEDURE — 94640 AIRWAY INHALATION TREATMENT: CPT

## 2024-12-08 PROCEDURE — 84443 ASSAY THYROID STIM HORMONE: CPT | Performed by: STUDENT IN AN ORGANIZED HEALTH CARE EDUCATION/TRAINING PROGRAM

## 2024-12-08 PROCEDURE — 99900031 HC PATIENT EDUCATION (STAT)

## 2024-12-08 PROCEDURE — 86803 HEPATITIS C AB TEST: CPT | Performed by: EMERGENCY MEDICINE

## 2024-12-08 PROCEDURE — 96365 THER/PROPH/DIAG IV INF INIT: CPT

## 2024-12-08 PROCEDURE — 25500020 PHARM REV CODE 255: Performed by: STUDENT IN AN ORGANIZED HEALTH CARE EDUCATION/TRAINING PROGRAM

## 2024-12-08 PROCEDURE — 81003 URINALYSIS AUTO W/O SCOPE: CPT | Performed by: STUDENT IN AN ORGANIZED HEALTH CARE EDUCATION/TRAINING PROGRAM

## 2024-12-08 PROCEDURE — 99285 EMERGENCY DEPT VISIT HI MDM: CPT | Mod: 25

## 2024-12-08 PROCEDURE — 99406 BEHAV CHNG SMOKING 3-10 MIN: CPT

## 2024-12-08 PROCEDURE — 85025 COMPLETE CBC W/AUTO DIFF WBC: CPT | Performed by: STUDENT IN AN ORGANIZED HEALTH CARE EDUCATION/TRAINING PROGRAM

## 2024-12-08 PROCEDURE — 27000221 HC OXYGEN, UP TO 24 HOURS

## 2024-12-08 PROCEDURE — 87635 SARS-COV-2 COVID-19 AMP PRB: CPT | Performed by: STUDENT IN AN ORGANIZED HEALTH CARE EDUCATION/TRAINING PROGRAM

## 2024-12-08 PROCEDURE — 25000003 PHARM REV CODE 250: Performed by: INTERNAL MEDICINE

## 2024-12-08 PROCEDURE — 83880 ASSAY OF NATRIURETIC PEPTIDE: CPT | Performed by: STUDENT IN AN ORGANIZED HEALTH CARE EDUCATION/TRAINING PROGRAM

## 2024-12-08 PROCEDURE — 87389 HIV-1 AG W/HIV-1&-2 AB AG IA: CPT | Performed by: EMERGENCY MEDICINE

## 2024-12-08 PROCEDURE — 25000242 PHARM REV CODE 250 ALT 637 W/ HCPCS: Performed by: NURSE PRACTITIONER

## 2024-12-08 PROCEDURE — 99900035 HC TECH TIME PER 15 MIN (STAT)

## 2024-12-08 PROCEDURE — 63600175 PHARM REV CODE 636 W HCPCS: Performed by: STUDENT IN AN ORGANIZED HEALTH CARE EDUCATION/TRAINING PROGRAM

## 2024-12-08 PROCEDURE — 63600175 PHARM REV CODE 636 W HCPCS: Performed by: NURSE PRACTITIONER

## 2024-12-08 PROCEDURE — 85610 PROTHROMBIN TIME: CPT | Performed by: STUDENT IN AN ORGANIZED HEALTH CARE EDUCATION/TRAINING PROGRAM

## 2024-12-08 PROCEDURE — 36415 COLL VENOUS BLD VENIPUNCTURE: CPT | Performed by: STUDENT IN AN ORGANIZED HEALTH CARE EDUCATION/TRAINING PROGRAM

## 2024-12-08 PROCEDURE — 87502 INFLUENZA DNA AMP PROBE: CPT | Performed by: STUDENT IN AN ORGANIZED HEALTH CARE EDUCATION/TRAINING PROGRAM

## 2024-12-08 PROCEDURE — 83735 ASSAY OF MAGNESIUM: CPT | Performed by: STUDENT IN AN ORGANIZED HEALTH CARE EDUCATION/TRAINING PROGRAM

## 2024-12-08 PROCEDURE — 96376 TX/PRO/DX INJ SAME DRUG ADON: CPT

## 2024-12-08 RX ORDER — LANOLIN ALCOHOL/MO/W.PET/CERES
800 CREAM (GRAM) TOPICAL
Status: DISCONTINUED | OUTPATIENT
Start: 2024-12-08 | End: 2024-12-10 | Stop reason: HOSPADM

## 2024-12-08 RX ORDER — ALPRAZOLAM 0.25 MG/1
0.25 TABLET ORAL ONCE
Status: COMPLETED | OUTPATIENT
Start: 2024-12-08 | End: 2024-12-08

## 2024-12-08 RX ORDER — CEFEPIME HYDROCHLORIDE 1 G/1
1 INJECTION, POWDER, FOR SOLUTION INTRAMUSCULAR; INTRAVENOUS
Status: DISCONTINUED | OUTPATIENT
Start: 2024-12-08 | End: 2024-12-08

## 2024-12-08 RX ORDER — FAMOTIDINE 20 MG/1
20 TABLET, FILM COATED ORAL 2 TIMES DAILY
Status: DISCONTINUED | OUTPATIENT
Start: 2024-12-08 | End: 2024-12-10 | Stop reason: HOSPADM

## 2024-12-08 RX ORDER — AZITHROMYCIN 250 MG/1
500 TABLET, FILM COATED ORAL
Status: DISCONTINUED | OUTPATIENT
Start: 2024-12-09 | End: 2024-12-10 | Stop reason: HOSPADM

## 2024-12-08 RX ORDER — FENTANYL CITRATE 50 UG/ML
25 INJECTION, SOLUTION INTRAMUSCULAR; INTRAVENOUS EVERY 4 HOURS PRN
Status: DISCONTINUED | OUTPATIENT
Start: 2024-12-08 | End: 2024-12-08

## 2024-12-08 RX ORDER — ALPRAZOLAM 0.5 MG/1
0.5 TABLET ORAL 3 TIMES DAILY PRN
Status: DISCONTINUED | OUTPATIENT
Start: 2024-12-08 | End: 2024-12-10 | Stop reason: HOSPADM

## 2024-12-08 RX ORDER — ETHAMBUTOL HYDROCHLORIDE 400 MG/1
1200 TABLET, FILM COATED ORAL
Status: DISCONTINUED | OUTPATIENT
Start: 2024-12-08 | End: 2024-12-10 | Stop reason: HOSPADM

## 2024-12-08 RX ORDER — TALC
6 POWDER (GRAM) TOPICAL NIGHTLY PRN
Status: DISCONTINUED | OUTPATIENT
Start: 2024-12-08 | End: 2024-12-10 | Stop reason: HOSPADM

## 2024-12-08 RX ORDER — IPRATROPIUM BROMIDE AND ALBUTEROL SULFATE 2.5; .5 MG/3ML; MG/3ML
3 SOLUTION RESPIRATORY (INHALATION) EVERY 6 HOURS
Status: DISCONTINUED | OUTPATIENT
Start: 2024-12-09 | End: 2024-12-10 | Stop reason: HOSPADM

## 2024-12-08 RX ORDER — LEVOFLOXACIN 5 MG/ML
750 INJECTION, SOLUTION INTRAVENOUS
Status: DISCONTINUED | OUTPATIENT
Start: 2024-12-09 | End: 2024-12-10 | Stop reason: HOSPADM

## 2024-12-08 RX ORDER — BENZONATATE 100 MG/1
100 CAPSULE ORAL 3 TIMES DAILY PRN
Status: DISCONTINUED | OUTPATIENT
Start: 2024-12-08 | End: 2024-12-10 | Stop reason: HOSPADM

## 2024-12-08 RX ORDER — HYDROMORPHONE HYDROCHLORIDE 1 MG/ML
0.5 INJECTION, SOLUTION INTRAMUSCULAR; INTRAVENOUS; SUBCUTANEOUS EVERY 6 HOURS PRN
Status: DISCONTINUED | OUTPATIENT
Start: 2024-12-08 | End: 2024-12-10 | Stop reason: HOSPADM

## 2024-12-08 RX ORDER — SODIUM,POTASSIUM PHOSPHATES 280-250MG
2 POWDER IN PACKET (EA) ORAL
Status: DISCONTINUED | OUTPATIENT
Start: 2024-12-08 | End: 2024-12-10 | Stop reason: HOSPADM

## 2024-12-08 RX ORDER — ZOLPIDEM TARTRATE 5 MG/1
5 TABLET ORAL NIGHTLY PRN
Status: DISCONTINUED | OUTPATIENT
Start: 2024-12-08 | End: 2024-12-10 | Stop reason: HOSPADM

## 2024-12-08 RX ORDER — GUAIFENESIN AND DEXTROMETHORPHAN HYDROBROMIDE 10; 100 MG/5ML; MG/5ML
5 SYRUP ORAL EVERY 4 HOURS PRN
Status: DISCONTINUED | OUTPATIENT
Start: 2024-12-08 | End: 2024-12-10 | Stop reason: HOSPADM

## 2024-12-08 RX ORDER — FOLIC ACID 1 MG/1
1000 TABLET ORAL DAILY
Status: DISCONTINUED | OUTPATIENT
Start: 2024-12-09 | End: 2024-12-10 | Stop reason: HOSPADM

## 2024-12-08 RX ORDER — LEVOFLOXACIN 5 MG/ML
750 INJECTION, SOLUTION INTRAVENOUS
Status: COMPLETED | OUTPATIENT
Start: 2024-12-08 | End: 2024-12-08

## 2024-12-08 RX ORDER — LABETALOL HYDROCHLORIDE 5 MG/ML
5 INJECTION, SOLUTION INTRAVENOUS
Status: COMPLETED | OUTPATIENT
Start: 2024-12-08 | End: 2024-12-08

## 2024-12-08 RX ORDER — ONDANSETRON HYDROCHLORIDE 2 MG/ML
4 INJECTION, SOLUTION INTRAVENOUS EVERY 6 HOURS PRN
Status: DISCONTINUED | OUTPATIENT
Start: 2024-12-08 | End: 2024-12-10 | Stop reason: HOSPADM

## 2024-12-08 RX ORDER — FENTANYL CITRATE 50 UG/ML
50 INJECTION, SOLUTION INTRAMUSCULAR; INTRAVENOUS
Status: COMPLETED | OUTPATIENT
Start: 2024-12-08 | End: 2024-12-08

## 2024-12-08 RX ORDER — ACETAMINOPHEN 325 MG/1
650 TABLET ORAL EVERY 4 HOURS PRN
Status: DISCONTINUED | OUTPATIENT
Start: 2024-12-08 | End: 2024-12-10 | Stop reason: HOSPADM

## 2024-12-08 RX ORDER — VALSARTAN 80 MG/1
160 TABLET ORAL NIGHTLY
Status: DISCONTINUED | OUTPATIENT
Start: 2024-12-08 | End: 2024-12-10 | Stop reason: HOSPADM

## 2024-12-08 RX ORDER — LEVOFLOXACIN 5 MG/ML
500 INJECTION, SOLUTION INTRAVENOUS
Status: DISCONTINUED | OUTPATIENT
Start: 2024-12-08 | End: 2024-12-08

## 2024-12-08 RX ORDER — PREDNISONE 20 MG/1
40 TABLET ORAL DAILY
Status: DISCONTINUED | OUTPATIENT
Start: 2024-12-09 | End: 2024-12-10 | Stop reason: HOSPADM

## 2024-12-08 RX ORDER — RIFAMPIN 300 MG/1
600 CAPSULE ORAL
Status: DISCONTINUED | OUTPATIENT
Start: 2024-12-09 | End: 2024-12-10 | Stop reason: HOSPADM

## 2024-12-08 RX ORDER — GUAIFENESIN 100 MG/5ML
200 SOLUTION ORAL
Status: COMPLETED | OUTPATIENT
Start: 2024-12-08 | End: 2024-12-08

## 2024-12-08 RX ORDER — SPIRONOLACTONE 25 MG/1
25 TABLET ORAL DAILY
Status: DISCONTINUED | OUTPATIENT
Start: 2024-12-09 | End: 2024-12-10 | Stop reason: HOSPADM

## 2024-12-08 RX ORDER — SODIUM CHLORIDE 0.9 % (FLUSH) 0.9 %
10 SYRINGE (ML) INJECTION EVERY 12 HOURS PRN
Status: DISCONTINUED | OUTPATIENT
Start: 2024-12-08 | End: 2024-12-10 | Stop reason: HOSPADM

## 2024-12-08 RX ORDER — BUPROPION HYDROCHLORIDE 150 MG/1
300 TABLET ORAL DAILY
Status: DISCONTINUED | OUTPATIENT
Start: 2024-12-09 | End: 2024-12-10 | Stop reason: HOSPADM

## 2024-12-08 RX ORDER — IPRATROPIUM BROMIDE AND ALBUTEROL SULFATE 2.5; .5 MG/3ML; MG/3ML
3 SOLUTION RESPIRATORY (INHALATION) EVERY 4 HOURS PRN
Status: DISCONTINUED | OUTPATIENT
Start: 2024-12-08 | End: 2024-12-10 | Stop reason: HOSPADM

## 2024-12-08 RX ORDER — AMLODIPINE BESYLATE 5 MG/1
10 TABLET ORAL DAILY
Status: DISCONTINUED | OUTPATIENT
Start: 2024-12-09 | End: 2024-12-10 | Stop reason: HOSPADM

## 2024-12-08 RX ORDER — IPRATROPIUM BROMIDE AND ALBUTEROL SULFATE 2.5; .5 MG/3ML; MG/3ML
3 SOLUTION RESPIRATORY (INHALATION)
Status: COMPLETED | OUTPATIENT
Start: 2024-12-08 | End: 2024-12-08

## 2024-12-08 RX ADMIN — ZOLPIDEM TARTRATE 5 MG: 5 TABLET, COATED ORAL at 09:12

## 2024-12-08 RX ADMIN — VALSARTAN 160 MG: 80 TABLET, FILM COATED ORAL at 08:12

## 2024-12-08 RX ADMIN — HYDROMORPHONE HYDROCHLORIDE 0.5 MG: 1 INJECTION, SOLUTION INTRAMUSCULAR; INTRAVENOUS; SUBCUTANEOUS at 09:12

## 2024-12-08 RX ADMIN — IPRATROPIUM BROMIDE AND ALBUTEROL SULFATE 3 ML: .5; 3 SOLUTION RESPIRATORY (INHALATION) at 01:12

## 2024-12-08 RX ADMIN — FENTANYL CITRATE 50 MCG: 0.05 INJECTION, SOLUTION INTRAMUSCULAR; INTRAVENOUS at 01:12

## 2024-12-08 RX ADMIN — GUAIFENESIN AND DEXTROMETHORPHAN 5 ML: 100; 10 SYRUP ORAL at 09:12

## 2024-12-08 RX ADMIN — ALPRAZOLAM 0.5 MG: 0.5 TABLET ORAL at 11:12

## 2024-12-08 RX ADMIN — BENZONATATE 100 MG: 100 CAPSULE ORAL at 08:12

## 2024-12-08 RX ADMIN — IPRATROPIUM BROMIDE AND ALBUTEROL SULFATE 3 ML: .5; 3 SOLUTION RESPIRATORY (INHALATION) at 08:12

## 2024-12-08 RX ADMIN — FAMOTIDINE 20 MG: 20 TABLET ORAL at 09:12

## 2024-12-08 RX ADMIN — ALPRAZOLAM 0.25 MG: 0.25 TABLET ORAL at 05:12

## 2024-12-08 RX ADMIN — CEFEPIME 1 G: 1 INJECTION, POWDER, FOR SOLUTION INTRAMUSCULAR; INTRAVENOUS at 06:12

## 2024-12-08 RX ADMIN — GUAIFENESIN 200 MG: 200 SOLUTION ORAL at 02:12

## 2024-12-08 RX ADMIN — FENTANYL CITRATE 25 MCG: 0.05 INJECTION, SOLUTION INTRAMUSCULAR; INTRAVENOUS at 06:12

## 2024-12-08 RX ADMIN — IOHEXOL 100 ML: 350 INJECTION, SOLUTION INTRAVENOUS at 02:12

## 2024-12-08 RX ADMIN — LABETALOL HYDROCHLORIDE 5 MG: 5 INJECTION, SOLUTION INTRAVENOUS at 01:12

## 2024-12-08 RX ADMIN — LEVOFLOXACIN 750 MG: 750 INJECTION, SOLUTION INTRAVENOUS at 01:12

## 2024-12-08 NOTE — ED PROVIDER NOTES
Encounter Date: 2024       History     Chief Complaint   Patient presents with    Shortness of Breath    Chest Pain     54-year-old female history of sarcoidosis and bronchiectasis presents with chest pain or shortness for breath ongoing for 3 days.  Currently on prednisone however not alleviating symptoms.  Associated generalized body aches, no lower extremity swelling        Review of patient's allergies indicates:   Allergen Reactions    Sulfamethoxazole-trimethoprim Hives, Shortness Of Breath, Nausea And Vomiting and Rash     Hives, nausea and vomiting, and shortness of breath.  Did not require admission  Hives, nausea and vomiting, and shortness of breath.  Did not require admission    Benadryl [diphenhydramine hcl]     Latex, natural rubber     Percocet [oxycodone-acetaminophen]     Shrimp     Codeine Itching and Nausea Only     Past Medical History:   Diagnosis Date    HTN (hypertension)     Pneumonia 10/2020    Sarcoidosis      Past Surgical History:   Procedure Laterality Date    BRONCHOALVEOLAR LAVAGE Bilateral 2024    Procedure: LAVAGE, BRONCHOALVEOLAR;  Surgeon: Chilango Zuluaga MD;  Location: [facility] ENDO;  Service: Pulmonary;  Laterality: Bilateral;    BRONCHOSCOPY WITH FLUOROSCOPY Right 10/20/2020    Procedure: BRONCHOSCOPY, WITH FLUOROSCOPY;  Surgeon: Ava Rolon MD;  Location: [facility] ENDO;  Service: Pulmonary;  Laterality: Right;     SECTION      x2    FALLOPIAN TUBOPLASTY      LAPAROSCOPIC APPENDECTOMY N/A 8/10/2022    Procedure: APPENDECTOMY, LAPAROSCOPIC;  Surgeon: Jackson Vogel MD;  Location: [facility] OR;  Service: General;  Laterality: N/A;    TUBAL LIGATION      WRIST FRACTURE SURGERY Right     ganglion cyst     Family History   Problem Relation Name Age of Onset    Hypertension Mother       Social History     Tobacco Use    Smoking status: Former     Current packs/day: 0.00     Types: Cigarettes     Quit date: 2020     Years since quittin.6    Smokeless tobacco:  Never    Tobacco comments:     ocassionally never was qd   Substance Use Topics    Alcohol use: Not Currently    Drug use: Never     Review of Systems   All other systems reviewed and are negative.      Physical Exam     Initial Vitals [12/08/24 1257]   BP Pulse Resp Temp SpO2   (!) 184/106 (!) 129 (!) 40 98.3 °F (36.8 °C) 100 %      MAP       --         Physical Exam    Nursing note and vitals reviewed.  Constitutional:   Patient appears uncomfortable   HENT:   Head: Normocephalic.   Eyes: No scleral icterus.   Cardiovascular:            Tachycardic   Pulmonary/Chest: Effort normal. No stridor.   Breath sounds bilaterally, tachypnea   Abdominal: There is no guarding.   Musculoskeletal:      Comments: No asymmetrical lower extremity swelling     Neurological: She is alert.   Skin: No rash noted. No erythema.         ED Course   Critical Care    Date/Time: 12/8/2024 5:37 PM    Performed by: Zaire Jiménez Jr., DO  Authorized by: Zaire Jiménez Jr., DO  Direct patient critical care time: 10 minutes  Additional history critical care time: 10 minutes  Ordering / reviewing critical care time: 10 minutes  Documentation critical care time: 5 minutes  Consulting other physicians critical care time: 5 minutes  Total critical care time (exclusive of procedural time) : 40 minutes        Labs Reviewed   COMPREHENSIVE METABOLIC PANEL - Abnormal       Result Value    Sodium 136      Potassium 4.3      Chloride 102      CO2 23      Glucose 101      BUN 10      Creatinine 0.8      Calcium 10.4      Total Protein 9.0 (*)     Albumin 4.5      Total Bilirubin 0.4      Alkaline Phosphatase 79      AST 25      ALT 26      eGFR >60.0      Anion Gap 11      Narrative:     Release to patient->Immediate   APTT - Abnormal    aPTT 33.3 (*)     Narrative:     Release to patient->Immediate   CULTURE, BLOOD   CULTURE, BLOOD   CBC W/ AUTO DIFFERENTIAL    WBC 8.64      RBC 5.32      Hemoglobin 15.3      Hematocrit 45.3      MCV 85      MCH  28.8      MCHC 33.8      RDW 13.5      Platelets 322      MPV 11.5      Immature Granulocytes 0.2      Gran # (ANC) 5.0      Immature Grans (Abs) 0.02      Lymph # 2.7      Mono # 0.7      Eos # 0.1      Baso # 0.08      nRBC 0      Gran % 57.7      Lymph % 31.7      Mono % 8.2      Eosinophil % 1.3      Basophil % 0.9      Platelet Estimate Appears normal      Aniso Slight      Differential Method Automated      Narrative:     Release to patient->Immediate   INFLUENZA A AND B ANTIGEN    Influenza A, Molecular Negative      Influenza B, Molecular Negative      Flu A & B Source Nasal swab      Narrative:     Specimen Source->Nasopharyngeal Swab   MAGNESIUM    Magnesium 2.0      Narrative:     Release to patient->Immediate   PROTIME-INR    Prothrombin Time 11.1      INR 1.0      Narrative:     Release to patient->Immediate   B-TYPE NATRIURETIC PEPTIDE    BNP 26      Narrative:     Release to patient->Immediate   LIPASE    Lipase 26      Narrative:     Release to patient->Immediate   TROPONIN I HIGH SENSITIVITY    Troponin I High Sensitivity 4.1      Narrative:     Release to patient->Immediate   SARS-COV-2 RNA AMPLIFICATION, QUAL    SARS-CoV-2 RNA, Amplification, Qual Negative     TSH    TSH 1.077      Narrative:     Release to patient->Immediate   CK          Narrative:     Release to patient->Immediate   HEPATITIS C ANTIBODY   HIV 1 / 2 ANTIBODY   URINALYSIS, REFLEX TO URINE CULTURE   LACTIC ACID, PLASMA   ISTAT LACTATE    POC Lactate 2.18      Sample VENOUS     POCT LACTATE        ECG Results              EKG 12-lead (Final result)        Collection Time Result Time QRS Duration OHS QTC Calculation    12/08/24 13:00:15 12/08/24 15:51:37 80 463                     Final result by Interface, Lab In [facility] (12/08/24 15:51:43)                   Narrative:    Test Reason : R06.02,    Vent. Rate : 126 BPM     Atrial Rate : 126 BPM     P-R Int : 118 ms          QRS Dur :  80 ms      QT Int : 320 ms       P-R-T  Axes :  46   2  54 degrees    QTcB Int : 463 ms    Sinus tachycardia  Biatrial enlargement  Minimal voltage criteria for LVH, may be normal variant ( R in aVL )  Abnormal ECG  When compared with ECG of 22-Jun-2024 11:08,  No significant change was found  Confirmed by Geraldo Monteiro ([id number]) on 12/8/2024 3:51:31 PM    Referred By: AAAREFERRAL SELF           Confirmed By: Geraldo Monteiro                                  Imaging Results              CTA Chest Non-Coronary (PE Studies) (Final result)  Result time 12/08/24 14:50:23      Final result by Jhoan Monet Jr., MD (12/08/24 14:50:23)                   Impression:      1.  No CT evidence of acute pulmonary thromboembolism.    2.  Geographic areas of ground-glass opacity changes, bronchiectasis, and prominent intralobar and interlobular septal thickening that appears equal in magnitude as compared to the patient's prior imaging study.  In the setting of hilar mediastinal and hilar adenopathy, findings may reflecting advanced stage of sarcoid.  Correlate clinically.      Electronically signed by: Jhoan Monet MD  Date:    12/08/2024  Time:    14:50               Narrative:      CMS MANDATED QUALITY DATA - CT RADIATION - 436    All CT scans at this facility utilize dose modulation, iterative reconstruction, and/or weight based dosing when appropriate to reduce radiation dose to as low as reasonably achievable    EXAMINATION:  CTA CHEST NON CORONARY (PE STUDIES)    CLINICAL HISTORY:  Pulmonary embolism (PE) suspected, unknown D-dimer;    TECHNIQUE:  Routine CT angiogram of the chest protocol performed after the IV administration of 100 mL Omnipaque 350. 3D reconstructions/reformats/MIPs obtained. All CT scans at this facility are performed  using dose modulation techniques as appropriate to performed exam including the following:  automated exposure control; adjustment of mA and/or kV according to the patients size (this includes techniques or standardized  protocols for targeted exams where dose is matched to indication/reason for exam: i.e. extremities or head);  iterative reconstruction technique.    COMPARISON:  CT of the chest of 06/22/2024.    FINDINGS:  High quality examination for evaluation of pulmonary thromboembolism.  Normal opacification of the pulmonary arterial system about to the tertiary order branch vessels without evidence of pruning, webbing, or truncation of the pulmonary arterial system the left suspicion towards pulmonary embolism.    Main pulmonary trunk appears patent without evidence of saddle embolus is not at the level of the main pulmonary trunk.  Both right and left pulmonary arteries appear normal in caliber.  Normal tapering noted the distal pulmonary vascular.    Evaluation mediastinum demonstrates evidence of prominent soft tissue density in the subcarinal station that is in keeping with adenopathy and minimal soft tissue density focus of projected over both sara that is compatible likewise show no variable change upon comparison.  Cardiac chambers maintain normal size and overall configuration.  An element of sarcoid must be considered.    Parenchymal lung window settings again demonstrates evidence of diffuse ground-glass opacity changes, mild upper lower lobe bronchiectasis, and regional areas of mixed ground-glass opacity changes and consolidative changes in the upper lobes bilaterally.  Degree of bronchiectasis appears most pronounced in the upper lobes bilaterally.  There is some parenchymal scarring and consolidative changes noted in the left lower lobe contacting the minor fissure.    Upper abdominal cavity appears unremarkable.  No evidence of adrenal enlargement.                                       X-Ray Chest AP Portable (Final result)  Result time 12/08/24 14:02:04      Final result by Jhoan Monet Jr., MD (12/08/24 14:02:04)                   Impression:      Diffuse interstitial pulmonary scar formation without  evidence of detrimental change upon comparison.      Electronically signed by: Jhoan Monet MD  Date:    12/08/2024  Time:    14:02               Narrative:    EXAMINATION:  XR CHEST AP PORTABLE    CLINICAL HISTORY:  Sepsis;    TECHNIQUE:  Single frontal view of the chest was performed.    COMPARISON:  07/23/2024    FINDINGS:  Single AP the chest was submitted for diagnostic interpretation.  The cardiac shadow remains prominent size similar in capacity as compared to previous.  Diffuse interstitial opacities again globe distributed throughout both lungs that appear more pronounced the right upper lobe distribution.  Moderate central wall thickening again persist with no detrimental change upon comparison the basis of acute or acute on chronic airway wall inflammatory change.  The hilar structures without enlargement.  No significant enlargement of the vascular pedicle.  The thoracic cage appears normal.                                       Medications   benzonatate capsule 100 mg ([facility]   fentaNYL injection 25 mcg (has no administration in time range)   azithromycin tablet 500 mg (has no administration in time range)   buPROPion TB24 tablet 300 mg (has no administration in time range)   ethambutoL tablet 1,200 mg (has no administration in time range)   folic acid tablet 1,000 mcg (has no administration in time range)   predniSONE tablet 40 mg (has no administration in time range)   rifAMpin capsule 600 mg (has no administration in time range)   spironolactone tablet 25 mg (has no administration in time range)   valsartan tablet 160 mg (has no administration in time range)   zolpidem tablet 5 mg (has no administration in time range)   amLODIPine tablet 10 mg (has no administration in time range)   sodium chloride 0.9% flush 10 mL (has no administration in time range)   levoFLOXacin 750 mg/150 mL IVPB 750 mg (has no administration in time range)   acetaminophen tablet 650 mg  (has no administration in time range)   ondansetron injection 4 mg (has no administration in time range)   albuterol-ipratropium 2.5 mg-0.5 mg/3 mL nebulizer solution 3 mL (has no administration in time range)   melatonin tablet 6 mg (has no administration in time range)   ALPRAZolam tablet 0.25 mg (has no administration in time range)   ceFEPIme injection 1 g (has no administration in time range)   labetaloL injection 5 mg (5 mg Intravenous Given 12/8/24 1320)   levoFLOXacin 750 mg/150 mL IVPB 750 mg (0 mg Intravenous Stopped 12/8/24 1545)   albuterol-ipratropium 2.5 mg-0.5 mg/3 mL nebulizer solution 3 mL (3 mLs Nebulization Given 12/8/24 1349)   fentaNYL 50 mcg/mL injection 50 mcg (50 mcg Intravenous Given 12/8/24 1348)   guaiFENesin 100 mg/5 ml syrup 200 mg (200 mg Oral Given 12/8/24 1412)   iohexoL (OMNIPAQUE 350) injection 100 mL (100 mLs Intravenous Given 12/8/24 1434)     Medical Decision Making  54-year-old female presents with worsening shortness a breath, history of sarcoid presents with worsening chest pain and shortness a breath.  Workup initiated with no STEMI, patient placed on oxygen support.  Patient hypertensive and administered IV labetalol and responsive to labetalol.  Sepsis protocol initiated, previous micro sensitivities reviewed and administered IV Levaquin.  PE protocol with no evidence of PE.  Patient improving with interventions however would benefit from admission.  Spoke with hospitalist team who will admit for further management evaluation, patient updated and agrees with plan    Amount and/or Complexity of Data Reviewed  Labs: ordered. Decision-making details documented in ED Course.  Radiology: ordered.  ECG/medicine tests: ordered and independent interpretation performed.     Details: Rate 126, sinus tachycardia, QRS 80, , no STEMI  Discussion of management or test interpretation with external provider(s): Spoke with Otilia Obregon NP with hospitalist team, will admit for  further management and evaluation      Risk  OTC drugs.  Prescription drug management.  Decision regarding hospitalization.               ED Course as of 12/08/24 1737   Sun Dec 08, 2024   1417 WBC: 8.64 [KB]   1417 Hemoglobin: 15.3 [KB]   1417 Hematocrit: 45.3 [KB]   1417 TSH: 1.077 [KB]   1417 Troponin I High Sensitivity: 4.1 [KB]   1417 BNP: 26 [KB]   1417 Sodium: 136 [KB]   1417 Potassium: 4.3 [KB]   1417 Chloride: 102 [KB]   1417 CO2: 23 [KB]   1417 Glucose: 101 [KB]   1417 BUN: 10 [KB]   1417 Creatinine: 0.8 [KB]   1417 Calcium: 10.4 [KB]   1417 PROTEIN TOTAL(!): 9.0 [KB]   1417 SARS-CoV-2 RNA, Amplification, Qual: Negative [KB]   1417 POC Lactate: 2.18 [KB]   1417 Patient had prednisone prior to arrival 40 mg [KB]   1417 Chest x-ray with bilateral densities per my interpretation Zaire Jiménez DO [KB]      ED Course User Index  [KB] Zaire Jiménez Jr.,                            Clinical Impression:  Final diagnoses:  [R06.00] Dyspnea, unspecified type  [R06.02] SOB (shortness of breath)  [R06.03] Respiratory distress  [I16.0] Hypertensive urgency (Primary)  [R07.9] Chest pain, unspecified type  [D86.9] Sarcoid          ED Disposition Condition    Observation                 Zaire Jiménez Jr., DO  12/08/24 1737

## 2024-12-08 NOTE — PROGRESS NOTES
Pharmacist Renal Dose Adjustment Note    Sumit Burger is a 54 y.o. female being treated with the medication Cefepime    Patient Data:    Vital Signs (Most Recent):  Temp: 98.3 °F (36.8 °C) (12/08/24 1257)  Pulse: 104 (12/08/24 1349)  Resp: (!) 22 (12/08/24 1349)  BP: (!) 192/85 (12/08/24 1300)  SpO2: 100 % (12/08/24 1349) Vital Signs (72h Range):  Temp:  [98.3 °F (36.8 °C)]   Pulse:  [104-129]   Resp:  [20-40]   BP: (184-192)/()   SpO2:  [98 %-100 %]      Recent Labs   Lab 12/08/24  1316   CREATININE 0.8     Serum creatinine: 0.8 mg/dL 12/08/24 1316  Estimated creatinine clearance: 76.1 mL/min    Medication:Cefepime dose: 1 G frequency Q6H will be changed to medication:Cefepime dose:1 G frequency:Q8H    Pharmacist's Name: Carol Dexter  Pharmacist's Extension: [phone]

## 2024-12-08 NOTE — H&P
[facility] - Emergency Dept  Hospital Medicine  History & Physical    Patient Name: Sumit Burger  MRN: [id number]  Patient Class: OP- Observation  Admission Date: 12/8/2024  Attending Physician: Siddharth Pulido MD   Primary Care Provider: Administration, [facility]         Patient information was obtained from patient, past medical records, and ER records.     Subjective:     Principal Problem:Sarcoidosis, stage 3    Chief Complaint:   Chief Complaint   Patient presents with    Shortness of Breath    Chest Pain        HPI: 54-year-old female with a past medical history of hypertension, pneumonia, sarcoidosis, and bronchiectasis who presents to the emergency room with reports of chest pain or shortness for breath for the last 3 days. Currently on prednisone however not alleviating symptoms. Associated generalized body aches, no lower extremity swelling     In the ER, hypertensive, tachycardic, CBC unremarkable CMP unremarkable, BNP troponin CPK normal limits TSH 1.077, Lactic 2.18, influenza negative COVID negative blood culture sent EKG with sinus tachycardia, QRS 80, , no STEMI.  Chest x-ray with  Diffuse interstitial pulmonary scar formation.  CTA chest negative for PE,Geographic areas of ground-glass opacity changes, bronchiectasis, and prominent intralobar and interlobular septal thickening   Admit to hospital medicine for hypoxia from sarcoidosis SUZIE.  Consult pulmonology continue IV antibiotics    Past Medical History:   Diagnosis Date    HTN (hypertension)     Pneumonia 10/2020    Sarcoidosis        Past Surgical History:   Procedure Laterality Date    BRONCHOALVEOLAR LAVAGE Bilateral 6/24/2024    Procedure: LAVAGE, BRONCHOALVEOLAR;  Surgeon: Chilango Zuluaga MD;  Location: [facility];  Service: Pulmonary;  Laterality: Bilateral;    BRONCHOSCOPY WITH FLUOROSCOPY Right 10/20/2020    Procedure: BRONCHOSCOPY, WITH FLUOROSCOPY;  Surgeon: Ava Rolon MD;  Location: [facility];   Service: Pulmonary;  Laterality: Right;     SECTION      x2    FALLOPIAN TUBOPLASTY      LAPAROSCOPIC APPENDECTOMY N/A 8/10/2022    Procedure: APPENDECTOMY, LAPAROSCOPIC;  Surgeon: Jackson Vogel MD;  Location: [facility];  Service: General;  Laterality: N/A;    TUBAL LIGATION      WRIST FRACTURE SURGERY Right     ganglion cyst       Review of patient's allergies indicates:   Allergen Reactions    Sulfamethoxazole-trimethoprim Hives, Shortness Of Breath, Nausea And Vomiting and Rash     Hives, nausea and vomiting, and shortness of breath.  Did not require admission  Hives, nausea and vomiting, and shortness of breath.  Did not require admission    Benadryl [diphenhydramine hcl]     Latex, natural rubber     Percocet [oxycodone-acetaminophen]     Shrimp     Codeine Itching and Nausea Only       No current facility-administered medications on file prior to encounter.     Current Outpatient Medications on File Prior to Encounter   Medication Sig    amLODIPine (NORVASC) 5 MG tablet Take 1 tablet (5 mg total) by mouth once daily. (Patient taking differently: Take 10 mg [facility])    azithromycin (ZITHROMAX) 500 MG tablet Take 1 tablet (500 mg total) by mouth every Mon, Wed, Fri.    buPROPion (WELLBUTRIN XL) 300 MG 24 hr tablet Take 300 mg by mouth once daily.    ethambutoL (MYAMBUTOL) 400 MG Tab Take 3 tablets (1,200 mg total) by mouth every Mon, Wed, Fri. Take every  and Friday    folic acid (FOLVITE) 1 MG tablet Take 1 tablet by mouth once daily.    predniSONE (DELTASONE) 10 MG tablet Take 4 tablets (40 mg total) by mouth once daily. With food    rifAMpin (RIFADIN) 300 MG capsule Take 2 capsules (600 mg total) by mouth every Mon, Wed, Fri. Every  and Friday    spironolactone (ALDACTONE) 25 MG tablet Take 25 mg by mouth once daily.    valsartan (DIOVAN) 160 MG tablet Take 1 tablet (160 mg total) by mouth every evening.    zolpidem (AMBIEN) 5 MG Tab Take 1 tablet  (5 mg total) by mouth nightly as needed (insomnia).    isoniazid (NYDRAZID) 300 MG Tab Take 2 tablets (600 mg total) by mouth every Mon, Wed, Fri. (Patient not taking: Reported on 2024)    pyridoxine, vitamin B6, (B-6) 50 MG Tab Take 1 tablet (50 mg total) by mouth every Mon, Wed, Fri. (Patient not taking: Reported on 2024)    [DISCONTINUED] albuterol (PROVENTIL/VENTOLIN HFA) 90 mcg/actuation inhaler Inhale 2 puffs into the lungs every 4 to 6 hours as needed for Shortness of Breath. Rescue     Family History       Problem Relation (Age of Onset)    Hypertension Mother          Tobacco Use    Smoking status: Former     Current packs/day: 0.00     Types: Cigarettes     Quit date: 2020     Years since quittin.6    Smokeless tobacco: Never    Tobacco comments:     ocassionally never was qd   Substance and Sexual Activity    Alcohol use: Not Currently    Drug use: Never    Sexual activity: Not Currently     Review of Systems   Constitutional:  Positive for fatigue.        Body aches   HENT: Negative.     Eyes: Negative.    Respiratory:  Positive for cough and shortness of breath.    Cardiovascular:  Positive for chest pain.   Gastrointestinal: Negative.    Genitourinary: Negative.    Musculoskeletal: Negative.    Neurological:  Positive for weakness.   Psychiatric/Behavioral: Negative.       Objective:     Vital Signs (Most Recent):  Temp: 98.3 °F (36.8 °C) (24 1257)  Pulse: 104 (24 1349)  Resp: (!) 22 (24 1349)  BP: (!) 192/85 (24 1300)  SpO2: 100 % (24 1349) Vital Signs (24h Range):  Temp:  [98.3 °F (36.8 °C)] 98.3 °F (36.8 °C)  Pulse:  [104-129] 104  Resp:  [20-40] 22  SpO2:  [98 %-100 %] 100 %  BP: (184-192)/() 192/85     Weight: 68 kg (150 lb)  Body mass index is 26.57 kg/m².     Physical Exam  Vitals reviewed.   Constitutional:       General: She is in acute distress.      Appearance: Normal appearance. She is not ill-appearing.   HENT:      Head: Normocephalic  and atraumatic.      Mouth/Throat:      Mouth: Mucous membranes are moist.   Eyes:      Extraocular Movements: Extraocular movements intact.      Pupils: Pupils are equal, round, and reactive to light.   Cardiovascular:      Rate and Rhythm: Tachycardia present.   Pulmonary:      Effort: Tachypnea and accessory muscle usage present.      Breath sounds: Examination of the right-middle field reveals rales. Examination of the left-middle field reveals rales. Examination of the right-lower field reveals wheezing. Examination of the left-lower field reveals wheezing. Wheezing and rales present.      Comments: Supplemental oxygen    Abdominal:      General: Bowel sounds are normal. There is no distension.   Musculoskeletal:         General: Normal range of motion.      Cervical back: Neck supple.   Skin:     General: Skin is dry.      Capillary Refill: Capillary refill takes less than 2 seconds.   Neurological:      Mental Status: She is alert.   Psychiatric:         Mood and Affect: Mood normal.              CRANIAL NERVES     CN III, IV, VI   Pupils are equal, round, and reactive to light.       Significant Labs: All pertinent labs within the past 24 hours have been reviewed.  Recent Lab Results         12/08/24  1316   12/08/24  1315   12/08/24  1313   12/08/24  1300        Influenza A, Molecular   Negative           Influenza B, Molecular   Negative           Albumin 4.5             ALP 79             ALT 26             Anion Gap 11             Aniso Slight             PTT 33.3  Comment: Refer to local heparin nomogram for intensity/dose specific   therapeutic   range.               AST 25             Baso # 0.08             Basophil % 0.9             BILIRUBIN TOTAL 0.4  Comment: For infants and newborns, interpretation of results should be based  on gestational age, weight and in agreement with clinical  observations.    Premature Infant recommended reference ranges:  Up to 24 hours.............<8.0 mg/dL  Up to 48  hours............<12.0 mg/dL  3-5 days..................<15.0 mg/dL  6-29 days.................<15.0 mg/dL               BNP 26  Comment: Values of less than 100 pg/ml are consistent with non-CHF populations.             BUN 10             Calcium 10.4             Chloride 102             CO2 23                          Creatinine 0.8             Differential Method Automated             eGFR >60.0             Eos # 0.1             Eos % 1.3             Flu A & B Source   Nasal swab           Glucose 101             Gran # (ANC) 5.0             Gran % 57.7             Hematocrit 45.3             Hemoglobin 15.3             Immature Grans (Abs) 0.02  Comment: Mild elevation in immature granulocytes is non specific and   can be seen in a variety of conditions including stress response,   acute inflammation, trauma and pregnancy. Correlation with other   laboratory and clinical findings is essential.               Immature Granulocytes 0.2             INR 1.0  Comment: Coumadin Therapy:  2.0 - 3.0 for INR for all indicators except mechanical heart valves  and antiphospholipid syndromes which should use 2.5 - 3.5.               Lipase 26             Lymph # 2.7             Lymph % 31.7             Magnesium  2.0             MCH 28.8             MCHC 33.8             MCV 85             Mono # 0.7             Mono % 8.2             MPV 11.5             nRBC 0             QRS Duration       80       OHS QTC Calculation       463       Platelet Estimate Appears normal             Platelet Count 322             POC Lactate     2.18         Potassium 4.3             PROTEIN TOTAL 9.0             PT 11.1             RBC 5.32             RDW 13.5             Sample     VENOUS         SARS-CoV-2 RNA, Amplification, Qual   Negative  Comment: This test utilizes isothermal nucleic acid amplification technology   to   detect the SARS-CoV-2 RdRp nucleic acid segment. The analytical   sensitivity   (limit of detection) is 500  copies/swab.     A POSITIVE result is indicative of the presence of SARS-CoV-2 RNA;   clinical   correlation with patient history and other diagnostic information is   necessary to determine patient infection status.    A NEGATIVE result means that SARS-CoV-2 nucleic acids are not present   above   the limit of detection. A NEGATIVE result should be treated as   presumptive.   It does not rule out the possibility of COVID-19 and should not be   the sole   basis for treatment decisions. If COVID-19 is strongly suspected   based on   clinical and exposure history, re-testing using an alternate   molecular assay   should be considered.     This test is FDA approved.Performance characteristics of this test   has been   independently verified by [facility] in conjunction   with   [facility] Department of Pathology and Laboratory   Medicine.             Sodium 136             Troponin I High Sensitivity 4.1  Comment: Troponin results differ between methods. Do not use   results between Troponin methods interchangeably.    Alkaline Phospatase levels above 400 U/L may   cause false positive results.    Access hsTnI should not be used for patients taking   Asfotase sandie (Strensiq).               TSH 1.077             WBC 8.64                     Significant Imaging: I have reviewed all pertinent imaging results/findings within the past 24 hours.  Imaging Results              CTA Chest Non-Coronary (PE Studies) (Final result)  Result time 12/08/24 14:50:23      Final result by Jhoan Monet Jr., MD (12/08/24 14:50:23)                   Impression:      1.  No CT evidence of acute pulmonary thromboembolism.    2.  Geographic areas of ground-glass opacity changes, bronchiectasis, and prominent intralobar and interlobular septal thickening that appears equal in magnitude as compared to the patient's prior imaging study.  In the setting of hilar mediastinal and hilar adenopathy, findings may  reflecting advanced stage of sarcoid.  Correlate clinically.      Electronically signed by: Jhoan Monet MD  Date:    12/08/2024  Time:    14:50               Narrative:      CMS MANDATED QUALITY DATA - CT RADIATION - 436    All CT scans at this facility utilize dose modulation, iterative reconstruction, and/or weight based dosing when appropriate to reduce radiation dose to as low as reasonably achievable    EXAMINATION:  CTA CHEST NON CORONARY (PE STUDIES)    CLINICAL HISTORY:  Pulmonary embolism (PE) suspected, unknown D-dimer;    TECHNIQUE:  Routine CT angiogram of the chest protocol performed after the IV administration of 100 mL Omnipaque 350. 3D reconstructions/reformats/MIPs obtained. All CT scans at this facility are performed  using dose modulation techniques as appropriate to performed exam including the following:  automated exposure control; adjustment of mA and/or kV according to the patients size (this includes techniques or standardized protocols for targeted exams where dose is matched to indication/reason for exam: i.e. extremities or head);  iterative reconstruction technique.    COMPARISON:  CT of the chest of 06/22/2024.    FINDINGS:  High quality examination for evaluation of pulmonary thromboembolism.  Normal opacification of the pulmonary arterial system about to the tertiary order branch vessels without evidence of pruning, webbing, or truncation of the pulmonary arterial system the left suspicion towards pulmonary embolism.    Main pulmonary trunk appears patent without evidence of saddle embolus is not at the level of the main pulmonary trunk.  Both right and left pulmonary arteries appear normal in caliber.  Normal tapering noted the distal pulmonary vascular.    Evaluation mediastinum demonstrates evidence of prominent soft tissue density in the subcarinal station that is in keeping with adenopathy and minimal soft tissue density focus of projected over both sara that is compatible  likewise show no variable change upon comparison.  Cardiac chambers maintain normal size and overall configuration.  An element of sarcoid must be considered.    Parenchymal lung window settings again demonstrates evidence of diffuse ground-glass opacity changes, mild upper lower lobe bronchiectasis, and regional areas of mixed ground-glass opacity changes and consolidative changes in the upper lobes bilaterally.  Degree of bronchiectasis appears most pronounced in the upper lobes bilaterally.  There is some parenchymal scarring and consolidative changes noted in the left lower lobe contacting the minor fissure.    Upper abdominal cavity appears unremarkable.  No evidence of adrenal enlargement.                                       X-Ray Chest AP Portable (Final result)  Result time 12/08/24 14:02:04      Final result by Jhoan Monet Jr., MD (12/08/24 14:02:04)                   Impression:      Diffuse interstitial pulmonary scar formation without evidence of detrimental change upon comparison.      Electronically signed by: Jhoan Monet MD  Date:    12/08/2024  Time:    14:02               Narrative:    EXAMINATION:  XR CHEST AP PORTABLE    CLINICAL HISTORY:  Sepsis;    TECHNIQUE:  Single frontal view of the chest was performed.    COMPARISON:  07/23/2024    FINDINGS:  Single AP the chest was submitted for diagnostic interpretation.  The cardiac shadow remains prominent size similar in capacity as compared to previous.  Diffuse interstitial opacities again globe distributed throughout both lungs that appear more pronounced the right upper lobe distribution.  Moderate central wall thickening again persist with no detrimental change upon comparison the basis of acute or acute on chronic airway wall inflammatory change.  The hilar structures without enlargement.  No significant enlargement of the vascular pedicle.  The thoracic cage appears normal.                                      Assessment/Plan:     *  Sarcoidosis, stage 3  With hypoxia   Oxygen supplementation via NC  Cefepime, levaquin IV given in ER  DuoNeb given in ER  Continue IV cefepime, levaquin and DuoNebs  Respiratory consult  Pulmonary consult  Restart Prednisone      - CTA chest with no evidence of pulmonary embolism, Geographic areas of ground-glass opacity changes, bronchiectasis, and prominent intralobar and interlobular septal thickening that appears equal in magnitude as compared to the patient's prior imaging study.  In the setting of hilar mediastinal and hilar adenopathy, findings may reflecting advanced stage of sarcoid.  Correlate clinically.   - chest xray: Diffuse interstitial pulmonary scar formation       Last PFT: 10/11/223   No obstruction, mild restriction, moderate diffusion defect, better than 2020  Last CT chest:  06/22/2024  Heart size is normal.  The thoracic aorta is normal in caliber.  The pulmonary arteries are fairly well opacified.  There are no filling defects to indicate pulmonary thromboembolic disease.     There is mildly increasing mediastinal and bilateral hilar lymphadenopathy.  Images at lung windows demonstrate bilateral bronchiectasis diffuse bilateral ground-glass opacities and interstitial prominence appear increased compared to the prior study.  No pulmonary mass lesions or pleural effusions are identified.  The upper abdomen is unremarkable.      SUZIE (mycobacterium avium-intracellulare)  INH replaced with rifampin, continue ethambutol and azithromycin. Prednisone weaned from 20 mg to 10 mg to 5 mg  Patient reports taking prednisone 30 mg yesterday and today with no relief and shortness for breath.  Oxygen supplement  Respiratory          Hypertensive urgency  Patient has a current diagnosis of hypertensive urgency (without evidence of end organ damage) which is uncontrolled.  Latest blood pressure and vitals reviewed-   Temp:  [98.3 °F (36.8 °C)]   Pulse:  [104-129]   Resp:  [20-40]   BP: (184-192)/()    SpO2:  [98 %-100 %] .   Patient currently on IV antihypertensives.   Home meds for hypertension were reviewed and noted below.   Hypertension Medications               amLODIPine (NORVASC) 5 MG tablet Take 1 tablet (5 mg total) by mouth once daily.    spironolactone (ALDACTONE) 25 MG tablet Take 25 mg by mouth once daily.    valsartan (DIOVAN) 160 MG tablet Take 1 tablet (160 mg total) by mouth every evening.            Medication adjustment for hospital antihypertensives is as follows-       Will aim for controlled BP reduction by medications noted above. Monitor and mitigate end organ damage as indicated.    Essential hypertension  Patient's blood pressure range in the last 24 hours was: BP  Min: 184/106  Max: 192/85.The patient's inpatient anti-hypertensive regimen is listed below:  Current Antihypertensives       Plan  - BP is uncontrolled, will adjust as follows: labetalol IV given in ER  - continue home meds.         VTE Risk Mitigation (From admission, onward)           Ordered     Place sequential compression device  Until discontinued         12/08/24 1727     IP VTE LOW RISK PATIENT  Once         12/08/24 1727                         On 12/08/2024, patient should be placed in hospital observation services under my care in collaboration with Dr. Pulido.      Pharmacist Renal Dose Adjustment Note    Sumit Burger is a 54 y.o. female being treated with the medication Cefepime    Patient Data:    Vital Signs (Most Recent):  Temp: 98.3 °F (36.8 °C) (12/08/24 1257)  Pulse: 104 (12/08/24 1349)  Resp: (!) 22 (12/08/24 1349)  BP: (!) 192/85 (12/08/24 1300)  SpO2: 100 % (12/08/24 1349) Vital Signs (72h Range):  Temp:  [98.3 °F (36.8 °C)]   Pulse:  [104-129]   Resp:  [20-40]   BP: (184-192)/()   SpO2:  [98 %-100 %]      Recent Labs   Lab 12/08/24  1316   CREATININE 0.8     Serum creatinine: 0.8 mg/dL 12/08/24 1316  Estimated creatinine clearance: 76.1 mL/min    Medication:Cefepime dose: 1 G frequency  Q6H will be changed to medication:Cefepime dose:1 G frequency:Q8H    Pharmacist's Name: Carol Dexter  Pharmacist's Extension: [phone]      Otilia Rhodes NP  Department of Hospital Medicine  [facility] - Emergency Dept

## 2024-12-08 NOTE — ASSESSMENT & PLAN NOTE
Patient's blood pressure range in the last 24 hours was: BP  Min: 184/106  Max: 192/85.The patient's inpatient anti-hypertensive regimen is listed below:  Current Antihypertensives       Plan  - BP is uncontrolled, will adjust as follows: labetalol IV given in ER  - continue home meds.

## 2024-12-08 NOTE — HPI
54-year-old female with a past medical history of hypertension, pneumonia, sarcoidosis, and bronchiectasis who presents to the emergency room with reports of chest pain or shortness for breath for the last 3 days. Currently on prednisone however not alleviating symptoms. Associated generalized body aches, no lower extremity swelling     In the ER, hypertensive, tachycardic, CBC unremarkable CMP unremarkable, BNP troponin CPK normal limits TSH 1.077, Lactic 2.18, influenza negative COVID negative blood culture sent EKG with sinus tachycardia, QRS 80, , no STEMI.  Chest x-ray with  Diffuse interstitial pulmonary scar formation.  CTA chest negative for PE,Geographic areas of ground-glass opacity changes, bronchiectasis, and prominent intralobar and interlobular septal thickening   Admit to hospital medicine for hypoxia from sarcoidosis SUZIE.  Consult pulmonology continue IV antibiotics

## 2024-12-08 NOTE — SUBJECTIVE & OBJECTIVE
Past Medical History:   Diagnosis Date    HTN (hypertension)     Pneumonia 10/2020    Sarcoidosis        Past Surgical History:   Procedure Laterality Date    BRONCHOALVEOLAR LAVAGE Bilateral 2024    Procedure: LAVAGE, BRONCHOALVEOLAR;  Surgeon: Chilango Zuluaga MD;  Location: [facility] ENDO;  Service: Pulmonary;  Laterality: Bilateral;    BRONCHOSCOPY WITH FLUOROSCOPY Right 10/20/2020    Procedure: BRONCHOSCOPY, WITH FLUOROSCOPY;  Surgeon: Ava Rolon MD;  Location: [facility] ENDO;  Service: Pulmonary;  Laterality: Right;     SECTION      x2    FALLOPIAN TUBOPLASTY      LAPAROSCOPIC APPENDECTOMY N/A 8/10/2022    Procedure: APPENDECTOMY, LAPAROSCOPIC;  Surgeon: Jackson Vogel MD;  Location: [facility] OR;  Service: General;  Laterality: N/A;    TUBAL LIGATION      WRIST FRACTURE SURGERY Right     ganglion cyst       Review of patient's allergies indicates:   Allergen Reactions    Sulfamethoxazole-trimethoprim Hives, Shortness Of Breath, Nausea And Vomiting and Rash     Hives, nausea and vomiting, and shortness of breath.  Did not require admission  Hives, nausea and vomiting, and shortness of breath.  Did not require admission    Benadryl [diphenhydramine hcl]     Latex, natural rubber     Percocet [oxycodone-acetaminophen]     Shrimp     Codeine Itching and Nausea Only       No current facility-administered medications on file prior to encounter.     Current Outpatient Medications on File Prior to Encounter   Medication Sig    amLODIPine (NORVASC) 5 MG tablet Take 1 tablet (5 mg total) by mouth once daily. (Patient taking differently: Take 10 [facility])    azithromycin (ZITHROMAX) 500 MG tablet Take 1 tablet (500 mg total) by mouth every Mon, Wed, Fri.    buPROPion (WELLBUTRIN XL) 300 MG 24 hr tablet Take 300 mg by mouth once daily.    ethambutoL (MYAMBUTOL) 400 MG Tab Take 3 tablets (1,200 mg total) by mouth every Mon, Wed, Fri. Take every  and Friday    folic acid  (FOLVITE) 1 MG tablet Take 1 tablet by mouth once daily.    predniSONE (DELTASONE) 10 MG tablet Take 4 tablets (40 mg total) by mouth once daily. With food    rifAMpin (RIFADIN) 300 MG capsule Take 2 capsules (600 mg total) by mouth every Mon, Wed, Fri. Every  and Friday    spironolactone (ALDACTONE) 25 MG tablet Take 25 mg by mouth once daily.    valsartan (DIOVAN) 160 MG tablet Take 1 tablet (160 mg total) by mouth every evening.    zolpidem (AMBIEN) 5 MG Tab Take 1 tablet (5 mg total) by mouth nightly as needed (insomnia).    isoniazid (NYDRAZID) 300 MG Tab Take 2 tablets (600 mg total) by mouth every Mon, Wed, Fri. (Patient not taking: Reported on 2024)    pyridoxine, vitamin B6, (B-6) 50 MG Tab Take 1 tablet (50 mg total) by mouth every Mon, Wed, Fri. (Patient not taking: Reported on 2024)    [DISCONTINUED] albuterol (PROVENTIL/VENTOLIN HFA) 90 mcg/actuation inhaler Inhale 2 puffs into the lungs every 4 to 6 hours as needed for Shortness of Breath. Rescue     Family History       Problem Relation (Age of Onset)    Hypertension Mother          Tobacco Use    Smoking status: Former     Current packs/day: 0.00     Types: Cigarettes     Quit date: 2020     Years since quittin.6    Smokeless tobacco: Never    Tobacco comments:     ocassionally never was qd   Substance and Sexual Activity    Alcohol use: Not Currently    Drug use: Never    Sexual activity: Not Currently     Review of Systems   Constitutional:  Positive for fatigue.        Body aches   HENT: Negative.     Eyes: Negative.    Respiratory:  Positive for cough and shortness of breath.    Cardiovascular:  Positive for chest pain.   Gastrointestinal: Negative.    Genitourinary: Negative.    Musculoskeletal: Negative.    Neurological:  Positive for weakness.   Psychiatric/Behavioral: Negative.       Objective:     Vital Signs (Most Recent):  Temp: 98.3 °F (36.8 °C) (24 1257)  Pulse: 104 (24 1349)  Resp: (!) 22  (12/08/24 1349)  BP: (!) 192/85 (12/08/24 1300)  SpO2: 100 % (12/08/24 1349) Vital Signs (24h Range):  Temp:  [98.3 °F (36.8 °C)] 98.3 °F (36.8 °C)  Pulse:  [104-129] 104  Resp:  [20-40] 22  SpO2:  [98 %-100 %] 100 %  BP: (184-192)/() 192/85     Weight: 68 kg (150 lb)  Body mass index is 26.57 kg/m².     Physical Exam  Vitals reviewed.   Constitutional:       General: She is in acute distress.      Appearance: Normal appearance. She is not ill-appearing.   HENT:      Head: Normocephalic and atraumatic.      Mouth/Throat:      Mouth: Mucous membranes are moist.   Eyes:      Extraocular Movements: Extraocular movements intact.      Pupils: Pupils are equal, round, and reactive to light.   Cardiovascular:      Rate and Rhythm: Tachycardia present.   Pulmonary:      Effort: Tachypnea and accessory muscle usage present.      Breath sounds: Examination of the right-middle field reveals rales. Examination of the left-middle field reveals rales. Examination of the right-lower field reveals wheezing. Examination of the left-lower field reveals wheezing. Wheezing and rales present.      Comments: Supplemental oxygen    Abdominal:      General: Bowel sounds are normal. There is no distension.   Musculoskeletal:         General: Normal range of motion.      Cervical back: Neck supple.   Skin:     General: Skin is dry.      Capillary Refill: Capillary refill takes less than 2 seconds.   Neurological:      Mental Status: She is alert.   Psychiatric:         Mood and Affect: Mood normal.              CRANIAL NERVES     CN III, IV, VI   Pupils are equal, round, and reactive to light.       Significant Labs: All pertinent labs within the past 24 hours have been reviewed.  Recent Lab Results         12/08/24  1316   12/08/24  1315   12/08/24  1313   12/08/24  1300        Influenza A, Molecular   Negative           Influenza B, Molecular   Negative           Albumin 4.5             ALP 79             ALT 26             Anion Gap  11             Aniso Slight             PTT 33.3  Comment: Refer to local heparin nomogram for intensity/dose specific   therapeutic   range.               AST 25             Baso # 0.08             Basophil % 0.9             BILIRUBIN TOTAL 0.4  Comment: For infants and newborns, interpretation of results should be based  on gestational age, weight and in agreement with clinical  observations.    Premature Infant recommended reference ranges:  Up to 24 hours.............<8.0 mg/dL  Up to 48 hours............<12.0 mg/dL  3-5 days..................<15.0 mg/dL  6-29 days.................<15.0 mg/dL               BNP 26  Comment: Values of less than 100 pg/ml are consistent with non-CHF populations.             BUN 10             Calcium 10.4             Chloride 102             CO2 23                          Creatinine 0.8             Differential Method Automated             eGFR >60.0             Eos # 0.1             Eos % 1.3             Flu A & B Source   Nasal swab           Glucose 101             Gran # (ANC) 5.0             Gran % 57.7             Hematocrit 45.3             Hemoglobin 15.3             Immature Grans (Abs) 0.02  Comment: Mild elevation in immature granulocytes is non specific and   can be seen in a variety of conditions including stress response,   acute inflammation, trauma and pregnancy. Correlation with other   laboratory and clinical findings is essential.               Immature Granulocytes 0.2             INR 1.0  Comment: Coumadin Therapy:  2.0 - 3.0 for INR for all indicators except mechanical heart valves  and antiphospholipid syndromes which should use 2.5 - 3.5.               Lipase 26             Lymph # 2.7             Lymph % 31.7             Magnesium  2.0             MCH 28.8             MCHC 33.8             MCV 85             Mono # 0.7             Mono % 8.2             MPV 11.5             nRBC 0             QRS Duration       80       OHS QTC Calculation        463       Platelet Estimate Appears normal             Platelet Count 322             POC Lactate     2.18         Potassium 4.3             PROTEIN TOTAL 9.0             PT 11.1             RBC 5.32             RDW 13.5             Sample     VENOUS         SARS-CoV-2 RNA, Amplification, Qual   Negative  Comment: This test utilizes isothermal nucleic acid amplification technology   to   detect the SARS-CoV-2 RdRp nucleic acid segment. The analytical   sensitivity   (limit of detection) is 500 copies/swab.     A POSITIVE result is indicative of the presence of SARS-CoV-2 RNA;   clinical   correlation with patient history and other diagnostic information is   necessary to determine patient infection status.    A NEGATIVE result means that SARS-CoV-2 nucleic acids are not present   above   the limit of detection. A NEGATIVE result should be treated as   presumptive.   It does not rule out the possibility of COVID-19 and should not be   the sole   basis for treatment decisions. If COVID-19 is strongly suspected   based on   clinical and exposure history, re-testing using an alternate   molecular assay   should be considered.     This test is FDA approved.Performance characteristics of this test   has been   independently verified by [facility] in conjunction   with   [facility] Department of Pathology and Laboratory   Medicine.             Sodium 136             Troponin I High Sensitivity 4.1  Comment: Troponin results differ between methods. Do not use   results between Troponin methods interchangeably.    Alkaline Phospatase levels above 400 U/L may   cause false positive results.    Access hsTnI should not be used for patients taking   Asfotase sandie (Strensiq).               TSH 1.077             WBC 8.64                     Significant Imaging: I have reviewed all pertinent imaging results/findings within the past 24 hours.  Imaging Results              CTA Chest Non-Coronary (PE  Studies) (Final result)  Result time 12/08/24 14:50:23      Final result by Jhoan Monet Jr., MD (12/08/24 14:50:23)                   Impression:      1.  No CT evidence of acute pulmonary thromboembolism.    2.  Geographic areas of ground-glass opacity changes, bronchiectasis, and prominent intralobar and interlobular septal thickening that appears equal in magnitude as compared to the patient's prior imaging study.  In the setting of hilar mediastinal and hilar adenopathy, findings may reflecting advanced stage of sarcoid.  Correlate clinically.      Electronically signed by: Jhoan Monet MD  Date:    12/08/2024  Time:    14:50               Narrative:      CMS MANDATED QUALITY DATA - CT RADIATION - 436    All CT scans at this facility utilize dose modulation, iterative reconstruction, and/or weight based dosing when appropriate to reduce radiation dose to as low as reasonably achievable    EXAMINATION:  CTA CHEST NON CORONARY (PE STUDIES)    CLINICAL HISTORY:  Pulmonary embolism (PE) suspected, unknown D-dimer;    TECHNIQUE:  Routine CT angiogram of the chest protocol performed after the IV administration of 100 mL Omnipaque 350. 3D reconstructions/reformats/MIPs obtained. All CT scans at this facility are performed  using dose modulation techniques as appropriate to performed exam including the following:  automated exposure control; adjustment of mA and/or kV according to the patients size (this includes techniques or standardized protocols for targeted exams where dose is matched to indication/reason for exam: i.e. extremities or head);  iterative reconstruction technique.    COMPARISON:  CT of the chest of 06/22/2024.    FINDINGS:  High quality examination for evaluation of pulmonary thromboembolism.  Normal opacification of the pulmonary arterial system about to the tertiary order branch vessels without evidence of pruning, webbing, or truncation of the pulmonary arterial system the left suspicion  towards pulmonary embolism.    Main pulmonary trunk appears patent without evidence of saddle embolus is not at the level of the main pulmonary trunk.  Both right and left pulmonary arteries appear normal in caliber.  Normal tapering noted the distal pulmonary vascular.    Evaluation mediastinum demonstrates evidence of prominent soft tissue density in the subcarinal station that is in keeping with adenopathy and minimal soft tissue density focus of projected over both sara that is compatible likewise show no variable change upon comparison.  Cardiac chambers maintain normal size and overall configuration.  An element of sarcoid must be considered.    Parenchymal lung window settings again demonstrates evidence of diffuse ground-glass opacity changes, mild upper lower lobe bronchiectasis, and regional areas of mixed ground-glass opacity changes and consolidative changes in the upper lobes bilaterally.  Degree of bronchiectasis appears most pronounced in the upper lobes bilaterally.  There is some parenchymal scarring and consolidative changes noted in the left lower lobe contacting the minor fissure.    Upper abdominal cavity appears unremarkable.  No evidence of adrenal enlargement.                                       X-Ray Chest AP Portable (Final result)  Result time 12/08/24 14:02:04      Final result by Jhoan Monet Jr., MD (12/08/24 14:02:04)                   Impression:      Diffuse interstitial pulmonary scar formation without evidence of detrimental change upon comparison.      Electronically signed by: Jhoan Monet MD  Date:    12/08/2024  Time:    14:02               Narrative:    EXAMINATION:  XR CHEST AP PORTABLE    CLINICAL HISTORY:  Sepsis;    TECHNIQUE:  Single frontal view of the chest was performed.    COMPARISON:  07/23/2024    FINDINGS:  Single AP the chest was submitted for diagnostic interpretation.  The cardiac shadow remains prominent size similar in capacity as compared to  previous.  Diffuse interstitial opacities again globe distributed throughout both lungs that appear more pronounced the right upper lobe distribution.  Moderate central wall thickening again persist with no detrimental change upon comparison the basis of acute or acute on chronic airway wall inflammatory change.  The hilar structures without enlargement.  No significant enlargement of the vascular pedicle.  The thoracic cage appears normal.

## 2024-12-08 NOTE — ASSESSMENT & PLAN NOTE
INH replaced with rifampin, continue ethambutol and azithromycin. Prednisone weaned from 20 mg to 10 mg to 5 mg  Patient reports taking prednisone 30 mg yesterday and today with no relief and shortness for breath.  Oxygen supplement  Respiratory

## 2024-12-08 NOTE — ASSESSMENT & PLAN NOTE
Patient has a current diagnosis of hypertensive urgency (without evidence of end organ damage) which is uncontrolled.  Latest blood pressure and vitals reviewed-   Temp:  [98.3 °F (36.8 °C)]   Pulse:  [104-129]   Resp:  [20-40]   BP: (184-192)/()   SpO2:  [98 %-100 %] .   Patient currently on IV antihypertensives.   Home meds for hypertension were reviewed and noted below.   Hypertension Medications               amLODIPine (NORVASC) 5 MG tablet Take 1 tablet (5 mg total) by mouth once daily.    spironolactone (ALDACTONE) 25 MG tablet Take 25 mg by mouth once daily.    valsartan (DIOVAN) 160 MG tablet Take 1 tablet (160 mg total) by mouth every evening.            Medication adjustment for hospital antihypertensives is as follows-       Will aim for controlled BP reduction by medications noted above. Monitor and mitigate end organ damage as indicated.

## 2024-12-09 ENCOUNTER — CLINICAL SUPPORT (OUTPATIENT)
Dept: CARDIOLOGY | Facility: HOSPITAL | Age: 54
End: 2024-12-09
Attending: INTERNAL MEDICINE
Payer: OTHER GOVERNMENT

## 2024-12-09 PROBLEM — I10 ESSENTIAL HYPERTENSION: Status: RESOLVED | Noted: 2023-06-30 | Resolved: 2024-12-09

## 2024-12-09 LAB
ANION GAP SERPL CALC-SCNC: 7 MMOL/L (ref 8–16)
AORTIC ROOT ANNULUS: 2.7 CM
AORTIC VALVE CUSP SEPERATION: 1.7 CM
APICAL FOUR CHAMBER EJECTION FRACTION: 50 %
APICAL TWO CHAMBER EJECTION FRACTION: 53 %
AV INDEX (PROSTH): 0.76
AV MEAN GRADIENT: 5 MMHG
AV PEAK GRADIENT: 9 MMHG
AV VALVE AREA BY VELOCITY RATIO: 2.5 CM²
AV VALVE AREA: 2.4 CM²
AV VELOCITY RATIO: 0.8
BASOPHILS # BLD AUTO: 0.09 K/UL (ref 0–0.2)
BASOPHILS NFR BLD: 1.7 % (ref 0–1.9)
BSA FOR ECHO PROCEDURE: 1.75 M2
BUN SERPL-MCNC: 8 MG/DL (ref 6–20)
CALCIUM SERPL-MCNC: 9.9 MG/DL (ref 8.7–10.5)
CHLORIDE SERPL-SCNC: 103 MMOL/L (ref 95–110)
CO2 SERPL-SCNC: 25 MMOL/L (ref 23–29)
CREAT SERPL-MCNC: 0.8 MG/DL (ref 0.5–1.4)
DIFFERENTIAL METHOD BLD: ABNORMAL
DOP CALC AO PEAK VEL: 1.5 M/S
DOP CALC AO VTI: 24.3 CM
DOP CALC LVOT AREA: 3.1 CM2
DOP CALC LVOT DIAMETER: 2 CM
DOP CALC LVOT PEAK VEL: 1.2 M/S
DOP CALC LVOT STROKE VOLUME: 58.1 CM3
DOP CALC MV VTI: 19.9 CM
DOP CALCLVOT PEAK VEL VTI: 18.5 CM
E WAVE DECELERATION TIME: 148 MSEC
E/A RATIO: 0.8
E/E' RATIO: 8.8 M/S
EOSINOPHIL # BLD AUTO: 0.2 K/UL (ref 0–0.5)
EOSINOPHIL NFR BLD: 3.4 % (ref 0–8)
ERYTHROCYTE [DISTWIDTH] IN BLOOD BY AUTOMATED COUNT: 13.6 % (ref 11.5–14.5)
EST. GFR  (NO RACE VARIABLE): >60 ML/MIN/1.73 M^2
GLUCOSE SERPL-MCNC: 85 MG/DL (ref 70–110)
HCT VFR BLD AUTO: 41.7 % (ref 37–48.5)
HGB BLD-MCNC: 13.2 G/DL (ref 12–16)
IMM GRANULOCYTES # BLD AUTO: 0.01 K/UL (ref 0–0.04)
IMM GRANULOCYTES NFR BLD AUTO: 0.2 % (ref 0–0.5)
IVC DIAMETER: 1.5 CM
LEFT VENTRICLE DIASTOLIC VOLUME INDEX: 25.52 ML/M2
LEFT VENTRICLE DIASTOLIC VOLUME: 43.9 ML
LEFT VENTRICLE END DIASTOLIC VOLUME APICAL 2 CHAMBER: 39.2 ML
LEFT VENTRICLE END DIASTOLIC VOLUME APICAL 4 CHAMBER: 47.4 ML
LEFT VENTRICLE SYSTOLIC VOLUME INDEX: 12.8 ML/M2
LEFT VENTRICLE SYSTOLIC VOLUME: 22.1 ML
LV LATERAL E/E' RATIO: 8.25 M/S
LV SEPTAL E/E' RATIO: 9.43 M/S
LVOT MG: 3 MMHG
LVOT MV: 0.76 CM/S
LYMPHOCYTES # BLD AUTO: 1.6 K/UL (ref 1–4.8)
LYMPHOCYTES NFR BLD: 30.1 % (ref 18–48)
MAGNESIUM SERPL-MCNC: 1.9 MG/DL (ref 1.6–2.6)
MCH RBC QN AUTO: 27.5 PG (ref 27–31)
MCHC RBC AUTO-ENTMCNC: 31.7 G/DL (ref 32–36)
MCV RBC AUTO: 87 FL (ref 82–98)
MONOCYTES # BLD AUTO: 0.6 K/UL (ref 0.3–1)
MONOCYTES NFR BLD: 10.7 % (ref 4–15)
MV MEAN GRADIENT: 2 MMHG
MV PEAK A VEL: 0.82 M/S
MV PEAK E VEL: 0.66 M/S
MV PEAK GRADIENT: 4 MMHG
MV VALVE AREA BY CONTINUITY EQUATION: 2.92 CM2
NEUTROPHILS # BLD AUTO: 2.8 K/UL (ref 1.8–7.7)
NEUTROPHILS NFR BLD: 53.9 % (ref 38–73)
NRBC BLD-RTO: 0 /100 WBC
OHS LV EJECTION FRACTION SIMPSONS BIPLANE MOD: 50 %
PHOSPHATE SERPL-MCNC: 3.9 MG/DL (ref 2.7–4.5)
PISA TR MAX VEL: 2.63 M/S
PLATELET # BLD AUTO: 323 K/UL (ref 150–450)
PMV BLD AUTO: 10.9 FL (ref 9.2–12.9)
POTASSIUM SERPL-SCNC: 4.2 MMOL/L (ref 3.5–5.1)
PV MV: 0.57 M/S
PV PEAK GRADIENT: 3 MMHG
PV PEAK VELOCITY: 0.84 M/S
RBC # BLD AUTO: 4.8 M/UL (ref 4–5.4)
RV TISSUE DOPPLER FREE WALL SYSTOLIC VELOCITY 1 (APICAL 4 CHAMBER VIEW): 13.8 CM/S
SODIUM SERPL-SCNC: 135 MMOL/L (ref 136–145)
TDI LATERAL: 0.08 M/S
TDI SEPTAL: 0.07 M/S
TDI: 0.08 M/S
TR MAX PG: 28 MMHG
TROPONIN I SERPL HS-MCNC: 2.5 PG/ML (ref 0–14.9)
WBC # BLD AUTO: 5.25 K/UL (ref 3.9–12.7)

## 2024-12-09 PROCEDURE — 63600175 PHARM REV CODE 636 W HCPCS: Performed by: INTERNAL MEDICINE

## 2024-12-09 PROCEDURE — 99900031 HC PATIENT EDUCATION (STAT)

## 2024-12-09 PROCEDURE — 99223 1ST HOSP IP/OBS HIGH 75: CPT | Mod: ,,, | Performed by: INTERNAL MEDICINE

## 2024-12-09 PROCEDURE — 99900035 HC TECH TIME PER 15 MIN (STAT)

## 2024-12-09 PROCEDURE — 96366 THER/PROPH/DIAG IV INF ADDON: CPT

## 2024-12-09 PROCEDURE — 25000003 PHARM REV CODE 250: Performed by: STUDENT IN AN ORGANIZED HEALTH CARE EDUCATION/TRAINING PROGRAM

## 2024-12-09 PROCEDURE — 94761 N-INVAS EAR/PLS OXIMETRY MLT: CPT

## 2024-12-09 PROCEDURE — 85025 COMPLETE CBC W/AUTO DIFF WBC: CPT | Performed by: NURSE PRACTITIONER

## 2024-12-09 PROCEDURE — 83735 ASSAY OF MAGNESIUM: CPT | Performed by: NURSE PRACTITIONER

## 2024-12-09 PROCEDURE — 21400001 HC TELEMETRY ROOM

## 2024-12-09 PROCEDURE — 93306 TTE W/DOPPLER COMPLETE: CPT | Mod: 26,,, | Performed by: GENERAL PRACTICE

## 2024-12-09 PROCEDURE — 80048 BASIC METABOLIC PNL TOTAL CA: CPT | Performed by: NURSE PRACTITIONER

## 2024-12-09 PROCEDURE — 63600175 PHARM REV CODE 636 W HCPCS: Performed by: NURSE PRACTITIONER

## 2024-12-09 PROCEDURE — 63700000 PHARM REV CODE 250 ALT 637 W/O HCPCS: Performed by: NURSE PRACTITIONER

## 2024-12-09 PROCEDURE — 93306 TTE W/DOPPLER COMPLETE: CPT

## 2024-12-09 PROCEDURE — 36415 COLL VENOUS BLD VENIPUNCTURE: CPT | Performed by: INTERNAL MEDICINE

## 2024-12-09 PROCEDURE — 36415 COLL VENOUS BLD VENIPUNCTURE: CPT | Performed by: NURSE PRACTITIONER

## 2024-12-09 PROCEDURE — 96376 TX/PRO/DX INJ SAME DRUG ADON: CPT

## 2024-12-09 PROCEDURE — 84100 ASSAY OF PHOSPHORUS: CPT | Performed by: NURSE PRACTITIONER

## 2024-12-09 PROCEDURE — 25000003 PHARM REV CODE 250: Performed by: INTERNAL MEDICINE

## 2024-12-09 PROCEDURE — 25000003 PHARM REV CODE 250: Performed by: NURSE PRACTITIONER

## 2024-12-09 PROCEDURE — 84484 ASSAY OF TROPONIN QUANT: CPT | Performed by: INTERNAL MEDICINE

## 2024-12-09 RX ORDER — KETOROLAC TROMETHAMINE 30 MG/ML
30 INJECTION, SOLUTION INTRAMUSCULAR; INTRAVENOUS ONCE
Status: COMPLETED | OUTPATIENT
Start: 2024-12-09 | End: 2024-12-09

## 2024-12-09 RX ORDER — DIAZEPAM 10 MG/2ML
5 INJECTION INTRAMUSCULAR ONCE
Status: DISCONTINUED | OUTPATIENT
Start: 2024-12-09 | End: 2024-12-10 | Stop reason: HOSPADM

## 2024-12-09 RX ORDER — MORPHINE SULFATE 4 MG/ML
8 INJECTION, SOLUTION INTRAMUSCULAR; INTRAVENOUS ONCE
Status: COMPLETED | OUTPATIENT
Start: 2024-12-09 | End: 2024-12-09

## 2024-12-09 RX ADMIN — BUPROPION HYDROCHLORIDE 300 MG: 150 TABLET, EXTENDED RELEASE ORAL at 08:12

## 2024-12-09 RX ADMIN — FAMOTIDINE 20 MG: 20 TABLET ORAL at 08:12

## 2024-12-09 RX ADMIN — KETOROLAC TROMETHAMINE 30 MG: 30 INJECTION, SOLUTION INTRAMUSCULAR at 08:12

## 2024-12-09 RX ADMIN — LEVOFLOXACIN 750 MG: 750 INJECTION, SOLUTION INTRAVENOUS at 12:12

## 2024-12-09 RX ADMIN — HYDROMORPHONE HYDROCHLORIDE 0.5 MG: 1 INJECTION, SOLUTION INTRAMUSCULAR; INTRAVENOUS; SUBCUTANEOUS at 02:12

## 2024-12-09 RX ADMIN — AZITHROMYCIN DIHYDRATE 500 MG: 250 TABLET ORAL at 05:12

## 2024-12-09 RX ADMIN — MORPHINE SULFATE 8 MG: 4 INJECTION, SOLUTION INTRAMUSCULAR; INTRAVENOUS at 11:12

## 2024-12-09 RX ADMIN — FOLIC ACID 1000 MCG: 1 TABLET ORAL at 08:12

## 2024-12-09 RX ADMIN — GUAIFENESIN AND DEXTROMETHORPHAN 5 ML: 100; 10 SYRUP ORAL at 05:12

## 2024-12-09 RX ADMIN — GUAIFENESIN AND DEXTROMETHORPHAN 5 ML: 100; 10 SYRUP ORAL at 01:12

## 2024-12-09 RX ADMIN — RIFAMPIN 600 MG: 300 CAPSULE ORAL at 05:12

## 2024-12-09 RX ADMIN — HYDROMORPHONE HYDROCHLORIDE 0.5 MG: 1 INJECTION, SOLUTION INTRAMUSCULAR; INTRAVENOUS; SUBCUTANEOUS at 08:12

## 2024-12-09 RX ADMIN — ETHAMBUTOL HYDROCHLORIDE 1200 MG: 400 TABLET ORAL at 05:12

## 2024-12-09 RX ADMIN — BENZONATATE 100 MG: 100 CAPSULE ORAL at 07:12

## 2024-12-09 RX ADMIN — VALSARTAN 160 MG: 80 TABLET, FILM COATED ORAL at 08:12

## 2024-12-09 RX ADMIN — PREDNISONE 40 MG: 20 TABLET ORAL at 08:12

## 2024-12-09 RX ADMIN — ALPRAZOLAM 0.5 MG: 0.5 TABLET ORAL at 07:12

## 2024-12-09 NOTE — HOSPITAL COURSE
Patient is a 54 year old female with history of sarcoidosis, SUZIE, presented with weakness, worsening SOB and episodes of diaphoresis. Patient was seen by pulmonary, states that she is stable from sarcoid standpoint. Request a consult for cardiology. Patient remained on RA. No evidence of pneumonia was found. Patient was seen by Cardiology and pulmonary and cleared for discharge. Patient was discharged on prednisone taper per pulmonary recommendations.

## 2024-12-09 NOTE — ASSESSMENT & PLAN NOTE
CTA chest with no evidence of pulmonary embolism, Geographic areas of ground-glass opacity changes, bronchiectasis, and prominent intralobar and interlobular septal thickening that appears equal in magnitude as compared to the patient's prior imaging study.  In the setting of hilar mediastinal and hilar adenopathy, findings may reflecting advanced stage of sarcoid.   Oxygen supplementation via NC  Continue IV cefepime, levaquin and DuoNebs  Pulmonary consult  Cont Prednisone  Discussed with Dr. Rolon, cont PO Prednisone for now, recommended cardiology consult

## 2024-12-09 NOTE — SUBJECTIVE & OBJECTIVE
Interval History: Patient is complaining of worsening SOB. She was diaphoretic while Dr. Rolon was in the room. She is too weak to sit up. Discussed with Dr. Rolon, states that from pulmonary standpoint she is stable, recommended to consult cardiology.     Review of Systems  Objective:     Vital Signs (Most Recent):  Temp: 98.2 °F (36.8 °C) (12/09/24 1245)  Pulse: 108 (12/09/24 1245)  Resp: 18 (12/09/24 1245)  BP: 133/89 (12/09/24 1245)  SpO2: 98 % (12/09/24 1245) Vital Signs (24h Range):  Temp:  [97.4 °F (36.3 °C)-98.4 °F (36.9 °C)] 98.2 °F (36.8 °C)  Pulse:  [] 108  Resp:  [18-28] 18  SpO2:  [96 %-100 %] 98 %  BP: ()/(67-91) 133/89     Weight: 68.6 kg (151 lb 4.8 oz)  Body mass index is 26.8 kg/m².    Intake/Output Summary (Last 24 hours) at 12/9/2024 1412  Last data filed at 12/9/2024 0756  Gross per 24 hour   Intake 270 ml   Output --   Net 270 ml         Physical Exam  Vitals reviewed.   Constitutional:       Appearance: Normal appearance. She is not ill-appearing.   HENT:      Head: Normocephalic and atraumatic.      Mouth/Throat:      Mouth: Mucous membranes are moist.   Eyes:      Extraocular Movements: Extraocular movements intact.      Pupils: Pupils are equal, round, and reactive to light.   Cardiovascular:      Rate and Rhythm: Tachycardia present.   Pulmonary:      Effort: Tachypnea     Breath sounds: Examination of the right-middle field reveals rales.   Abdominal:      General: Bowel sounds are normal. There is no distension.   Musculoskeletal:         General: Normal range of motion.      Cervical back: Neck supple.   Skin:     General: Skin is dry.      Capillary Refill: Capillary refill takes less than 2 seconds.   Neurological:      Mental Status: She is alert.   Psychiatric:         Mood and Affect: Mood normal.     Significant Labs: All pertinent labs within the past 24 hours have been reviewed.  CBC:   Recent Labs   Lab 12/08/24  1316 12/09/24  0432   WBC 8.64 5.25   HGB 15.3 13.2    HCT 45.3 41.7    323     CMP:   Recent Labs   Lab 12/08/24  1316 12/09/24  0432    135*   K 4.3 4.2    103   CO2 23 25    85   BUN 10 8   CREATININE 0.8 0.8   CALCIUM 10.4 9.9   PROT 9.0*  --    ALBUMIN 4.5  --    BILITOT 0.4  --    ALKPHOS 79  --    AST 25  --    ALT 26  --    ANIONGAP 11 7*       Significant Imaging: I have reviewed all pertinent imaging results/findings within the past 24 hours.

## 2024-12-09 NOTE — CARE UPDATE
12/09/24 0700   Patient Assessment/Suction   Level of Consciousness (AVPU) alert   Respiratory Effort Normal;Unlabored   Expansion/Accessory Muscles/Retractions no retractions;no use of accessory muscles;expansion symmetric   All Lung Fields Breath Sounds diminished   PRE-TX-O2   Device (Oxygen Therapy) room air   SpO2 100 %   Pulse Oximetry Type Intermittent   $ Pulse Oximetry - Multiple Charge Pulse Oximetry - Multiple   Aerosol Therapy   $ Aerosol Therapy Charges Refused

## 2024-12-09 NOTE — ASSESSMENT & PLAN NOTE
BP is now controlled    Home meds for hypertension were reviewed and noted below.   Hypertension Medications               amLODIPine (NORVASC) 5 MG tablet Take 1 tablet (5 mg total) by mouth once daily.    spironolactone (ALDACTONE) 25 MG tablet Take 25 mg by mouth once daily.    valsartan (DIOVAN) 160 MG tablet Take 1 tablet (160 mg total) by mouth every evening.          Cont above

## 2024-12-09 NOTE — ASSESSMENT & PLAN NOTE
INH replaced with rifampin, continue ethambutol and azithromycin. Prednisone weaned from 20 mg to 10 mg to 5 mg  Patient reports taking prednisone 30 mg yesterday and today with no relief and shortness for breath.  Oxygen supplement

## 2024-12-09 NOTE — PROGRESS NOTES
[facility] Medicine  Progress Note    Patient Name: Sumit Burger  MRN: [id number]  Patient Class: OP- Observation   Admission Date: 12/8/2024  Length of Stay: 0 days  Attending Physician: Tammy Yoder MD  Primary Care Provider: AdministrationYasmany     Principal Problem:Sarcoidosis, stage 3        HPI:  54-year-old female with a past medical history of hypertension, pneumonia, sarcoidosis, and bronchiectasis who presents to the emergency room with reports of chest pain or shortness for breath for the last 3 days. Currently on prednisone however not alleviating symptoms. Associated generalized body aches, no lower extremity swelling     In the ER, hypertensive, tachycardic, CBC unremarkable CMP unremarkable, BNP troponin CPK normal limits TSH 1.077, Lactic 2.18, influenza negative COVID negative blood culture sent EKG with sinus tachycardia, QRS 80, , no STEMI.  Chest x-ray with  Diffuse interstitial pulmonary scar formation.  CTA chest negative for PE,Geographic areas of ground-glass opacity changes, bronchiectasis, and prominent intralobar and interlobular septal thickening   Admit to hospital medicine for hypoxia from sarcoidosis SUZIE.  Consult pulmonology continue IV antibiotics    Overview/Hospital Course:  Patient is a 54 year old female with history of sarcoidosis, SUZIE, presented with weakness, worsening SOB and episodes of diaphoresis. Patient was seen by pulmonary, states that she is stable from sarcoid standpoint. Request a consult for cardiology.     Interval History: Patient is complaining of worsening SOB. She was diaphoretic while Dr. Rolon was in the room. She is too weak to sit up. Discussed with Dr. Rolon, states that from pulmonary standpoint she is stable, recommended to consult cardiology.     Review of Systems  Objective:     Vital Signs (Most Recent):  Temp: 98.2 °F (36.8 °C) (12/09/24 1245)  Pulse: 108 (12/09/24 1245)  Resp:  18 (12/09/24 1245)  BP: 133/89 (12/09/24 1245)  SpO2: 98 % (12/09/24 1245) Vital Signs (24h Range):  Temp:  [97.4 °F (36.3 °C)-98.4 °F (36.9 °C)] 98.2 °F (36.8 °C)  Pulse:  [] 108  Resp:  [18-28] 18  SpO2:  [96 %-100 %] 98 %  BP: ()/(67-91) 133/89     Weight: 68.6 kg (151 lb 4.8 oz)  Body mass index is 26.8 kg/m².    Intake/Output Summary (Last 24 hours) at 12/9/2024 1412  Last data filed at 12/9/2024 0756  Gross per 24 hour   Intake 270 ml   Output --   Net 270 ml         Physical Exam  Vitals reviewed.   Constitutional:       Appearance: Normal appearance. She is not ill-appearing.   HENT:      Head: Normocephalic and atraumatic.      Mouth/Throat:      Mouth: Mucous membranes are moist.   Eyes:      Extraocular Movements: Extraocular movements intact.      Pupils: Pupils are equal, round, and reactive to light.   Cardiovascular:      Rate and Rhythm: Tachycardia present.   Pulmonary:      Effort: Tachypnea     Breath sounds: Examination of the right-middle field reveals rales.   Abdominal:      General: Bowel sounds are normal. There is no distension.   Musculoskeletal:         General: Normal range of motion.      Cervical back: Neck supple.   Skin:     General: Skin is dry.      Capillary Refill: Capillary refill takes less than 2 seconds.   Neurological:      Mental Status: She is alert.   Psychiatric:         Mood and Affect: Mood normal.     Significant Labs: All pertinent labs within the past 24 hours have been reviewed.  CBC:   Recent Labs   Lab 12/08/24  1316 12/09/24  0432   WBC 8.64 5.25   HGB 15.3 13.2   HCT 45.3 41.7    323     CMP:   Recent Labs   Lab 12/08/24  1316 12/09/24  0432    135*   K 4.3 4.2    103   CO2 23 25    85   BUN 10 8   CREATININE 0.8 0.8   CALCIUM 10.4 9.9   PROT 9.0*  --    ALBUMIN 4.5  --    BILITOT 0.4  --    ALKPHOS 79  --    AST 25  --    ALT 26  --    ANIONGAP 11 7*       Significant Imaging: I have reviewed all pertinent imaging  results/findings within the past 24 hours.    Assessment and Plan     * Sarcoidosis, stage 3  CTA chest with no evidence of pulmonary embolism, Geographic areas of ground-glass opacity changes, bronchiectasis, and prominent intralobar and interlobular septal thickening that appears equal in magnitude as compared to the patient's prior imaging study.  In the setting of hilar mediastinal and hilar adenopathy, findings may reflecting advanced stage of sarcoid.   Oxygen supplementation via NC  Continue IV cefepime, levaquin and DuoNebs  Pulmonary consult  Cont Prednisone  Discussed with Dr. Rolon, cont PO Prednisone for now, recommended cardiology consult       SUZIE (mycobacterium avium-intracellulare)  INH replaced with rifampin, continue ethambutol and azithromycin. Prednisone weaned from 20 mg to 10 mg to 5 mg  Patient reports taking prednisone 30 mg yesterday and today with no relief and shortness for breath.  Oxygen supplement          Hypertensive urgency  BP is now controlled    Home meds for hypertension were reviewed and noted below.   Hypertension Medications               amLODIPine (NORVASC) 5 MG tablet Take 1 tablet (5 mg total) by mouth once daily.    spironolactone (ALDACTONE) 25 MG tablet Take 25 mg by mouth once daily.    valsartan (DIOVAN) 160 MG tablet Take 1 tablet (160 mg total) by mouth every evening.          Cont above       VTE Risk Mitigation (From admission, onward)           Ordered     Place sequential compression device  Until discontinued         12/08/24 1727     IP VTE LOW RISK PATIENT  Once         12/08/24 1727                    Discharge Planning   SHAKIRA: 12/10/2024     Code Status: Full Code   Medical Readiness for Discharge Date:                        Tammy Yoder MD  Department of Hospital Medicine   [facility]

## 2024-12-09 NOTE — CARE UPDATE
12/08/24 2017   Patient Assessment/Suction   Level of Consciousness (AVPU) alert   Respiratory Effort Normal;Unlabored   Expansion/Accessory Muscles/Retractions no use of accessory muscles;expansion symmetric   All Lung Fields Breath Sounds Anterior:;diminished   PRE-TX-O2   Device (Oxygen Therapy) nasal cannula   $ Is the patient on Low Flow Oxygen? Yes   Flow (L/min) (Oxygen Therapy) 1   SpO2 99 %   Pulse Oximetry Type Intermittent   $ Pulse Oximetry - Multiple Charge Pulse Oximetry - Multiple   Pulse 97   Resp 20   Aerosol Therapy   $ Aerosol Therapy Charges Aerosol Treatment   Daily Review of Necessity (SVN) completed   Respiratory Treatment Status (SVN) given   Treatment Route (SVN) oxygen;mask   Patient Position HOB elevated   Post Treatment Assessment (SVN) breath sounds unchanged   Signs of Intolerance (SVN) none   Education   $ Education 15 min;Bronchodilator

## 2024-12-09 NOTE — PLAN OF CARE
Problem: Adult Inpatient Plan of Care  Goal: Plan of Care Review  Outcome: Progressing     Problem: Pain Acute  Goal: Optimal Pain Control and Function  Outcome: Progressing     Problem: Hypertension Acute  Goal: Blood Pressure Within Desired Range  Outcome: Progressing

## 2024-12-09 NOTE — PLAN OF CARE
[facility]  Initial Discharge Assessment       Primary Care Provider: Yasmany Beckford    Admission Diagnosis: Dyspnea, unspecified type [R06.00]  Hypertensive urgency [I16.0]    Admission Date: 12/8/2024  Expected Discharge Date: 12/10/2024     completed discharge assessment with pt's adult daughter via phone call. Pt AAOx4s. Pt lives with minor daughter. Demographics, PCP, and insurance verified. No home health. No dialysis. Pt completes ADLs without assistance. Pt to discharge home via family transport. Pt has no other needs to be addressed at this time.     Transition of Care Barriers: None    Payor: [employer] / Plan: [place] [employer] [employer] / Product Type: Government /     Extended Emergency Contact Information  Primary Emergency Contact: Susan Burger  Mobile Phone: [phone]  Relation: Daughter  Preferred language: English   needed? No  Secondary Emergency Contact: Rayray Burger  Address: [address] [place] [address] [place] [address] [facility]  Home Phone: [phone]  Mobile Phone: [phone]  Relation: Other    Discharge Plan A: Home with family  Discharge Plan B: Home with family      [facility]  [address]  [facility] [address]  Phone: [phone] Fax: [phone]    [id number] - [place]  [address] AT [facility] [address]  [address]  [facility] [address]  Phone: [phone] Fax: [phone]    [id number] - [place]  [address] AT [facility] [address] & [facility]  [address] [facility] [address]  Phone: [phone] Fax: [phone]      Initial Assessment (most recent)       Adult Discharge Assessment - 12/09/24 1501          Discharge Assessment    Assessment Type Discharge Planning Assessment     Confirmed/corrected address, phone number and insurance Yes     Confirmed Demographics Correct on Facesheet     Source of Information  other (see comments)   adult daughter    When was your last doctors appointment? --   few months ago    Reason For Admission Sarcoidosis, stage 3     People in Home child(carter), dependent     Facility Arrived From: Home     Do you expect to return to your current living situation? Yes     Do you have help at home or someone to help you manage your care at home? Yes     Who are your caregiver(s) and their phone number(s)? High school student     Prior to hospitilization cognitive status: Unable to Assess     Current cognitive status: Alert/Oriented     Walking or Climbing Stairs Difficulty no     Dressing/Bathing Difficulty no     Home Accessibility wheelchair accessible     Home Layout Able to live on 1st floor     Equipment Currently Used at Home blood pressure machine     Readmission within 30 days? No     Patient currently being followed by outpatient case management? No     Do you currently have service(s) that help you manage your care at home? No     Do you take prescription medications? Yes     Do you have prescription coverage? Yes     Coverage [employer] - [place] [employer]     Do you have any problems affording any of your prescribed medications? No     Is the patient taking medications as prescribed? yes     Are you on dialysis? No     Do you take coumadin? No     Discharge Plan A Home with family     Discharge Plan B Home with family     DME Needed Upon Discharge  none     Discharge Plan discussed with: Adult children     Transition of Care Barriers None

## 2024-12-10 ENCOUNTER — PATIENT MESSAGE (OUTPATIENT)
Dept: PULMONOLOGY | Facility: CLINIC | Age: 54
End: 2024-12-10
Payer: OTHER GOVERNMENT

## 2024-12-10 ENCOUNTER — TELEPHONE (OUTPATIENT)
Dept: PULMONOLOGY | Facility: CLINIC | Age: 54
End: 2024-12-10
Payer: OTHER GOVERNMENT

## 2024-12-10 VITALS
HEIGHT: 63 IN | DIASTOLIC BLOOD PRESSURE: 88 MMHG | RESPIRATION RATE: 19 BRPM | HEART RATE: 104 BPM | WEIGHT: 151.31 LBS | TEMPERATURE: 99 F | BODY MASS INDEX: 26.81 KG/M2 | SYSTOLIC BLOOD PRESSURE: 134 MMHG | OXYGEN SATURATION: 98 %

## 2024-12-10 LAB
ANION GAP SERPL CALC-SCNC: 10 MMOL/L (ref 8–16)
BASOPHILS # BLD AUTO: 0.03 K/UL (ref 0–0.2)
BASOPHILS NFR BLD: 0.3 % (ref 0–1.9)
BUN SERPL-MCNC: 12 MG/DL (ref 6–20)
CALCIUM SERPL-MCNC: 10.1 MG/DL (ref 8.7–10.5)
CHLORIDE SERPL-SCNC: 102 MMOL/L (ref 95–110)
CO2 SERPL-SCNC: 24 MMOL/L (ref 23–29)
CREAT SERPL-MCNC: 0.9 MG/DL (ref 0.5–1.4)
DIFFERENTIAL METHOD BLD: ABNORMAL
EOSINOPHIL # BLD AUTO: 0.1 K/UL (ref 0–0.5)
EOSINOPHIL NFR BLD: 0.7 % (ref 0–8)
ERYTHROCYTE [DISTWIDTH] IN BLOOD BY AUTOMATED COUNT: 13.5 % (ref 11.5–14.5)
EST. GFR  (NO RACE VARIABLE): >60 ML/MIN/1.73 M^2
GLUCOSE SERPL-MCNC: 111 MG/DL (ref 70–110)
HCT VFR BLD AUTO: 42.1 % (ref 37–48.5)
HGB BLD-MCNC: 13.5 G/DL (ref 12–16)
IMM GRANULOCYTES # BLD AUTO: 0.03 K/UL (ref 0–0.04)
IMM GRANULOCYTES NFR BLD AUTO: 0.3 % (ref 0–0.5)
LYMPHOCYTES # BLD AUTO: 1 K/UL (ref 1–4.8)
LYMPHOCYTES NFR BLD: 8.6 % (ref 18–48)
MAGNESIUM SERPL-MCNC: 2.1 MG/DL (ref 1.6–2.6)
MCH RBC QN AUTO: 27.9 PG (ref 27–31)
MCHC RBC AUTO-ENTMCNC: 32.1 G/DL (ref 32–36)
MCV RBC AUTO: 87 FL (ref 82–98)
MONOCYTES # BLD AUTO: 0.4 K/UL (ref 0.3–1)
MONOCYTES NFR BLD: 3.4 % (ref 4–15)
NEUTROPHILS # BLD AUTO: 10.4 K/UL (ref 1.8–7.7)
NEUTROPHILS NFR BLD: 86.7 % (ref 38–73)
NRBC BLD-RTO: 0 /100 WBC
OHS QRS DURATION: 72 MS
OHS QTC CALCULATION: 439 MS
PHOSPHATE SERPL-MCNC: 4.5 MG/DL (ref 2.7–4.5)
PLATELET # BLD AUTO: 302 K/UL (ref 150–450)
PMV BLD AUTO: 10.4 FL (ref 9.2–12.9)
POTASSIUM SERPL-SCNC: 4.1 MMOL/L (ref 3.5–5.1)
RBC # BLD AUTO: 4.84 M/UL (ref 4–5.4)
SODIUM SERPL-SCNC: 136 MMOL/L (ref 136–145)
WBC # BLD AUTO: 11.92 K/UL (ref 3.9–12.7)

## 2024-12-10 PROCEDURE — 83735 ASSAY OF MAGNESIUM: CPT | Performed by: NURSE PRACTITIONER

## 2024-12-10 PROCEDURE — 99900035 HC TECH TIME PER 15 MIN (STAT)

## 2024-12-10 PROCEDURE — 85025 COMPLETE CBC W/AUTO DIFF WBC: CPT | Performed by: NURSE PRACTITIONER

## 2024-12-10 PROCEDURE — 94761 N-INVAS EAR/PLS OXIMETRY MLT: CPT

## 2024-12-10 PROCEDURE — 25000003 PHARM REV CODE 250: Performed by: STUDENT IN AN ORGANIZED HEALTH CARE EDUCATION/TRAINING PROGRAM

## 2024-12-10 PROCEDURE — 36415 COLL VENOUS BLD VENIPUNCTURE: CPT | Performed by: NURSE PRACTITIONER

## 2024-12-10 PROCEDURE — 80048 BASIC METABOLIC PNL TOTAL CA: CPT | Performed by: NURSE PRACTITIONER

## 2024-12-10 PROCEDURE — 99232 SBSQ HOSP IP/OBS MODERATE 35: CPT | Mod: ,,, | Performed by: INTERNAL MEDICINE

## 2024-12-10 PROCEDURE — 36415 COLL VENOUS BLD VENIPUNCTURE: CPT | Performed by: INTERNAL MEDICINE

## 2024-12-10 PROCEDURE — 63600175 PHARM REV CODE 636 W HCPCS: Performed by: NURSE PRACTITIONER

## 2024-12-10 PROCEDURE — 97165 OT EVAL LOW COMPLEX 30 MIN: CPT

## 2024-12-10 PROCEDURE — 84165 PROTEIN E-PHORESIS SERUM: CPT | Performed by: INTERNAL MEDICINE

## 2024-12-10 PROCEDURE — 25000003 PHARM REV CODE 250: Performed by: INTERNAL MEDICINE

## 2024-12-10 PROCEDURE — 84100 ASSAY OF PHOSPHORUS: CPT | Performed by: NURSE PRACTITIONER

## 2024-12-10 PROCEDURE — 99222 1ST HOSP IP/OBS MODERATE 55: CPT | Mod: ,,, | Performed by: INTERNAL MEDICINE

## 2024-12-10 PROCEDURE — 25000003 PHARM REV CODE 250: Performed by: NURSE PRACTITIONER

## 2024-12-10 RX ORDER — PREDNISONE 20 MG/1
TABLET ORAL
Qty: 27 TABLET | Refills: 0 | Status: SHIPPED | OUTPATIENT
Start: 2024-12-11 | End: 2025-01-10

## 2024-12-10 RX ADMIN — ZOLPIDEM TARTRATE 5 MG: 5 TABLET, COATED ORAL at 04:12

## 2024-12-10 RX ADMIN — FOLIC ACID 1000 MCG: 1 TABLET ORAL at 08:12

## 2024-12-10 RX ADMIN — SPIRONOLACTONE 25 MG: 25 TABLET ORAL at 08:12

## 2024-12-10 RX ADMIN — FAMOTIDINE 20 MG: 20 TABLET ORAL at 08:12

## 2024-12-10 RX ADMIN — PREDNISONE 40 MG: 20 TABLET ORAL at 08:12

## 2024-12-10 RX ADMIN — BUPROPION HYDROCHLORIDE 300 MG: 150 TABLET, EXTENDED RELEASE ORAL at 08:12

## 2024-12-10 RX ADMIN — BENZONATATE 100 MG: 100 CAPSULE ORAL at 12:12

## 2024-12-10 NOTE — CARE UPDATE
12/10/24 0100   Patient Assessment/Suction   Level of Consciousness (AVPU) alert   Respiratory Effort Normal;Unlabored   PRE-TX-O2   Device (Oxygen Therapy) room air   SpO2 97 %   Pulse Oximetry Type Intermittent   $ Pulse Oximetry - Multiple Charge Pulse Oximetry - Multiple   Pulse 95   Resp 18   Positioning   Body Position position changed independently   Aerosol Therapy   $ Aerosol Therapy Charges Refused

## 2024-12-10 NOTE — ASSESSMENT & PLAN NOTE
CTA chest with no evidence of pulmonary embolism, Geographic areas of ground-glass opacity changes, bronchiectasis, and prominent intralobar and interlobular septal thickening that appears equal in magnitude as compared to the patient's prior imaging study.  In the setting of hilar mediastinal and hilar adenopathy, findings may reflecting advanced stage of sarcoid.     Discussed with Dr. Rolon, cont PO Prednisone, discharge with slow taper

## 2024-12-10 NOTE — PT/OT/SLP EVAL
"Occupational Therapy   Evaluation and Discharge Note    Name: Sumit Burger  MRN: [id number]  Admitting Diagnosis: Sarcoidosis, stage 3  Recent Surgery: * No surgery found *      Recommendations:     Discharge Recommendations: No Therapy Indicated  Discharge Equipment Recommendations:    Barriers to discharge:       Assessment:     Sumit Burger is a 54 y.o. female with a medical diagnosis of Sarcoidosis, stage 3. At this time, patient is functioning at their prior level of function and does not require further acute OT services.     Plan:     During this hospitalization, patient does not require further acute OT services.  Please re-consult if situation changes.    Plan of Care Reviewed with: patient    Subjective     Chief Complaint: "shortness of breath"  Patient/Family Comments/goals: "I'm ready to go home"    Occupational Profile:  Living Environment: pt lives with a minor dependent daughter in a H with not steps to enter and has a walk-in shower.  Previous level of function: Independent with all ADLs and IADLs.   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     Pain/Comfort:       Patients cultural, spiritual, Latter day conflicts given the current situation: no    Objective:     Communicated with: nurse prior to session.  Patient found supine with peripheral IV upon OT entry to room.    General Precautions: Standard,    Orthopedic Precautions:    Braces: N/A  Respiratory Status: Room air     Occupational Performance:    Bed Mobility:    Patient completed Rolling/Turning to Left with  supervision  Patient completed Sit to Supine with supervision    Functional Mobility/Transfers:  Patient completed Sit <> Stand Transfer with supervision  with  no assistive device   Functional Mobility: pt able to ambulate from bed to window with supervision.     Activities of Daily Living:  Pt denied support for self-care and personal hygiene. "     Cognitive/Visual Perceptual:  Cognitive/Psychosocial Skills:     -       Oriented to: Person, Place, Time, and Situation   -       Follows Commands/attention:Follows multistep  commands  -       Communication: clear/fluent  -       Safety awareness/insight to disability: intact     Physical Exam:  Balance:    -       Pt able to balance self sitting EOB and standing.   Upper Extremity Range of Motion:     -       Right Upper Extremity: WFL  -       Left Upper Extremity: WFL  Upper Extremity Strength:    -       Right Upper Extremity: WFL  -       Left Upper Extremity: WFL   Strength:    -       Right Upper Extremity: WFL  -       Left Upper Extremity: WFL  Gross motor coordination:   WFL    AMPAC 6 Click ADL:  AMPAC Total Score: 24    Treatment & Education:  Pt educated on role of OT/POC, importance of OOB/EOB activity, use of call bell, and safety during ADLs, transfers, and functional mobility.     Patient left supine with all lines intact    GOALS:   Multidisciplinary Problems       Occupational Therapy Goals       Not on file                    History:     Past Medical History:   Diagnosis Date    HTN (hypertension)     Pneumonia 10/2020    Sarcoidosis          Past Surgical History:   Procedure Laterality Date    BRONCHOALVEOLAR LAVAGE Bilateral 2024    Procedure: LAVAGE, BRONCHOALVEOLAR;  Surgeon: Chilango Zuluaga MD;  Location: [facility] ENDO;  Service: Pulmonary;  Laterality: Bilateral;    BRONCHOSCOPY WITH FLUOROSCOPY Right 10/20/2020    Procedure: BRONCHOSCOPY, WITH FLUOROSCOPY;  Surgeon: Ava Rolon MD;  Location: [facility] ENDO;  Service: Pulmonary;  Laterality: Right;     SECTION      x2    FALLOPIAN TUBOPLASTY      LAPAROSCOPIC APPENDECTOMY N/A 8/10/2022    Procedure: APPENDECTOMY, LAPAROSCOPIC;  Surgeon: Jackson Vogel MD;  Location: [facility] OR;  Service: General;  Laterality: N/A;    TUBAL LIGATION      WRIST FRACTURE SURGERY Right     ganglion cyst       Time Tracking:     OT Date  of Treatment: 12/10/24  OT Start Time: 0922  OT Stop Time: 0933  OT Total Time (min): 11 min    Billable Minutes:Evaluation 11    12/10/2024

## 2024-12-10 NOTE — DISCHARGE SUMMARY
[facility] Medicine  Discharge Summary      Patient Name: Sumit Burger  MRN: [id number]  ANA: [id number]  Patient Class: IP- Inpatient  Admission Date: 12/8/2024  Hospital Length of Stay: 1 days  Discharge Date and Time:  12/10/2024 11:47 AM  Attending Physician: Tammy Yoder MD   Discharging Provider: Tammy Yoder MD  Primary Care Provider: Administration, [facility]    Primary Care Team: Networked reference to record PCT     HPI:   54-year-old female with a past medical history of hypertension, pneumonia, sarcoidosis, and bronchiectasis who presents to the emergency room with reports of chest pain or shortness for breath for the last 3 days. Currently on prednisone however not alleviating symptoms. Associated generalized body aches, no lower extremity swelling     In the ER, hypertensive, tachycardic, CBC unremarkable CMP unremarkable, BNP troponin CPK normal limits TSH 1.077, Lactic 2.18, influenza negative COVID negative blood culture sent EKG with sinus tachycardia, QRS 80, , no STEMI.  Chest x-ray with  Diffuse interstitial pulmonary scar formation.  CTA chest negative for PE,Geographic areas of ground-glass opacity changes, bronchiectasis, and prominent intralobar and interlobular septal thickening   Admit to hospital medicine for hypoxia from sarcoidosis SUZIE.  Consult pulmonology continue IV antibiotics    * No surgery found *      Hospital Course:   Patient is a 54 year old female with history of sarcoidosis, SUZIE, presented with weakness, worsening SOB and episodes of diaphoresis. Patient was seen by pulmonary, states that she is stable from sarcoid standpoint. Request a consult for cardiology. Patient remained on RA. No evidence of pneumonia was found. Patient was seen by Cardiology and pulmonary and cleared for discharge. Patient was discharged on prednisone taper per pulmonary recommendations.      Goals of Care Treatment Preferences:  Code Status:  Full Code    Physical Exam  Vitals reviewed.   Constitutional:       Appearance: Normal appearance. She is not ill-appearing.   HENT:      Head: Normocephalic and atraumatic.      Mouth/Throat:      Mouth: Mucous membranes are moist.   Eyes:      Extraocular Movements: Extraocular movements intact.      Pupils: Pupils are equal, round, and reactive to light.   Cardiovascular:      Rate and Rhythm: Tachycardia present.   Pulmonary:      Breath sounds: diminished.   Abdominal:      General: Bowel sounds are normal. There is no distension.   Musculoskeletal:         General: Normal range of motion.      Cervical back: Neck supple.   Skin:     General: Skin is dry.      Capillary Refill: Capillary refill takes less than 2 seconds.   Neurological:      Mental Status: She is alert.   Psychiatric:         Mood and Affect: Mood normal.     SDOH Screening:  The patient declined to be screened for utility difficulties, food insecurity, transport difficulties, housing insecurity, and interpersonal safety, so no concerns could be identified this admission.     Consults:   Consults (From admission, onward)          Status Ordering Provider     Inpatient consult to Cardiology  Once        Provider:  Flip Soliman MD    Acknowledged FLORENTIN FOSTER     Inpatient consult to Pulmonology  Once        Provider:  Ava Rolon MD    Completed MARGARITA NAVARRO            * Sarcoidosis, stage 3  CTA chest with no evidence of pulmonary embolism, Geographic areas of ground-glass opacity changes, bronchiectasis, and prominent intralobar and interlobular septal thickening that appears equal in magnitude as compared to the patient's prior imaging study.  In the setting of hilar mediastinal and hilar adenopathy, findings may reflecting advanced stage of sarcoid.     Discussed with Dr. Rolon, cont PO Prednisone, discharge with slow taper       SUZIE (mycobacterium avium-intracellulare)  INH replaced with rifampin, continue ethambutol  and azithromycin. Prednisone weaned from 20 mg to 10 mg to 5 mg  Started prednisone taper           Hypertensive urgency  BP is now controlled    Home meds for hypertension were reviewed and noted below.   Hypertension Medications               amLODIPine (NORVASC) 5 MG tablet Take 1 tablet (5 mg total) by mouth once daily.    spironolactone (ALDACTONE) 25 MG tablet Take 25 mg by mouth once daily.    valsartan (DIOVAN) 160 MG tablet Take 1 tablet (160 mg total) by mouth every evening.          Cont above       Final Active Diagnoses:    Diagnosis Date Noted POA    PRINCIPAL PROBLEM:  Sarcoidosis, stage 3 [D86.0] 06/22/2024 Yes    SUZIE (mycobacterium avium-intracellulare) [A31.0] 12/08/2024 Yes    Hypertensive urgency [I16.0] 06/22/2024 Yes      Problems Resolved During this Admission:       Discharged Condition: good    Disposition: Home or Self Care    Follow Up:   Follow-up Information       [facility] 1 week(s).    Contact information:  [address] [facility] [address] [phone]                           Patient Instructions:   No discharge procedures on file.    Significant Diagnostic Studies: Labs: CMP   Recent Labs   Lab 12/08/24  1316 12/09/24  0432 12/10/24  0519    135* 136   K 4.3 4.2 4.1    103 102   CO2 23 25 24    85 111*   BUN 10 8 12   CREATININE 0.8 0.8 0.9   CALCIUM 10.4 9.9 10.1   PROT 9.0*  --   --    ALBUMIN 4.5  --   --    BILITOT 0.4  --   --    ALKPHOS 79  --   --    AST 25  --   --    ALT 26  --   --    ANIONGAP 11 7* 10    and CBC   Recent Labs   Lab 12/08/24  1316 12/09/24  0432 12/10/24  0519   WBC 8.64 5.25 11.92   HGB 15.3 13.2 13.5   HCT 45.3 41.7 42.1    323 302       Pending Diagnostic Studies:       Procedure Component Value Units Date/Time    Protein electrophoresis, serum [[id number]]     Order Status: Sent Lab Status: No result     Specimen: Blood            Medications:  Reconciled Home  Medications:      Medication List        START taking these medications      pyridoxine (vitamin B6) 50 MG Tab  Commonly known as: B-6  Take 1 tablet (50 mg total) by mouth every Mon, Wed, Fri.            CHANGE how you take these medications      amLODIPine 5 MG tablet  Commonly known as: NORVASC  Take 1 tablet (5 mg total) by mouth once daily.  What changed:   how much to take  additional instructions     predniSONE 20 MG tablet  Commonly known as: DELTASONE  Take 2 tablets (40 mg total) by mouth once daily for 4 days, THEN 1.5 tablets (30 mg total) once daily for 4 days, THEN 1 tablet (20 mg total) once daily for 4 days, THEN 0.5 tablets (10 mg total) once daily for 18 days.  Start taking on: December 11, 2024  What changed:   medication strength  See the new instructions.            CONTINUE taking these medications      azithromycin 500 MG tablet  Commonly known as: ZITHROMAX  Take 1 tablet (500 mg total) by mouth every Mon, Wed, Fri.     buPROPion 300 MG 24 hr tablet  Commonly known as: WELLBUTRIN XL  Take 300 mg by mouth once daily.     ethambutoL 400 MG Tab  Commonly known as: MYAMBUTOL  Take 3 tablets (1,200 mg total) by mouth every Mon, Wed, Fri. Take every Monday Wednesday and Friday     folic acid 1 MG tablet  Commonly known as: FOLVITE  Take 1 tablet by mouth once daily.     rifAMpin 300 MG capsule  Commonly known as: RIFADIN  Take 2 capsules (600 mg total) by mouth every Mon, Wed, Fri. Every Monday Wednesday and Friday     spironolactone 25 MG tablet  Commonly known as: ALDACTONE  Take 25 mg by mouth once daily.     valsartan 160 MG tablet  Commonly known as: DIOVAN  Take 1 tablet (160 mg total) by mouth every evening.     zolpidem 5 MG Tab  Commonly known as: AMBIEN  Take 1 tablet (5 mg total) by mouth nightly as needed (insomnia).            STOP taking these medications      isoniazid 300 MG Tab  Commonly known as: NYDRAZID              Indwelling Lines/Drains at time of discharge:    Lines/Drains/Airways       None                   Time spent on the discharge of patient: 40 minutes         Tammy Yoder MD  Department of Hospital Medicine  [facility]

## 2024-12-10 NOTE — CARE UPDATE
12/09/24 2004   Patient Assessment/Suction   Level of Consciousness (AVPU) alert   Respiratory Effort Normal;Unlabored   Expansion/Accessory Muscles/Retractions no use of accessory muscles   All Lung Fields Breath Sounds clear;equal bilaterally   PRE-TX-O2   Device (Oxygen Therapy) room air   SpO2 98 %   Pulse Oximetry Type Intermittent   $ Pulse Oximetry - Multiple Charge Pulse Oximetry - Multiple   Pulse 95   Resp 18   Positioning   Positioning/Transfer Devices pillows;in use   Aerosol Therapy   $ Aerosol Therapy Charges Refused   Education   $ Education Bronchodilator;15 min

## 2024-12-10 NOTE — ASSESSMENT & PLAN NOTE
INH replaced with rifampin, continue ethambutol and azithromycin. Prednisone weaned from 20 mg to 10 mg to 5 mg  Started prednisone taper

## 2024-12-10 NOTE — PLAN OF CARE
Problem: Adult Inpatient Plan of Care  Goal: Plan of Care Review  Outcome: Progressing  Goal: Patient-Specific Goal (Individualized)  Outcome: Progressing  Goal: Absence of Hospital-Acquired Illness or Injury  Outcome: Progressing  Goal: Optimal Comfort and Wellbeing  Outcome: Progressing  Goal: Readiness for Transition of Care  Outcome: Progressing     Problem: Pain Acute  Goal: Optimal Pain Control and Function  Outcome: Progressing     Problem: Hypertension Acute  Goal: Blood Pressure Within Desired Range  Outcome: Progressing

## 2024-12-10 NOTE — PT/OT/SLP PROGRESS
"Physical Therapy      Patient Name:  Sumit Burger   MRN:  [id number]    Patient not seen today secondary to Patient unwilling to participate, Other (Comment) (Pt reported being independent with mobility and wasn't willing to participate in evaluation as "I already did that with therapy earlier(OT)."). Will not follow-up per pt request.    "

## 2024-12-10 NOTE — CONSULTS
[facility]  Cardiology  Consult Note    Patient Name: Sumit Burger  MRN: [id number]  Admission Date: 2024  Hospital Length of Stay: 1 days  Code Status: Full Code   Attending Provider: No att. providers found   Consulting Provider: Flip Soliman MD  Primary Care Physician: Yasmany Beckford  Principal Problem:Sarcoidosis, stage 3    Patient information was obtained from patient and ER records.     Inpatient consult to Cardiology  Consult performed by: Flip Soliman MD  Consult ordered by: Tammy Yoder MD  Reason for consult: Diaphoresis with exertion        Subjective:     54-year-old female cardiology consulted for exertional diaphoresis.  Patient reports that when she walks she gets sweating episodes.  Does not report any specific chest discomfort.    Past Medical History:   Diagnosis Date    HTN (hypertension)     Pneumonia 10/2020    Sarcoidosis        Past Surgical History:   Procedure Laterality Date    BRONCHOALVEOLAR LAVAGE Bilateral 2024    Procedure: LAVAGE, BRONCHOALVEOLAR;  Surgeon: Chilango Zuluaga MD;  Location: [facility] ENDO;  Service: Pulmonary;  Laterality: Bilateral;    BRONCHOSCOPY WITH FLUOROSCOPY Right 10/20/2020    Procedure: BRONCHOSCOPY, WITH FLUOROSCOPY;  Surgeon: Ava Rolon MD;  Location: [facility] ENDO;  Service: Pulmonary;  Laterality: Right;     SECTION      x2    FALLOPIAN TUBOPLASTY      LAPAROSCOPIC APPENDECTOMY N/A 8/10/2022    Procedure: APPENDECTOMY, LAPAROSCOPIC;  Surgeon: Jakcson Vogel MD;  Location: [facility] OR;  Service: General;  Laterality: N/A;    TUBAL LIGATION      WRIST FRACTURE SURGERY Right     ganglion cyst       Review of patient's allergies indicates:   Allergen Reactions    Sulfamethoxazole-trimethoprim Hives, Shortness Of Breath, Nausea And Vomiting and Rash     Hives, nausea and vomiting, and shortness of breath.  Did not require admission  Hives, nausea and vomiting, and shortness of breath.  Did not  require admission    Benadryl [diphenhydramine hcl]     Latex, natural rubber     Percocet [oxycodone-acetaminophen]     Shrimp     Codeine Itching and Nausea Only       No current facility-administered medications on file prior to encounter.     Current Outpatient Medications on File Prior to Encounter   Medication Sig    amLODIPine (NORVASC) 5 MG tablet Take 1 tablet (5 mg total) by mouth once daily. (Patient taking differently: Take 10 [facility]    azithromycin (ZITHROMAX) 500 MG tablet Take 1 tablet (500 mg total) by mouth every Mon, Wed, Fri.    buPROPion (WELLBUTRIN XL) 300 MG 24 hr tablet Take 300 mg by mouth once daily.    ethambutoL (MYAMBUTOL) 400 MG Tab Take 3 tablets (1,200 mg total) by mouth every Mon, Wed, Fri. Take every  and Friday    folic acid (FOLVITE) 1 MG tablet Take 1 tablet by mouth once daily.    pyridoxine, vitamin B6, (B-6) 50 MG Tab Take 1 tablet (50 mg total) by mouth every Mon, Wed, Fri.    rifAMpin (RIFADIN) 300 MG capsule Take 2 capsules (600 mg total) by mouth every Mon, Wed, Fri. Every  and Friday    spironolactone (ALDACTONE) 25 MG tablet Take 25 mg by mouth once daily.    valsartan (DIOVAN) 160 MG tablet Take 1 tablet (160 mg total) by mouth every evening.    zolpidem (AMBIEN) 5 MG Tab Take 1 tablet (5 mg total) by mouth nightly as needed (insomnia).    [DISCONTINUED] predniSONE (DELTASONE) 10 MG tablet Take 4 tablets (40 mg total) by mouth once daily. With food    [DISCONTINUED] isoniazid (NYDRAZID) 300 MG Tab Take 2 tablets (600 mg total) by mouth every Mon, Wed, Fri. (Patient not taking: Reported on 2024)     Family History       Problem Relation (Age of Onset)    Hypertension Mother          Tobacco Use    Smoking status: Former     Current packs/day: 0.00     Types: Cigarettes     Quit date: 2020     Years since quittin.6    Smokeless tobacco: Never    Tobacco comments:     ocassionally never was qd  "  Substance and Sexual Activity    Alcohol use: Not Currently    Drug use: Never    Sexual activity: Not Currently     Review of Systems   All other systems reviewed and are negative.    Objective:     Vital Signs (Most Recent):  Temp: 98.8 °F (37.1 °C) (12/10/24 1103)  Pulse: 104 (12/10/24 1103)  Resp: 19 (12/10/24 1103)  BP: 134/88 (12/10/24 1103)  SpO2: 98 % (12/10/24 1103) Vital Signs (24h Range):  Temp:  [97.5 °F (36.4 °C)-98.8 °F (37.1 °C)] 98.8 °F (37.1 °C)  Pulse:  [] 104  Resp:  [18-19] 19  SpO2:  [97 %-100 %] 98 %  BP: (119-136)/() 134/88     Weight: 68.6 kg (151 lb 4.8 oz)  Body mass index is 26.8 kg/m².    SpO2: 98 %         Intake/Output Summary (Last 24 hours) at 12/10/2024 1424  Last data filed at 12/10/2024 1027  Gross per 24 hour   Intake 120 ml   Output --   Net 120 ml       Lines/Drains/Airways       Peripheral Intravenous Line  Duration                  Peripheral IV - Single Lumen 12/10/24 0310 20 G Anterior;Left Upper Arm <1 day                    Physical Exam  Vitals reviewed.   Cardiovascular:      Rate and Rhythm: Normal rate and regular rhythm.      Heart sounds: No murmur heard.     No friction rub. No gallop.   Pulmonary:      Effort: Pulmonary effort is normal.      Breath sounds: Normal breath sounds.   Skin:     General: Skin is warm.   Neurological:      General: No focal deficit present.      Mental Status: She is alert and oriented to person, place, and time.         Significant Labs: BMP:   Recent Labs   Lab 12/09/24  0432 12/10/24  0519   GLU 85 111*   * 136   K 4.2 4.1    102   CO2 25 24   BUN 8 12   CREATININE 0.8 0.9   CALCIUM 9.9 10.1   MG 1.9 2.1   , CBC   Recent Labs   Lab 12/09/24  0432 12/10/24  0519   WBC 5.25 11.92   HGB 13.2 13.5   HCT 41.7 42.1    302   , and Troponin No results for input(s): "TROPONINI" in the last 48 hours.    Significant Imaging: Echocardiogram: 2D echo with color flow doppler: No results found for this or any " previous visit. and Transthoracic echo (TTE) complete (Cupid Only):   Results for orders placed or performed during the hospital encounter of 12/08/24   Echo   Result Value Ref Range    Deutsch's Biplane MOD Ejection Fraction 50 %    A2C EF 53 %    A4C EF 50 %    LVOT stroke volume 58.1 cm3    LV Systolic Volume 22.10 mL    LV Systolic Volume Index 12.8 mL/m2    LV Diastolic Volume 43.90 mL    LV Diastolic Volume Index 25.52 mL/m2    LV EDV A2C [id number] mL    LV EDV A4C 47.40 mL    LVOT diameter 2.0 cm    LVOT area 3.1 cm2    MV Peak E Sagar 0.66 m/s    TDI LATERAL 0.08 m/s    TDI SEPTAL 0.07 m/s    E/E' ratio 8.80 m/s    MV Peak A Sagar 0.82 m/s    TR Max Sagar 2.63 m/s    E/A ratio 0.80     E wave deceleration time 148.00 msec    LV SEPTAL E/E' RATIO 9.43 m/s    LV LATERAL E/E' RATIO 8.25 m/s    LVOT peak sagar 1.2 m/s    Left Ventricular Outflow Tract Mean Velocity 0.76 cm/s    Left Ventricular Outflow Tract Mean Gradient 3.00 mmHg    RV S' 13.80 cm/s    AV mean gradient 5.0 mmHg    AV peak gradient 9.0 mmHg    Ao peak sagar 1.5 m/s    Ao VTI 24.3 cm    LVOT peak VTI 18.5 cm    AV valve area 2.4 cm²    AV Velocity Ratio 0.80     AV index (prosthetic) 0.76     YOUNG by Velocity Ratio 2.5 cm²    MV mean gradient 2 mmHg    MV peak gradient 4 mmHg    MV valve area by continuity eq 2.92 cm2    MV VTI 19.9 cm    Triscuspid Valve Regurgitation Peak Gradient 28 mmHg    PV PEAK VELOCITY 0.84 m/s    PV peak gradient 3 mmHg    Pulmonary Valve Mean Velocity 0.57 m/s    Ao root annulus 2.70 cm    IVC diameter 1.50 cm    Mean e' 0.08 m/s    AORTIC VALVE CUSP SEPERATION 1.70 cm    BSA 1.75 m2    Narrative      Left Ventricle: The left ventricle is normal in size. Normal wall   thickness. There is normal systolic function with a visually estimated   ejection fraction of 60 - 65%. Grade I diastolic dysfunction. E/A ratio is   0.80.    Right Ventricle: Normal right ventricular cavity size. Systolic   function is normal.    Mitral  Valve: There is mild regurgitation.    Tricuspid Valve: There is mild to moderate regurgitation. The estimated   PA systolic pressure is at least 28 mmHg.       Assessment and Plan:     Exertional diaphoresis  Malaise    Recommendation:  - echo normal, troponin negative.  EKG does not show any clear ischemic changes.  Atypical anginal symptoms, given risk factors can evaluate further with a stress test.  Patient offered a stress test for further evaluation but patient reports she would like to follow up with the own cardiologist with whom she has an appointment in the next 2 days.        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 I will sign off. Please contact us if you have any additional questions.    Flip Soliman MD  Cardiology   [facility]

## 2024-12-10 NOTE — PROGRESS NOTES
Pulmonary/Critical Care Progress Note      PATIENT NAME: Sumit Burger  MRN: [id number]  TODAY'S DATE: 12/10/2024  1:53 PM  ADMIT DATE: 2024  AGE: 54 y.o. : 1970    CONSULT REQUESTED BY: Tammy Yoder MD    REASON FOR CONSULT:   Sarcoidosis    HPI:  The patient states she started coughing on Thursday and by Friday was feeling really bad and continued to cough a great deal despite taking 2 days of 30 mg of prednisone.  The patient is feeling very weak.  She is having episodes of diaphoresis.  They come in waves.  She feels so bad she thinks she is dying.  Her sats are 100%.  Her lungs are clear.  Her CT scan is stable.  I do not think this is a pulmonary issue.    12/10 the patient is anxious to go home.  She does not feel that anything is being done for her.  So far the only abnormality the we have been able to find his that her total protein is a little high.  She states she feels weak and terrible.  She states she becomes diaphoretic with any activity.    REVIEW OF SYSTEMS  GENERAL: Feeling very weak and having episodes of diaphoresis  EYES: Vision is good.  ENT: No sinusitis or pharyngitis.   HEART: No chest pain or palpitations.  LUNGS:  She was coughing a great deal at home but this is now resolved  GI: No Nausea, vomiting, constipation, diarrhea, or reflux.  : No dysuria, hesitancy, or nocturia.  SKIN: No lesions or rashes.  MUSCULOSKELETAL:  She complains of severe myalgias and arthralgias all over her body.  NEURO: No headaches or neuropathy.  LYMPH: No edema or adenopathy.  PSYCH: No anxiety or depression.  ENDO: No weight change.    No change in the patient's Past Medical History, Past Surgical History, Social History or Family History since admission.      VITAL SIGNS (MOST RECENT)  Temp: 98.8 °F (37.1 °C) (12/10/24 1103)  Pulse: 104 (12/10/24 1103)  Resp: 19 (12/10/24 1103)  BP: 134/88 (12/10/24 1103)  SpO2: 98 % (12/10/24 1103)    INTAKE AND OUTPUT (LAST 24  HOURS):  Intake/Output Summary (Last 24 hours) at 12/10/2024 1339  Last data filed at 12/10/2024 1027  Gross per 24 hour   Intake 360 ml   Output --   Net 360 ml       WEIGHT  Wt Readings from Last 1 Encounters:   12/08/24 68.6 kg (151 lb 4.8 oz)       PHYSICAL EXAM  GENERAL:  Mid aged patient in no distress.  HEENT: Pupils equal and reactive. Extraocular movements intact. Nose intact. Pharynx moist.  NECK: Supple.   HEART: Regular rate and rhythm. No murmur or gallop auscultated.  LUNGS: Clear to auscultation and percussion. Lung excursion symmetrical. No change in fremitus. No adventitial noises.  ABDOMEN: Bowel sounds present. Non-tender, no masses palpated.  : Normal anatomy.  EXTREMITIES: Normal muscle tone and joint movement, no cyanosis or clubbing.   LYMPHATICS: No adenopathy palpated, no edema.  SKIN:  Dry,  intact, no lesions.   NEURO: Cranial nerves II-XII intact. Motor strength 5/5 bilaterally, upper and lower extremities.  PSYCH:  Depressed and crying      CBC LAST (LAST 24 HOURS)  Recent Labs   Lab 12/10/24  0519   WBC 11.92   RBC 4.84   HGB 13.5   HCT 42.1   MCV 87   MCH 27.9   MCHC 32.1   RDW 13.5      MPV 10.4   GRAN 86.7*  10.4*   LYMPH 8.6*  1.0   MONO 3.4*  0.4   BASO 0.03   NRBC 0       CHEMISTRY LAST (LAST 24 HOURS)  Recent Labs   Lab 12/10/24  0519      K 4.1      CO2 24   ANIONGAP 10   BUN 12   CREATININE 0.9   *   CALCIUM 10.1   MG 2.1       CARDIAC PROFILE (LAST 24 HOURS)  Recent Labs   Lab 12/08/24  1316 12/09/24  1521   BNP 26  --      --    TROPONINIHS 4.1 2.5       LAST 7 DAYS MICROBIOLOGY   Microbiology Results (last 7 days)       Procedure Component Value Units Date/Time    Blood culture x two cultures. Draw prior to antibiotics. [[id number]] Collected: 12/08/24 1319    Order Status: Completed Specimen: Blood from Peripheral, Hand, Right Updated: 12/09/24 1432     Blood Culture, Routine No Growth to date      No Growth to date    Narrative:       Aerobic and anaerobic    Blood culture x two cultures. Draw prior to antibiotics. [[id number]] Collected: 12/08/24 1328    Order Status: Completed Specimen: Blood from Peripheral, Hand, Left Updated: 12/09/24 1432     Blood Culture, Routine No Growth to date      No Growth to date    Narrative:      Aerobic and anaerobic            MOST RECENT IMAGING  Echo    Left Ventricle: The left ventricle is normal in size. Normal wall   thickness. There is normal systolic function with a visually estimated   ejection fraction of 60 - 65%. Grade I diastolic dysfunction. E/A ratio is   0.80.    Right Ventricle: Normal right ventricular cavity size. Systolic   function is normal.    Mitral Valve: There is mild regurgitation.    Tricuspid Valve: There is mild to moderate regurgitation. The estimated   PA systolic pressure is at least 28 mmHg.      CURRENT VISIT EKG  Results for orders placed or performed during the hospital encounter of 12/08/24   EKG 12-lead   Result Value Ref Range    QRS Duration 80 ms    [facility] QTC Calculation 463 ms    Narrative    Test Reason : R06.02,    Vent. Rate : 126 BPM     Atrial Rate : 126 BPM     P-R Int : 118 ms          QRS Dur :  80 ms      QT Int : 320 ms       P-R-T Axes :  46   2  54 degrees    QTcB Int : 463 ms    Sinus tachycardia  Biatrial enlargement  Minimal voltage criteria for LVH, may be normal variant ( R in aVL )  Abnormal ECG  When compared with ECG of 22-Jun-2024 11:08,  No significant change was found  Confirmed by Geraldo Monteiro ([id number]) on 12/8/2024 3:51:31 PM    Referred By: AAAREFERRAL SELF           Confirmed By: Geraldo Monteiro       ECHOCARDIOGRAM RESULTS  Results for orders placed during the hospital encounter of 06/30/23    Echo    Interpretation Summary  · The left ventricle is normal in size with concentric remodeling and normal systolic function.  · The estimated ejection fraction is 65%.  · Normal left ventricular diastolic function.  · Mild tricuspid regurgitation.  ·  Moderate left atrial enlargement.  · Mild mitral regurgitation.  · The estimated PA systolic pressure is 34 mmHg.  · Normal right ventricular size with normal right ventricular systolic function.  · Normal central venous pressure (3 mmHg).        VENTILATOR INFORMATION     The patient is on room air       IMPRESSION AND PLAN  Acute illness beginning Thursday with cough, myalgias, arthralgias, and episodes of diaphoresis.  Flu and COVID negative. The cough settled with 30 mg of prednisone.  Can not explain what is causing her diaphoresis.  Patient is anxious to go home.  Will draw a serum protein electrophoresis and follow this up as an outpatient.  Patient should go home on her 30 of prednisone and we will wean this quickly back down and off.  She knows to call me in a week.    Ava Rolon MD  Date of Service: 12/10/2024  1:53 PM

## 2024-12-10 NOTE — TELEPHONE ENCOUNTER
Messaged pt and let her know dr lane was not in the office until after 12pm she was still at the hospital making her rounds.  I messaged Dr Lane and she said she will go see her when she can.

## 2024-12-10 NOTE — TELEPHONE ENCOUNTER
----- Message from Opal sent at 12/10/2024  9:47 AM CST -----  Regarding: Hospital follow up  Patient says she is in the hospital and her new blood work came back and her numbers are higher then yesterday and she would to know if  or Nurse Mariel can come pass by her room and speak with here, she doesn't want to go home with out the right dosage of medication       Rm# [id number]

## 2024-12-10 NOTE — PLAN OF CARE
Charts and orders reviewed. Pt to discharge home.  put instructions on [facility]'s [facility] and [facility]. Pt has no other needs to be addressed by case management. Pt cleared to discharge from case management standpoint, after cleared by cards.    Pt family will be transporting pt home from [facility].       12/10/24 1143   Final Note   Assessment Type Final Discharge Note   Anticipated Discharge Disposition Home   What phone number can be called within the next 1-3 days to see how you are doing after discharge? [phone]   Post-Acute Status   Coverage [facility] [place] [facility] [employer]   Discharge Delays (!) Waiting for Provider to Speak to Patient

## 2024-12-13 LAB
ALBUMIN SERPL ELPH-MCNC: 3.7 G/DL (ref 2.9–4.4)
ALBUMIN/GLOB SERPL: 0.9 {RATIO} (ref 0.7–1.7)
ALPHA1 GLOB SERPL ELPH-MCNC: 0.3 G/DL (ref 0–0.4)
ALPHA2 GLOB SERPL ELPH-MCNC: 1 G/DL (ref 0.4–1)
B-GLOBULIN SERPL ELPH-MCNC: 1.3 G/DL (ref 0.7–1.3)
BACTERIA BLD CULT: NORMAL
BACTERIA BLD CULT: NORMAL
GAMMA GLOB SERPL ELPH-MCNC: 1.5 G/DL (ref 0.4–1.8)
GLOBULIN SER CALC-MCNC: 4.1 G/DL (ref 2.2–3.9)
LABORATORY COMMENT REPORT: ABNORMAL
M PROTEIN SERPL ELPH-MCNC: ABNORMAL G/DL
PROT SERPL-MCNC: 7.8 G/DL (ref 6–8.5)

## 2025-01-26 DIAGNOSIS — F19.982 DRUG-INDUCED INSOMNIA: ICD-10-CM

## 2025-01-27 RX ORDER — ZOLPIDEM TARTRATE 5 MG/1
5 TABLET ORAL NIGHTLY PRN
Qty: 30 TABLET | Refills: 5 | Status: SHIPPED | OUTPATIENT
Start: 2025-01-27 | End: 2025-07-28

## 2025-01-28 DIAGNOSIS — M25.511 RIGHT SHOULDER PAIN, UNSPECIFIED CHRONICITY: Primary | ICD-10-CM

## 2025-01-30 ENCOUNTER — TELEPHONE (OUTPATIENT)
Dept: PULMONOLOGY | Facility: CLINIC | Age: 55
End: 2025-01-30
Payer: OTHER GOVERNMENT

## 2025-01-30 DIAGNOSIS — D86.0 SARCOIDOSIS, STAGE 3: Primary | ICD-10-CM

## 2025-01-30 NOTE — TELEPHONE ENCOUNTER
----- Message from Ava Rolon MD sent at 1/30/2025 12:36 PM CST -----  Regarding: RE: Follow up Appt  Don't need labs and will place order for CXR  ----- Message -----  From: Jose L Monaco MA  Sent: 1/29/2025   4:50 PM CST  To: Ava Rolon MD  Subject: Follow up Appt                                   Patient requested appt to follow up from recent hospitalization. She has been scheduled for tomorrow 1/30/2025. She states you usually want labs and chest xray prior. Would you like this done prior to her appt tomorrow?   Thanks  Jose L

## 2025-02-25 ENCOUNTER — HOSPITAL ENCOUNTER (OUTPATIENT)
Dept: RADIOLOGY | Facility: HOSPITAL | Age: 55
Discharge: HOME | End: 2025-02-25
Attending: INTERNAL MEDICINE
Payer: OTHER GOVERNMENT

## 2025-02-25 ENCOUNTER — RESULTS FOLLOW-UP (OUTPATIENT)
Dept: PULMONOLOGY | Facility: HOSPITAL | Age: 55
End: 2025-02-25

## 2025-02-25 DIAGNOSIS — D86.0 SARCOIDOSIS, STAGE 3: ICD-10-CM

## 2025-02-25 PROCEDURE — 71046 X-RAY EXAM CHEST 2 VIEWS: CPT | Mod: 26,,, | Performed by: RADIOLOGY

## 2025-02-25 PROCEDURE — 71046 X-RAY EXAM CHEST 2 VIEWS: CPT | Mod: TC,PO

## 2025-03-12 ENCOUNTER — HOSPITAL ENCOUNTER (INPATIENT)
Facility: HOSPITAL | Age: 55
LOS: 8 days | Discharge: HOME | DRG: 196 | End: 2025-03-20
Attending: STUDENT IN AN ORGANIZED HEALTH CARE EDUCATION/TRAINING PROGRAM | Admitting: INTERNAL MEDICINE
Payer: OTHER GOVERNMENT

## 2025-03-12 DIAGNOSIS — J22 LOWER RESPIRATORY INFECTION: ICD-10-CM

## 2025-03-12 DIAGNOSIS — R06.02 SHORTNESS OF BREATH AT REST: ICD-10-CM

## 2025-03-12 DIAGNOSIS — J18.9 ACUTE PNEUMONIA: ICD-10-CM

## 2025-03-12 DIAGNOSIS — F41.0 PANIC ATTACKS: ICD-10-CM

## 2025-03-12 DIAGNOSIS — R65.20 SEVERE SEPSIS: Primary | ICD-10-CM

## 2025-03-12 DIAGNOSIS — R06.09 DYSPNEA ON EXERTION: ICD-10-CM

## 2025-03-12 DIAGNOSIS — R06.03 RESPIRATORY DISTRESS: ICD-10-CM

## 2025-03-12 DIAGNOSIS — R07.9 CHEST PAIN: ICD-10-CM

## 2025-03-12 DIAGNOSIS — D86.0 SARCOIDOSIS, STAGE 3: ICD-10-CM

## 2025-03-12 DIAGNOSIS — A41.9 SEVERE SEPSIS: Primary | ICD-10-CM

## 2025-03-12 PROBLEM — R09.02 HYPOXIA: Status: ACTIVE | Noted: 2025-03-12

## 2025-03-12 LAB
ALBUMIN SERPL BCP-MCNC: 4.7 G/DL (ref 3.5–5.2)
ALP SERPL-CCNC: 87 U/L (ref 55–135)
ALT SERPL W/O P-5'-P-CCNC: 25 U/L (ref 10–44)
ANION GAP SERPL CALC-SCNC: 12 MMOL/L (ref 8–16)
AST SERPL-CCNC: 22 U/L (ref 10–40)
BASOPHILS NFR BLD: 0 % (ref 0–1.9)
BILIRUB SERPL-MCNC: 0.8 MG/DL (ref 0.1–1)
BILIRUB UR QL STRIP: NEGATIVE
BNP SERPL-MCNC: 10 PG/ML (ref 0–99)
BUN SERPL-MCNC: 9 MG/DL (ref 6–20)
CALCIUM SERPL-MCNC: 10.9 MG/DL (ref 8.7–10.5)
CHLORIDE SERPL-SCNC: 101 MMOL/L (ref 95–110)
CLARITY UR: CLEAR
CO2 SERPL-SCNC: 23 MMOL/L (ref 23–29)
COLOR UR: YELLOW
CREAT SERPL-MCNC: 0.8 MG/DL (ref 0.5–1.4)
DIFFERENTIAL METHOD BLD: ABNORMAL
EOSINOPHIL NFR BLD: 0 % (ref 0–8)
ERYTHROCYTE [DISTWIDTH] IN BLOOD BY AUTOMATED COUNT: 14.3 % (ref 11.5–14.5)
EST. GFR  (NO RACE VARIABLE): >60 ML/MIN/1.73 M^2
GLUCOSE SERPL-MCNC: 87 MG/DL (ref 70–110)
GLUCOSE UR QL STRIP: ABNORMAL
GROUP A STREP, MOLECULAR: NEGATIVE
HCT VFR BLD AUTO: 44.2 % (ref 37–48.5)
HGB BLD-MCNC: 14.7 G/DL (ref 12–16)
HGB UR QL STRIP: ABNORMAL
IMM GRANULOCYTES # BLD AUTO: ABNORMAL K/UL (ref 0–0.04)
IMM GRANULOCYTES NFR BLD AUTO: ABNORMAL % (ref 0–0.5)
INFLUENZA A, MOLECULAR: NEGATIVE
INFLUENZA B, MOLECULAR: NEGATIVE
KETONES UR QL STRIP: ABNORMAL
LACTATE SERPL-SCNC: 0.9 MMOL/L (ref 0.5–1.9)
LDH SERPL L TO P-CCNC: 1.52 MMOL/L (ref 0.5–2.2)
LEUKOCYTE ESTERASE UR QL STRIP: NEGATIVE
LYMPHOCYTES NFR BLD: 18 % (ref 18–48)
MAGNESIUM SERPL-MCNC: 1.8 MG/DL (ref 1.6–2.6)
MCH RBC QN AUTO: 27.7 PG (ref 27–31)
MCHC RBC AUTO-ENTMCNC: 33.3 G/DL (ref 32–36)
MCV RBC AUTO: 83 FL (ref 82–98)
MICROSCOPIC COMMENT: NORMAL
MONOCYTES NFR BLD: 3 % (ref 4–15)
NEUTROPHILS NFR BLD: 79 % (ref 38–73)
NITRITE UR QL STRIP: NEGATIVE
NRBC BLD-RTO: 0 /100 WBC
OHS QRS DURATION: 74 MS
OHS QTC CALCULATION: 441 MS
PH UR STRIP: 6 [PH] (ref 5–8)
PLATELET # BLD AUTO: 303 K/UL (ref 150–450)
PLATELET BLD QL SMEAR: ABNORMAL
PMV BLD AUTO: 11 FL (ref 9.2–12.9)
POTASSIUM SERPL-SCNC: 3.5 MMOL/L (ref 3.5–5.1)
PROT SERPL-MCNC: 8.8 G/DL (ref 6–8.4)
PROT UR QL STRIP: ABNORMAL
RBC # BLD AUTO: 5.3 M/UL (ref 4–5.4)
RBC #/AREA URNS HPF: 1 /HPF (ref 0–4)
SAMPLE: NORMAL
SARS-COV-2 RDRP RESP QL NAA+PROBE: NEGATIVE
SODIUM SERPL-SCNC: 136 MMOL/L (ref 136–145)
SP GR UR STRIP: 1.02 (ref 1–1.03)
SPECIMEN SOURCE: NORMAL
SQUAMOUS #/AREA URNS HPF: 1 /HPF
TROPONIN I SERPL HS-MCNC: 3.8 PG/ML (ref 0–14.9)
TROPONIN I SERPL HS-MCNC: 3.9 PG/ML (ref 0–14.9)
URN SPEC COLLECT METH UR: ABNORMAL
UROBILINOGEN UR STRIP-ACNC: NEGATIVE EU/DL
WBC # BLD AUTO: 13.1 K/UL (ref 3.9–12.7)
WBC #/AREA URNS HPF: 0 /HPF (ref 0–5)

## 2025-03-12 PROCEDURE — 96361 HYDRATE IV INFUSION ADD-ON: CPT

## 2025-03-12 PROCEDURE — 87635 SARS-COV-2 COVID-19 AMP PRB: CPT | Performed by: EMERGENCY MEDICINE

## 2025-03-12 PROCEDURE — 85007 BL SMEAR W/DIFF WBC COUNT: CPT | Performed by: STUDENT IN AN ORGANIZED HEALTH CARE EDUCATION/TRAINING PROGRAM

## 2025-03-12 PROCEDURE — 25000003 PHARM REV CODE 250: Performed by: INTERNAL MEDICINE

## 2025-03-12 PROCEDURE — 94761 N-INVAS EAR/PLS OXIMETRY MLT: CPT

## 2025-03-12 PROCEDURE — 80053 COMPREHEN METABOLIC PANEL: CPT | Performed by: STUDENT IN AN ORGANIZED HEALTH CARE EDUCATION/TRAINING PROGRAM

## 2025-03-12 PROCEDURE — 84484 ASSAY OF TROPONIN QUANT: CPT | Performed by: STUDENT IN AN ORGANIZED HEALTH CARE EDUCATION/TRAINING PROGRAM

## 2025-03-12 PROCEDURE — 99291 CRITICAL CARE FIRST HOUR: CPT

## 2025-03-12 PROCEDURE — 93005 ELECTROCARDIOGRAM TRACING: CPT | Performed by: INTERNAL MEDICINE

## 2025-03-12 PROCEDURE — 96374 THER/PROPH/DIAG INJ IV PUSH: CPT

## 2025-03-12 PROCEDURE — 81001 URINALYSIS AUTO W/SCOPE: CPT | Performed by: STUDENT IN AN ORGANIZED HEALTH CARE EDUCATION/TRAINING PROGRAM

## 2025-03-12 PROCEDURE — 25000242 PHARM REV CODE 250 ALT 637 W/ HCPCS: Performed by: INTERNAL MEDICINE

## 2025-03-12 PROCEDURE — 96375 TX/PRO/DX INJ NEW DRUG ADDON: CPT

## 2025-03-12 PROCEDURE — 83880 ASSAY OF NATRIURETIC PEPTIDE: CPT | Performed by: STUDENT IN AN ORGANIZED HEALTH CARE EDUCATION/TRAINING PROGRAM

## 2025-03-12 PROCEDURE — 87040 BLOOD CULTURE FOR BACTERIA: CPT | Mod: 59 | Performed by: STUDENT IN AN ORGANIZED HEALTH CARE EDUCATION/TRAINING PROGRAM

## 2025-03-12 PROCEDURE — 94799 UNLISTED PULMONARY SVC/PX: CPT

## 2025-03-12 PROCEDURE — 99900031 HC PATIENT EDUCATION (STAT)

## 2025-03-12 PROCEDURE — 94640 AIRWAY INHALATION TREATMENT: CPT

## 2025-03-12 PROCEDURE — 12000002 HC ACUTE/MED SURGE SEMI-PRIVATE ROOM

## 2025-03-12 PROCEDURE — 93010 ELECTROCARDIOGRAM REPORT: CPT | Mod: ,,, | Performed by: INTERNAL MEDICINE

## 2025-03-12 PROCEDURE — 25000003 PHARM REV CODE 250: Performed by: STUDENT IN AN ORGANIZED HEALTH CARE EDUCATION/TRAINING PROGRAM

## 2025-03-12 PROCEDURE — 27000221 HC OXYGEN, UP TO 24 HOURS

## 2025-03-12 PROCEDURE — 87651 STREP A DNA AMP PROBE: CPT | Performed by: EMERGENCY MEDICINE

## 2025-03-12 PROCEDURE — 63600175 PHARM REV CODE 636 W HCPCS: Mod: JZ | Performed by: INTERNAL MEDICINE

## 2025-03-12 PROCEDURE — 25500020 PHARM REV CODE 255: Performed by: STUDENT IN AN ORGANIZED HEALTH CARE EDUCATION/TRAINING PROGRAM

## 2025-03-12 PROCEDURE — 36415 COLL VENOUS BLD VENIPUNCTURE: CPT | Performed by: STUDENT IN AN ORGANIZED HEALTH CARE EDUCATION/TRAINING PROGRAM

## 2025-03-12 PROCEDURE — 63600175 PHARM REV CODE 636 W HCPCS: Performed by: STUDENT IN AN ORGANIZED HEALTH CARE EDUCATION/TRAINING PROGRAM

## 2025-03-12 PROCEDURE — 63600175 PHARM REV CODE 636 W HCPCS: Performed by: INTERNAL MEDICINE

## 2025-03-12 PROCEDURE — 83735 ASSAY OF MAGNESIUM: CPT | Performed by: STUDENT IN AN ORGANIZED HEALTH CARE EDUCATION/TRAINING PROGRAM

## 2025-03-12 PROCEDURE — 99900035 HC TECH TIME PER 15 MIN (STAT)

## 2025-03-12 PROCEDURE — 85027 COMPLETE CBC AUTOMATED: CPT | Performed by: STUDENT IN AN ORGANIZED HEALTH CARE EDUCATION/TRAINING PROGRAM

## 2025-03-12 PROCEDURE — 83605 ASSAY OF LACTIC ACID: CPT | Performed by: STUDENT IN AN ORGANIZED HEALTH CARE EDUCATION/TRAINING PROGRAM

## 2025-03-12 PROCEDURE — 87502 INFLUENZA DNA AMP PROBE: CPT | Performed by: EMERGENCY MEDICINE

## 2025-03-12 PROCEDURE — 63700000 PHARM REV CODE 250 ALT 637 W/O HCPCS: Performed by: INTERNAL MEDICINE

## 2025-03-12 RX ORDER — ALUMINUM HYDROXIDE, MAGNESIUM HYDROXIDE, AND SIMETHICONE 1200; 120; 1200 MG/30ML; MG/30ML; MG/30ML
30 SUSPENSION ORAL 4 TIMES DAILY PRN
Status: DISCONTINUED | OUTPATIENT
Start: 2025-03-12 | End: 2025-03-20 | Stop reason: HOSPADM

## 2025-03-12 RX ORDER — TALC
6 POWDER (GRAM) TOPICAL NIGHTLY PRN
Status: DISCONTINUED | OUTPATIENT
Start: 2025-03-12 | End: 2025-03-15

## 2025-03-12 RX ORDER — ZOLPIDEM TARTRATE 5 MG/1
5 TABLET ORAL NIGHTLY PRN
Status: DISCONTINUED | OUTPATIENT
Start: 2025-03-12 | End: 2025-03-14

## 2025-03-12 RX ORDER — ONDANSETRON HYDROCHLORIDE 2 MG/ML
4 INJECTION, SOLUTION INTRAVENOUS EVERY 6 HOURS PRN
Status: DISCONTINUED | OUTPATIENT
Start: 2025-03-12 | End: 2025-03-20 | Stop reason: HOSPADM

## 2025-03-12 RX ORDER — AZITHROMYCIN 250 MG/1
500 TABLET, FILM COATED ORAL
Status: DISCONTINUED | OUTPATIENT
Start: 2025-03-12 | End: 2025-03-20 | Stop reason: HOSPADM

## 2025-03-12 RX ORDER — BUPROPION HYDROCHLORIDE 150 MG/1
300 TABLET ORAL DAILY
Status: DISCONTINUED | OUTPATIENT
Start: 2025-03-12 | End: 2025-03-20 | Stop reason: HOSPADM

## 2025-03-12 RX ORDER — HYDROCODONE BITARTRATE AND ACETAMINOPHEN 5; 325 MG/1; MG/1
1 TABLET ORAL EVERY 6 HOURS PRN
Refills: 0 | Status: DISCONTINUED | OUTPATIENT
Start: 2025-03-12 | End: 2025-03-20 | Stop reason: HOSPADM

## 2025-03-12 RX ORDER — GUAIFENESIN AND DEXTROMETHORPHAN HYDROBROMIDE 10; 100 MG/5ML; MG/5ML
5 SYRUP ORAL
Status: COMPLETED | OUTPATIENT
Start: 2025-03-12 | End: 2025-03-12

## 2025-03-12 RX ORDER — SODIUM,POTASSIUM PHOSPHATES 280-250MG
2 POWDER IN PACKET (EA) ORAL
Status: DISCONTINUED | OUTPATIENT
Start: 2025-03-12 | End: 2025-03-20 | Stop reason: HOSPADM

## 2025-03-12 RX ORDER — ALPRAZOLAM 0.5 MG/1
0.5 TABLET ORAL 3 TIMES DAILY PRN
Status: DISCONTINUED | OUTPATIENT
Start: 2025-03-12 | End: 2025-03-14

## 2025-03-12 RX ORDER — METHYLPREDNISOLONE SOD SUCC 125 MG
125 VIAL (EA) INJECTION
Status: COMPLETED | OUTPATIENT
Start: 2025-03-12 | End: 2025-03-12

## 2025-03-12 RX ORDER — CEFEPIME HYDROCHLORIDE 1 G/1
1 INJECTION, POWDER, FOR SOLUTION INTRAMUSCULAR; INTRAVENOUS
Status: COMPLETED | OUTPATIENT
Start: 2025-03-12 | End: 2025-03-12

## 2025-03-12 RX ORDER — FENTANYL CITRATE 50 UG/ML
50 INJECTION, SOLUTION INTRAMUSCULAR; INTRAVENOUS
Refills: 0 | Status: COMPLETED | OUTPATIENT
Start: 2025-03-12 | End: 2025-03-12

## 2025-03-12 RX ORDER — ACETAMINOPHEN 325 MG/1
650 TABLET ORAL EVERY 4 HOURS PRN
Status: DISCONTINUED | OUTPATIENT
Start: 2025-03-12 | End: 2025-03-20 | Stop reason: HOSPADM

## 2025-03-12 RX ORDER — NALOXONE HCL 0.4 MG/ML
0.02 VIAL (ML) INJECTION
Status: DISCONTINUED | OUTPATIENT
Start: 2025-03-12 | End: 2025-03-20 | Stop reason: HOSPADM

## 2025-03-12 RX ORDER — LEVALBUTEROL INHALATION SOLUTION 1.25 MG/3ML
1.25 SOLUTION RESPIRATORY (INHALATION) EVERY 8 HOURS
Status: DISCONTINUED | OUTPATIENT
Start: 2025-03-12 | End: 2025-03-14

## 2025-03-12 RX ORDER — ALPRAZOLAM 0.5 MG/1
0.5 TABLET ORAL ONCE
Status: COMPLETED | OUTPATIENT
Start: 2025-03-12 | End: 2025-03-12

## 2025-03-12 RX ORDER — HYDROMORPHONE HYDROCHLORIDE 1 MG/ML
0.5 INJECTION, SOLUTION INTRAMUSCULAR; INTRAVENOUS; SUBCUTANEOUS EVERY 6 HOURS PRN
Refills: 0 | Status: DISCONTINUED | OUTPATIENT
Start: 2025-03-12 | End: 2025-03-20 | Stop reason: HOSPADM

## 2025-03-12 RX ORDER — LANOLIN ALCOHOL/MO/W.PET/CERES
800 CREAM (GRAM) TOPICAL
Status: DISCONTINUED | OUTPATIENT
Start: 2025-03-12 | End: 2025-03-20 | Stop reason: HOSPADM

## 2025-03-12 RX ORDER — ACETAMINOPHEN 325 MG/1
650 TABLET ORAL EVERY 8 HOURS PRN
Status: DISCONTINUED | OUTPATIENT
Start: 2025-03-12 | End: 2025-03-20 | Stop reason: HOSPADM

## 2025-03-12 RX ORDER — LEVOFLOXACIN 5 MG/ML
750 INJECTION, SOLUTION INTRAVENOUS
Status: DISCONTINUED | OUTPATIENT
Start: 2025-03-12 | End: 2025-03-14

## 2025-03-12 RX ORDER — RIFAMPIN 300 MG/1
600 CAPSULE ORAL
Status: DISCONTINUED | OUTPATIENT
Start: 2025-03-12 | End: 2025-03-20 | Stop reason: HOSPADM

## 2025-03-12 RX ORDER — FOLIC ACID 1 MG/1
1000 TABLET ORAL DAILY
Status: DISCONTINUED | OUTPATIENT
Start: 2025-03-13 | End: 2025-03-20 | Stop reason: HOSPADM

## 2025-03-12 RX ORDER — SODIUM CHLORIDE 9 MG/ML
INJECTION, SOLUTION INTRAVENOUS CONTINUOUS
Status: ACTIVE | OUTPATIENT
Start: 2025-03-12 | End: 2025-03-13

## 2025-03-12 RX ORDER — ETHAMBUTOL HYDROCHLORIDE 400 MG/1
1200 TABLET, FILM COATED ORAL
Status: DISCONTINUED | OUTPATIENT
Start: 2025-03-12 | End: 2025-03-20 | Stop reason: HOSPADM

## 2025-03-12 RX ORDER — AMLODIPINE BESYLATE 5 MG/1
5 TABLET ORAL DAILY
Status: DISCONTINUED | OUTPATIENT
Start: 2025-03-12 | End: 2025-03-15

## 2025-03-12 RX ORDER — HYDROMORPHONE HYDROCHLORIDE 1 MG/ML
0.5 INJECTION, SOLUTION INTRAMUSCULAR; INTRAVENOUS; SUBCUTANEOUS ONCE
Status: COMPLETED | OUTPATIENT
Start: 2025-03-12 | End: 2025-03-12

## 2025-03-12 RX ORDER — LANOLIN ALCOHOL/MO/W.PET/CERES
50 CREAM (GRAM) TOPICAL
Status: DISCONTINUED | OUTPATIENT
Start: 2025-03-12 | End: 2025-03-13

## 2025-03-12 RX ORDER — SPIRONOLACTONE 25 MG/1
25 TABLET ORAL DAILY
Status: DISCONTINUED | OUTPATIENT
Start: 2025-03-12 | End: 2025-03-12

## 2025-03-12 RX ADMIN — ETHAMBUTOL HYDROCHLORIDE 1200 MG: 400 TABLET ORAL at 05:03

## 2025-03-12 RX ADMIN — HYDROCODONE BITARTRATE AND ACETAMINOPHEN 1 TABLET: 5; 325 TABLET ORAL at 10:03

## 2025-03-12 RX ADMIN — GUAIFENESIN AND DEXTROMETHORPHAN 5 ML: 100; 10 SYRUP ORAL at 11:03

## 2025-03-12 RX ADMIN — AZITHROMYCIN DIHYDRATE 500 MG: 250 TABLET ORAL at 05:03

## 2025-03-12 RX ADMIN — ALPRAZOLAM 0.5 MG: 0.5 TABLET ORAL at 06:03

## 2025-03-12 RX ADMIN — SODIUM CHLORIDE 500 ML: 9 INJECTION, SOLUTION INTRAVENOUS at 11:03

## 2025-03-12 RX ADMIN — IOHEXOL 100 ML: 350 INJECTION, SOLUTION INTRAVENOUS at 12:03

## 2025-03-12 RX ADMIN — METHYLPREDNISOLONE SODIUM SUCCINATE 40 MG: 40 INJECTION, POWDER, FOR SOLUTION INTRAMUSCULAR; INTRAVENOUS at 05:03

## 2025-03-12 RX ADMIN — PYRIDOXINE HCL TAB 50 MG 50 MG: 50 TAB at 05:03

## 2025-03-12 RX ADMIN — RIFAMPIN 600 MG: 300 CAPSULE ORAL at 05:03

## 2025-03-12 RX ADMIN — HYDROMORPHONE HYDROCHLORIDE 0.5 MG: 1 INJECTION, SOLUTION INTRAMUSCULAR; INTRAVENOUS; SUBCUTANEOUS at 05:03

## 2025-03-12 RX ADMIN — LEVALBUTEROL HYDROCHLORIDE 1.25 MG: 1.25 SOLUTION RESPIRATORY (INHALATION) at 11:03

## 2025-03-12 RX ADMIN — ZOLPIDEM TARTRATE 5 MG: 5 TABLET, COATED ORAL at 10:03

## 2025-03-12 RX ADMIN — SODIUM CHLORIDE: 9 INJECTION, SOLUTION INTRAVENOUS at 07:03

## 2025-03-12 RX ADMIN — FENTANYL CITRATE 50 MCG: 50 INJECTION INTRAMUSCULAR; INTRAVENOUS at 01:03

## 2025-03-12 RX ADMIN — CEFEPIME 1 G: 1 INJECTION, POWDER, FOR SOLUTION INTRAMUSCULAR; INTRAVENOUS at 11:03

## 2025-03-12 RX ADMIN — LEVALBUTEROL HYDROCHLORIDE 1.25 MG: 1.25 SOLUTION RESPIRATORY (INHALATION) at 02:03

## 2025-03-12 RX ADMIN — METHYLPREDNISOLONE SODIUM SUCCINATE 125 MG: 125 INJECTION, POWDER, FOR SOLUTION INTRAMUSCULAR; INTRAVENOUS at 12:03

## 2025-03-12 RX ADMIN — HYDROCODONE BITARTRATE AND ACETAMINOPHEN 1 TABLET: 5; 325 TABLET ORAL at 04:03

## 2025-03-12 RX ADMIN — LEVOFLOXACIN 750 MG: 5 INJECTION, SOLUTION INTRAVENOUS at 01:03

## 2025-03-12 RX ADMIN — ZOLPIDEM TARTRATE 5 MG: 5 TABLET, COATED ORAL at 09:03

## 2025-03-12 NOTE — ED PROVIDER NOTES
Encounter Date: 3/12/2025       History     Chief Complaint   Patient presents with    Shortness of Breath     SOB and cough since . Pt has lung problems and believes she is having a flare up     54-year-old female presents with worsening cough, shortness for breath and chills for the last couple of days.  History of sarcoid lung disease.  Collateral history obtained from mother.  Associated exertional shortness a breath no associated lower extremity swelling    The history is provided by the patient and a parent.     Review of patient's allergies indicates:   Allergen Reactions    Sulfamethoxazole-trimethoprim Hives, Shortness Of Breath, Nausea And Vomiting and Rash     Hives, nausea and vomiting, and shortness of breath.  Did not require admission  Hives, nausea and vomiting, and shortness of breath.  Did not require admission    Benadryl [diphenhydramine hcl]     Latex, natural rubber     Percocet [oxycodone-acetaminophen]     Shrimp     Codeine Itching and Nausea Only     Past Medical History:   Diagnosis Date    HTN (hypertension)     Pneumonia 10/2020    Sarcoidosis      Past Surgical History:   Procedure Laterality Date    BRONCHOALVEOLAR LAVAGE Bilateral 2024    Procedure: LAVAGE, BRONCHOALVEOLAR;  Surgeon: Chilango Zuluaga MD;  Location: [facility] ENDO;  Service: Pulmonary;  Laterality: Bilateral;    BRONCHOSCOPY WITH FLUOROSCOPY Right 10/20/2020    Procedure: BRONCHOSCOPY, WITH FLUOROSCOPY;  Surgeon: Ava Rolon MD;  Location: [facility] ENDO;  Service: Pulmonary;  Laterality: Right;     SECTION      x2    FALLOPIAN TUBOPLASTY      LAPAROSCOPIC APPENDECTOMY N/A 8/10/2022    Procedure: APPENDECTOMY, LAPAROSCOPIC;  Surgeon: Jackson Vogel MD;  Location: [facility] OR;  Service: General;  Laterality: N/A;    TUBAL LIGATION      WRIST FRACTURE SURGERY Right     ganglion cyst     Family History   Problem Relation Name Age of Onset    Hypertension Mother       Social History[1]  Review of Systems    All other systems reviewed and are negative.      Physical Exam     Initial Vitals [03/12/25 0954]   BP Pulse Resp Temp SpO2   (!) 137/97 (!) 120 20 98.6 °F (37 °C) 95 %      MAP       --         Physical Exam    Nursing note and vitals reviewed.  Constitutional: She is not diaphoretic.   HENT:   Head: Normocephalic.   Eyes: No scleral icterus.   Cardiovascular:            Tachycardic   Pulmonary/Chest: No stridor. She is in respiratory distress.   Rhonchi bilaterally, tachypnea, oxygen 94% on room air   Abdominal: There is no guarding.     Neurological: She is alert.   Skin: No rash noted. No erythema.         ED Course   Critical Care    Date/Time: 3/12/2025 1:24 PM    Performed by: Zaire Jiménez Jr., DO  Authorized by: Zaire Jiménez Jr., DO  Direct patient critical care time: 10 minutes  Additional history critical care time: 10 minutes  Ordering / reviewing critical care time: 10 minutes  Documentation critical care time: 10 minutes  Total critical care time (exclusive of procedural time) : 40 minutes        Labs Reviewed   CBC W/ AUTO DIFFERENTIAL - Abnormal       Result Value    WBC 13.10 (*)     RBC 5.30      Hemoglobin 14.7      Hematocrit 44.2      MCV 83      MCH 27.7      MCHC 33.3      RDW 14.3      Platelets 303      MPV 11.0     COMPREHENSIVE METABOLIC PANEL - Abnormal    Sodium 136      Potassium 3.5      Chloride 101      CO2 23      Glucose 87      BUN 9      Creatinine 0.8      Calcium 10.9 (*)     Total Protein 8.8 (*)     Albumin 4.7      Total Bilirubin 0.8      Alkaline Phosphatase 87      AST 22      ALT 25      eGFR >60.0      Anion Gap 12     GROUP A STREP, MOLECULAR    Group A Strep, Molecular Negative     CULTURE, BLOOD    Narrative:     Collection has been rescheduled by RE1 at 03/12/2025 10:56 Reason:   Done  Collection has been rescheduled by RE1 at 03/12/2025 10:56 Reason:   Done   CULTURE, BLOOD   SARS-COV-2 RNA AMPLIFICATION, QUAL    SARS-CoV-2 RNA, Amplification, Qual  Negative     INFLUENZA A AND B ANTIGEN    Influenza A, Molecular Negative      Influenza B, Molecular Negative      Flu A & B Source Nasal swab      Narrative:     Specimen Source->Nasopharyngeal Swab   MAGNESIUM    Magnesium 1.8     TROPONIN I HIGH SENSITIVITY    Troponin I High Sensitivity 3.8     B-TYPE NATRIURETIC PEPTIDE    BNP 10     TROPONIN I HIGH SENSITIVITY   LACTIC ACID, PLASMA   URINALYSIS, REFLEX TO URINE CULTURE   ISTAT LACTATE    POC Lactate 1.52      Sample VENOUS     POCT LACTATE        ECG Results              EKG 12-lead (In process)        Collection Time Result Time QRS Duration OHS QTC Calculation    03/12/25 09:40:56 03/12/25 09:42:12 74 441                     In process by Interface, Lab In [facility] (03/12/25 09:42:18)                   Narrative:    Test Reason : R06.02,    Vent. Rate : 125 BPM     Atrial Rate : 125 BPM     P-R Int : 116 ms          QRS Dur :  74 ms      QT Int : 306 ms       P-R-T Axes :  65  19  59 degrees    QTcB Int : 441 ms    Sinus tachycardia  Right atrial enlargement  Borderline Abnormal ECG  No previous ECGs available    Referred By:            Confirmed By:                                   Imaging Results              CTA Chest Non-Coronary (PE Studies) (Final result)  Result time 03/12/25 12:58:25      Final result by Jhoan Bruno MD (03/12/25 12:58:25)                   Impression:      1. No evidence of pulmonary embolism to the segmental arterial level. If there is continued clinical concern, consider further evaluation with lower extremity doppler.    2.  Multifocal geographic areas of airspace opacity and mosaic attenuation most pronounced in the upper lobes, and slightly worse in the right lower lobe suggesting a combination of multifocal infection/pneumonia, interstitial lung disease (e.g sarcoid, hypersensitivity pneumonitis, chronic covid, RB-ILD?), and possibly a component of mild interstitial edema.      Electronically signed by: Jhoan  Kiana  Date:    03/12/2025  Time:    12:58               Narrative:      CMS MANDATED QUALITY DATA - CT RADIATION - 436    All CT scans at this facility utilize dose modulation, iterative reconstruction, and/or weight based dosing when appropriate to reduce radiation dose to as low as reasonably achievable.    CLINICAL HISTORY:  ([id number])55 y/o  (1970) F    Pulmonary embolism (PE) suspected, high prob;    TECHNIQUE:  (A#[id number], exam time 3/12/2025 12:47)    CTA CHEST NON CORONARY (PE STUDIES) [id number]    Axial CT examination of the chest with attention to the pulmonary arteries was performed using contiguous axial images from the diaphragms to the lung apices following the intravenous administration of non-ionic contrast material. Images were reviewed using lung, mediastinal, and bone windows. The study was performed with thin sections with subsequent 3-D reformats/MIP/reconstructions in multiple planes.    COMPARISON:  Radiograph from 02/25/2025, CT from 12/08/2024.    FINDINGS:  CTA PE evaluation: This is a moderate quality study for the evaluation of pulmonary embolism secondary to a combination of contrast bolus timing and motion artifact. The pulmonary arteries are normal in appearance without pulmonary emboli noted up to the segmental level, noting the limitations of CT technique for identifying small or isolated subsegmental emboli.    CT Chest:    Visualized neck: normal    Lungs: Moderate mosaic ground-glass attenuation opacity is seen throughout a majority of the right upper lobe, and left upper lobe, similar to the previous CT, with new mosaic ground-glass attenuation opacity throughout the posterior segment of the right lower lobe (image 207), new from the previous exam.  Mild faint central hilar interstitial opacity is present bilaterally.    Airway: Mild bronchiectasis is present with a central hilar predominance.    Pleura: There is no pleural effusion. There is no  pneumothorax.    Cardiovascular: The heart is top-normal in size.  No pericardial effusion is seen.  The great vessels are normal in course and caliber.    Mediastinum: No pathologic lymphadenopathy is seen.    Soft tissues: normal    Musculoskeletal: There are mild degenerative changes of the thoracic spine.  There are no suspicious osseous lesions.    Esophagus: normal    Upper Abdomen: No acute abnormality in the upper abdomen.                                       X-Ray Chest AP Portable (Final result)  Result time 03/12/25 11:02:12      Final result by Jhoan Bruno MD (03/12/25 11:02:12)                   Impression:      Unchanged radiograph of the chest when accounting for differences in imaging technique.      Electronically signed by: Jhoan Bruno  Date:    03/12/2025  Time:    11:02               Narrative:    CLINICAL HISTORY:  ([id number])55 y/o  (1970) F    Chest Pain;    TECHNIQUE:  (A#[id number], exam time 3/12/2025 11:00)    XR CHEST AP PORTABLE [id number]    COMPARISON:  Radiograph from 02/25/2025.    FINDINGS:  Mildly increased central hilar interstitial opacities are seen bilaterally suggestive of either mild edema  atypical infection/pneumonia or bronchitis in the appropriate clinical setting. No consolidative pneumonia is seen. Patchy airspace opacities seen in the right upper lobe, similar to the previous exam.  No pneumothorax is identified. The heart is normal in size. The mediastinum is within normal limits. Osseous structures appear within normal limits. The visualized upper abdomen is unremarkable.                                       Medications   ceFEPIme injection 1 g (1 g Intravenous Given 3/12/25 1105)   dextromethorphan-guaiFENesin  mg/5 ml liquid 5 mL (5 mLs Oral Given 3/12/25 1147)   sodium chloride 0.9% bolus 500 mL 500 mL (0 mLs Intravenous Stopped 3/12/25 1228)   methylPREDNISolone sodium succinate injection 125 mg (125 mg Intravenous Given 3/12/25 1245)    iohexoL (OMNIPAQUE 350) injection 100 mL (100 mLs Intravenous Given 3/12/25 1241)     Medical Decision Making  54-year-old female presents with worsening shortness a breath, preceding increased cough and sputum production and chills.  Viral tested negative, code sepsis initiated and patient administered IV cefepime.  PE protocol obtained with no evidence of PE but concerning for infectious etiology versus worsening sarcoid.  Given history of increased cough and Infectious like symptoms we will treated as bacterial infection.  Patient also administered some IV fluids and IV steroids.  Cardiac markers within normal limits.  Patient agreeable with admission for further supportive care, spoke with hospitalist team who will admit for further management evaluation    Amount and/or Complexity of Data Reviewed  Independent Historian: parent  Labs: ordered. Decision-making details documented in ED Course.  Radiology: ordered and independent interpretation performed. Decision-making details documented in ED Course.  ECG/medicine tests: ordered and independent interpretation performed.     Details: EKG rate 125, sinus tachycardia, QRS 74, , no STEMI  Discussion of management or test interpretation with external provider(s): Spoke with Hospitalist Dr. Pulido, Will admit for further management and evaluation      Risk  OTC drugs.  Prescription drug management.  Decision regarding hospitalization.               ED Course as of 03/12/25 1328   Wed Mar 12, 2025   1006 BP(!): 137/97 [EF]   1006 Temp: 98.6 °F (37 °C) [EF]   1006 Temp Source: Oral [EF]   1006 Pulse(!): 120 [EF]   1006 Resp: 20 [EF]   1006 SpO2: 95 % [EF]   1036 Group A Strep, Molecular: Negative [KB]   1036 SARS-CoV-2 RNA, Amplification, Qual: Negative [KB]   1036 Influenza A, Molecular: Negative [KB]   1036 Influenza B, Molecular: Negative [KB]   1153 Chest x-ray with interstitial changes Zaire Jiménez DO [KB]   1227 WBC(!): 13.10 [KB]   1227 Hemoglobin:  14.7 [KB]   1227 Hematocrit: 44.2 [KB]   1319 Spoke with Hospitalist Dr. Pulido, Will admit for further management and evaluation   [KB]   1326 This patient does have evidence of infective focus  My overall impression is Severe Sepsis.  Source: Respiratory  Antibiotics given- Antibiotics (72h ago, onward)    None      Latest lactate reviewed-  @[id number](lactate,poclac)@  Organ dysfunction indicated by Acute respiratory failure    Fluid challenge Fluid Not Needed - Patient is not hypotensive and/or lactate is less than 4.0.     Post- resuscitation assessment No - Post resuscitation assessment not needed       Will Not start Pressors- Levophed for MAP of 65  Source control achieved by: Cefepime   [KB]      ED Course User Index  [EF] Doug Warner MD  [KB] Zaire Jiménez Jr.,                            Clinical Impression:  Final diagnoses:  [R06.02] Shortness of breath at rest  [R06.09] Dyspnea on exertion  [R06.03] Respiratory distress  [A41.9, R65.20] Severe sepsis (Primary)  [J22] Lower respiratory infection          ED Disposition Condition    Admit Stable                  [1]   Social History  Tobacco Use    Smoking status: Former     Current packs/day: 0.00     Types: Cigarettes     Quit date: 2020     Years since quittin.8    Smokeless tobacco: Never    Tobacco comments:     ocassionally never was qd   Substance Use Topics    Alcohol use: Not Currently    Drug use: Never        Zaire Jiménez Jr., DO  [phone]

## 2025-03-12 NOTE — PLAN OF CARE
[facility] - Emergency Dept  Initial Discharge Assessment       Primary Care Provider: Marcial Rico    Admission Diagnosis: Acute pneumonia [J18.9]    Admission Date: 3/12/2025  Expected Discharge Date:     Transition of Care Barriers: None    Assessment completed at bedside with patient and mother.  Lives with daughter, independent at baseline, drives to own appointments.  Denies HH/HD/Coumadin.  No DME.  Pharmacy confirmed.      Payor: [employer] / Plan: [facility] [employer] / Product Type: Government /     Extended Emergency Contact Information  Primary Emergency Contact: BurgerZoeSusan  Mobile Phone: [phone]  Relation: Daughter  Preferred language: English   needed? No  Secondary Emergency Contact: Rayray Burger  Address: [address] [place] [address] [place] [address] [facility]  Home Phone: [phone]  Mobile Phone: [phone]  Relation: Other    Discharge Plan A: Home with family  Discharge Plan B: Home with family      [facility]  [address]  [facility] [address]  Phone: [phone] Fax: [phone]    [id number] - [place], [place]  [address] AT [facility] [address] [facility] [address]  [address]  [facility] [address]  Phone: [phone] Fax: [phone]    [id number] - [place], [place] - [address] AT [facility] [address] & [facility]  [address] [facility] [address]  Phone: [phone] Fax: [phone]      Initial Assessment (most recent)       Adult Discharge Assessment - 03/12/25 1513          Discharge Assessment    Assessment Type Discharge Planning Assessment     Confirmed/corrected address, phone number and insurance Yes     Confirmed Demographics Correct on Facesheet     Source of Information patient;family     Communicated SHAKIRA with patient/caregiver Date not available/Unable to determine     Reason For Admission acute pneumonia     People in Home child(carter), dependent     Do  you expect to return to your current living situation? Yes     Do you have help at home or someone to help you manage your care at home? Yes     Who are your caregiver(s) and their phone number(s)? Lluvia (mother) [phone], Susan (daughter) [phone]     Prior to hospitilization cognitive status: Alert/Oriented     Current cognitive status: Alert/Oriented     Walking or Climbing Stairs Difficulty no     Dressing/Bathing Difficulty no     Home Accessibility wheelchair accessible     Equipment Currently Used at Home none     Readmission within 30 days? No     Patient currently being followed by outpatient case management? No     Do you currently have service(s) that help you manage your care at home? No     Do you take prescription medications? Yes     Do you have prescription coverage? Yes     Coverage [facility] [employer] [place] [employer]     Do you have any problems affording any of your prescribed medications? No     Is the patient taking medications as prescribed? yes     Who is going to help you get home at discharge? mother/daughter     How do you get to doctors appointments? car, drives self     Are you on dialysis? No     Do you take coumadin? No     Discharge Plan A Home with family     Discharge Plan B Home with family     DME Needed Upon Discharge  none     Discharge Plan discussed with: Patient     Transition of Care Barriers None

## 2025-03-12 NOTE — PHARMACY MED REC
"Admission Medication History     The home medication history was taken by Bernard Guzman.    You may go to "Admission" then "Reconcile Home Medications" tabs to review and/or act upon these items.     The home medication list has been updated by the Pharmacy department.   Please read ALL comments highlighted in yellow.   Please address this information as you see fit.    Feel free to contact us if you have any questions or require assistance.        Medications listed below were obtained from: Patient/family and Analytic software- [employer]  No current facility-administered medications on file prior to encounter.     Current Outpatient Medications on File Prior to Encounter   Medication Sig Dispense Refill    amLODIPine (NORVASC) 5 MG tablet Take 1 tablet (5 mg total) by mouth once daily. (Patient taking differently: Take 10 mg by mouth once daily.) 30 tablet 0    azithromycin (ZITHROMAX) 500 MG tablet Take 1 tablet (500 mg total) by mouth every Mon, Wed, Fri. 12 tablet 17    buPROPion (WELLBUTRIN XL) 300 MG 24 hr tablet Take 300 mg by mouth once daily.      ethambutoL (MYAMBUTOL) 400 MG Tab Take 3 tablets (1,200 mg total) by mouth every Mon, Wed, Fri. Take every Monday Wednesday and Friday 36 tablet 17    folic acid (FOLVITE) 1 MG tablet Take 1 tablet by mouth once daily.      rifAMpin (RIFADIN) 300 MG capsule Take 2 capsules (600 mg total) by mouth every Mon, Wed, Fri. Every Monday Wednesday and Friday 24 capsule 17    spironolactone (ALDACTONE) 25 MG tablet Take 25 mg by mouth once daily.      valsartan (DIOVAN) 160 MG tablet Take 1 tablet (160 mg total) by mouth every evening. (Patient taking differently: Take 160 mg by mouth every morning.) 90 tablet 0    zolpidem (AMBIEN) 5 MG Tab Take 1 tablet (5 mg total) by mouth nightly as needed (insomnia). 30 tablet 5    pyridoxine, vitamin B6, (B-6) 50 MG Tab Take 1 tablet (50 mg total) by mouth every Mon, Wed, Fri. (Patient not taking: Reported on 3/12/2025) 12 tablet 17 "             Bernard Guzman  EXT 1657                .

## 2025-03-12 NOTE — ASSESSMENT & PLAN NOTE
Started on iv Levofloxacin  Started on iv solumedrol at the moment   If no improvement need pulmonology consult       Antibiotics (From admission, onward)      Start     Stop Route Frequency Ordered    03/12/25 1700  azithromycin tablet 500 mg         -- Oral Every Mon, Wed, Fri 03/12/25 1330    03/12/25 1700  ethambutoL tablet 1,200 mg         -- Oral Every Mon, Wed, Fri 03/12/25 1330    03/12/25 1700  rifAMpin capsule 600 mg         -- Oral Every Mon, Wed, Fri 03/12/25 1330    03/12/25 1445  levoFLOXacin 750 mg/150 mL IVPB 750 mg         -- IV Every 24 hours (non-standard times) 03/12/25 1331            Microbiology Results (last 7 days)       Procedure Component Value Units Date/Time    Blood culture x two cultures. Draw prior to antibiotics. [[id number]] Collected: 03/12/25 1054    Order Status: Sent Specimen: Blood from Peripheral, Hand, Left Updated: 03/12/25 1140    Narrative:      Collection has been rescheduled by RE1 at 03/12/2025 10:56 Reason:   Done  Collection has been rescheduled by RE1 at 03/12/2025 10:56 Reason:   Done    Blood culture x two cultures. Draw prior to antibiotics. [[id number]] Collected: 03/12/25 1055    Order Status: Sent Specimen: Blood from Peripheral, Hand, Right Updated: 03/12/25 1139    Group A Strep, Molecular [[id number]] Collected: 03/12/25 0943    Order Status: Completed Specimen: Throat Updated: 03/12/25 1025     Group A Strep, Molecular Negative     Comment: Arcanobacterium haemolyticum and Beta Streptococcus group C   and G will not be detected by this test method.  Please order   Throat Culture ([id number]) if suspected.

## 2025-03-12 NOTE — HPI
54 Year old pt with H/o Sarcoidosis/SUZIE getting admitted with possible acute Pneumonia  Symptom s started with cough few days ago   This was followed by SOB mainly on exertion   Later she developed chest discomfort and symptoms worsened   She came to ER and got admitted  Per records:She was admitted with same c/o on December 2024 and Pneumonia was r/o at that time   She had CTA Today in ER   Compared to previous CTA In December 2024 :new mosaic ground-glass attenuation opacity throughout the posterior segment of the right lower lobe , new from the previous exam

## 2025-03-12 NOTE — H&P
[facility] hospital Medicine  History & Physical    Patient Name: Sumit Burger  MRN: [id number]  Patient Class: IP- Inpatient  Admission Date: 3/12/2025  Attending Physician: Siddharth Pulido MD   Primary Care Provider: Marcial Rico         Patient information was obtained from patient, past medical records, ER records, and ER MD  .     Subjective:     Principal Problem:Acute pneumonia    Chief Complaint:   Chief Complaint   Patient presents with    Shortness of Breath     SOB and cough since . Pt has lung problems and believes she is having a flare up        HPI: 54 Year old pt with H/o Sarcoidosis/SUZIE getting admitted with possible acute Pneumonia  Symptom s started with cough few days ago   This was followed by SOB mainly on exertion   Later she developed chest discomfort and symptoms worsened   She came to ER and got admitted  Per records:She was admitted with same c/o on 2024 and Pneumonia was r/o at that time   She had CTA Today in ER   Compared to previous CTA In 2024 :new mosaic ground-glass attenuation opacity throughout the posterior segment of the right lower lobe , new from the previous exam     Past Medical History:   Diagnosis Date    HTN (hypertension)     Pneumonia 10/2020    Sarcoidosis        Past Surgical History:   Procedure Laterality Date    BRONCHOALVEOLAR LAVAGE Bilateral 2024    Procedure: LAVAGE, BRONCHOALVEOLAR;  Surgeon: Chilango Zuluaga MD;  Location: [facility] ENDO;  Service: Pulmonary;  Laterality: Bilateral;    BRONCHOSCOPY WITH FLUOROSCOPY Right 10/20/2020    Procedure: BRONCHOSCOPY, WITH FLUOROSCOPY;  Surgeon: Ava Rolon MD;  Location: [facility] ENDO;  Service: Pulmonary;  Laterality: Right;     SECTION      x2    FALLOPIAN TUBOPLASTY      LAPAROSCOPIC APPENDECTOMY N/A 8/10/2022    Procedure: APPENDECTOMY, LAPAROSCOPIC;  Surgeon: Jackson Vogel MD;  Location: [facility] OR;  Service: General;  Laterality: N/A;    TUBAL  LIGATION      WRIST FRACTURE SURGERY Right     ganglion cyst       Review of patient's allergies indicates:   Allergen Reactions    Sulfamethoxazole-trimethoprim Hives, Shortness Of Breath, Nausea And Vomiting and Rash     Hives, nausea and vomiting, and shortness of breath.  Did not require admission  Hives, nausea and vomiting, and shortness of breath.  Did not require admission    Benadryl [diphenhydramine hcl]     Latex, natural rubber     Percocet [oxycodone-acetaminophen]     Shrimp     Codeine Itching and Nausea Only       No current facility-administered medications on file prior to encounter.     Current Outpatient Medications on File Prior to Encounter   Medication Sig    amLODIPine (NORVASC) 5 MG tablet Take 1 tablet (5 mg total) by mouth once daily. (Patient taking differently: Take 10 mg by mouth once daily.)    azithromycin (ZITHROMAX) 500 MG tablet Take 1 tablet (500 mg total) by mouth every Mon, Wed, Fri.    buPROPion (WELLBUTRIN XL) 300 MG 24 hr tablet Take 300 mg by mouth once daily.    ethambutoL (MYAMBUTOL) 400 MG Tab Take 3 tablets (1,200 mg total) by mouth every Mon, Wed, Fri. Take every Monday Wednesday and Friday    folic acid (FOLVITE) 1 MG tablet Take 1 tablet by mouth once daily.    rifAMpin (RIFADIN) 300 MG capsule Take 2 capsules (600 mg total) by mouth every Mon, Wed, Fri. Every Monday Wednesday and Friday    spironolactone (ALDACTONE) 25 MG tablet Take 25 mg by mouth once daily.    valsartan (DIOVAN) 160 MG tablet Take 1 tablet (160 mg total) by mouth every evening. (Patient taking differently: Take 160 mg by mouth every morning.)    zolpidem (AMBIEN) 5 MG Tab Take 1 tablet (5 mg total) by mouth nightly as needed (insomnia).    pyridoxine, vitamin B6, (B-6) 50 MG Tab Take 1 tablet (50 mg total) by mouth every Mon, Wed, Fri. (Patient not taking: Reported on 3/12/2025)     Family History       Problem Relation (Age of Onset)    Hypertension Mother          Tobacco Use    Smoking status:  Former     Current packs/day: 0.00     Types: Cigarettes     Quit date: 2020     Years since quittin.8    Smokeless tobacco: Never    Tobacco comments:     ocassionally never was qd   Substance and Sexual Activity    Alcohol use: Not Currently    Drug use: Never    Sexual activity: Not Currently     Review of Systems   Constitutional:  Negative for activity change and appetite change.   HENT:  Negative for congestion and dental problem.    Eyes:  Negative for discharge and itching.   Respiratory:  Positive for shortness of breath.    Cardiovascular:  Positive for chest pain.   Gastrointestinal:  Negative for abdominal distention and abdominal pain.   Endocrine: Negative for cold intolerance.   Genitourinary:  Negative for difficulty urinating and dysuria.   Musculoskeletal:  Negative for arthralgias and back pain.   Skin:  Negative for color change.   Neurological:  Negative for dizziness and facial asymmetry.   Hematological:  Negative for adenopathy.   Psychiatric/Behavioral:  Negative for agitation and behavioral problems.      Objective:     Vital Signs (Most Recent):  Temp: 98.7 °F (37.1 °C) (25 1703)  Pulse: (!) 123 (25 1703)  Resp: 20 (25 1754)  BP: (!) 166/94 (25 1703)  SpO2: 95 % (25 1703) Vital Signs (24h Range):  Temp:  [98.6 °F (37 °C)-98.7 °F (37.1 °C)] 98.7 °F (37.1 °C)  Pulse:  [108-123] 123  Resp:  [18-22] 20  SpO2:  [95 %-100 %] 95 %  BP: (137-170)/() 166/94     Weight: 64.9 kg (142 lb 15.9 oz)  Body mass index is 25.33 kg/m².     Physical Exam  Vitals and nursing note reviewed.   Constitutional:       General: She is not in acute distress.  HENT:      Head: Atraumatic.      Right Ear: External ear normal.      Left Ear: External ear normal.      Nose: Nose normal.      Mouth/Throat:      Mouth: Mucous membranes are moist.   Eyes:      Extraocular Movements: Extraocular movements intact.   Cardiovascular:      Rate and Rhythm: Normal rate.   Pulmonary:       Effort: Pulmonary effort is normal.   Musculoskeletal:         General: Normal range of motion.      Cervical back: Normal range of motion.   Skin:     General: Skin is dry.   Neurological:      Mental Status: She is alert and oriented to person, place, and time.   Psychiatric:         Behavior: Behavior normal.                Significant Labs: All pertinent labs within the past 24 hours have been reviewed.  CBC:   Recent Labs   Lab 03/12/25  1058   WBC 13.10*   HGB 14.7   HCT 44.2        CMP:   Recent Labs   Lab 03/12/25  1058      K 3.5      CO2 23   GLU 87   BUN 9   CREATININE 0.8   CALCIUM 10.9*   PROT 8.8*   ALBUMIN 4.7   BILITOT 0.8   ALKPHOS 87   AST 22   ALT 25   ANIONGAP 12       Significant Imaging: I have reviewed all pertinent imaging results/findings within the past 24 hours.    Assessment/Plan:     * Acute pneumonia  Started on iv Levofloxacin  Started on iv solumedrol at the moment   If no improvement need pulmonology consult       Antibiotics (From admission, onward)      Start     Stop Route Frequency Ordered    03/12/25 1700  azithromycin tablet 500 mg         -- Oral Every Mon, Wed, Fri 03/12/25 1330    03/12/25 1700  ethambutoL tablet 1,200 mg         -- Oral Every Mon, Wed, Fri 03/12/25 1330    03/12/25 1700  rifAMpin capsule 600 mg         -- Oral Every Mon, Wed, Fri 03/12/25 1330    03/12/25 1445  levoFLOXacin 750 mg/150 mL IVPB 750 mg         -- IV Every 24 hours (non-standard times) 03/12/25 1331            Microbiology Results (last 7 days)       Procedure Component Value Units Date/Time    Blood culture x two cultures. Draw prior to antibiotics. [[id number]] Collected: 03/12/25 1054    Order Status: Sent Specimen: Blood from Peripheral, Hand, Left Updated: 03/12/25 1140    Narrative:      Collection has been rescheduled by RE1 at 03/12/2025 10:56 Reason:   Done  Collection has been rescheduled by RE1 at 03/12/2025 10:56 Reason:   Done    Blood culture x two cultures.  Draw prior to antibiotics. [[id number]] Collected: 03/12/25 1055    Order Status: Sent Specimen: Blood from Peripheral, Hand, Right Updated: 03/12/25 1139    Group A Strep, Molecular [[id number]] Collected: 03/12/25 0943    Order Status: Completed Specimen: Throat Updated: 03/12/25 1025     Group A Strep, Molecular Negative     Comment: Arcanobacterium haemolyticum and Beta Streptococcus group C   and G will not be detected by this test method.  Please order   Throat Culture ([id number]) if suspected.                 Respiratory distress  POA  As mentioned above       Hypoxia  New onset       SUZIE (mycobacterium avium-intracellulare)  Maintain PO abx pt takes at home      Sarcoidosis  H/o aware   Pt still om PO steroids at home        VTE Risk Mitigation (From admission, onward)           Ordered     IP VTE LOW RISK PATIENT  Once         03/12/25 1330     Place sequential compression device  Until discontinued         03/12/25 1330                                    Siddharth Pulido MD  Department of Hospital Medicine  [facility]

## 2025-03-12 NOTE — SUBJECTIVE & OBJECTIVE
Past Medical History:   Diagnosis Date    HTN (hypertension)     Pneumonia 10/2020    Sarcoidosis        Past Surgical History:   Procedure Laterality Date    BRONCHOALVEOLAR LAVAGE Bilateral 2024    Procedure: LAVAGE, BRONCHOALVEOLAR;  Surgeon: Chilango Zuluaga MD;  Location: [facility] ENDO;  Service: Pulmonary;  Laterality: Bilateral;    BRONCHOSCOPY WITH FLUOROSCOPY Right 10/20/2020    Procedure: BRONCHOSCOPY, WITH FLUOROSCOPY;  Surgeon: Ava Rolon MD;  Location: [facility] ENDO;  Service: Pulmonary;  Laterality: Right;     SECTION      x2    FALLOPIAN TUBOPLASTY      LAPAROSCOPIC APPENDECTOMY N/A 8/10/2022    Procedure: APPENDECTOMY, LAPAROSCOPIC;  Surgeon: Jackson Vogel MD;  Location: [facility] OR;  Service: General;  Laterality: N/A;    TUBAL LIGATION      WRIST FRACTURE SURGERY Right     ganglion cyst       Review of patient's allergies indicates:   Allergen Reactions    Sulfamethoxazole-trimethoprim Hives, Shortness Of Breath, Nausea And Vomiting and Rash     Hives, nausea and vomiting, and shortness of breath.  Did not require admission  Hives, nausea and vomiting, and shortness of breath.  Did not require admission    Benadryl [diphenhydramine hcl]     Latex, natural rubber     Percocet [oxycodone-acetaminophen]     Shrimp     Codeine Itching and Nausea Only       No current facility-administered medications on file prior to encounter.     Current Outpatient Medications on File Prior to Encounter   Medication Sig    amLODIPine (NORVASC) 5 MG tablet Take 1 tablet (5 mg total) by mouth once daily. (Patient taking differently: Take 10 mg by mouth once daily.)    azithromycin (ZITHROMAX) 500 MG tablet Take 1 tablet (500 mg total) by mouth every Mon, Wed, Fri.    buPROPion (WELLBUTRIN XL) 300 MG 24 hr tablet Take 300 mg by mouth once daily.    ethambutoL (MYAMBUTOL) 400 MG Tab Take 3 tablets (1,200 mg total) by mouth every Mon, Wed, Fri. Take every  and Friday    folic acid (FOLVITE) 1  MG tablet Take 1 tablet by mouth once daily.    rifAMpin (RIFADIN) 300 MG capsule Take 2 capsules (600 mg total) by mouth every Mon, Wed, Fri. Every  and Friday    spironolactone (ALDACTONE) 25 MG tablet Take 25 mg by mouth once daily.    valsartan (DIOVAN) 160 MG tablet Take 1 tablet (160 mg total) by mouth every evening. (Patient taking differently: Take 160 mg by mouth every morning.)    zolpidem (AMBIEN) 5 MG Tab Take 1 tablet (5 mg total) by mouth nightly as needed (insomnia).    pyridoxine, vitamin B6, (B-6) 50 MG Tab Take 1 tablet (50 mg total) by mouth every Mon, Wed, Fri. (Patient not taking: Reported on 3/12/2025)     Family History       Problem Relation (Age of Onset)    Hypertension Mother          Tobacco Use    Smoking status: Former     Current packs/day: 0.00     Types: Cigarettes     Quit date: 2020     Years since quittin.8    Smokeless tobacco: Never    Tobacco comments:     ocassionally never was qd   Substance and Sexual Activity    Alcohol use: Not Currently    Drug use: Never    Sexual activity: Not Currently     Review of Systems   Constitutional:  Negative for activity change and appetite change.   HENT:  Negative for congestion and dental problem.    Eyes:  Negative for discharge and itching.   Respiratory:  Positive for shortness of breath.    Cardiovascular:  Positive for chest pain.   Gastrointestinal:  Negative for abdominal distention and abdominal pain.   Endocrine: Negative for cold intolerance.   Genitourinary:  Negative for difficulty urinating and dysuria.   Musculoskeletal:  Negative for arthralgias and back pain.   Skin:  Negative for color change.   Neurological:  Negative for dizziness and facial asymmetry.   Hematological:  Negative for adenopathy.   Psychiatric/Behavioral:  Negative for agitation and behavioral problems.      Objective:     Vital Signs (Most Recent):  Temp: 98.7 °F (37.1 °C) (25)  Pulse: (!) 123 (25)  Resp: 20  (03/12/25 1754)  BP: (!) 166/94 (03/12/25 1703)  SpO2: 95 % (03/12/25 1703) Vital Signs (24h Range):  Temp:  [98.6 °F (37 °C)-98.7 °F (37.1 °C)] 98.7 °F (37.1 °C)  Pulse:  [108-123] 123  Resp:  [18-22] 20  SpO2:  [95 %-100 %] 95 %  BP: (137-170)/() 166/94     Weight: 64.9 kg (142 lb 15.9 oz)  Body mass index is 25.33 kg/m².     Physical Exam  Vitals and nursing note reviewed.   Constitutional:       General: She is not in acute distress.  HENT:      Head: Atraumatic.      Right Ear: External ear normal.      Left Ear: External ear normal.      Nose: Nose normal.      Mouth/Throat:      Mouth: Mucous membranes are moist.   Eyes:      Extraocular Movements: Extraocular movements intact.   Cardiovascular:      Rate and Rhythm: Normal rate.   Pulmonary:      Effort: Pulmonary effort is normal.   Musculoskeletal:         General: Normal range of motion.      Cervical back: Normal range of motion.   Skin:     General: Skin is dry.   Neurological:      Mental Status: She is alert and oriented to person, place, and time.   Psychiatric:         Behavior: Behavior normal.                Significant Labs: All pertinent labs within the past 24 hours have been reviewed.  CBC:   Recent Labs   Lab 03/12/25  1058   WBC 13.10*   HGB 14.7   HCT 44.2        CMP:   Recent Labs   Lab 03/12/25  1058      K 3.5      CO2 23   GLU 87   BUN 9   CREATININE 0.8   CALCIUM 10.9*   PROT 8.8*   ALBUMIN 4.7   BILITOT 0.8   ALKPHOS 87   AST 22   ALT 25   ANIONGAP 12       Significant Imaging: I have reviewed all pertinent imaging results/findings within the past 24 hours.

## 2025-03-13 PROBLEM — J96.01 ACUTE HYPOXIC RESPIRATORY FAILURE: Status: ACTIVE | Noted: 2025-03-13

## 2025-03-13 LAB
ALBUMIN SERPL BCP-MCNC: 4.5 G/DL (ref 3.5–5.2)
ALP SERPL-CCNC: 82 U/L (ref 55–135)
ALT SERPL W/O P-5'-P-CCNC: 20 U/L (ref 10–44)
ANION GAP SERPL CALC-SCNC: 10 MMOL/L (ref 8–16)
AST SERPL-CCNC: 15 U/L (ref 10–40)
BASOPHILS # BLD AUTO: 0.02 K/UL (ref 0–0.2)
BASOPHILS NFR BLD: 0.2 % (ref 0–1.9)
BILIRUB SERPL-MCNC: 1 MG/DL (ref 0.1–1)
BUN SERPL-MCNC: 7 MG/DL (ref 6–20)
CALCIUM SERPL-MCNC: 10.7 MG/DL (ref 8.7–10.5)
CHLORIDE SERPL-SCNC: 104 MMOL/L (ref 95–110)
CO2 SERPL-SCNC: 24 MMOL/L (ref 23–29)
CREAT SERPL-MCNC: 0.6 MG/DL (ref 0.5–1.4)
DIFFERENTIAL METHOD BLD: ABNORMAL
EOSINOPHIL # BLD AUTO: 0 K/UL (ref 0–0.5)
EOSINOPHIL NFR BLD: 0 % (ref 0–8)
ERYTHROCYTE [DISTWIDTH] IN BLOOD BY AUTOMATED COUNT: 14.1 % (ref 11.5–14.5)
EST. GFR  (NO RACE VARIABLE): >60 ML/MIN/1.73 M^2
GLUCOSE SERPL-MCNC: 156 MG/DL (ref 70–110)
HCT VFR BLD AUTO: 41.5 % (ref 37–48.5)
HGB BLD-MCNC: 13.6 G/DL (ref 12–16)
IMM GRANULOCYTES # BLD AUTO: 0.03 K/UL (ref 0–0.04)
IMM GRANULOCYTES NFR BLD AUTO: 0.3 % (ref 0–0.5)
LACTATE SERPL-SCNC: 1.9 MMOL/L (ref 0.5–1.9)
LYMPHOCYTES # BLD AUTO: 1.1 K/UL (ref 1–4.8)
LYMPHOCYTES NFR BLD: 10.1 % (ref 18–48)
MAGNESIUM SERPL-MCNC: 2.1 MG/DL (ref 1.6–2.6)
MCH RBC QN AUTO: 27.4 PG (ref 27–31)
MCHC RBC AUTO-ENTMCNC: 32.8 G/DL (ref 32–36)
MCV RBC AUTO: 84 FL (ref 82–98)
MONOCYTES # BLD AUTO: 0.2 K/UL (ref 0.3–1)
MONOCYTES NFR BLD: 2 % (ref 4–15)
NEUTROPHILS # BLD AUTO: 9.8 K/UL (ref 1.8–7.7)
NEUTROPHILS NFR BLD: 87.4 % (ref 38–73)
NRBC BLD-RTO: 0 /100 WBC
PLATELET # BLD AUTO: 296 K/UL (ref 150–450)
PMV BLD AUTO: 10.8 FL (ref 9.2–12.9)
POTASSIUM SERPL-SCNC: 4 MMOL/L (ref 3.5–5.1)
PROCALCITONIN SERPL IA-MCNC: <0.05 NG/ML (ref 0–0.5)
PROT SERPL-MCNC: 8.4 G/DL (ref 6–8.4)
RBC # BLD AUTO: 4.96 M/UL (ref 4–5.4)
SODIUM SERPL-SCNC: 138 MMOL/L (ref 136–145)
WBC # BLD AUTO: 11.23 K/UL (ref 3.9–12.7)

## 2025-03-13 PROCEDURE — 83605 ASSAY OF LACTIC ACID: CPT | Performed by: INTERNAL MEDICINE

## 2025-03-13 PROCEDURE — 36415 COLL VENOUS BLD VENIPUNCTURE: CPT | Performed by: HOSPITALIST

## 2025-03-13 PROCEDURE — 12000002 HC ACUTE/MED SURGE SEMI-PRIVATE ROOM

## 2025-03-13 PROCEDURE — 94640 AIRWAY INHALATION TREATMENT: CPT

## 2025-03-13 PROCEDURE — 99223 1ST HOSP IP/OBS HIGH 75: CPT | Mod: ,,, | Performed by: INTERNAL MEDICINE

## 2025-03-13 PROCEDURE — 63600175 PHARM REV CODE 636 W HCPCS: Performed by: INTERNAL MEDICINE

## 2025-03-13 PROCEDURE — 84145 PROCALCITONIN (PCT): CPT | Performed by: HOSPITALIST

## 2025-03-13 PROCEDURE — 94761 N-INVAS EAR/PLS OXIMETRY MLT: CPT

## 2025-03-13 PROCEDURE — 25000242 PHARM REV CODE 250 ALT 637 W/ HCPCS: Performed by: INTERNAL MEDICINE

## 2025-03-13 PROCEDURE — 25000003 PHARM REV CODE 250: Performed by: STUDENT IN AN ORGANIZED HEALTH CARE EDUCATION/TRAINING PROGRAM

## 2025-03-13 PROCEDURE — 80053 COMPREHEN METABOLIC PANEL: CPT | Performed by: INTERNAL MEDICINE

## 2025-03-13 PROCEDURE — 99900031 HC PATIENT EDUCATION (STAT)

## 2025-03-13 PROCEDURE — 85025 COMPLETE CBC W/AUTO DIFF WBC: CPT | Performed by: INTERNAL MEDICINE

## 2025-03-13 PROCEDURE — 25000003 PHARM REV CODE 250: Performed by: INTERNAL MEDICINE

## 2025-03-13 PROCEDURE — 83735 ASSAY OF MAGNESIUM: CPT | Performed by: INTERNAL MEDICINE

## 2025-03-13 RX ORDER — BENZONATATE 100 MG/1
100 CAPSULE ORAL 3 TIMES DAILY PRN
Status: DISCONTINUED | OUTPATIENT
Start: 2025-03-13 | End: 2025-03-20 | Stop reason: HOSPADM

## 2025-03-13 RX ORDER — ENOXAPARIN SODIUM 100 MG/ML
40 INJECTION SUBCUTANEOUS EVERY 24 HOURS
Status: DISCONTINUED | OUTPATIENT
Start: 2025-03-14 | End: 2025-03-20 | Stop reason: HOSPADM

## 2025-03-13 RX ORDER — ALPRAZOLAM 0.5 MG/1
0.5 TABLET ORAL ONCE
Status: COMPLETED | OUTPATIENT
Start: 2025-03-13 | End: 2025-03-13

## 2025-03-13 RX ADMIN — METHYLPREDNISOLONE SODIUM SUCCINATE 40 MG: 40 INJECTION, POWDER, FOR SOLUTION INTRAMUSCULAR; INTRAVENOUS at 12:03

## 2025-03-13 RX ADMIN — LEVOFLOXACIN 750 MG: 5 INJECTION, SOLUTION INTRAVENOUS at 01:03

## 2025-03-13 RX ADMIN — HYDROCODONE BITARTRATE AND ACETAMINOPHEN 1 TABLET: 5; 325 TABLET ORAL at 08:03

## 2025-03-13 RX ADMIN — ALPRAZOLAM 0.5 MG: 0.5 TABLET ORAL at 08:03

## 2025-03-13 RX ADMIN — HYDROMORPHONE HYDROCHLORIDE 0.5 MG: 1 INJECTION, SOLUTION INTRAMUSCULAR; INTRAVENOUS; SUBCUTANEOUS at 01:03

## 2025-03-13 RX ADMIN — BUPROPION HYDROCHLORIDE 300 MG: 150 TABLET, EXTENDED RELEASE ORAL at 08:03

## 2025-03-13 RX ADMIN — ALPRAZOLAM 0.5 MG: 0.5 TABLET ORAL at 04:03

## 2025-03-13 RX ADMIN — ALPRAZOLAM 0.5 MG: 0.5 TABLET ORAL at 12:03

## 2025-03-13 RX ADMIN — METHYLPREDNISOLONE SODIUM SUCCINATE 40 MG: 40 INJECTION, POWDER, FOR SOLUTION INTRAMUSCULAR; INTRAVENOUS at 04:03

## 2025-03-13 RX ADMIN — LEVALBUTEROL HYDROCHLORIDE 1.25 MG: 1.25 SOLUTION RESPIRATORY (INHALATION) at 01:03

## 2025-03-13 RX ADMIN — LEVALBUTEROL HYDROCHLORIDE 1.25 MG: 1.25 SOLUTION RESPIRATORY (INHALATION) at 06:03

## 2025-03-13 RX ADMIN — HYDROCODONE BITARTRATE AND ACETAMINOPHEN 1 TABLET: 5; 325 TABLET ORAL at 04:03

## 2025-03-13 RX ADMIN — FOLIC ACID 1000 MCG: 1 TABLET ORAL at 08:03

## 2025-03-13 RX ADMIN — ALPRAZOLAM 0.5 MG: 0.5 TABLET ORAL at 10:03

## 2025-03-13 RX ADMIN — AMLODIPINE BESYLATE 5 MG: 5 TABLET ORAL at 08:03

## 2025-03-13 RX ADMIN — METHYLPREDNISOLONE SODIUM SUCCINATE 40 MG: 40 INJECTION, POWDER, FOR SOLUTION INTRAMUSCULAR; INTRAVENOUS at 05:03

## 2025-03-13 RX ADMIN — BENZONATATE 100 MG: 100 CAPSULE ORAL at 12:03

## 2025-03-13 NOTE — CONSULTS
Pulmonary/Critical Care Consult      Patient name: Sumit Burger  MRN: [id number]  Date: 03/13/2025    Admit Date: 3/12/2025  Consult Requested By: Stephanie Lal MD    Reason for Consult: pneumonia, sarcoid    HPI:    3/13/2025 - Pt known to Dr Rolon with sarcoid (dx about 3 years ago) usually on prednisone 10 mg/d started with some increased cough for a few days then the last day or so has develop worsened SOB, cough and in general feeling worse.  Came to ER for evaluation, WBC was elevated, LA ok.  CTA shows bilateral infiltrates which are actually similar to prior CTA chest done 12/2024 except some increase in RLL.  Procalcitonin is very low.  RUL lung biopsy + noncaseating granuloma 2020.  Sats are OK on 1 LPM.  ACE level has been elevated in the past.  She also has a ho SUZIE on treatment with Ethambutol/Rifampin/azithromycin    Review of Systems    Review of Systems   Constitutional:  Positive for malaise/fatigue. Negative for chills, diaphoresis, fever and weight loss.   HENT:  Negative for congestion.    Eyes:  Negative for pain.   Respiratory:  Positive for cough and shortness of breath. Negative for hemoptysis, sputum production, wheezing and stridor.    Cardiovascular:  Negative for chest pain, palpitations, orthopnea, claudication, leg swelling and PND.   Gastrointestinal:  Positive for nausea. Negative for abdominal pain, constipation, diarrhea, heartburn and vomiting.        Decreased appetite   Genitourinary:  Negative for dysuria, frequency and urgency.   Musculoskeletal:  Negative for falls and myalgias.   Neurological:  Negative for sensory change, focal weakness and weakness.   Psychiatric/Behavioral:  Negative for depression. The patient is not nervous/anxious.        Past Medical History    Past Medical History:   Diagnosis Date    HTN (hypertension)     Pneumonia 10/2020    Sarcoidosis        Past Surgical History    Past Surgical History:   Procedure Laterality Date     BRONCHOALVEOLAR LAVAGE Bilateral 2024    Procedure: LAVAGE, BRONCHOALVEOLAR;  Surgeon: Chilango Zuluaga MD;  Location: [facility];  Service: Pulmonary;  Laterality: Bilateral;    BRONCHOSCOPY WITH FLUOROSCOPY Right 10/20/2020    Procedure: BRONCHOSCOPY, WITH FLUOROSCOPY;  Surgeon: Ava Rolon MD;  Location: [facility];  Service: Pulmonary;  Laterality: Right;     SECTION      x2    FALLOPIAN TUBOPLASTY      LAPAROSCOPIC APPENDECTOMY N/A 8/10/2022    Procedure: APPENDECTOMY, LAPAROSCOPIC;  Surgeon: Jackson Vogel MD;  Location: [facility] OR;  Service: General;  Laterality: N/A;    TUBAL LIGATION      WRIST FRACTURE SURGERY Right     ganglion cyst       Medications (scheduled):      amLODIPine  5 mg Oral Daily    azithromycin  500 mg Oral Every Mon, Wed, Fri    buPROPion  300 mg Oral Daily    ethambutoL  1,200 mg Oral Every Mon, Wed, Fri    folic acid  1,000 mcg Oral Daily    levalbuterol  1.25 mg Nebulization Q8H    levoFLOXacin  750 mg Intravenous Q24H    methylPREDNISolone injection (PEDS and ADULTS)  40 mg Intravenous Q6H    rifAMpin  600 mg Oral Every Mon, Wed, Fri       Medications (infusions):         Medications (prn):       Current Facility-Administered Medications:     acetaminophen, 650 mg, Oral, Q8H PRN    acetaminophen, 650 mg, Oral, Q4H PRN    ALPRAZolam, 0.5 mg, Oral, TID PRN    aluminum-magnesium hydroxide-simethicone, 30 mL, Oral, QID PRN    benzonatate, 100 mg, Oral, TID PRN    HYDROcodone-acetaminophen, 1 tablet, Oral, Q6H PRN    HYDROmorphone, 0.5 mg, Intravenous, Q6H PRN    magnesium oxide, 800 mg, Oral, PRN    magnesium oxide, 800 mg, Oral, PRN    melatonin, 6 mg, Oral, Nightly PRN    naloxone, 0.02 mg, Intravenous, PRN    ondansetron, 4 mg, Intravenous, Q6H PRN    potassium bicarbonate, 35 mEq, Oral, PRN    potassium bicarbonate, 50 mEq, Oral, PRN    potassium bicarbonate, 60 mEq, Oral, PRN    potassium, sodium phosphates, 2 packet, Oral, PRN    potassium, sodium phosphates, 2  "packet, Oral, PRN    potassium, sodium phosphates, 2 packet, Oral, PRN    zolpidem, 5 mg, Oral, Nightly PRN    Family History:   Family History   Problem Relation Name Age of Onset    Hypertension Mother         Social History: Tobacco: Tobacco Use History[1]                             EtOH:   Social History     Substance and Sexual Activity   Alcohol Use Not Currently                                Drugs:   Social History     Substance and Sexual Activity   Drug Use Never       Physical Exam    Vital signs:  Temp:  [97.5 °F (36.4 °C)-97.8 °F (36.6 °C)]   Pulse:  []   Resp:  [14-19]   BP: (104-135)/(63-86)   SpO2:  [96 %-100 %]     Intake/Output:   Intake/Output Summary (Last 24 hours) at 3/13/2025 1845  Last data filed at 3/13/2025 1130  Gross per 24 hour   Intake 1890 ml   Output 1702 ml   Net 188 ml        BMI: Estimated body mass index is 25.33 kg/m² as calculated from the following:    Height as of this encounter: 5' 3" (1.6 m).    Weight as of this encounter: 64.9 kg (142 lb 15.9 oz).    Physical Exam  Vitals and nursing note reviewed.   Constitutional:       General: She is not in acute distress.     Appearance: Normal appearance. She is not ill-appearing, toxic-appearing or diaphoretic.      Comments: Doesn't feel well   HENT:      Head: Normocephalic and atraumatic.      Right Ear: External ear normal.      Left Ear: External ear normal.      Nose: Nose normal. No congestion or rhinorrhea.      Mouth/Throat:      Mouth: Mucous membranes are moist.      Pharynx: Oropharynx is clear. No oropharyngeal exudate or posterior oropharyngeal erythema.   Eyes:      General: No scleral icterus.        Right eye: No discharge.         Left eye: No discharge.      Extraocular Movements: Extraocular movements intact.      Conjunctiva/sclera: Conjunctivae normal.      Pupils: Pupils are equal, round, and reactive to light.   Neck:      Vascular: No carotid bruit.   Cardiovascular:      Rate and Rhythm: Normal rate " "and regular rhythm.      Pulses: Normal pulses.      Heart sounds: No murmur heard.     No friction rub. No gallop.   Pulmonary:      Effort: Pulmonary effort is normal. No respiratory distress.      Breath sounds: No stridor. Rales present. No wheezing or rhonchi.   Chest:      Chest wall: No tenderness.   Abdominal:      General: Bowel sounds are normal. There is no distension.      Tenderness: There is no abdominal tenderness. There is no guarding.   Musculoskeletal:         General: No swelling. Normal range of motion.      Cervical back: Normal range of motion and neck supple. No rigidity or tenderness.      Right lower leg: No edema.      Left lower leg: No edema.   Lymphadenopathy:      Cervical: No cervical adenopathy.   Skin:     General: Skin is warm and dry.      Capillary Refill: Capillary refill takes less than 2 seconds.      Coloration: Skin is not jaundiced.      Findings: No bruising.   Neurological:      General: No focal deficit present.      Mental Status: She is alert and oriented to person, place, and time. Mental status is at baseline.      Cranial Nerves: No cranial nerve deficit.      Sensory: No sensory deficit.      Motor: No weakness.   Psychiatric:         Mood and Affect: Mood normal.         Behavior: Behavior normal.         Thought Content: Thought content normal.         Judgment: Judgment normal.         Laboratory    Recent Labs   Lab 03/13/25  0727   WBC 11.23   RBC 4.96   HGB 13.6   HCT 41.5      MCV 84   MCH 27.4   MCHC 32.8       Recent Labs   Lab 03/13/25  0727   CALCIUM 10.7*   ALBUMIN 4.5   PROT 8.4      K 4.0   CO2 24      BUN 7   CREATININE 0.6   ALKPHOS 82   ALT 20   AST 15   BILITOT 1.0       No results for input(s): "PT", "INR", "APTT" in the last 24 hours.    No results for input(s): "CPK", "CPKMB", "TROPONINI", "MB" in the last 24 hours.    Additional labs: reviewed    Microbiology:       Microbiology Results (last 7 days)       Procedure Component " Value Units Date/Time    Blood culture x two cultures. Draw prior to antibiotics. [[id number]] Collected: 03/12/25 1054    Order Status: Completed Specimen: Blood from Peripheral, Hand, Left Updated: 03/13/25 1232     Blood Culture, Routine No Growth to date      No Growth to date    Narrative:      Aerobic and anaerobic  Collection has been rescheduled by RE1 at 03/12/2025 10:56 Reason:   Done  Collection has been rescheduled by RE1 at 03/12/2025 10:56 Reason:   Done    Blood culture x two cultures. Draw prior to antibiotics. [[id number]] Collected: 03/12/25 1055    Order Status: Completed Specimen: Blood from Peripheral, Hand, Right Updated: 03/13/25 1232     Blood Culture, Routine No Growth to date      No Growth to date    Narrative:      Aerobic and anaerobic    Group A Strep, Molecular [[id number]] Collected: 03/12/25 0943    Order Status: Completed Specimen: Throat Updated: 03/12/25 1025     Group A Strep, Molecular Negative     Comment: Arcanobacterium haemolyticum and Beta Streptococcus group C   and G will not be detected by this test method.  Please order   Throat Culture ([id number]) if suspected.                 Radiology    CTA Chest Non-Coronary (PE Studies)  Narrative: CMS MANDATED QUALITY DATA - CT RADIATION - 436    All CT scans at this facility utilize dose modulation, iterative reconstruction, and/or weight based dosing when appropriate to reduce radiation dose to as low as reasonably achievable.    CLINICAL HISTORY:  ([id number])53 y/o  (1970) F    Pulmonary embolism (PE) suspected, high prob;    TECHNIQUE:  (A#[id number], exam time 3/12/2025 12:47)    CTA CHEST NON CORONARY (PE STUDIES) [id number]    Axial CT examination of the chest with attention to the pulmonary arteries was performed using contiguous axial images from the diaphragms to the lung apices following the intravenous administration of non-ionic contrast material. Images were reviewed using lung, mediastinal, and bone windows. The study  was performed with thin sections with subsequent 3-D reformats/MIP/reconstructions in multiple planes.    COMPARISON:  Radiograph from 02/25/2025, CT from 12/08/2024.    FINDINGS:  CTA PE evaluation: This is a moderate quality study for the evaluation of pulmonary embolism secondary to a combination of contrast bolus timing and motion artifact. The pulmonary arteries are normal in appearance without pulmonary emboli noted up to the segmental level, noting the limitations of CT technique for identifying small or isolated subsegmental emboli.    CT Chest:    Visualized neck: normal    Lungs: Moderate mosaic ground-glass attenuation opacity is seen throughout a majority of the right upper lobe, and left upper lobe, similar to the previous CT, with new mosaic ground-glass attenuation opacity throughout the posterior segment of the right lower lobe (image 207), new from the previous exam.  Mild faint central hilar interstitial opacity is present bilaterally.    Airway: Mild bronchiectasis is present with a central hilar predominance.    Pleura: There is no pleural effusion. There is no pneumothorax.    Cardiovascular: The heart is top-normal in size.  No pericardial effusion is seen.  The great vessels are normal in course and caliber.    Mediastinum: No pathologic lymphadenopathy is seen.    Soft tissues: normal    Musculoskeletal: There are mild degenerative changes of the thoracic spine.  There are no suspicious osseous lesions.    Esophagus: normal    Upper Abdomen: No acute abnormality in the upper abdomen.  Impression: 1. No evidence of pulmonary embolism to the segmental arterial level. If there is continued clinical concern, consider further evaluation with lower extremity doppler.    2.  Multifocal geographic areas of airspace opacity and mosaic attenuation most pronounced in the upper lobes, and slightly worse in the right lower lobe suggesting a combination of multifocal infection/pneumonia, interstitial lung  "disease (e.g sarcoid, hypersensitivity pneumonitis, chronic covid, RB-ILD?), and possibly a component of mild interstitial edema.    Electronically signed by: Jhoan Bruno  Date:    03/12/2025  Time:    12:58  X-Ray Chest AP Portable  Narrative: CLINICAL HISTORY:  ([id number])53 y/o  (1970) F    Chest Pain;    TECHNIQUE:  (A#[id number], exam time 3/12/2025 11:00)    XR CHEST AP PORTABLE [id number]    COMPARISON:  Radiograph from 02/25/2025.    FINDINGS:  Mildly increased central hilar interstitial opacities are seen bilaterally suggestive of either mild edema  atypical infection/pneumonia or bronchitis in the appropriate clinical setting. No consolidative pneumonia is seen. Patchy airspace opacities seen in the right upper lobe, similar to the previous exam.  No pneumothorax is identified. The heart is normal in size. The mediastinum is within normal limits. Osseous structures appear within normal limits. The visualized upper abdomen is unremarkable.  Impression: Unchanged radiograph of the chest when accounting for differences in imaging technique.    Electronically signed by: Jhoan Bruno  Date:    03/12/2025  Time:    11:02        Additional Studies    reviwed    Ventilator Information                  No results for input(s): "PH", "PCO2", "PO2", "HCO3", "POCSATURATED", "BE" in the last 72 hours.      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       Plan    Not convinced there is a bacterial pneumonia here  Continue ethambutol/rifampin/azithromycin  Recheck procalcitonin in AM  Continue levaquin for now  Will change steroid dose  Check ESR, CRP, ACE  Findings may represent a flair of sarcoid  Doubt " opportunistic infection  Lovenox prophylaxis    Thank you for this consult.  I will follow with you while the patient is hospitalized.        Jake Cohen MD  [facility] Pulmonary/Critical Care  2025               [1]   Social History  Tobacco Use   Smoking Status Former    Current packs/day: 0.00    Types: Cigarettes    Quit date: 2020    Years since quittin.9   Smokeless Tobacco Never   Tobacco Comments    ocassionally never was qd

## 2025-03-13 NOTE — ASSESSMENT & PLAN NOTE
Started on iv Levofloxacin  Started on iv solumedrol at the moment   If no improvement need pulmonology consult       Antibiotics (From admission, onward)      Start     Stop Route Frequency Ordered    03/12/25 1700  azithromycin tablet 500 mg         -- Oral Every Mon, Wed, Fri 03/12/25 1330    03/12/25 1700  ethambutoL tablet 1,200 mg         -- Oral Every Mon, Wed, Fri 03/12/25 1330    03/12/25 1700  rifAMpin capsule 600 mg         -- Oral Every Mon, Wed, Fri 03/12/25 1330    03/12/25 1445  levoFLOXacin 750 mg/150 mL IVPB 750 mg         -- IV Every 24 hours (non-standard times) 03/12/25 1331            Microbiology Results (last 7 days)       Procedure Component Value Units Date/Time    Blood culture x two cultures. Draw prior to antibiotics. [[id number]] Collected: 03/12/25 1054    Order Status: Completed Specimen: Blood from Peripheral, Hand, Left Updated: 03/12/25 1917     Blood Culture, Routine No Growth to date    Narrative:      Aerobic and anaerobic  Collection has been rescheduled by RE1 at 03/12/2025 10:56 Reason:   Done  Collection has been rescheduled by RE1 at 03/12/2025 10:56 Reason:   Done    Blood culture x two cultures. Draw prior to antibiotics. [[id number]] Collected: 03/12/25 1055    Order Status: Completed Specimen: Blood from Peripheral, Hand, Right Updated: 03/12/25 1917     Blood Culture, Routine No Growth to date    Narrative:      Aerobic and anaerobic    Group A Strep, Molecular [[id number]] Collected: 03/12/25 0943    Order Status: Completed Specimen: Throat Updated: 03/12/25 1025     Group A Strep, Molecular Negative     Comment: Arcanobacterium haemolyticum and Beta Streptococcus group C   and G will not be detected by this test method.  Please order   Throat Culture ([id number]) if suspected.

## 2025-03-13 NOTE — CARE UPDATE
03/13/25 0654   Patient Assessment/Suction   Level of Consciousness (AVPU) alert   Respiratory Effort Normal;Unlabored   Expansion/Accessory Muscles/Retractions no use of accessory muscles;no retractions;expansion symmetric   All Lung Fields Breath Sounds diminished   Rhythm/Pattern, Respiratory unlabored;pattern regular;depth regular;chest wiggle adequate;no shortness of breath reported   Cough Frequency no cough   PRE-TX-O2   Device (Oxygen Therapy) nasal cannula   Flow (L/min) (Oxygen Therapy) 1   SpO2 100 %   Pulse Oximetry Type Intermittent   $ Pulse Oximetry - Multiple Charge Pulse Oximetry - Multiple   Pulse 79   Resp 19   Aerosol Therapy   $ Aerosol Therapy Charges Aerosol Treatment   Daily Review of Necessity (SVN) completed   Respiratory Treatment Status (SVN) given   Treatment Route (SVN) mouthpiece utilized;oxygen   Patient Position HOB elevated   Post Treatment Assessment (SVN) increased aeration   Signs of Intolerance (SVN) none   Breath Sounds Post-Respiratory Treatment   Throughout All Fields Post-Treatment All Fields   Throughout All Fields Post-Treatment aeration increased   Post-treatment Heart Rate (beats/min) 82   Post-treatment Resp Rate (breaths/min) 20   Education   $ Education Bronchodilator;15 min

## 2025-03-13 NOTE — CARE UPDATE
03/12/25 5305   Patient Assessment/Suction   Level of Consciousness (AVPU) alert   Respiratory Effort Normal;Unlabored   Expansion/Accessory Muscles/Retractions no use of accessory muscles;no retractions   ION Breath Sounds clear   LLL Breath Sounds diminished   RUL Breath Sounds clear   RML Breath Sounds clear   RLL Breath Sounds diminished   Rhythm/Pattern, Respiratory unlabored;pattern regular;no shortness of breath reported   Cough Frequency no cough   PRE-TX-O2   Device (Oxygen Therapy) nasal cannula   $ Is the patient on Low Flow Oxygen? Yes   Flow (L/min) (Oxygen Therapy) 1   SpO2 99 %   Pulse Oximetry Type Intermittent   $ Pulse Oximetry - Multiple Charge Pulse Oximetry - Multiple   Aerosol Therapy   $ Aerosol Therapy Charges Aerosol Treatment   Daily Review of Necessity (SVN) completed   Respiratory Treatment Status (SVN) given   Treatment Route (SVN) mouthpiece utilized;oxygen   Patient Position HOB elevated   Post Treatment Assessment (SVN) increased aeration   Signs of Intolerance (SVN) none   Education   $ Education Bronchodilator;15 min

## 2025-03-13 NOTE — ASSESSMENT & PLAN NOTE
Patient with Hypoxic Respiratory failure which is Acute.  she is not on home oxygen. Supplemental oxygen was provided and noted-      .   Signs/symptoms of respiratory failure include- respiratory distress. Contributing diagnoses includes - Pneumonia and sarcoidosis Labs and images were reviewed. Patient Has not had a recent ABG. Will treat underlying causes and adjust management of respiratory failure as follows- follow up pulmonology consult

## 2025-03-13 NOTE — PROGRESS NOTES
[facility] Medicine  Progress Note    Patient Name: Sumit Burger  MRN: [id number]  Patient Class: IP- Inpatient   Admission Date: 3/12/2025  Length of Stay: 1 days  Attending Physician: Stephanie Lal MD  Primary Care Provider: Trisha, Provider        Subjective     Principal Problem:Acute pneumonia        HPI:  54 Year old pt with H/o Sarcoidosis/SUZIE getting admitted with possible acute Pneumonia  Symptom s started with cough few days ago   This was followed by SOB mainly on exertion   Later she developed chest discomfort and symptoms worsened   She came to ER and got admitted  Per records:She was admitted with same c/o on December 2024 and Pneumonia was r/o at that time   She had CTA Today in ER   Compared to previous CTA In December 2024 :new mosaic ground-glass attenuation opacity throughout the posterior segment of the right lower lobe , new from the previous exam     Overview/Hospital Course:  During hospitalization she was admitted to [facility] with telemetry for evaluation and management of an acute pneumonia, she did have a history of sarcoidosis, she was started on IV steroids and empirically managed.  Pulmonology was consulted.    Interval History:  She was seen and examined at bedside rounds, admits to generalized body aches and pains.  States has history of sarcoidosis, diagnosed 2 years ago.  Her pulmonologist is Dr. Rolon, she is requesting to be seen during the state.    Review of Systems   All other systems reviewed and are negative.    Objective:     Vital Signs (Most Recent):  Temp: 97.5 °F (36.4 °C) (03/13/25 0739)  Pulse: 104 (03/13/25 0739)  Resp: 16 (03/13/25 0846)  BP: 125/80 (03/13/25 0739)  SpO2: 97 % (03/13/25 0739) Vital Signs (24h Range):  Temp:  [97.5 °F (36.4 °C)-98.7 °F (37.1 °C)] 97.5 °F (36.4 °C)  Pulse:  [] 104  Resp:  [14-22] 16  SpO2:  [95 %-100 %] 97 %  BP: (104-170)/() 125/80     Weight: 64.9 kg (142 lb 15.9 oz)  Body mass  index is 25.33 kg/m².    Intake/Output Summary (Last 24 hours) at 3/13/2025 1005  Last data filed at 3/13/2025 0527  Gross per 24 hour   Intake 1450 ml   Output 1700 ml   Net -250 ml         Physical Exam  Constitutional:       Appearance: Normal appearance.   HENT:      Head: Normocephalic.      Right Ear: Tympanic membrane normal.      Left Ear: Tympanic membrane normal.      Nose: Nose normal.   Eyes:      Pupils: Pupils are equal, round, and reactive to light.   Cardiovascular:      Rate and Rhythm: Normal rate and regular rhythm.   Pulmonary:      Effort: Pulmonary effort is normal.      Breath sounds: Normal breath sounds.   Abdominal:      General: Abdomen is flat.      Palpations: Abdomen is soft.   Musculoskeletal:         General: Normal range of motion.      Cervical back: Normal range of motion.   Skin:     General: Skin is warm and dry.   Neurological:      General: No focal deficit present.      Mental Status: She is alert.   Psychiatric:         Mood and Affect: Mood normal.               Significant Labs: All pertinent labs within the past 24 hours have been reviewed.    Significant Imaging: I have reviewed all pertinent imaging results/findings within the past 24 hours.      Assessment & Plan  Acute pneumonia  Started on iv Levofloxacin  Started on iv solumedrol at the moment   If no improvement need pulmonology consult       Antibiotics (From admission, onward)      Start     Stop Route Frequency Ordered    03/12/25 1700  azithromycin tablet 500 mg         -- Oral Every Mon, Wed, Fri 03/12/25 1330    03/12/25 1700  ethambutoL tablet 1,200 mg         -- Oral Every Mon, Wed, Fri 03/12/25 1330    03/12/25 1700  rifAMpin capsule 600 mg         -- Oral Every Mon, Wed, Fri 03/12/25 1330    03/12/25 1445  levoFLOXacin 750 mg/150 mL IVPB 750 mg         -- IV Every 24 hours (non-standard times) 03/12/25 1331            Microbiology Results (last 7 days)       Procedure Component Value Units Date/Time     Blood culture x two cultures. Draw prior to antibiotics. [[id number]] Collected: 03/12/25 1054    Order Status: Completed Specimen: Blood from Peripheral, Hand, Left Updated: 03/12/25 1917     Blood Culture, Routine No Growth to date    Narrative:      Aerobic and anaerobic  Collection has been rescheduled by RE1 at 03/12/2025 10:56 Reason:   Done  Collection has been rescheduled by RE1 at 03/12/2025 10:56 Reason:   Done    Blood culture x two cultures. Draw prior to antibiotics. [[id number]] Collected: 03/12/25 1055    Order Status: Completed Specimen: Blood from Peripheral, Hand, Right Updated: 03/12/25 1917     Blood Culture, Routine No Growth to date    Narrative:      Aerobic and anaerobic    Group A Strep, Molecular [[id number]] Collected: 03/12/25 0943    Order Status: Completed Specimen: Throat Updated: 03/12/25 1025     Group A Strep, Molecular Negative     Comment: Arcanobacterium haemolyticum and Beta Streptococcus group C   and G will not be detected by this test method.  Please order   Throat Culture ([id number]) if suspected.               Sarcoidosis  H/o aware   Pt still om PO steroids at home    Pulmonology consulted    SUZIE (mycobacterium avium-intracellulare)  Maintain PO abx pt takes at home    Respiratory distress  POA  As mentioned above     Hypoxia  New onset     Acute hypoxic respiratory failure  Patient with Hypoxic Respiratory failure which is Acute.  she is not on home oxygen. Supplemental oxygen was provided and noted-      .   Signs/symptoms of respiratory failure include- respiratory distress. Contributing diagnoses includes - Pneumonia and sarcoidosis  Labs and images were reviewed. Patient Has not had a recent ABG. Will treat underlying causes and adjust management of respiratory failure as follows- follow up pulmonology consult  VTE Risk Mitigation (From admission, onward)           Ordered     IP VTE LOW RISK PATIENT  Once         03/12/25 1330     Place sequential compression device   Until discontinued         03/12/25 1330                    Discharge Planning   SHAKIRA: 3/15/2025     Code Status: Full Code   Medical Readiness for Discharge Date:   Discharge Plan A: Home with family                        Portillo Baer NP  Department of Hospital Medicine   [facility]

## 2025-03-13 NOTE — HOSPITAL COURSE
Ms. Burger has been monitored closely during her hospitalization. She was admitted on 3/12/25 for amyloidosis flare with concerns of pna. She was empirically started on levaquin and high dose steroids. Pulm has been consulted. She has known SUZIE and is currently on ethambutol, rifampin, and azithro, but there was question of macrolide resistance and ID was consulted per pulm recs. No changes in abx regimen recommended. She completed 5 days of levaquin as recommended by pulm. Procal has been negative x2. Resp infection panel negative. AFB x3 in process. Histoplasma collected and pending. TB gold pending. CTA chest negative for PE, shows worsening of mosaic attenuation in the upper lobes. WBC peaked at 18K in the setting of high dose IV steroids, and has trended down. Solumedrol 80mg q8h for 4 days, decreased to 40mg q8h on 3/17 and transitioned to PO on 3/19. 6 minute walk test completed and does not qualify for home O2. Pulm placed orders for a long steroid taper that has been ordered and recommends Atovaquone for OI prophylaxis given sulfa allergy. She had constipation and after several laxatives, she finally had some relief. She had episodes of hypertension, worse with high dose steroids, and she was initiated on prn clonidine with improvement. She will be discharged with this prn only, as she will likely have lower blood pressure readings while at home. Recommend she bring her BP log to her PCP for further med management outpt. She reports improvement of panic on xanax, explained this should be used only to abort panic attacks and is not for daily use as it has addiction and abuse potential. She verbalizes understanding and will make an appt with her mental health provider for further management. She will have close f/u with pulm and her PCP. She was seen and examined on the date of discharge. Strict return prxn provided.

## 2025-03-14 PROBLEM — D72.829 LEUKOCYTOSIS: Status: ACTIVE | Noted: 2025-03-14

## 2025-03-14 LAB
ALBUMIN SERPL BCP-MCNC: 4.5 G/DL (ref 3.5–5.2)
ALP SERPL-CCNC: 85 U/L (ref 55–135)
ALT SERPL W/O P-5'-P-CCNC: 18 U/L (ref 10–44)
ANION GAP SERPL CALC-SCNC: 6 MMOL/L (ref 8–16)
AST SERPL-CCNC: 13 U/L (ref 10–40)
BASOPHILS # BLD AUTO: 0.02 K/UL (ref 0–0.2)
BASOPHILS NFR BLD: 0.1 % (ref 0–1.9)
BILIRUB SERPL-MCNC: 0.3 MG/DL (ref 0.1–1)
BUN SERPL-MCNC: 10 MG/DL (ref 6–20)
CALCIUM SERPL-MCNC: 10.5 MG/DL (ref 8.7–10.5)
CHLORIDE SERPL-SCNC: 104 MMOL/L (ref 95–110)
CO2 SERPL-SCNC: 31 MMOL/L (ref 23–29)
CREAT SERPL-MCNC: 0.7 MG/DL (ref 0.5–1.4)
CRP SERPL-MCNC: 4.3 MG/DL
DIFFERENTIAL METHOD BLD: ABNORMAL
EOSINOPHIL # BLD AUTO: 0 K/UL (ref 0–0.5)
EOSINOPHIL NFR BLD: 0 % (ref 0–8)
ERYTHROCYTE [DISTWIDTH] IN BLOOD BY AUTOMATED COUNT: 14.4 % (ref 11.5–14.5)
ERYTHROCYTE [SEDIMENTATION RATE] IN BLOOD BY WESTERGREN METHOD: 29 MM/HR (ref 0–20)
EST. GFR  (NO RACE VARIABLE): >60 ML/MIN/1.73 M^2
GLUCOSE SERPL-MCNC: 127 MG/DL (ref 70–110)
HCT VFR BLD AUTO: 45.5 % (ref 37–48.5)
HGB BLD-MCNC: 14.4 G/DL (ref 12–16)
IMM GRANULOCYTES # BLD AUTO: 0.08 K/UL (ref 0–0.04)
IMM GRANULOCYTES NFR BLD AUTO: 0.4 % (ref 0–0.5)
LYMPHOCYTES # BLD AUTO: 0.8 K/UL (ref 1–4.8)
LYMPHOCYTES NFR BLD: 4.1 % (ref 18–48)
MAGNESIUM SERPL-MCNC: 2.2 MG/DL (ref 1.6–2.6)
MCH RBC QN AUTO: 27.3 PG (ref 27–31)
MCHC RBC AUTO-ENTMCNC: 31.6 G/DL (ref 32–36)
MCV RBC AUTO: 86 FL (ref 82–98)
MONOCYTES # BLD AUTO: 0.2 K/UL (ref 0.3–1)
MONOCYTES NFR BLD: 1.3 % (ref 4–15)
NEUTROPHILS # BLD AUTO: 17.7 K/UL (ref 1.8–7.7)
NEUTROPHILS NFR BLD: 94.1 % (ref 38–73)
NRBC BLD-RTO: 0 /100 WBC
PLATELET # BLD AUTO: 328 K/UL (ref 150–450)
PLATELET BLD QL SMEAR: ABNORMAL
PMV BLD AUTO: 10.8 FL (ref 9.2–12.9)
POTASSIUM SERPL-SCNC: 4.3 MMOL/L (ref 3.5–5.1)
PROCALCITONIN SERPL IA-MCNC: <0.05 NG/ML (ref 0–0.5)
PROT SERPL-MCNC: 8.8 G/DL (ref 6–8.4)
RBC # BLD AUTO: 5.27 M/UL (ref 4–5.4)
SODIUM SERPL-SCNC: 141 MMOL/L (ref 136–145)
WBC # BLD AUTO: 18.81 K/UL (ref 3.9–12.7)

## 2025-03-14 PROCEDURE — 25000003 PHARM REV CODE 250: Performed by: STUDENT IN AN ORGANIZED HEALTH CARE EDUCATION/TRAINING PROGRAM

## 2025-03-14 PROCEDURE — 94640 AIRWAY INHALATION TREATMENT: CPT

## 2025-03-14 PROCEDURE — 85025 COMPLETE CBC W/AUTO DIFF WBC: CPT | Performed by: INTERNAL MEDICINE

## 2025-03-14 PROCEDURE — 36415 COLL VENOUS BLD VENIPUNCTURE: CPT | Performed by: INTERNAL MEDICINE

## 2025-03-14 PROCEDURE — 63700000 PHARM REV CODE 250 ALT 637 W/O HCPCS: Performed by: INTERNAL MEDICINE

## 2025-03-14 PROCEDURE — 99233 SBSQ HOSP IP/OBS HIGH 50: CPT | Mod: ,,, | Performed by: INTERNAL MEDICINE

## 2025-03-14 PROCEDURE — 86140 C-REACTIVE PROTEIN: CPT | Performed by: INTERNAL MEDICINE

## 2025-03-14 PROCEDURE — 63600175 PHARM REV CODE 636 W HCPCS: Mod: JZ | Performed by: INTERNAL MEDICINE

## 2025-03-14 PROCEDURE — 99900031 HC PATIENT EDUCATION (STAT)

## 2025-03-14 PROCEDURE — 25000003 PHARM REV CODE 250: Performed by: NURSE PRACTITIONER

## 2025-03-14 PROCEDURE — 83735 ASSAY OF MAGNESIUM: CPT | Performed by: INTERNAL MEDICINE

## 2025-03-14 PROCEDURE — 99223 1ST HOSP IP/OBS HIGH 75: CPT | Mod: ,,, | Performed by: INTERNAL MEDICINE

## 2025-03-14 PROCEDURE — 63600175 PHARM REV CODE 636 W HCPCS: Mod: JZ | Performed by: STUDENT IN AN ORGANIZED HEALTH CARE EDUCATION/TRAINING PROGRAM

## 2025-03-14 PROCEDURE — 84145 PROCALCITONIN (PCT): CPT | Performed by: STUDENT IN AN ORGANIZED HEALTH CARE EDUCATION/TRAINING PROGRAM

## 2025-03-14 PROCEDURE — 80053 COMPREHEN METABOLIC PANEL: CPT | Performed by: INTERNAL MEDICINE

## 2025-03-14 PROCEDURE — 25000242 PHARM REV CODE 250 ALT 637 W/ HCPCS: Performed by: INTERNAL MEDICINE

## 2025-03-14 PROCEDURE — 25000003 PHARM REV CODE 250: Performed by: INTERNAL MEDICINE

## 2025-03-14 PROCEDURE — 85651 RBC SED RATE NONAUTOMATED: CPT | Performed by: INTERNAL MEDICINE

## 2025-03-14 PROCEDURE — 82164 ANGIOTENSIN I ENZYME TEST: CPT | Performed by: INTERNAL MEDICINE

## 2025-03-14 PROCEDURE — 12000002 HC ACUTE/MED SURGE SEMI-PRIVATE ROOM

## 2025-03-14 PROCEDURE — 25000242 PHARM REV CODE 250 ALT 637 W/ HCPCS: Performed by: HOSPITALIST

## 2025-03-14 PROCEDURE — 94761 N-INVAS EAR/PLS OXIMETRY MLT: CPT

## 2025-03-14 RX ORDER — METHOCARBAMOL 500 MG/1
500 TABLET, FILM COATED ORAL 4 TIMES DAILY PRN
Status: DISCONTINUED | OUTPATIENT
Start: 2025-03-14 | End: 2025-03-20 | Stop reason: HOSPADM

## 2025-03-14 RX ORDER — HYDROXYZINE HYDROCHLORIDE 25 MG/1
25 TABLET, FILM COATED ORAL 3 TIMES DAILY PRN
Status: DISCONTINUED | OUTPATIENT
Start: 2025-03-14 | End: 2025-03-15

## 2025-03-14 RX ORDER — KETOROLAC TROMETHAMINE 30 MG/ML
30 INJECTION, SOLUTION INTRAMUSCULAR; INTRAVENOUS EVERY 6 HOURS PRN
Status: DISPENSED | OUTPATIENT
Start: 2025-03-14 | End: 2025-03-17

## 2025-03-14 RX ORDER — SPIRONOLACTONE 25 MG/1
25 TABLET ORAL DAILY
Status: DISCONTINUED | OUTPATIENT
Start: 2025-03-14 | End: 2025-03-20 | Stop reason: HOSPADM

## 2025-03-14 RX ORDER — LEVOFLOXACIN 750 MG/1
750 TABLET ORAL DAILY
Status: DISCONTINUED | OUTPATIENT
Start: 2025-03-14 | End: 2025-03-19

## 2025-03-14 RX ORDER — LEVALBUTEROL INHALATION SOLUTION 1.25 MG/3ML
1.25 SOLUTION RESPIRATORY (INHALATION)
Status: DISCONTINUED | OUTPATIENT
Start: 2025-03-14 | End: 2025-03-20 | Stop reason: HOSPADM

## 2025-03-14 RX ORDER — VALSARTAN 80 MG/1
160 TABLET ORAL EVERY MORNING
Status: DISCONTINUED | OUTPATIENT
Start: 2025-03-14 | End: 2025-03-20 | Stop reason: HOSPADM

## 2025-03-14 RX ADMIN — LEVALBUTEROL HYDROCHLORIDE 1.25 MG: 1.25 SOLUTION RESPIRATORY (INHALATION) at 06:03

## 2025-03-14 RX ADMIN — LEVALBUTEROL HYDROCHLORIDE 1.25 MG: 1.25 SOLUTION RESPIRATORY (INHALATION) at 07:03

## 2025-03-14 RX ADMIN — METHYLPREDNISOLONE SODIUM SUCCINATE 80 MG: 40 INJECTION, POWDER, FOR SOLUTION INTRAMUSCULAR; INTRAVENOUS at 09:03

## 2025-03-14 RX ADMIN — ETHAMBUTOL HYDROCHLORIDE 1200 MG: 400 TABLET ORAL at 05:03

## 2025-03-14 RX ADMIN — HYDROMORPHONE HYDROCHLORIDE 0.5 MG: 1 INJECTION, SOLUTION INTRAMUSCULAR; INTRAVENOUS; SUBCUTANEOUS at 09:03

## 2025-03-14 RX ADMIN — LEVALBUTEROL HYDROCHLORIDE 1.25 MG: 1.25 SOLUTION RESPIRATORY (INHALATION) at 12:03

## 2025-03-14 RX ADMIN — HYDROCODONE BITARTRATE AND ACETAMINOPHEN 1 TABLET: 5; 325 TABLET ORAL at 01:03

## 2025-03-14 RX ADMIN — SPIRONOLACTONE 25 MG: 25 TABLET, FILM COATED ORAL at 11:03

## 2025-03-14 RX ADMIN — LEVALBUTEROL HYDROCHLORIDE 1.25 MG: 1.25 SOLUTION RESPIRATORY (INHALATION) at 01:03

## 2025-03-14 RX ADMIN — LEVOFLOXACIN 750 MG: 750 TABLET, FILM COATED ORAL at 01:03

## 2025-03-14 RX ADMIN — BUPROPION HYDROCHLORIDE 300 MG: 150 TABLET, EXTENDED RELEASE ORAL at 09:03

## 2025-03-14 RX ADMIN — QUETIAPINE FUMARATE 12.5 MG: 25 TABLET ORAL at 09:03

## 2025-03-14 RX ADMIN — VALSARTAN 160 MG: 80 TABLET, FILM COATED ORAL at 11:03

## 2025-03-14 RX ADMIN — HYDROCODONE BITARTRATE AND ACETAMINOPHEN 1 TABLET: 5; 325 TABLET ORAL at 10:03

## 2025-03-14 RX ADMIN — FOLIC ACID 1000 MCG: 1 TABLET ORAL at 07:03

## 2025-03-14 RX ADMIN — METHYLPREDNISOLONE SODIUM SUCCINATE 80 MG: 40 INJECTION, POWDER, FOR SOLUTION INTRAMUSCULAR; INTRAVENOUS at 01:03

## 2025-03-14 RX ADMIN — ACETAMINOPHEN 650 MG: 325 TABLET ORAL at 09:03

## 2025-03-14 RX ADMIN — AZITHROMYCIN DIHYDRATE 500 MG: 250 TABLET ORAL at 05:03

## 2025-03-14 RX ADMIN — BENZONATATE 100 MG: 100 CAPSULE ORAL at 07:03

## 2025-03-14 RX ADMIN — HYDROXYZINE HYDROCHLORIDE 25 MG: 25 TABLET, FILM COATED ORAL at 09:03

## 2025-03-14 RX ADMIN — ALPRAZOLAM 0.5 MG: 0.5 TABLET ORAL at 09:03

## 2025-03-14 RX ADMIN — RIFAMPIN 600 MG: 300 CAPSULE ORAL at 05:03

## 2025-03-14 RX ADMIN — HYDROMORPHONE HYDROCHLORIDE 0.5 MG: 1 INJECTION, SOLUTION INTRAMUSCULAR; INTRAVENOUS; SUBCUTANEOUS at 04:03

## 2025-03-14 RX ADMIN — ENOXAPARIN SODIUM 40 MG: 40 INJECTION SUBCUTANEOUS at 04:03

## 2025-03-14 RX ADMIN — METHYLPREDNISOLONE SODIUM SUCCINATE 80 MG: 40 INJECTION, POWDER, FOR SOLUTION INTRAMUSCULAR; INTRAVENOUS at 05:03

## 2025-03-14 RX ADMIN — KETOROLAC TROMETHAMINE 30 MG: 30 INJECTION, SOLUTION INTRAMUSCULAR; INTRAVENOUS at 09:03

## 2025-03-14 NOTE — CONSULTS
[facility]   Department of Infectious Disease  Consult Note        PATIENT NAME: Sumit Burger  MRN: [id number]  TODAY'S DATE: 03/14/2025  ADMIT DATE: 3/12/2025  LOS: 2 days    CHIEF COMPLAINT: Shortness of Breath (SOB and cough since Sunday. Pt has lung problems and believes she is having a flare up)      PRINCIPLE PROBLEM: Acute pneumonia    REASON FOR CONSULT:     ASSESSMENT and PLAN   Cough and shortness of breath in a patient with sarcoidosis who is also on therapy for pulmonary MAC.  CT chest slightly worse compared to December 2024.  May very well be sarcoidosis flare.  Currently on levofloxacin but she is also already on atypical coverage with azithromycin for MAC.  Check respiratory panel.  Obtain sputum induction for culture and AFB stain and culture to evaluate for persistent versus improvement in MAC.  Check QuantiFERON TB gold and urine Histoplasma antigen.    2. Pulmonary sarcoidosis.  May need augmentation of her therapy for this.    3. Hypertension.  Management as per hospitalist.         RECOMMENDATIONS:   Check respiratory panel   Sputum induction for sputum Gram stain and culture and AFB stain and culture   QuantiFERON TB gold   Check urine Histoplasma antigen   No need for airborne isolation    Thank you for this consult. Please send [employer] secure chat with any questions.    Antibiotics (From admission, onward)      Start     Stop Route Frequency Ordered    03/14/25 1300  levoFLOXacin tablet 750 mg         -- Oral Daily 03/14/25 1157    03/12/25 1700  azithromycin tablet 500 mg         -- Oral Every Mon, Wed, Fri 03/12/25 1330    03/12/25 1700  ethambutoL tablet 1,200 mg         -- Oral Every Mon, Wed, Fri 03/12/25 1330    03/12/25 1700  rifAMpin capsule 600 mg         -- Oral Every Mon, Wed, Fri 03/12/25 1330          Antifungals (From admission, onward)      None           Antivirals (From admission, onward)      None            HPI      Sumit Burger is a 54 y.o.  female with history of hypertension, and sarcoidosis diagnosed with lung biopsy.  She is on prednisone 10 mg daily.  Diagnosed with SUZIE on p.o. culture 06/24/2024 and is on thrice weekly azithromycin/rifampin/ethambutol ? Since 9/16/24.  She is compliant and tolerating well.  She also been hospitalized in December 20, 2024 with episodes of myalgia associated with diaphoresis in the setting of cough.  Workup at the time unremarkable for any new infection.  Possibility of menopause and other entities were entertained.    Started having worsening cough with shortness of breath on 03/09/2025. Presented to the ED 03/12/2025 for evaluation.  In the ED /9 7, pulse 120, respiratory rate 20, temperature 98° psoas 6 and oxygen saturation was 95%.  WBC 13 K, hematocrit 44, LFTs normal, creatinine 0.8.  Influenza and COVID assay negative.  Chest x-ray unchanged from 02/25/2025 with mildly increased intra hilar interstitial opacities.  CT PE protocol if for PE.  It did show multifocal geographic areas of airspace opacity a mosaic attenuation most pronounced in the upper lobes slightly worse in the right lower lobe suggesting combination multifocal infection/pneumoniae, interstitial lung disease like sarcoid possibly component of mild interstitial edema.  She was admitted and continued on her MAC therapy.  Seen by Pulmonary.  Levofloxacin added today.  ID asked to assist with her care.    Antibiotic history:    MAC therapy  Levofloxacin: 12/12/2025-    Microbiology:    Influenza and COVID assay 03/12/2025: Negative   Blood cultures 12/20/2025:  NGTD    Outdoor activities:  She is retired dental hygienist.  She quit working because of her illness.  She has a dog at home.  Lives with her family.  She does not smoke, drink or use any illicit drugs        Travel:   No recent travel  Implants:   None  Antibiotic history:  As in HPI     Social History  Marital Status:   Alcohol History:  reports that she does not  currently use alcohol.  Tobacco History:  reports that she quit smoking about 4 years ago. Her smoking use included cigarettes. She has never used smokeless tobacco.  Drug History:  reports no history of drug use.      Review of patient's allergies indicates:   Allergen Reactions    Sulfamethoxazole-trimethoprim Hives, Shortness Of Breath, Nausea And Vomiting and Rash     Hives, nausea and vomiting, and shortness of breath.  Did not require admission  Hives, nausea and vomiting, and shortness of breath.  Did not require admission    Benadryl [diphenhydramine hcl]     Latex, natural rubber     Percocet [oxycodone-acetaminophen]     Shrimp     Codeine Itching and Nausea Only     Past Medical History:   Diagnosis Date    HTN (hypertension)     Pneumonia 10/2020    Sarcoidosis      Past Surgical History:   Procedure Laterality Date    BRONCHOALVEOLAR LAVAGE Bilateral 2024    Procedure: LAVAGE, BRONCHOALVEOLAR;  Surgeon: Chilango Zuluaga MD;  Location: [facility] ENDO;  Service: Pulmonary;  Laterality: Bilateral;    BRONCHOSCOPY WITH FLUOROSCOPY Right 10/20/2020    Procedure: BRONCHOSCOPY, WITH FLUOROSCOPY;  Surgeon: Ava Rolon MD;  Location: [facility] ENDO;  Service: Pulmonary;  Laterality: Right;     SECTION      x2    FALLOPIAN TUBOPLASTY      LAPAROSCOPIC APPENDECTOMY N/A 8/10/2022    Procedure: APPENDECTOMY, LAPAROSCOPIC;  Surgeon: Jackson Vogel MD;  Location: [facility] OR;  Service: General;  Laterality: N/A;    TUBAL LIGATION      WRIST FRACTURE SURGERY Right     ganglion cyst     Family History   Problem Relation Name Age of Onset    Hypertension Mother         SUBJECTIVE     Review of systems: 10 system review unremarkable.  As in HPI.     OBJECTIVE   Temp:  [97.5 °F (36.4 °C)-97.9 °F (36.6 °C)] 97.9 °F (36.6 °C)  Pulse:  [] 104  Resp:  [14-18] 14  SpO2:  [96 %-100 %] 97 %  BP: (126-188)/() 188/105  Temp:  [97.5 °F (36.4 °C)-97.9 °F (36.6 °C)]   Temp: 97.9 °F (36.6 °C) (25  "1621)  Pulse: 104 (03/14/25 1621)  Resp: 14 (03/14/25 1647)  BP: (!) 188/105 (03/14/25 1621)  SpO2: 97 % (03/14/25 1621)    Intake/Output Summary (Last 24 hours) at 3/14/2025 1846  Last data filed at 3/13/2025 2228  Gross per 24 hour   Intake 626.33 ml   Output 1200 ml   Net -573.67 ml       Physical Exam  General:  Middle-aged man lying quietly in bed in no acute distress   HEENT: No oral thrush, no cervical adenopathy   CVS: S1 and 2 heard, no murmurs appreciated   Respiratory: Clear to auscultation   Abdomen: Full, soft, nontender, palpable organomegaly   Skin: No rash appreciated   CNS: No gross focal deficits   Musculoskeletal: No joint or abnormalities appreciated  Psych: Good mood, normal affect.     VAD:  PIV  ISOLATION:  None    Wounds:  None    Significant Labs: All pertinent labs within the past 24 hours have been reviewed.    CBC LAST 7  Recent Labs   Lab 03/12/25  1058 03/13/25  0727 03/14/25  0850   WBC 13.10* 11.23 18.81*   RBC 5.30 4.96 5.27   HGB 14.7 13.6 14.4   HCT 44.2 41.5 45.5   MCV 83 84 86   MCH 27.7 27.4 27.3   MCHC 33.3 32.8 31.6*   RDW 14.3 14.1 14.4    296 328   MPV 11.0 10.8 10.8   GRAN 79.0* 87.4*  9.8* 94.1*  17.7*   LYMPH 18.0 10.1*  1.1 4.1*  0.8*   MONO 3.0* 2.0*  0.2* 1.3*  0.2*   BASO  --  0.02 0.02   NRBC 0 0 0       CHEMISTRY LAST 7  Recent Labs   Lab 03/12/25  1058 03/13/25  0727 03/14/25  0850    138 141   K 3.5 4.0 4.3    104 104   CO2 23 24 31*   ANIONGAP 12 10 6*   BUN 9 7 10   CREATININE 0.8 0.6 0.7   GLU 87 156* 127*   CALCIUM 10.9* 10.7* 10.5   MG 1.8 2.1 2.2   ALBUMIN 4.7 4.5 4.5   PROT 8.8* 8.4 8.8*   ALKPHOS 87 82 85   ALT 25 20 18   AST 22 15 13   BILITOT 0.8 1.0 0.3       Estimated Creatinine Clearance: 85.2 mL/min (based on SCr of 0.7 mg/dL).    INFLAMMATORY/PROCAL  LAST 7  Recent Labs   Lab 03/13/25  1045 03/14/25  0850   PROCAL <0.050 <0.050   CRP  --  4.30*     No results found for: "ESR"  CRP   Date Value Ref Range Status   03/14/2025 " 4.30 (H) <1.00 mg/dL Final     Comment:     CRP-Normal Application expected values:   <1.0        mg/dL   Normal Range  1.0 - 5.0  mg/dL   Indicates mild inflammation  5.0 - 10.0 mg/dL   Indicates severe inflammation  >10.0        mg/dL   Represents serious processes and   frequently         indicates the presence of a bacterial   infection.      10/19/2020 4.13 (H) <0.76 mg/dL Final   10/19/2020 3.49 (H) <0.76 mg/dL Final       PRIOR  MICROBIOLOGY:  Reviewed    No results found for the last 90 days.    LAST 7 DAYS MICROBIOLOGY   Microbiology Results (last 7 days)       Procedure Component Value Units Date/Time    Blood culture x two cultures. Draw prior to antibiotics. [[id number]] Collected: 03/12/25 1055    Order Status: Completed Specimen: Blood from Peripheral, Hand, Right Updated: 03/14/25 1232     Blood Culture, Routine No Growth to date      No Growth to date      No Growth to date    Narrative:      Aerobic and anaerobic    Blood culture x two cultures. Draw prior to antibiotics. [[id number]] Collected: 03/12/25 1054    Order Status: Completed Specimen: Blood from Peripheral, Hand, Left Updated: 03/14/25 1232     Blood Culture, Routine No Growth to date      No Growth to date      No Growth to date    Narrative:      Aerobic and anaerobic  Collection has been rescheduled by RE1 at 03/12/2025 10:56 Reason:   Done  Collection has been rescheduled by RE1 at 03/12/2025 10:56 Reason:   Done    Group A Strep, Molecular [[id number]] Collected: 03/12/25 0943    Order Status: Completed Specimen: Throat Updated: 03/12/25 1025     Group A Strep, Molecular Negative     Comment: Arcanobacterium haemolyticum and Beta Streptococcus group C   and G will not be detected by this test method.  Please order   Throat Culture ([id number]) if suspected.               CURRENT/PREVIOUS VISIT EKG  Results for orders placed or performed during the hospital encounter of 03/12/25   EKG 12-lead    Collection Time: 03/12/25  9:40 AM   Result  Value Ref Range    QRS Duration 74 ms    [facility] QTC Calculation 441 ms    Narrative    Test Reason : R06.02,    Vent. Rate : 125 BPM     Atrial Rate : 125 BPM     P-R Int : 116 ms          QRS Dur :  74 ms      QT Int : 306 ms       P-R-T Axes :  65  19  59 degrees    QTcB Int : 441 ms    Sinus tachycardia  Right atrial enlargement  Borderline Abnormal ECG  Confirmed by Deric Wang ([id number]) on 3/12/2025 7:46:37 PM    Referred By:            Confirmed By: Deric Wang       Significant Imaging: I have reviewed all relevant and available imaging results/findings within the past 24 hours.    I spent a total of 65 minutes on the day of the visit.This includes face to face time and non-face to face time preparing to see the patient (eg, review of tests), obtaining and/or reviewing separately obtained history, documenting clinical information in the electronic or other health record, independently interpreting results and communicating results to the patient/family/caregiver, or care coordinator.    Moises Funez MD  Date of Service: 03/14/2025      This note was created using [employer] voice recognition software that occasionally misinterpreted phrases or words.

## 2025-03-14 NOTE — PROGRESS NOTES
[facility] Medicine  Progress Note    Patient Name: Sumit Burger  MRN: [id number]  Patient Class: IP- Inpatient   Admission Date: 3/12/2025  Length of Stay: 2 days  Attending Physician: Stephanie Lal MD  Primary Care Provider: Trisha, Provider        Subjective     Principal Problem:Acute pneumonia        HPI:  54 Year old pt with H/o Sarcoidosis/SUZIE getting admitted with possible acute Pneumonia  Symptom s started with cough few days ago   This was followed by SOB mainly on exertion   Later she developed chest discomfort and symptoms worsened   She came to ER and got admitted  Per records:She was admitted with same c/o on December 2024 and Pneumonia was r/o at that time   She had CTA Today in ER   Compared to previous CTA In December 2024 :new mosaic ground-glass attenuation opacity throughout the posterior segment of the right lower lobe , new from the previous exam     Overview/Hospital Course:  During hospitalization she was admitted to [facility] with telemetry for evaluation and management of an acute pneumonia, she did have a history of sarcoidosis, she was started on IV steroids and empirically managed.  Pulmonology was consulted.    Interval History:  She was seen and examined at bedside rounds, admits to generalized body aches and pains.  She was emotional this morning, admits to not sleeping well.    Review of Systems   All other systems reviewed and are negative.    Objective:     Vital Signs (Most Recent):  Temp: 97.9 °F (36.6 °C) (03/14/25 0730)  Pulse: 79 (03/14/25 0730)  Resp: 16 (03/14/25 0959)  BP: (!) 163/97 (03/14/25 0730)  SpO2: 96 % (03/14/25 0730) Vital Signs (24h Range):  Temp:  [97.5 °F (36.4 °C)-97.9 °F (36.6 °C)] 97.9 °F (36.6 °C)  Pulse:  [] 79  Resp:  [14-19] 16  SpO2:  [96 %-100 %] 96 %  BP: (119-163)/(73-97) 163/97     Weight: 64.9 kg (142 lb 15.9 oz)  Body mass index is 25.33 kg/m².    Intake/Output Summary (Last 24 hours) at 3/14/2025  1007  Last data filed at 3/13/2025 2228  Gross per 24 hour   Intake 826.33 ml   Output 1200 ml   Net -373.67 ml         Physical Exam  Constitutional:       Appearance: Normal appearance.   HENT:      Head: Normocephalic.      Right Ear: Tympanic membrane normal.      Left Ear: Tympanic membrane normal.      Nose: Nose normal.   Eyes:      Pupils: Pupils are equal, round, and reactive to light.   Cardiovascular:      Rate and Rhythm: Normal rate and regular rhythm.   Pulmonary:      Effort: Pulmonary effort is normal.      Breath sounds: Normal breath sounds.   Abdominal:      General: Abdomen is flat.      Palpations: Abdomen is soft.   Musculoskeletal:         General: Normal range of motion.      Cervical back: Normal range of motion.   Skin:     General: Skin is warm and dry.   Neurological:      General: No focal deficit present.      Mental Status: She is alert.   Psychiatric:         Mood and Affect: Mood normal.               Significant Labs: All pertinent labs within the past 24 hours have been reviewed.    Significant Imaging: I have reviewed all pertinent imaging results/findings within the past 24 hours.      Assessment & Plan  Acute pneumonia  Unlikely bacterial component  Continue iv Levofloxacin and azithromycin per pulmonology recs  Continue iv solumedrol per pulmonology recs  Seen by pulmonology      Antibiotics (From admission, onward)      Start     Stop Route Frequency Ordered    03/12/25 1700  azithromycin tablet 500 mg         -- Oral Every Mon, Wed, Fri 03/12/25 1330    03/12/25 1700  ethambutoL tablet 1,200 mg         -- Oral Every Mon, Wed, Fri 03/12/25 1330    03/12/25 1700  rifAMpin capsule 600 mg         -- Oral Every Mon, Wed, Fri 03/12/25 1330    03/12/25 1445  levoFLOXacin 750 mg/150 mL IVPB 750 mg         -- IV Every 24 hours (non-standard times) 03/12/25 1331            Microbiology Results (last 7 days)       Procedure Component Value Units Date/Time    Blood culture x two  cultures. Draw prior to antibiotics. [[id number]] Collected: 03/12/25 1055    Order Status: Completed Specimen: Blood from Peripheral, Hand, Right Updated: 03/14/25 0916     Blood Culture, Routine No Growth to date      No Growth to date    Narrative:      Aerobic and anaerobic    Blood culture x two cultures. Draw prior to antibiotics. [[id number]] Collected: 03/12/25 1054    Order Status: Completed Specimen: Blood from Peripheral, Hand, Left Updated: 03/14/25 0912     Blood Culture, Routine No Growth to date      No Growth to date    Narrative:      Aerobic and anaerobic  Collection has been rescheduled by RE1 at 03/12/2025 10:56 Reason:   Done  Collection has been rescheduled by RE1 at 03/12/2025 10:56 Reason:   Done    Group A Strep, Molecular [[id number]] Collected: 03/12/25 0943    Order Status: Completed Specimen: Throat Updated: 03/12/25 1025     Group A Strep, Molecular Negative     Comment: Arcanobacterium haemolyticum and Beta Streptococcus group C   and G will not be detected by this test method.  Please order   Throat Culture ([id number]) if suspected.               Sarcoidosis  H/o aware   Pt still om PO steroids at home    Pulmonology consulted    SUZIE (mycobacterium avium-intracellulare)  Maintain PO abx pt takes at home    Respiratory distress  POA  As mentioned above     Hypoxia  New onset     Acute hypoxic respiratory failure  Improved   Patient with Hypoxic Respiratory failure which is Acute.  she is not on home oxygen. Supplemental oxygen was provided and noted-      .   Signs/symptoms of respiratory failure include- respiratory distress. Contributing diagnoses includes - Pneumonia and sarcoidosis  Labs and images were reviewed. Patient Has not had a recent ABG. Will treat underlying causes and adjust management of respiratory failure as follows- follow up pulmonology consult  Leukocytosis  Procalcitonin remains normal  Suspect Secondary to corticosteroid use    VTE Risk Mitigation (From admission,  onward)           Ordered     enoxaparin injection 40 mg  Every 24 hours         03/13/25 1902     IP VTE LOW RISK PATIENT  Once         03/12/25 1330     Place sequential compression device  Until discontinued         03/12/25 1330                    Discharge Planning   SHAKIRA: 3/17/2025     Code Status: Full Code   Medical Readiness for Discharge Date:   Discharge Plan A: Home with family                        Portillo Baer NP  Department of Hospital Medicine   [facility]

## 2025-03-14 NOTE — PLAN OF CARE
Problem: Adult Inpatient Plan of Care  Goal: Plan of Care Review  Outcome: Progressing  Goal: Absence of Hospital-Acquired Illness or Injury  Outcome: Progressing     Problem: Pneumonia  Goal: Fluid Balance  Outcome: Progressing  Goal: Effective Oxygenation and Ventilation  Outcome: Progressing

## 2025-03-14 NOTE — CARE UPDATE
03/13/25 1903   Patient Assessment/Suction   Level of Consciousness (AVPU) alert   Respiratory Effort Normal;Unlabored   Expansion/Accessory Muscles/Retractions no use of accessory muscles;no retractions;expansion symmetric   All Lung Fields Breath Sounds diminished;clear   Rhythm/Pattern, Respiratory unlabored;pattern regular;no shortness of breath reported   Cough Frequency infrequent   PRE-TX-O2   Device (Oxygen Therapy) nasal cannula   Flow (L/min) (Oxygen Therapy) 1   SpO2 97 %   Pulse 90   Resp 18

## 2025-03-14 NOTE — ASSESSMENT & PLAN NOTE
Improved   Patient with Hypoxic Respiratory failure which is Acute.  she is not on home oxygen. Supplemental oxygen was provided and noted-      .   Signs/symptoms of respiratory failure include- respiratory distress. Contributing diagnoses includes - Pneumonia and sarcoidosis Labs and images were reviewed. Patient Has not had a recent ABG. Will treat underlying causes and adjust management of respiratory failure as follows- follow up pulmonology consult

## 2025-03-14 NOTE — PROGRESS NOTES
Pulmonary/Critical Care Progress Note      Patient name: Sumit Burger  MRN: [id number]  Date: 03/14/2025    Admit Date: 3/12/2025  Consult Requested By: Stephanie Lal MD    Reason for Consult: pneumonia, sarcoid    HPI:    3/13/2025 - Pt known to Dr Rolon with sarcoid (dx about 3 years ago) usually on prednisone 10 mg/d started with some increased cough for a few days then the last day or so has develop worsened SOB, cough and in general feeling worse.  Came to ER for evaluation, WBC was elevated, LA ok.  CTA shows bilateral infiltrates which are actually similar to prior CTA chest done 12/2024 except some increase in RLL.  Procalcitonin is very low.  RUL lung biopsy + noncaseating granuloma 2020.  Sats are OK on 1 LPM.  ACE level has been elevated in the past.  She also has a ho SUZIE on treatment with Ethambutol/Rifampin/azithromycin    3/14- pt very anxious and tearful today. Feels sob especially with exertion. Cough is non productive.    Review of Systems    Review of Systems   Constitutional:  Positive for malaise/fatigue. Negative for chills, diaphoresis, fever and weight loss.   HENT:  Negative for congestion.    Eyes:  Negative for pain.   Respiratory:  Positive for cough and shortness of breath. Negative for hemoptysis, sputum production, wheezing and stridor.    Cardiovascular:  Negative for chest pain, palpitations, orthopnea, claudication, leg swelling and PND.   Gastrointestinal:  Positive for nausea. Negative for abdominal pain, constipation, diarrhea, heartburn and vomiting.        Decreased appetite   Genitourinary:  Negative for dysuria, frequency and urgency.   Musculoskeletal:  Negative for falls and myalgias.   Neurological:  Negative for sensory change, focal weakness and weakness.   Psychiatric/Behavioral:  Negative for depression. The patient is not nervous/anxious.        Past Medical History    Past Medical History:   Diagnosis Date    HTN (hypertension)     Pneumonia 10/2020     Sarcoidosis        Past Surgical History    Past Surgical History:   Procedure Laterality Date    BRONCHOALVEOLAR LAVAGE Bilateral 2024    Procedure: LAVAGE, BRONCHOALVEOLAR;  Surgeon: Chilango Zuluaga MD;  Location: [facility] ENDO;  Service: Pulmonary;  Laterality: Bilateral;    BRONCHOSCOPY WITH FLUOROSCOPY Right 10/20/2020    Procedure: BRONCHOSCOPY, WITH FLUOROSCOPY;  Surgeon: Ava Rolon MD;  Location: [facility] ENDO;  Service: Pulmonary;  Laterality: Right;     SECTION      x2    FALLOPIAN TUBOPLASTY      LAPAROSCOPIC APPENDECTOMY N/A 8/10/2022    Procedure: APPENDECTOMY, LAPAROSCOPIC;  Surgeon: Jackson Vogel MD;  Location: [facility] OR;  Service: General;  Laterality: N/A;    TUBAL LIGATION      WRIST FRACTURE SURGERY Right     ganglion cyst       Medications (scheduled):      amLODIPine  5 mg Oral Daily    azithromycin  500 mg Oral Every Mon, Wed, Fri    buPROPion  300 mg Oral Daily    enoxparin  40 mg Subcutaneous Q24H (prophylaxis, 1700)    ethambutoL  1,200 mg Oral Every Mon, Wed, Fri    folic acid  1,000 mcg Oral Daily    levalbuterol  1.25 mg Nebulization Q6H WAKE    levoFLOXacin  750 mg Oral Daily    methylPREDNISolone injection (PEDS and ADULTS)  80 mg Intravenous Q8H    QUEtiapine  12.5 mg Oral QHS    rifAMpin  600 mg Oral Every Mon, Wed, Fri    spironolactone  25 mg Oral Daily    valsartan  160 mg Oral QAM       Medications (infusions):         Medications (prn):       Current Facility-Administered Medications:     acetaminophen, 650 mg, Oral, Q8H PRN    acetaminophen, 650 mg, Oral, Q4H PRN    aluminum-magnesium hydroxide-simethicone, 30 mL, Oral, QID PRN    benzonatate, 100 mg, Oral, TID PRN    HYDROcodone-acetaminophen, 1 tablet, Oral, Q6H PRN    HYDROmorphone, 0.5 mg, Intravenous, Q6H PRN    ketorolac, 30 mg, Intravenous, Q6H PRN    magnesium oxide, 800 mg, Oral, PRN    magnesium oxide, 800 mg, Oral, PRN    melatonin, 6 mg, Oral, Nightly PRN    methocarbamoL, 500 mg, Oral, QID PRN     "naloxone, 0.02 mg, Intravenous, PRN    ondansetron, 4 mg, Intravenous, Q6H PRN    potassium bicarbonate, 35 mEq, Oral, PRN    potassium bicarbonate, 50 mEq, Oral, PRN    potassium bicarbonate, 60 mEq, Oral, PRN    potassium, sodium phosphates, 2 packet, Oral, PRN    potassium, sodium phosphates, 2 packet, Oral, PRN    potassium, sodium phosphates, 2 packet, Oral, PRN    Family History:   Family History   Problem Relation Name Age of Onset    Hypertension Mother         Social History: Tobacco: Tobacco Use History[1]                             EtOH:   Social History     Substance and Sexual Activity   Alcohol Use Not Currently                                Drugs:   Social History     Substance and Sexual Activity   Drug Use Never       Physical Exam    Vital signs:  Temp:  [97.5 °F (36.4 °C)-97.9 °F (36.6 °C)]   Pulse:  []   Resp:  [14-19]   BP: (119-163)/(73-97)   SpO2:  [96 %-100 %]     Intake/Output:   Intake/Output Summary (Last 24 hours) at 3/14/2025 1214  Last data filed at 3/13/2025 2228  Gross per 24 hour   Intake 626.33 ml   Output 1200 ml   Net -573.67 ml        BMI: Estimated body mass index is 25.33 kg/m² as calculated from the following:    Height as of this encounter: 5' 3" (1.6 m).    Weight as of this encounter: 64.9 kg (142 lb 15.9 oz).    Physical Exam  Vitals and nursing note reviewed.   Constitutional:       General: She is not in acute distress.     Appearance: Normal appearance. She is not ill-appearing, toxic-appearing or diaphoretic.   HENT:      Head: Normocephalic and atraumatic.      Right Ear: External ear normal.      Left Ear: External ear normal.      Nose: Nose normal. No congestion or rhinorrhea.      Mouth/Throat:      Mouth: Mucous membranes are moist.      Pharynx: Oropharynx is clear. No oropharyngeal exudate or posterior oropharyngeal erythema.   Eyes:      General: No scleral icterus.        Right eye: No discharge.         Left eye: No discharge.      Extraocular " Movements: Extraocular movements intact.      Conjunctiva/sclera: Conjunctivae normal.      Pupils: Pupils are equal, round, and reactive to light.   Neck:      Vascular: No carotid bruit.   Cardiovascular:      Rate and Rhythm: Normal rate and regular rhythm.      Pulses: Normal pulses.      Heart sounds: No murmur heard.     No friction rub. No gallop.   Pulmonary:      Effort: Pulmonary effort is normal. No respiratory distress.      Breath sounds: No stridor. Rales present. No wheezing or rhonchi.   Chest:      Chest wall: No tenderness.   Abdominal:      General: Bowel sounds are normal. There is no distension.      Tenderness: There is no abdominal tenderness. There is no guarding.   Musculoskeletal:         General: No swelling. Normal range of motion.      Cervical back: Normal range of motion and neck supple. No rigidity or tenderness.      Right lower leg: No edema.      Left lower leg: No edema.   Lymphadenopathy:      Cervical: No cervical adenopathy.   Skin:     General: Skin is warm and dry.      Capillary Refill: Capillary refill takes less than 2 seconds.      Coloration: Skin is not jaundiced.      Findings: No bruising.   Neurological:      General: No focal deficit present.      Mental Status: She is alert and oriented to person, place, and time. Mental status is at baseline.      Cranial Nerves: No cranial nerve deficit.      Sensory: No sensory deficit.      Motor: No weakness.   Psychiatric:         Behavior: Behavior normal.         Thought Content: Thought content normal.         Judgment: Judgment normal.      Comments: Tearful and anxious         Laboratory    Recent Labs   Lab 03/14/25  0850   WBC 18.81*   RBC 5.27   HGB 14.4   HCT 45.5      MCV 86   MCH 27.3   MCHC 31.6*       Recent Labs   Lab 03/14/25  0850   CALCIUM 10.5   ALBUMIN 4.5   PROT 8.8*      K 4.3   CO2 31*      BUN 10   CREATININE 0.7   ALKPHOS 85   ALT 18   AST 13   BILITOT 0.3     6/24/24-   AFB Culture  "& Smear Notified Dr. Zuluaga via [employer] secure chat and acknowledged on 7/31/24   AFB Culture & Smear at 16:32; CJD   AFB Culture & Smear  Abnormal   MYCOBACTERIUM AVIUM COMPLEX  Testing performed by:  [facility] [address] [address]  Dir: Vika England MD    AFB CULTURE STAIN No acid fast bacilli seen.   AFB CULTURE STAIN Testing performed by:   AFB CULTURE STAIN [facility] [place]   AFB CULTURE STAIN [address] [facility]   [place] [address]   AFB CULTURE STAIN Dr. Rm Lake MD   Resulting Agency [facility]        Susceptibility              Amikacin 32.0 mcg/mL Intermediate      Ciprofloxacin 2.0 mcg/mL      Clarithromycin >64.0 mcg/mL Resistant 1     Linezolid 16.0 mcg/mL Intermediate     Minocycline 8.0 mcg/mL      Moxifloxacin 0.5 mcg/mL Sensitive     Rifampin >4.0 mcg/mL      Streptomycin 32.0 mcg/mL      Trimeth/Sulfa 4/76 mcg/mL           No results for input(s): "PT", "INR", "APTT" in the last 24 hours.    No results for input(s): "CPK", "CPKMB", "TROPONINI", "MB" in the last 24 hours.    Additional labs: reviewed    Microbiology:       Microbiology Results (last 7 days)       Procedure Component Value Units Date/Time    Blood culture x two cultures. Draw prior to antibiotics. [[id number]] Collected: 03/12/25 1055    Order Status: Completed Specimen: Blood from Peripheral, Hand, Right Updated: 03/14/25 0916     Blood Culture, Routine No Growth to date      No Growth to date    Narrative:      Aerobic and anaerobic    Blood culture x two cultures. Draw prior to antibiotics. [[id number]] Collected: 03/12/25 1054    Order Status: Completed Specimen: Blood from Peripheral, Hand, Left Updated: 03/14/25 0912     Blood Culture, Routine No Growth to date      No Growth to date    Narrative:      Aerobic and anaerobic  Collection has been rescheduled by RE1 at 03/12/2025 10:56 Reason:   Done  Collection has been rescheduled by RE1 at 03/12/2025 10:56 Reason: "   Done    Group A Strep, Molecular [[id number]] Collected: 03/12/25 0943    Order Status: Completed Specimen: Throat Updated: 03/12/25 1025     Group A Strep, Molecular Negative     Comment: Arcanobacterium haemolyticum and Beta Streptococcus group C   and G will not be detected by this test method.  Please order   Throat Culture ([id number]) if suspected.                 Radiology    CTA Chest Non-Coronary (PE Studies)  Narrative: CMS MANDATED QUALITY DATA - CT RADIATION - 436    All CT scans at this facility utilize dose modulation, iterative reconstruction, and/or weight based dosing when appropriate to reduce radiation dose to as low as reasonably achievable.    CLINICAL HISTORY:  ([id number])53 y/o  (1970) F    Pulmonary embolism (PE) suspected, high prob;    TECHNIQUE:  (A#[id number], exam time 3/12/2025 12:47)    CTA CHEST NON CORONARY (PE STUDIES) [id number]    Axial CT examination of the chest with attention to the pulmonary arteries was performed using contiguous axial images from the diaphragms to the lung apices following the intravenous administration of non-ionic contrast material. Images were reviewed using lung, mediastinal, and bone windows. The study was performed with thin sections with subsequent 3-D reformats/MIP/reconstructions in multiple planes.    COMPARISON:  Radiograph from 02/25/2025, CT from 12/08/2024.    FINDINGS:  CTA PE evaluation: This is a moderate quality study for the evaluation of pulmonary embolism secondary to a combination of contrast bolus timing and motion artifact. The pulmonary arteries are normal in appearance without pulmonary emboli noted up to the segmental level, noting the limitations of CT technique for identifying small or isolated subsegmental emboli.    CT Chest:    Visualized neck: normal    Lungs: Moderate mosaic ground-glass attenuation opacity is seen throughout a majority of the right upper lobe, and left upper lobe, similar to the previous CT, with new mosaic  ground-glass attenuation opacity throughout the posterior segment of the right lower lobe (image 207), new from the previous exam.  Mild faint central hilar interstitial opacity is present bilaterally.    Airway: Mild bronchiectasis is present with a central hilar predominance.    Pleura: There is no pleural effusion. There is no pneumothorax.    Cardiovascular: The heart is top-normal in size.  No pericardial effusion is seen.  The great vessels are normal in course and caliber.    Mediastinum: No pathologic lymphadenopathy is seen.    Soft tissues: normal    Musculoskeletal: There are mild degenerative changes of the thoracic spine.  There are no suspicious osseous lesions.    Esophagus: normal    Upper Abdomen: No acute abnormality in the upper abdomen.  Impression: 1. No evidence of pulmonary embolism to the segmental arterial level. If there is continued clinical concern, consider further evaluation with lower extremity doppler.    2.  Multifocal geographic areas of airspace opacity and mosaic attenuation most pronounced in the upper lobes, and slightly worse in the right lower lobe suggesting a combination of multifocal infection/pneumonia, interstitial lung disease (e.g sarcoid, hypersensitivity pneumonitis, chronic covid, RB-ILD?), and possibly a component of mild interstitial edema.    Electronically signed by: Jhoan Bruno  Date:    03/12/2025  Time:    12:58  X-Ray Chest AP Portable  Narrative: CLINICAL HISTORY:  ([id number])55 y/o  (1970) F    Chest Pain;    TECHNIQUE:  (A#[id number], exam time 3/12/2025 11:00)    XR CHEST AP PORTABLE [id number]    COMPARISON:  Radiograph from 02/25/2025.    FINDINGS:  Mildly increased central hilar interstitial opacities are seen bilaterally suggestive of either mild edema  atypical infection/pneumonia or bronchitis in the appropriate clinical setting. No consolidative pneumonia is seen. Patchy airspace opacities seen in the right upper lobe, similar to the previous  "exam.  No pneumothorax is identified. The heart is normal in size. The mediastinum is within normal limits. Osseous structures appear within normal limits. The visualized upper abdomen is unremarkable.  Impression: Unchanged radiograph of the chest when accounting for differences in imaging technique.    Electronically signed by: Jhoan Bruno  Date:    2025  Time:    11:02        Additional Studies    reviwed    Ventilator Information                  No results for input(s): "PH", "PCO2", "PO2", "HCO3", "POCSATURATED", "BE" in the last 72 hours.      Impression    Active Hospital Problems    Diagnosis  POA    *Acute pneumonia [J18.9]  Yes    Leukocytosis [D72.829]  Yes    Acute hypoxic respiratory failure [J96.01]  Yes    Respiratory distress [R06.03]  Yes    Hypoxia [R09.02]  Yes    SUZIE (mycobacterium avium-intracellulare) [A31.0]  Yes    Sarcoidosis [D86.9]  Yes      Resolved Hospital Problems   No resolved problems to display.       Plan    Not convinced there is a bacterial pneumonia here  Continue ethambutol/rifampin/azithromycin   Her SUZIE is resistant to macrolides- ID consult for further guidance would be helpful  Recheck procalcitonin- negative   Continue levaquin for now  Continue steroids  Check ESR, CRP, ACE  Findings may represent a flare of sarcoid vs progression of SUZIE infection  Doubt opportunistic infection  Lovenox prophylaxis  D/w RN and hospitalist NP    Mila Hernadez MD  Pulmonary & Critical Care Medicine             [1]   Social History  Tobacco Use   Smoking Status Former    Current packs/day: 0.00    Types: Cigarettes    Quit date: 2020    Years since quittin.9   Smokeless Tobacco Never   Tobacco Comments    ocassionally never was qd     "

## 2025-03-14 NOTE — SUBJECTIVE & OBJECTIVE
Interval History:  She was seen and examined at bedside rounds, admits to generalized body aches and pains.  She was emotional this morning, admits to not sleeping well.    Review of Systems   All other systems reviewed and are negative.    Objective:     Vital Signs (Most Recent):  Temp: 97.9 °F (36.6 °C) (03/14/25 0730)  Pulse: 79 (03/14/25 0730)  Resp: 16 (03/14/25 0959)  BP: (!) 163/97 (03/14/25 0730)  SpO2: 96 % (03/14/25 0730) Vital Signs (24h Range):  Temp:  [97.5 °F (36.4 °C)-97.9 °F (36.6 °C)] 97.9 °F (36.6 °C)  Pulse:  [] 79  Resp:  [14-19] 16  SpO2:  [96 %-100 %] 96 %  BP: (119-163)/(73-97) 163/97     Weight: 64.9 kg (142 lb 15.9 oz)  Body mass index is 25.33 kg/m².    Intake/Output Summary (Last 24 hours) at 3/14/2025 1007  Last data filed at 3/13/2025 2228  Gross per 24 hour   Intake 826.33 ml   Output 1200 ml   Net -373.67 ml         Physical Exam  Constitutional:       Appearance: Normal appearance.   HENT:      Head: Normocephalic.      Right Ear: Tympanic membrane normal.      Left Ear: Tympanic membrane normal.      Nose: Nose normal.   Eyes:      Pupils: Pupils are equal, round, and reactive to light.   Cardiovascular:      Rate and Rhythm: Normal rate and regular rhythm.   Pulmonary:      Effort: Pulmonary effort is normal.      Breath sounds: Normal breath sounds.   Abdominal:      General: Abdomen is flat.      Palpations: Abdomen is soft.   Musculoskeletal:         General: Normal range of motion.      Cervical back: Normal range of motion.   Skin:     General: Skin is warm and dry.   Neurological:      General: No focal deficit present.      Mental Status: She is alert.   Psychiatric:         Mood and Affect: Mood normal.               Significant Labs: All pertinent labs within the past 24 hours have been reviewed.    Significant Imaging: I have reviewed all pertinent imaging results/findings within the past 24 hours.

## 2025-03-14 NOTE — ASSESSMENT & PLAN NOTE
Unlikely bacterial component  Continue iv Levofloxacin and azithromycin per pulmonology recs  Continue iv solumedrol per pulmonology recs  Seen by pulmonology      Antibiotics (From admission, onward)      Start     Stop Route Frequency Ordered    03/12/25 1700  azithromycin tablet 500 mg         -- Oral Every Mon, Wed, Fri 03/12/25 1330    03/12/25 1700  ethambutoL tablet 1,200 mg         -- Oral Every Mon, Wed, Fri 03/12/25 1330    03/12/25 1700  rifAMpin capsule 600 mg         -- Oral Every Mon, Wed, Fri 03/12/25 1330    03/12/25 1445  levoFLOXacin 750 mg/150 mL IVPB 750 mg         -- IV Every 24 hours (non-standard times) 03/12/25 1331            Microbiology Results (last 7 days)       Procedure Component Value Units Date/Time    Blood culture x two cultures. Draw prior to antibiotics. [[id number]] Collected: 03/12/25 1055    Order Status: Completed Specimen: Blood from Peripheral, Hand, Right Updated: 03/14/25 0916     Blood Culture, Routine No Growth to date      No Growth to date    Narrative:      Aerobic and anaerobic    Blood culture x two cultures. Draw prior to antibiotics. [[id number]] Collected: 03/12/25 1054    Order Status: Completed Specimen: Blood from Peripheral, Hand, Left Updated: 03/14/25 0912     Blood Culture, Routine No Growth to date      No Growth to date    Narrative:      Aerobic and anaerobic  Collection has been rescheduled by RE1 at 03/12/2025 10:56 Reason:   Done  Collection has been rescheduled by RE1 at 03/12/2025 10:56 Reason:   Done    Group A Strep, Molecular [[id number]] Collected: 03/12/25 0943    Order Status: Completed Specimen: Throat Updated: 03/12/25 1025     Group A Strep, Molecular Negative     Comment: Arcanobacterium haemolyticum and Beta Streptococcus group C   and G will not be detected by this test method.  Please order   Throat Culture ([id number]) if suspected.

## 2025-03-14 NOTE — CARE UPDATE
03/14/25 0649   Patient Assessment/Suction   Level of Consciousness (AVPU) alert   Respiratory Effort Normal;Unlabored   All Lung Fields Breath Sounds Anterior:;Lateral:;clear   Rhythm/Pattern, Respiratory unlabored;pattern regular;depth regular   Cough Frequency no cough   PRE-TX-O2   Device (Oxygen Therapy) room air   SpO2 99 %   Pulse Oximetry Type Intermittent   $ Pulse Oximetry - Multiple Charge Pulse Oximetry - Multiple   Pulse 97   Resp 17   Aerosol Therapy   $ Aerosol Therapy Charges Aerosol Treatment   Daily Review of Necessity (SVN) completed   Respiratory Treatment Status (SVN) given   Treatment Route (SVN) mask;oxygen   Patient Position HOB elevated   Post Treatment Assessment (SVN) breath sounds improved   Signs of Intolerance (SVN) none   Breath Sounds Post-Respiratory Treatment   Throughout All Fields Post-Treatment All Fields   Throughout All Fields Post-Treatment aeration increased   Post-treatment Heart Rate (beats/min) 97   Post-treatment Resp Rate (breaths/min) 16   Education   $ Education Bronchodilator;15 min

## 2025-03-15 LAB
ADENOVIRUS: NOT DETECTED
ALBUMIN SERPL BCP-MCNC: 4.6 G/DL (ref 3.5–5.2)
ALP SERPL-CCNC: 85 U/L (ref 55–135)
ALT SERPL W/O P-5'-P-CCNC: 16 U/L (ref 10–44)
ANION GAP SERPL CALC-SCNC: 7 MMOL/L (ref 8–16)
AST SERPL-CCNC: 12 U/L (ref 10–40)
BASOPHILS # BLD AUTO: 0.01 K/UL (ref 0–0.2)
BASOPHILS NFR BLD: 0.1 % (ref 0–1.9)
BILIRUB SERPL-MCNC: 0.4 MG/DL (ref 0.1–1)
BORDETELLA PARAPERTUSSIS (IS1001): NOT DETECTED
BORDETELLA PERTUSSIS (PTXP): NOT DETECTED
BUN SERPL-MCNC: 12 MG/DL (ref 6–20)
CALCIUM SERPL-MCNC: 11 MG/DL (ref 8.7–10.5)
CHLAMYDIA PNEUMONIAE: NOT DETECTED
CHLORIDE SERPL-SCNC: 103 MMOL/L (ref 95–110)
CO2 SERPL-SCNC: 28 MMOL/L (ref 23–29)
CORONAVIRUS 229E, COMMON COLD VIRUS: NOT DETECTED
CORONAVIRUS HKU1, COMMON COLD VIRUS: NOT DETECTED
CORONAVIRUS NL63, COMMON COLD VIRUS: NOT DETECTED
CORONAVIRUS OC43, COMMON COLD VIRUS: NOT DETECTED
CREAT SERPL-MCNC: 0.6 MG/DL (ref 0.5–1.4)
DIFFERENTIAL METHOD BLD: ABNORMAL
EOSINOPHIL # BLD AUTO: 0 K/UL (ref 0–0.5)
EOSINOPHIL NFR BLD: 0 % (ref 0–8)
ERYTHROCYTE [DISTWIDTH] IN BLOOD BY AUTOMATED COUNT: 14.5 % (ref 11.5–14.5)
EST. GFR  (NO RACE VARIABLE): >60 ML/MIN/1.73 M^2
FLUBV RNA NPH QL NAA+NON-PROBE: NOT DETECTED
GLUCOSE SERPL-MCNC: 114 MG/DL (ref 70–110)
HCT VFR BLD AUTO: 47.8 % (ref 37–48.5)
HGB BLD-MCNC: 14.9 G/DL (ref 12–16)
HPIV1 RNA NPH QL NAA+NON-PROBE: NOT DETECTED
HPIV2 RNA NPH QL NAA+NON-PROBE: NOT DETECTED
HPIV3 RNA NPH QL NAA+NON-PROBE: NOT DETECTED
HPIV4 RNA NPH QL NAA+NON-PROBE: NOT DETECTED
HUMAN METAPNEUMOVIRUS: NOT DETECTED
IMM GRANULOCYTES # BLD AUTO: 0.04 K/UL (ref 0–0.04)
IMM GRANULOCYTES NFR BLD AUTO: 0.4 % (ref 0–0.5)
INFLUENZA A: NOT DETECTED
LYMPHOCYTES # BLD AUTO: 1.2 K/UL (ref 1–4.8)
LYMPHOCYTES NFR BLD: 10.8 % (ref 18–48)
MAGNESIUM SERPL-MCNC: 2.1 MG/DL (ref 1.6–2.6)
MCH RBC QN AUTO: 27.2 PG (ref 27–31)
MCHC RBC AUTO-ENTMCNC: 31.2 G/DL (ref 32–36)
MCV RBC AUTO: 87 FL (ref 82–98)
MONOCYTES # BLD AUTO: 0.5 K/UL (ref 0.3–1)
MONOCYTES NFR BLD: 4.1 % (ref 4–15)
MYCOPLASMA PNEUMONIAE: NOT DETECTED
NEUTROPHILS # BLD AUTO: 9.6 K/UL (ref 1.8–7.7)
NEUTROPHILS NFR BLD: 84.6 % (ref 38–73)
NRBC BLD-RTO: 0 /100 WBC
PLATELET # BLD AUTO: 345 K/UL (ref 150–450)
PMV BLD AUTO: 11.4 FL (ref 9.2–12.9)
POTASSIUM SERPL-SCNC: 4.2 MMOL/L (ref 3.5–5.1)
PROT SERPL-MCNC: 8.3 G/DL (ref 6–8.4)
RBC # BLD AUTO: 5.48 M/UL (ref 4–5.4)
RESPIRATORY INFECTION PANEL SOURCE: NORMAL
RSV RNA NPH QL NAA+NON-PROBE: NOT DETECTED
RV+EV RNA NPH QL NAA+NON-PROBE: NOT DETECTED
SARS-COV-2 RNA RESP QL NAA+PROBE: NOT DETECTED
SODIUM SERPL-SCNC: 138 MMOL/L (ref 136–145)
WBC # BLD AUTO: 11.29 K/UL (ref 3.9–12.7)

## 2025-03-15 PROCEDURE — 94761 N-INVAS EAR/PLS OXIMETRY MLT: CPT

## 2025-03-15 PROCEDURE — 83735 ASSAY OF MAGNESIUM: CPT | Performed by: INTERNAL MEDICINE

## 2025-03-15 PROCEDURE — 63600175 PHARM REV CODE 636 W HCPCS: Mod: JZ | Performed by: INTERNAL MEDICINE

## 2025-03-15 PROCEDURE — 0202U NFCT DS 22 TRGT SARS-COV-2: CPT | Performed by: INTERNAL MEDICINE

## 2025-03-15 PROCEDURE — 86480 TB TEST CELL IMMUN MEASURE: CPT | Performed by: INTERNAL MEDICINE

## 2025-03-15 PROCEDURE — 36415 COLL VENOUS BLD VENIPUNCTURE: CPT | Performed by: INTERNAL MEDICINE

## 2025-03-15 PROCEDURE — 85025 COMPLETE CBC W/AUTO DIFF WBC: CPT | Performed by: INTERNAL MEDICINE

## 2025-03-15 PROCEDURE — 94640 AIRWAY INHALATION TREATMENT: CPT

## 2025-03-15 PROCEDURE — 25000242 PHARM REV CODE 250 ALT 637 W/ HCPCS: Performed by: HOSPITALIST

## 2025-03-15 PROCEDURE — 12000002 HC ACUTE/MED SURGE SEMI-PRIVATE ROOM

## 2025-03-15 PROCEDURE — 25000003 PHARM REV CODE 250: Performed by: STUDENT IN AN ORGANIZED HEALTH CARE EDUCATION/TRAINING PROGRAM

## 2025-03-15 PROCEDURE — 99900031 HC PATIENT EDUCATION (STAT)

## 2025-03-15 PROCEDURE — 25000003 PHARM REV CODE 250: Performed by: INTERNAL MEDICINE

## 2025-03-15 PROCEDURE — 80053 COMPREHEN METABOLIC PANEL: CPT | Performed by: INTERNAL MEDICINE

## 2025-03-15 PROCEDURE — 25000242 PHARM REV CODE 250 ALT 637 W/ HCPCS: Performed by: INTERNAL MEDICINE

## 2025-03-15 PROCEDURE — 63600175 PHARM REV CODE 636 W HCPCS: Mod: JZ | Performed by: STUDENT IN AN ORGANIZED HEALTH CARE EDUCATION/TRAINING PROGRAM

## 2025-03-15 PROCEDURE — 99233 SBSQ HOSP IP/OBS HIGH 50: CPT | Mod: ,,, | Performed by: INTERNAL MEDICINE

## 2025-03-15 RX ORDER — SODIUM CHLORIDE FOR INHALATION 3 %
4 VIAL, NEBULIZER (ML) INHALATION ONCE
Status: DISCONTINUED | OUTPATIENT
Start: 2025-03-15 | End: 2025-03-15

## 2025-03-15 RX ORDER — TALC
6 POWDER (GRAM) TOPICAL NIGHTLY
Status: DISCONTINUED | OUTPATIENT
Start: 2025-03-15 | End: 2025-03-20 | Stop reason: HOSPADM

## 2025-03-15 RX ORDER — SODIUM CHLORIDE FOR INHALATION 3 %
4 VIAL, NEBULIZER (ML) INHALATION ONCE
Status: COMPLETED | OUTPATIENT
Start: 2025-03-15 | End: 2025-03-15

## 2025-03-15 RX ORDER — METOPROLOL TARTRATE 1 MG/ML
5 INJECTION, SOLUTION INTRAVENOUS ONCE
Status: COMPLETED | OUTPATIENT
Start: 2025-03-15 | End: 2025-03-15

## 2025-03-15 RX ORDER — QUETIAPINE FUMARATE 25 MG/1
25 TABLET, FILM COATED ORAL NIGHTLY
Status: DISCONTINUED | OUTPATIENT
Start: 2025-03-15 | End: 2025-03-20 | Stop reason: HOSPADM

## 2025-03-15 RX ORDER — SODIUM CHLORIDE FOR INHALATION 3 %
4 VIAL, NEBULIZER (ML) INHALATION DAILY
Status: ACTIVE | OUTPATIENT
Start: 2025-03-15 | End: 2025-03-17

## 2025-03-15 RX ORDER — ALPRAZOLAM 0.5 MG/1
0.5 TABLET ORAL 3 TIMES DAILY PRN
Status: DISCONTINUED | OUTPATIENT
Start: 2025-03-15 | End: 2025-03-20 | Stop reason: HOSPADM

## 2025-03-15 RX ORDER — HYDRALAZINE HYDROCHLORIDE 25 MG/1
25 TABLET, FILM COATED ORAL 4 TIMES DAILY PRN
Status: DISCONTINUED | OUTPATIENT
Start: 2025-03-15 | End: 2025-03-16

## 2025-03-15 RX ADMIN — LEVOFLOXACIN 750 MG: 750 TABLET, FILM COATED ORAL at 09:03

## 2025-03-15 RX ADMIN — LEVALBUTEROL HYDROCHLORIDE 1.25 MG: 1.25 SOLUTION RESPIRATORY (INHALATION) at 01:03

## 2025-03-15 RX ADMIN — VALSARTAN 160 MG: 80 TABLET, FILM COATED ORAL at 07:03

## 2025-03-15 RX ADMIN — SODIUM CHLORIDE SOLN NEBU 3% 4 ML: 3 NEBU SOLN at 02:03

## 2025-03-15 RX ADMIN — ALPRAZOLAM 0.5 MG: 0.5 TABLET ORAL at 09:03

## 2025-03-15 RX ADMIN — BENZONATATE 100 MG: 100 CAPSULE ORAL at 09:03

## 2025-03-15 RX ADMIN — HYDROMORPHONE HYDROCHLORIDE 0.5 MG: 1 INJECTION, SOLUTION INTRAMUSCULAR; INTRAVENOUS; SUBCUTANEOUS at 06:03

## 2025-03-15 RX ADMIN — FOLIC ACID 1000 MCG: 1 TABLET ORAL at 08:03

## 2025-03-15 RX ADMIN — LEVALBUTEROL HYDROCHLORIDE 1.25 MG: 1.25 SOLUTION RESPIRATORY (INHALATION) at 08:03

## 2025-03-15 RX ADMIN — HYDROCODONE BITARTRATE AND ACETAMINOPHEN 1 TABLET: 5; 325 TABLET ORAL at 01:03

## 2025-03-15 RX ADMIN — METHYLPREDNISOLONE SODIUM SUCCINATE 80 MG: 40 INJECTION, POWDER, FOR SOLUTION INTRAMUSCULAR; INTRAVENOUS at 01:03

## 2025-03-15 RX ADMIN — HYDROCODONE BITARTRATE AND ACETAMINOPHEN 1 TABLET: 5; 325 TABLET ORAL at 05:03

## 2025-03-15 RX ADMIN — METOROPROLOL TARTRATE 5 MG: 5 INJECTION, SOLUTION INTRAVENOUS at 04:03

## 2025-03-15 RX ADMIN — KETOROLAC TROMETHAMINE 30 MG: 30 INJECTION, SOLUTION INTRAMUSCULAR; INTRAVENOUS at 09:03

## 2025-03-15 RX ADMIN — BENZONATATE 100 MG: 100 CAPSULE ORAL at 05:03

## 2025-03-15 RX ADMIN — ALPRAZOLAM 0.5 MG: 0.5 TABLET ORAL at 08:03

## 2025-03-15 RX ADMIN — AMLODIPINE BESYLATE 5 MG: 5 TABLET ORAL at 08:03

## 2025-03-15 RX ADMIN — HYDROMORPHONE HYDROCHLORIDE 0.5 MG: 1 INJECTION, SOLUTION INTRAMUSCULAR; INTRAVENOUS; SUBCUTANEOUS at 09:03

## 2025-03-15 RX ADMIN — Medication 6 MG: at 09:03

## 2025-03-15 RX ADMIN — BUPROPION HYDROCHLORIDE 300 MG: 150 TABLET, EXTENDED RELEASE ORAL at 08:03

## 2025-03-15 RX ADMIN — LEVALBUTEROL HYDROCHLORIDE 1.25 MG: 1.25 SOLUTION RESPIRATORY (INHALATION) at 07:03

## 2025-03-15 RX ADMIN — ENOXAPARIN SODIUM 40 MG: 40 INJECTION SUBCUTANEOUS at 04:03

## 2025-03-15 RX ADMIN — METHYLPREDNISOLONE SODIUM SUCCINATE 80 MG: 40 INJECTION, POWDER, FOR SOLUTION INTRAMUSCULAR; INTRAVENOUS at 09:03

## 2025-03-15 RX ADMIN — SPIRONOLACTONE 25 MG: 25 TABLET, FILM COATED ORAL at 08:03

## 2025-03-15 RX ADMIN — QUETIAPINE 25 MG: 25 TABLET ORAL at 09:03

## 2025-03-15 RX ADMIN — METHYLPREDNISOLONE SODIUM SUCCINATE 80 MG: 40 INJECTION, POWDER, FOR SOLUTION INTRAMUSCULAR; INTRAVENOUS at 05:03

## 2025-03-15 NOTE — CARE UPDATE
03/14/25 1917   Patient Assessment/Suction   Level of Consciousness (AVPU) alert   Respiratory Effort Unlabored;Normal   Expansion/Accessory Muscles/Retractions no use of accessory muscles;no retractions;expansion symmetric   All Lung Fields Breath Sounds clear;diminished   Rhythm/Pattern, Respiratory no shortness of breath reported;unlabored;pattern regular;shallow   Cough Frequency no cough   Cough Type dry   PRE-TX-O2   Device (Oxygen Therapy) room air   SpO2 99 %   Pulse Oximetry Type Intermittent   Pulse 96   Resp 16   Aerosol Therapy   $ Aerosol Therapy Charges Aerosol Treatment  (xop)   Daily Review of Necessity (SVN) completed   Respiratory Treatment Status (SVN) given   Treatment Route (SVN) oxygen;mouthpiece utilized   Patient Position HOB elevated   Post Treatment Assessment (SVN) increased aeration   Signs of Intolerance (SVN) none   Breath Sounds Post-Respiratory Treatment   Throughout All Fields Post-Treatment All Fields   Throughout All Fields Post-Treatment aeration increased   Post-treatment Heart Rate (beats/min) 95   Post-treatment Resp Rate (breaths/min) 18

## 2025-03-15 NOTE — PROGRESS NOTES
[facility]   Department of Infectious Disease  Progress Note        PATIENT NAME: Sumit Burger  MRN: [id number]  TODAY'S DATE: 03/15/2025  ADMIT DATE: 3/12/2025  LOS: 3 days    CHIEF COMPLAINT: Shortness of Breath (SOB and cough since Sunday. Pt has lung problems and believes she is having a flare up)      PRINCIPLE PROBLEM: Acute pneumonia    INTERVAL HISTORY      03/15/2025: Seen and evaluated at bedside.  No acute issues overnight.  Respiratory panel negative.  Other studies pending.  Has a anxiety which has been managed by hospitalist.  She is feeling better today.    Antibiotics (From admission, onward)      Start     Stop Route Frequency Ordered    03/14/25 1300  levoFLOXacin tablet 750 mg         -- Oral Daily 03/14/25 1157    03/12/25 1700  azithromycin tablet 500 mg         -- Oral Every Mon, Wed, Fri 03/12/25 1330    03/12/25 1700  ethambutoL tablet 1,200 mg         -- Oral Every Mon, Wed, Fri 03/12/25 1330    03/12/25 1700  rifAMpin capsule 600 mg         -- Oral Every Mon, Wed, Fri 03/12/25 1330          Antifungals (From admission, onward)      None           Antivirals (From admission, onward)      None            ASSESSMENT and PLAN      Cough and shortness of breath in a patient with sarcoidosis who is also on therapy for pulmonary MAC.  CT chest slightly worse compared to December 2024.  May very well be sarcoidosis flare.  Currently on levofloxacin but she is also already on atypical coverage with azithromycin for MAC.   Obtain sputum induction for culture and AFB stain and culture to evaluate for persistent versus improvement in MAC.  Follow QuantiFERON TB gold and urine Histoplasma antigen.     2. Pulmonary sarcoidosis.  May need augmentation of her therapy for this.     3. Hypertension.  Management as per hospitalist.         RECOMMENDATIONS:    Follow up pending studies   Continue management of pulmonary sarcoidosis as per pulmonologist    Please send Epic secure chat with  any questions.  Discussed with patient and her sister at bedside.  Their questions answered.      SUBJECTIVE    Sumit Burger is a 54 y.o. female with history of hypertension, and sarcoidosis diagnosed with lung biopsy.  She is on prednisone 10 mg daily.  Diagnosed with SUZIE on p.o. culture 06/24/2024 and is on thrice weekly azithromycin/rifampin/ethambutol ? Since 9/16/24.  She is compliant and tolerating well.  She also been hospitalized in December 20, 2024 with episodes of myalgia associated with diaphoresis in the setting of cough.  Workup at the time unremarkable for any new infection.  Possibility of menopause and other entities were entertained.     Started having worsening cough with shortness of breath on 03/09/2025. Presented to the ED 03/12/2025 for evaluation.  In the ED /9 7, pulse 120, respiratory rate 20, temperature 98°  and oxygen saturation was 95%.  WBC 13 K, hematocrit 44, LFTs normal, creatinine 0.8.  Influenza and COVID assay negative.  Chest x-ray unchanged from 02/25/2025 with mildly increased intra hilar interstitial opacities.  CT PE protocol if for PE.  It did show multifocal geographic areas of airspace opacity a mosaic attenuation most pronounced in the upper lobes slightly worse in the right lower lobe suggesting combination multifocal infection/pneumoniae, interstitial lung disease like sarcoid possibly component of mild interstitial edema.  She was admitted and continued on her MAC therapy.  Seen by Pulmonary.  Levofloxacin added today.  ID asked to assist with her care.     Antibiotic history:    MAC therapy  Levofloxacin: 12/12/2025-     Microbiology:    Influenza and COVID assay 03/12/2025: Negative   Blood cultures 12/20/2025:  NGTD     Review of Systems  Negative except as stated above in Interval History     OBJECTIVE   Temp:  [97.6 °F (36.4 °C)-98.2 °F (36.8 °C)] 98.2 °F (36.8 °C)  Pulse:  [] 89  Resp:  [14-18] 16  SpO2:  [96 %-100 %] 98 %  BP:  (132-188)/() 177/103  Temp:  [97.6 °F (36.4 °C)-98.2 °F (36.8 °C)]   Temp: 98.2 °F (36.8 °C) (03/15/25 0821)  Pulse: 89 (03/15/25 0821)  Resp: 16 (03/15/25 0821)  BP: (!) 177/103 (03/15/25 0821)  SpO2: 98 % (03/15/25 0821)  No intake or output data in the 24 hours ending 03/15/25 1058    Physical Exam  General:  Middle-aged man lying quietly in bed in no acute distress   HEENT: No oral thrush, no cervical adenopathy   CVS: S1 and 2 heard, no murmurs appreciated   Respiratory: Clear to auscultation   Abdomen: Full, soft, nontender, palpable organomegaly   Skin: No rash appreciated   CNS: No gross focal deficits   Musculoskeletal: No joint or abnormalities appreciated  Psych: Good mood, normal affect.      VAD:  PIV  ISOLATION:  None     Wounds:  None    Significant Labs: All pertinent labs within the past 24 hours have been reviewed.    CBC LAST 7 DAYS  Recent Labs   Lab 03/12/25  1058 03/13/25  0727 03/14/25  0850 03/15/25  0516   WBC 13.10* 11.23 18.81* 11.29   RBC 5.30 4.96 5.27 5.48*   HGB 14.7 13.6 14.4 14.9   HCT 44.2 41.5 45.5 47.8   MCV 83 84 86 87   MCH 27.7 27.4 27.3 27.2   MCHC 33.3 32.8 31.6* 31.2*   RDW 14.3 14.1 14.4 14.5    296 328 345   MPV 11.0 10.8 10.8 11.4   GRAN 79.0* 87.4*  9.8* 94.1*  17.7* 84.6*  9.6*   LYMPH 18.0 10.1*  1.1 4.1*  0.8* 10.8*  1.2   MONO 3.0* 2.0*  0.2* 1.3*  0.2* 4.1  0.5   BASO  --  0.02 0.02 0.01   NRBC 0 0 0 0       CHEMISTRY LAST 7 DAYS  Recent Labs   Lab 03/12/25  1058 03/13/25  0727 03/14/25  0850 03/15/25  0516    138 141 138   K 3.5 4.0 4.3 4.2    104 104 103   CO2 23 24 31* 28   ANIONGAP 12 10 6* 7*   BUN 9 7 10 12   CREATININE 0.8 0.6 0.7 0.6   GLU 87 156* 127* 114*   CALCIUM 10.9* 10.7* 10.5 11.0*   MG 1.8 2.1 2.2 2.1   ALBUMIN 4.7 4.5 4.5 4.6   PROT 8.8* 8.4 8.8* 8.3   ALKPHOS 87 82 85 85   ALT 25 20 18 16   AST 22 15 13 12   BILITOT 0.8 1.0 0.3 0.4       Estimated Creatinine Clearance: 99.4 mL/min (based on SCr of 0.6  "mg/dL).    INFLAMMATORY/PROCAL  LAST 7 DAYS  Recent Labs   Lab 03/13/25  1045 03/14/25  0850   PROCAL <0.050 <0.050   CRP  --  4.30*     No results found for: "ESR"  CRP   Date Value Ref Range Status   03/14/2025 4.30 (H) <1.00 mg/dL Final     Comment:     CRP-Normal Application expected values:   <1.0        mg/dL   Normal Range  1.0 - 5.0  mg/dL   Indicates mild inflammation  5.0 - 10.0 mg/dL   Indicates severe inflammation  >10.0        mg/dL   Represents serious processes and   frequently         indicates the presence of a bacterial   infection.      10/19/2020 4.13 (H) <0.76 mg/dL Final   10/19/2020 3.49 (H) <0.76 mg/dL Final       PRIOR  MICROBIOLOGY:    No results found for the last 90 days.    LAST 7 DAYS MICROBIOLOGY   Microbiology Results (last 7 days)       Procedure Component Value Units Date/Time    Respiratory Infection Panel (PCR), Nasopharyngeal [[id number]] Collected: 03/15/25 0506    Order Status: Completed Specimen: Nasopharyngeal Swab Updated: 03/15/25 0819     Respiratory Infection Panel Source NP swab     Adenovirus Not Detected     Coronavirus 229E, Common Cold Virus Not Detected     Coronavirus HKU1, Common Cold Virus Not Detected     Coronavirus NL63, Common Cold Virus Not Detected     Coronavirus OC43, Common Cold Virus Not Detected     Comment: Coronavirus strains 229E, HKU1, NL63, and OC43 can cause the common   cold   and are not associated with the respiratory disease outbreak caused   by  the COVID-19 (SARS-CoV-2 novel Coronavirus) strain.           SARS-CoV2 (COVID-19) Qualitative PCR Not Detected     Human Metapneumovirus Not Detected     Human Rhinovirus/Enterovirus Not Detected     Influenza A Not Detected     Influenza B Not Detected     Parainfluenza Virus 1 Not Detected     Parainfluenza Virus 2 Not Detected     Parainfluenza Virus 3 Not Detected     Parainfluenza Virus 4 Not Detected     Respiratory Syncytial Virus Not Detected     Bordetella Parapertussis (BY1068) Not " Detected     Bordetella pertussis (ptxP) Not Detected     Chlamydia pneumoniae Not Detected     Mycoplasma pneumoniae Not Detected     Comment: Respiratory Infection Panel testing performed by Multiplex PCR.       Narrative:      Respiratory Infection Panel source->NP Swab    AFB Culture & Smear [[id number]] Resulted: 03/15/25 0646    Order Status: No result Specimen: Sputum Updated: 03/15/25 0646    Afb Culture Stain [[id number]] Resulted: 03/15/25 0646    Order Status: No result Specimen: Sputum Updated: 03/15/25 0646    Culture, Respiratory with Gram Stain [[id number]] Resulted: 03/15/25 0645    Order Status: No result Specimen: Sputum Updated: 03/15/25 0645    Culture, Respiratory with Gram Stain [[id number]] Collected: 03/15/25 0623    Order Status: Canceled Specimen: Sputum, Induced     AFB Culture & Smear [[id number]] Collected: 03/15/25 0623    Order Status: Canceled Specimen: Sputum, Induced     Blood culture x two cultures. Draw prior to antibiotics. [[id number]] Collected: 03/12/25 1055    Order Status: Completed Specimen: Blood from Peripheral, Hand, Right Updated: 03/14/25 1232     Blood Culture, Routine No Growth to date      No Growth to date      No Growth to date    Narrative:      Aerobic and anaerobic    Blood culture x two cultures. Draw prior to antibiotics. [[id number]] Collected: 03/12/25 1054    Order Status: Completed Specimen: Blood from Peripheral, Hand, Left Updated: 03/14/25 1232     Blood Culture, Routine No Growth to date      No Growth to date      No Growth to date    Narrative:      Aerobic and anaerobic  Collection has been rescheduled by RE1 at 03/12/2025 10:56 Reason:   Done  Collection has been rescheduled by RE1 at 03/12/2025 10:56 Reason:   Done    Group A Strep, Molecular [[id number]] Collected: 03/12/25 0943    Order Status: Completed Specimen: Throat Updated: 03/12/25 1025     Group A Strep, Molecular Negative     Comment: Arcanobacterium haemolyticum and Beta Streptococcus  group C   and G will not be detected by this test method.  Please order   Throat Culture ([id number]) if suspected.               CURRENT/PREVIOUS VISIT EKG  Results for orders placed or performed during the hospital encounter of 03/12/25   EKG 12-lead    Collection Time: 03/12/25  9:40 AM   Result Value Ref Range    QRS Duration 74 ms    [facility] QTC Calculation 441 ms    Narrative    Test Reason : R06.02,    Vent. Rate : 125 BPM     Atrial Rate : 125 BPM     P-R Int : 116 ms          QRS Dur :  74 ms      QT Int : 306 ms       P-R-T Axes :  65  19  59 degrees    QTcB Int : 441 ms    Sinus tachycardia  Right atrial enlargement  Borderline Abnormal ECG  Confirmed by Deric Wang ([id number]) on 3/12/2025 7:46:37 PM    Referred By:            Confirmed By: Deric Wang     Significant Imaging: I have reviewed all relevant and available imaging results/findings within the past 24 hours.    I spent a total of 50 minutes on the day of the visit.This includes face to face time and non-face to face time preparing to see the patient (eg, review of tests), obtaining and/or reviewing separately obtained history, documenting clinical information in the electronic or other health record, independently interpreting results and communicating results to the patient/family/caregiver, or care coordinator.    Moises Funez MD  Date of Service: 03/15/2025      This note was created using [employer] voice recognition software that occasionally misinterpreted phrases or words.

## 2025-03-15 NOTE — CARE UPDATE
03/15/25 0725   Patient Assessment/Suction   Level of Consciousness (AVPU) alert   Respiratory Effort Normal;Unlabored   Expansion/Accessory Muscles/Retractions no use of accessory muscles   All Lung Fields Breath Sounds clear   PRE-TX-O2   Device (Oxygen Therapy) room air   SpO2 100 %   Pulse Oximetry Type Intermittent   $ Pulse Oximetry - Multiple Charge Pulse Oximetry - Multiple   Pulse 84   Resp 14   Positioning HOB elevated 30 degrees   Aerosol Therapy   $ Aerosol Therapy Charges Aerosol Treatment   Daily Review of Necessity (SVN) completed   Respiratory Treatment Status (SVN) given   Treatment Route (SVN) mask;oxygen   Patient Position HOB elevated   Post Treatment Assessment (SVN) breath sounds unchanged;vital signs unchanged   Signs of Intolerance (SVN) none

## 2025-03-15 NOTE — ASSESSMENT & PLAN NOTE
Unlikely bacterial component  Continue iv Levofloxacin and azithromycin per pulmonology recs  Continue iv solumedrol per pulmonology recs  Seen by pulmonology      Antibiotics (From admission, onward)      Start     Stop Route Frequency Ordered    03/14/25 1300  levoFLOXacin tablet 750 mg         -- Oral Daily 03/14/25 1157    03/12/25 1700  azithromycin tablet 500 mg         -- Oral Every Mon, Wed, Fri 03/12/25 1330    03/12/25 1700  ethambutoL tablet 1,200 mg         -- Oral Every Mon, Wed, Fri 03/12/25 1330    03/12/25 1700  rifAMpin capsule 600 mg         -- Oral Every Mon, Wed, Fri 03/12/25 1330            Microbiology Results (last 7 days)       Procedure Component Value Units Date/Time    Respiratory Infection Panel (PCR), Nasopharyngeal [[id number]] Collected: 03/15/25 0506    Order Status: Completed Specimen: Nasopharyngeal Swab Updated: 03/15/25 0819     Respiratory Infection Panel Source NP swab     Adenovirus Not Detected     Coronavirus 229E, Common Cold Virus Not Detected     Coronavirus HKU1, Common Cold Virus Not Detected     Coronavirus NL63, Common Cold Virus Not Detected     Coronavirus OC43, Common Cold Virus Not Detected     Comment: Coronavirus strains 229E, HKU1, NL63, and OC43 can cause the common   cold   and are not associated with the respiratory disease outbreak caused   by  the COVID-19 (SARS-CoV-2 novel Coronavirus) strain.           SARS-CoV2 (COVID-19) Qualitative PCR Not Detected     Human Metapneumovirus Not Detected     Human Rhinovirus/Enterovirus Not Detected     Influenza A Not Detected     Influenza B Not Detected     Parainfluenza Virus 1 Not Detected     Parainfluenza Virus 2 Not Detected     Parainfluenza Virus 3 Not Detected     Parainfluenza Virus 4 Not Detected     Respiratory Syncytial Virus Not Detected     Bordetella Parapertussis ([id number]) Not Detected     Bordetella pertussis (ptxP) Not Detected     Chlamydia pneumoniae Not Detected     Mycoplasma pneumoniae Not  Detected     Comment: Respiratory Infection Panel testing performed by Multiplex PCR.       Narrative:      Respiratory Infection Panel source->NP Swab    AFB Culture & Smear [[id number]] Resulted: 03/15/25 0646    Order Status: No result Specimen: Sputum Updated: 03/15/25 0646    Afb Culture Stain [[id number]] Resulted: 03/15/25 0646    Order Status: No result Specimen: Sputum Updated: 03/15/25 0646    Culture, Respiratory with Gram Stain [[id number]] Resulted: 03/15/25 0645    Order Status: No result Specimen: Sputum Updated: 03/15/25 0645    Culture, Respiratory with Gram Stain [[id number]] Collected: 03/15/25 0623    Order Status: Canceled Specimen: Sputum, Induced     AFB Culture & Smear [[id number]] Collected: 03/15/25 0623    Order Status: Canceled Specimen: Sputum, Induced     Blood culture x two cultures. Draw prior to antibiotics. [[id number]] Collected: 03/12/25 1055    Order Status: Completed Specimen: Blood from Peripheral, Hand, Right Updated: 03/14/25 1232     Blood Culture, Routine No Growth to date      No Growth to date      No Growth to date    Narrative:      Aerobic and anaerobic    Blood culture x two cultures. Draw prior to antibiotics. [[id number]] Collected: 03/12/25 1054    Order Status: Completed Specimen: Blood from Peripheral, Hand, Left Updated: 03/14/25 1232     Blood Culture, Routine No Growth to date      No Growth to date      No Growth to date    Narrative:      Aerobic and anaerobic  Collection has been rescheduled by RE1 at 03/12/2025 10:56 Reason:   Done  Collection has been rescheduled by RE1 at 03/12/2025 10:56 Reason:   Done    Group A Strep, Molecular [[id number]] Collected: 03/12/25 0943    Order Status: Completed Specimen: Throat Updated: 03/12/25 1025     Group A Strep, Molecular Negative     Comment: Arcanobacterium haemolyticum and Beta Streptococcus group C   and G will not be detected by this test method.  Please order   Throat Culture ([id number]) if suspected.

## 2025-03-15 NOTE — CARE UPDATE
Patient requesting something for anxiety. Inquired as to why the xanax was discontinued.     Started Hydroxyzine tid prn anxiety, will divert the inquiry about why the Xanax was discontinued to morning MIRIAN who dc the xanax.

## 2025-03-15 NOTE — SUBJECTIVE & OBJECTIVE
Interval History:  She was seen and examined at bedside rounds, admits to generalized body aches and pains.  She has also emotional this morning, requested anxiety medications.  As well as additional sleep aid.  Pulmonology following.    Review of Systems   All other systems reviewed and are negative.    Objective:     Vital Signs (Most Recent):  Temp: 98.2 °F (36.8 °C) (03/15/25 0821)  Pulse: 89 (03/15/25 0821)  Resp: 16 (03/15/25 0821)  BP: (!) 177/103 (03/15/25 0821)  SpO2: 98 % (03/15/25 0821) Vital Signs (24h Range):  Temp:  [97.6 °F (36.4 °C)-98.2 °F (36.8 °C)] 98.2 °F (36.8 °C)  Pulse:  [] 89  Resp:  [14-18] 16  SpO2:  [96 %-100 %] 98 %  BP: (132-188)/() 177/103     Weight: 64.9 kg (142 lb 15.9 oz)  Body mass index is 25.33 kg/m².  No intake or output data in the 24 hours ending 03/15/25 0916        Physical Exam  Constitutional:       Appearance: Normal appearance.      Comments: Emotional and tearful   HENT:      Head: Normocephalic.      Right Ear: Tympanic membrane normal.      Left Ear: Tympanic membrane normal.      Nose: Nose normal.   Eyes:      Pupils: Pupils are equal, round, and reactive to light.   Cardiovascular:      Rate and Rhythm: Normal rate and regular rhythm.   Pulmonary:      Effort: Pulmonary effort is normal.      Breath sounds: Normal breath sounds.   Abdominal:      General: Abdomen is flat.      Palpations: Abdomen is soft.   Musculoskeletal:         General: Normal range of motion.      Cervical back: Normal range of motion.   Skin:     General: Skin is warm and dry.   Neurological:      General: No focal deficit present.      Mental Status: She is alert.   Psychiatric:         Mood and Affect: Mood normal.               Significant Labs: All pertinent labs within the past 24 hours have been reviewed.    Significant Imaging: I have reviewed all pertinent imaging results/findings within the past 24 hours.

## 2025-03-15 NOTE — PROGRESS NOTES
[facility]  Progress Note    Patient Name: Sumit Burger  MRN: [id number]  Patient Class: IP- Inpatient   Admission Date: 3/12/2025  Length of Stay: 3 days  Attending Physician: Alessandro Marx MD  Primary Care Provider: Trisha, Provider        Subjective     Principal Problem:Acute pneumonia        HPI:  54 Year old pt with H/o Sarcoidosis/SUZIE getting admitted with possible acute Pneumonia  Symptom s started with cough few days ago   This was followed by SOB mainly on exertion   Later she developed chest discomfort and symptoms worsened   She came to ER and got admitted  Per records:She was admitted with same c/o on December 2024 and Pneumonia was r/o at that time   She had CTA Today in ER   Compared to previous CTA In December 2024 :new mosaic ground-glass attenuation opacity throughout the posterior segment of the right lower lobe , new from the previous exam     Overview/Hospital Course:  During hospitalization she was admitted to [facility] with telemetry for evaluation and management of an acute pneumonia, she did have a history of sarcoidosis, she was started on IV steroids and empirically managed.  Pulmonology was consulted, who does not suspect a bacterial component.  ID was consulted for antibiotic advice movement.    Interval History:  She was seen and examined at bedside rounds, admits to generalized body aches and pains.  She has also emotional this morning, requested anxiety medications.  As well as additional sleep aid.  Pulmonology following.    Review of Systems   All other systems reviewed and are negative.    Objective:     Vital Signs (Most Recent):  Temp: 98.2 °F (36.8 °C) (03/15/25 0821)  Pulse: 89 (03/15/25 0821)  Resp: 16 (03/15/25 0821)  BP: (!) 177/103 (03/15/25 0821)  SpO2: 98 % (03/15/25 0821) Vital Signs (24h Range):  Temp:  [97.6 °F (36.4 °C)-98.2 °F (36.8 °C)] 98.2 °F (36.8 °C)  Pulse:  [] 89  Resp:  [14-18] 16  SpO2:  [96 %-100 %] 98  %  BP: (132-188)/() 177/103     Weight: 64.9 kg (142 lb 15.9 oz)  Body mass index is 25.33 kg/m².  No intake or output data in the 24 hours ending 03/15/25 0916        Physical Exam  Constitutional:       Appearance: Normal appearance.      Comments: Emotional and tearful   HENT:      Head: Normocephalic.      Right Ear: Tympanic membrane normal.      Left Ear: Tympanic membrane normal.      Nose: Nose normal.   Eyes:      Pupils: Pupils are equal, round, and reactive to light.   Cardiovascular:      Rate and Rhythm: Normal rate and regular rhythm.   Pulmonary:      Effort: Pulmonary effort is normal.      Breath sounds: Normal breath sounds.   Abdominal:      General: Abdomen is flat.      Palpations: Abdomen is soft.   Musculoskeletal:         General: Normal range of motion.      Cervical back: Normal range of motion.   Skin:     General: Skin is warm and dry.   Neurological:      General: No focal deficit present.      Mental Status: She is alert.   Psychiatric:         Mood and Affect: Mood normal.               Significant Labs: All pertinent labs within the past 24 hours have been reviewed.    Significant Imaging: I have reviewed all pertinent imaging results/findings within the past 24 hours.      Assessment & Plan  Acute pneumonia  Unlikely bacterial component  Continue iv Levofloxacin and azithromycin per pulmonology recs  Continue iv solumedrol per pulmonology recs  Seen by pulmonology      Antibiotics (From admission, onward)      Start     Stop Route Frequency Ordered    03/14/25 1300  levoFLOXacin tablet 750 mg         -- Oral Daily 03/14/25 1157    03/12/25 1700  azithromycin tablet 500 mg         -- Oral Every Mon, Wed, Fri 03/12/25 1330    03/12/25 1700  ethambutoL tablet 1,200 mg         -- Oral Every Mon, Wed, Fri 03/12/25 1330    03/12/25 1700  rifAMpin capsule 600 mg         -- Oral Every Mon, Wed, Fri 03/12/25 1330            Microbiology Results (last 7 days)       Procedure Component  Value Units Date/Time    Respiratory Infection Panel (PCR), Nasopharyngeal [[id number]] Collected: 03/15/25 0506    Order Status: Completed Specimen: Nasopharyngeal Swab Updated: 03/15/25 0819     Respiratory Infection Panel Source NP swab     Adenovirus Not Detected     Coronavirus 229E, Common Cold Virus Not Detected     Coronavirus HKU1, Common Cold Virus Not Detected     Coronavirus NL63, Common Cold Virus Not Detected     Coronavirus OC43, Common Cold Virus Not Detected     Comment: Coronavirus strains 229E, HKU1, NL63, and OC43 can cause the common   cold   and are not associated with the respiratory disease outbreak caused   by  the COVID-19 (SARS-CoV-2 novel Coronavirus) strain.           SARS-CoV2 (COVID-19) Qualitative PCR Not Detected     Human Metapneumovirus Not Detected     Human Rhinovirus/Enterovirus Not Detected     Influenza A Not Detected     Influenza B Not Detected     Parainfluenza Virus 1 Not Detected     Parainfluenza Virus 2 Not Detected     Parainfluenza Virus 3 Not Detected     Parainfluenza Virus 4 Not Detected     Respiratory Syncytial Virus Not Detected     Bordetella Parapertussis (TU2551) Not Detected     Bordetella pertussis (ptxP) Not Detected     Chlamydia pneumoniae Not Detected     Mycoplasma pneumoniae Not Detected     Comment: Respiratory Infection Panel testing performed by Multiplex PCR.       Narrative:      Respiratory Infection Panel source->NP Swab    AFB Culture & Smear [[id number]] Resulted: 03/15/25 0646    Order Status: No result Specimen: Sputum Updated: 03/15/25 0646    Afb Culture Stain [[id number]] Resulted: 03/15/25 0646    Order Status: No result Specimen: Sputum Updated: 03/15/25 0646    Culture, Respiratory with Gram Stain [[id number]] Resulted: 03/15/25 0645    Order Status: No result Specimen: Sputum Updated: 03/15/25 0645    Culture, Respiratory with Gram Stain [[id number]] Collected: 03/15/25 0623    Order Status: Canceled Specimen: Sputum, Induced     AFB  Culture & Smear [[id number]] Collected: 03/15/25 0623    Order Status: Canceled Specimen: Sputum, Induced     Blood culture x two cultures. Draw prior to antibiotics. [[id number]] Collected: 03/12/25 1055    Order Status: Completed Specimen: Blood from Peripheral, Hand, Right Updated: 03/14/25 1232     Blood Culture, Routine No Growth to date      No Growth to date      No Growth to date    Narrative:      Aerobic and anaerobic    Blood culture x two cultures. Draw prior to antibiotics. [[id number]] Collected: 03/12/25 1054    Order Status: Completed Specimen: Blood from Peripheral, Hand, Left Updated: 03/14/25 1232     Blood Culture, Routine No Growth to date      No Growth to date      No Growth to date    Narrative:      Aerobic and anaerobic  Collection has been rescheduled by RE1 at 03/12/2025 10:56 Reason:   Done  Collection has been rescheduled by RE1 at 03/12/2025 10:56 Reason:   Done    Group A Strep, Molecular [[id number]] Collected: 03/12/25 0943    Order Status: Completed Specimen: Throat Updated: 03/12/25 1025     Group A Strep, Molecular Negative     Comment: Arcanobacterium haemolyticum and Beta Streptococcus group C   and G will not be detected by this test method.  Please order   Throat Culture ([id number]) if suspected.               Sarcoidosis  H/o aware   Pt still om PO steroids at home    Pulmonology following    SUZIE (mycobacterium avium-intracellulare)  Maintain PO abx pt takes at home    Respiratory distress  POA  As mentioned above     Hypoxia  New onset     Acute hypoxic respiratory failure  Improved   Patient with Hypoxic Respiratory failure which is Acute.  she is not on home oxygen. Supplemental oxygen was provided and noted-      .   Signs/symptoms of respiratory failure include- respiratory distress. Contributing diagnoses includes - Pneumonia and sarcoidosis  Labs and images were reviewed. Patient Has not had a recent ABG. Will treat underlying causes and adjust management of respiratory  failure as follows- follow up pulmonology consult  Leukocytosis  Procalcitonin remains normal  Suspect Secondary to corticosteroid use    VTE Risk Mitigation (From admission, onward)           Ordered     enoxaparin injection 40 mg  Every 24 hours         03/13/25 1902     IP VTE LOW RISK PATIENT  Once         03/12/25 1330     Place sequential compression device  Until discontinued         03/12/25 1330                    Discharge Planning   SHAKIRA: 3/17/2025     Code Status: Full Code   Medical Readiness for Discharge Date:   Discharge Plan A: Home with family                        Portillo Baer NP  Department of Hospital Medicine   [facility]

## 2025-03-16 LAB
ALBUMIN SERPL BCP-MCNC: 4.2 G/DL (ref 3.5–5.2)
ALP SERPL-CCNC: 69 U/L (ref 55–135)
ALT SERPL W/O P-5'-P-CCNC: 14 U/L (ref 10–44)
ANION GAP SERPL CALC-SCNC: 11 MMOL/L (ref 8–16)
AST SERPL-CCNC: 10 U/L (ref 10–40)
BASOPHILS # BLD AUTO: 0.01 K/UL (ref 0–0.2)
BASOPHILS NFR BLD: 0.1 % (ref 0–1.9)
BILIRUB SERPL-MCNC: 0.2 MG/DL (ref 0.1–1)
BUN SERPL-MCNC: 11 MG/DL (ref 6–20)
CALCIUM SERPL-MCNC: 10.4 MG/DL (ref 8.7–10.5)
CHLORIDE SERPL-SCNC: 101 MMOL/L (ref 95–110)
CO2 SERPL-SCNC: 25 MMOL/L (ref 23–29)
CREAT SERPL-MCNC: 0.6 MG/DL (ref 0.5–1.4)
DIFFERENTIAL METHOD BLD: ABNORMAL
EOSINOPHIL # BLD AUTO: 0 K/UL (ref 0–0.5)
EOSINOPHIL NFR BLD: 0 % (ref 0–8)
ERYTHROCYTE [DISTWIDTH] IN BLOOD BY AUTOMATED COUNT: 14.3 % (ref 11.5–14.5)
EST. GFR  (NO RACE VARIABLE): >60 ML/MIN/1.73 M^2
GLUCOSE SERPL-MCNC: 141 MG/DL (ref 70–110)
HCT VFR BLD AUTO: 42.7 % (ref 37–48.5)
HGB BLD-MCNC: 13.9 G/DL (ref 12–16)
IMM GRANULOCYTES # BLD AUTO: 0.08 K/UL (ref 0–0.04)
IMM GRANULOCYTES NFR BLD AUTO: 0.7 % (ref 0–0.5)
LYMPHOCYTES # BLD AUTO: 1.8 K/UL (ref 1–4.8)
LYMPHOCYTES NFR BLD: 15.9 % (ref 18–48)
MAGNESIUM SERPL-MCNC: 1.9 MG/DL (ref 1.6–2.6)
MCH RBC QN AUTO: 27.8 PG (ref 27–31)
MCHC RBC AUTO-ENTMCNC: 32.6 G/DL (ref 32–36)
MCV RBC AUTO: 85 FL (ref 82–98)
MONOCYTES # BLD AUTO: 0.6 K/UL (ref 0.3–1)
MONOCYTES NFR BLD: 5.6 % (ref 4–15)
NEUTROPHILS # BLD AUTO: 8.7 K/UL (ref 1.8–7.7)
NEUTROPHILS NFR BLD: 77.7 % (ref 38–73)
NRBC BLD-RTO: 0 /100 WBC
PLATELET # BLD AUTO: 343 K/UL (ref 150–450)
PMV BLD AUTO: 11.1 FL (ref 9.2–12.9)
POTASSIUM SERPL-SCNC: 4.2 MMOL/L (ref 3.5–5.1)
PROT SERPL-MCNC: 7.5 G/DL (ref 6–8.4)
RBC # BLD AUTO: 5 M/UL (ref 4–5.4)
SODIUM SERPL-SCNC: 137 MMOL/L (ref 136–145)
WBC # BLD AUTO: 11.16 K/UL (ref 3.9–12.7)

## 2025-03-16 PROCEDURE — 63600175 PHARM REV CODE 636 W HCPCS: Performed by: STUDENT IN AN ORGANIZED HEALTH CARE EDUCATION/TRAINING PROGRAM

## 2025-03-16 PROCEDURE — 25000003 PHARM REV CODE 250: Performed by: INTERNAL MEDICINE

## 2025-03-16 PROCEDURE — 99900031 HC PATIENT EDUCATION (STAT)

## 2025-03-16 PROCEDURE — 36415 COLL VENOUS BLD VENIPUNCTURE: CPT | Performed by: INTERNAL MEDICINE

## 2025-03-16 PROCEDURE — 25000003 PHARM REV CODE 250: Performed by: STUDENT IN AN ORGANIZED HEALTH CARE EDUCATION/TRAINING PROGRAM

## 2025-03-16 PROCEDURE — 12000002 HC ACUTE/MED SURGE SEMI-PRIVATE ROOM

## 2025-03-16 PROCEDURE — 87206 SMEAR FLUORESCENT/ACID STAI: CPT | Performed by: INTERNAL MEDICINE

## 2025-03-16 PROCEDURE — 85025 COMPLETE CBC W/AUTO DIFF WBC: CPT | Performed by: INTERNAL MEDICINE

## 2025-03-16 PROCEDURE — 25000242 PHARM REV CODE 250 ALT 637 W/ HCPCS: Performed by: HOSPITALIST

## 2025-03-16 PROCEDURE — 94640 AIRWAY INHALATION TREATMENT: CPT

## 2025-03-16 PROCEDURE — 87116 MYCOBACTERIA CULTURE: CPT | Performed by: INTERNAL MEDICINE

## 2025-03-16 PROCEDURE — 25000242 PHARM REV CODE 250 ALT 637 W/ HCPCS: Performed by: INTERNAL MEDICINE

## 2025-03-16 PROCEDURE — 94761 N-INVAS EAR/PLS OXIMETRY MLT: CPT

## 2025-03-16 PROCEDURE — 63600175 PHARM REV CODE 636 W HCPCS: Mod: JZ | Performed by: INTERNAL MEDICINE

## 2025-03-16 PROCEDURE — 87205 SMEAR GRAM STAIN: CPT | Performed by: INTERNAL MEDICINE

## 2025-03-16 PROCEDURE — 83735 ASSAY OF MAGNESIUM: CPT | Performed by: INTERNAL MEDICINE

## 2025-03-16 PROCEDURE — 87070 CULTURE OTHR SPECIMN AEROBIC: CPT | Performed by: INTERNAL MEDICINE

## 2025-03-16 PROCEDURE — 99233 SBSQ HOSP IP/OBS HIGH 50: CPT | Mod: ,,, | Performed by: INTERNAL MEDICINE

## 2025-03-16 PROCEDURE — 80053 COMPREHEN METABOLIC PANEL: CPT | Performed by: INTERNAL MEDICINE

## 2025-03-16 RX ORDER — HYDRALAZINE HYDROCHLORIDE 20 MG/ML
10 INJECTION INTRAMUSCULAR; INTRAVENOUS EVERY 6 HOURS PRN
Status: DISCONTINUED | OUTPATIENT
Start: 2025-03-16 | End: 2025-03-17

## 2025-03-16 RX ORDER — METOPROLOL SUCCINATE 25 MG/1
25 TABLET, EXTENDED RELEASE ORAL DAILY
Status: DISCONTINUED | OUTPATIENT
Start: 2025-03-17 | End: 2025-03-20 | Stop reason: HOSPADM

## 2025-03-16 RX ADMIN — LEVALBUTEROL HYDROCHLORIDE 1.25 MG: 1.25 SOLUTION RESPIRATORY (INHALATION) at 07:03

## 2025-03-16 RX ADMIN — LEVOFLOXACIN 750 MG: 750 TABLET, FILM COATED ORAL at 09:03

## 2025-03-16 RX ADMIN — BUPROPION HYDROCHLORIDE 300 MG: 150 TABLET, EXTENDED RELEASE ORAL at 07:03

## 2025-03-16 RX ADMIN — ALPRAZOLAM 0.5 MG: 0.5 TABLET ORAL at 07:03

## 2025-03-16 RX ADMIN — VALSARTAN 160 MG: 80 TABLET, FILM COATED ORAL at 06:03

## 2025-03-16 RX ADMIN — METHYLPREDNISOLONE SODIUM SUCCINATE 80 MG: 40 INJECTION, POWDER, FOR SOLUTION INTRAMUSCULAR; INTRAVENOUS at 03:03

## 2025-03-16 RX ADMIN — LEVALBUTEROL HYDROCHLORIDE 1.25 MG: 1.25 SOLUTION RESPIRATORY (INHALATION) at 06:03

## 2025-03-16 RX ADMIN — SODIUM CHLORIDE SOLN NEBU 3% 4 ML: 3 NEBU SOLN at 06:03

## 2025-03-16 RX ADMIN — HYDRALAZINE HYDROCHLORIDE 25 MG: 25 TABLET ORAL at 07:03

## 2025-03-16 RX ADMIN — ENOXAPARIN SODIUM 40 MG: 40 INJECTION SUBCUTANEOUS at 05:03

## 2025-03-16 RX ADMIN — METHYLPREDNISOLONE SODIUM SUCCINATE 80 MG: 40 INJECTION, POWDER, FOR SOLUTION INTRAMUSCULAR; INTRAVENOUS at 05:03

## 2025-03-16 RX ADMIN — Medication 6 MG: at 09:03

## 2025-03-16 RX ADMIN — BENZONATATE 100 MG: 100 CAPSULE ORAL at 12:03

## 2025-03-16 RX ADMIN — ALPRAZOLAM 0.5 MG: 0.5 TABLET ORAL at 12:03

## 2025-03-16 RX ADMIN — LEVALBUTEROL HYDROCHLORIDE 1.25 MG: 1.25 SOLUTION RESPIRATORY (INHALATION) at 01:03

## 2025-03-16 RX ADMIN — METOPROLOL SUCCINATE 12.5 MG: 25 TABLET, EXTENDED RELEASE ORAL at 09:03

## 2025-03-16 RX ADMIN — SPIRONOLACTONE 25 MG: 25 TABLET, FILM COATED ORAL at 09:03

## 2025-03-16 RX ADMIN — METHYLPREDNISOLONE SODIUM SUCCINATE 80 MG: 40 INJECTION, POWDER, FOR SOLUTION INTRAMUSCULAR; INTRAVENOUS at 09:03

## 2025-03-16 RX ADMIN — QUETIAPINE 25 MG: 25 TABLET ORAL at 09:03

## 2025-03-16 RX ADMIN — FOLIC ACID 1000 MCG: 1 TABLET ORAL at 07:03

## 2025-03-16 RX ADMIN — HYDRALAZINE HYDROCHLORIDE 10 MG: 20 INJECTION, SOLUTION INTRAMUSCULAR; INTRAVENOUS at 05:03

## 2025-03-16 NOTE — SUBJECTIVE & OBJECTIVE
Interval History:  She was seen and examined at bedside rounds, admits to generalized body aches and pains.  She has also emotional this morning, requested anxiety medications.  As well as additional sleep aid.  Pulmonology following.    Review of Systems   All other systems reviewed and are negative.    Objective:     Vital Signs (Most Recent):  Temp: 98 °F (36.7 °C) (03/16/25 0759)  Pulse: (!) 111 (03/16/25 0945)  Resp: 12 (03/16/25 0650)  BP: (!) 179/119 (03/16/25 0945)  SpO2: 98 % (03/16/25 0945) Vital Signs (24h Range):  Temp:  [97.7 °F (36.5 °C)-98.6 °F (37 °C)] 98 °F (36.7 °C)  Pulse:  [] 111  Resp:  [12-20] 12  SpO2:  [96 %-100 %] 98 %  BP: (143-187)/() 179/119     Weight: 64.9 kg (142 lb 15.9 oz)  Body mass index is 25.33 kg/m².    Intake/Output Summary (Last 24 hours) at 3/16/2025 0947  Last data filed at 3/15/2025 1816  Gross per 24 hour   Intake 480 ml   Output --   Net 480 ml           Physical Exam  Constitutional:       Appearance: Normal appearance.      Comments: Emotional and tearful   HENT:      Head: Normocephalic.      Right Ear: Tympanic membrane normal.      Left Ear: Tympanic membrane normal.      Nose: Nose normal.   Eyes:      Pupils: Pupils are equal, round, and reactive to light.   Cardiovascular:      Rate and Rhythm: Normal rate and regular rhythm.   Pulmonary:      Effort: Pulmonary effort is normal.      Breath sounds: Normal breath sounds.   Abdominal:      General: Abdomen is flat.      Palpations: Abdomen is soft.   Musculoskeletal:         General: Normal range of motion.      Cervical back: Normal range of motion.   Skin:     General: Skin is warm and dry.   Neurological:      General: No focal deficit present.      Mental Status: She is alert.   Psychiatric:         Mood and Affect: Mood normal.               Significant Labs: All pertinent labs within the past 24 hours have been reviewed.    Significant Imaging: I have reviewed all pertinent imaging results/findings  within the past 24 hours.

## 2025-03-16 NOTE — ASSESSMENT & PLAN NOTE
Unlikely bacterial component  Continue iv Levofloxacin and azithromycin per pulmonology recs  Continue iv solumedrol per pulmonology recs  Seen by pulmonology      Antibiotics (From admission, onward)      Start     Stop Route Frequency Ordered    03/14/25 1300  levoFLOXacin tablet 750 mg         -- Oral Daily 03/14/25 1157    03/12/25 1700  azithromycin tablet 500 mg         -- Oral Every Mon, Wed, Fri 03/12/25 1330    03/12/25 1700  ethambutoL tablet 1,200 mg         -- Oral Every Mon, Wed, Fri 03/12/25 1330    03/12/25 1700  rifAMpin capsule 600 mg         -- Oral Every Mon, Wed, Fri 03/12/25 1330            Microbiology Results (last 7 days)       Procedure Component Value Units Date/Time    AFB Culture & Smear [[id number]]     Order Status: Sent Specimen: Sputum, Expectorated     Blood culture x two cultures. Draw prior to antibiotics. [[id number]] Collected: 03/12/25 1054    Order Status: Completed Specimen: Blood from Peripheral, Hand, Left Updated: 03/15/25 1232     Blood Culture, Routine No Growth to date      No Growth to date      No Growth to date      No Growth to date    Narrative:      Aerobic and anaerobic  Collection has been rescheduled by RE1 at 03/12/2025 10:56 Reason:   Done  Collection has been rescheduled by RE1 at 03/12/2025 10:56 Reason:   Done    Blood culture x two cultures. Draw prior to antibiotics. [[id number]] Collected: 03/12/25 1055    Order Status: Completed Specimen: Blood from Peripheral, Hand, Right Updated: 03/15/25 1232     Blood Culture, Routine No Growth to date      No Growth to date      No Growth to date      No Growth to date    Narrative:      Aerobic and anaerobic    Respiratory Infection Panel (PCR), Nasopharyngeal [[id number]] Collected: 03/15/25 0506    Order Status: Completed Specimen: Nasopharyngeal Swab Updated: 03/15/25 0819     Respiratory Infection Panel Source NP swab     Adenovirus Not Detected     Coronavirus 229E, Common Cold Virus Not Detected      Coronavirus HKU1, Common Cold Virus Not Detected     Coronavirus NL63, Common Cold Virus Not Detected     Coronavirus OC43, Common Cold Virus Not Detected     Comment: Coronavirus strains 229E, HKU1, NL63, and OC43 can cause the common   cold   and are not associated with the respiratory disease outbreak caused   by  the COVID-19 (SARS-CoV-2 novel Coronavirus) strain.           SARS-CoV2 (COVID-19) Qualitative PCR Not Detected     Human Metapneumovirus Not Detected     Human Rhinovirus/Enterovirus Not Detected     Influenza A Not Detected     Influenza B Not Detected     Parainfluenza Virus 1 Not Detected     Parainfluenza Virus 2 Not Detected     Parainfluenza Virus 3 Not Detected     Parainfluenza Virus 4 Not Detected     Respiratory Syncytial Virus Not Detected     Bordetella Parapertussis (LP4626) Not Detected     Bordetella pertussis (ptxP) Not Detected     Chlamydia pneumoniae Not Detected     Mycoplasma pneumoniae Not Detected     Comment: Respiratory Infection Panel testing performed by Multiplex PCR.       Narrative:      Respiratory Infection Panel source->NP Swab    AFB Culture & Smear [[id number]] Resulted: 03/15/25 0646    Order Status: No result Specimen: Sputum Updated: 03/15/25 0646    Afb Culture Stain [[id number]] Resulted: 03/15/25 0646    Order Status: No result Specimen: Sputum Updated: 03/15/25 0646    Culture, Respiratory with Gram Stain [[id number]] Resulted: 03/15/25 0645    Order Status: No result Specimen: Sputum Updated: 03/15/25 0645    Culture, Respiratory with Gram Stain [[id number]] Collected: 03/15/25 0623    Order Status: Canceled Specimen: Sputum, Induced     AFB Culture & Smear [[id number]] Collected: 03/15/25 0623    Order Status: Canceled Specimen: Sputum, Induced     Group A Strep, Molecular [[id number]] Collected: 03/12/25 0943    Order Status: Completed Specimen: Throat Updated: 03/12/25 1025     Group A Strep, Molecular Negative     Comment: Arcanobacterium haemolyticum and  Beta Streptococcus group C   and G will not be detected by this test method.  Please order   Throat Culture ([id number]) if suspected.

## 2025-03-16 NOTE — PLAN OF CARE
03/15/25 2028   Patient Assessment/Suction   Level of Consciousness (AVPU) alert   Respiratory Effort Normal;Unlabored   Expansion/Accessory Muscles/Retractions no use of accessory muscles;expansion symmetric   All Lung Fields Breath Sounds clear;diminished   Rhythm/Pattern, Respiratory depth regular;pattern regular;unlabored   Cough Frequency frequent   Cough Type nonproductive   PRE-TX-O2   Device (Oxygen Therapy) room air   SpO2 98 %   Pulse Oximetry Type Intermittent   $ Pulse Oximetry - Multiple Charge Pulse Oximetry - Multiple   Pulse 89   Resp 18   Aerosol Therapy   $ Aerosol Therapy Charges Aerosol Treatment   Daily Review of Necessity (SVN) completed   Respiratory Treatment Status (SVN) given   Treatment Route (SVN) mouthpiece utilized;oxygen   Patient Position HOB elevated   Post Treatment Assessment (SVN) breath sounds unchanged   Signs of Intolerance (SVN) none   Respiratory Interventions   Cough And Deep Breathing done independently per patient   Airway/Ventilation Management airway patency maintained;humidification applied   Airway/Ventilation Support pulmonary hygiene promoted   Education   $ Education Bronchodilator;15 min   Tobacco Cessation Intervention   Do you use any type of tobacco product? No

## 2025-03-16 NOTE — CARE UPDATE
03/16/25 0650   Patient Assessment/Suction   Level of Consciousness (AVPU) alert   Respiratory Effort Unlabored   Expansion/Accessory Muscles/Retractions no use of accessory muscles   All Lung Fields Breath Sounds clear   Rhythm/Pattern, Respiratory pattern regular   PRE-TX-O2   Device (Oxygen Therapy) room air   SpO2 100 %   Pulse Oximetry Type Intermittent   $ Pulse Oximetry - Multiple Charge Pulse Oximetry - Multiple   Pulse 80   Resp 12   Aerosol Therapy   $ Aerosol Therapy Charges Aerosol Treatment   Daily Review of Necessity (SVN) completed   Respiratory Treatment Status (SVN) given   Treatment Route (SVN) mouthpiece utilized;oxygen   Patient Position HOB elevated   Post Treatment Assessment (SVN) breath sounds unchanged;vital signs unchanged   Signs of Intolerance (SVN) none

## 2025-03-16 NOTE — ASSESSMENT & PLAN NOTE
Patient's blood pressure range in the last 24 hours was: BP  Min: 143/77  Max: 187/89.The patient's inpatient anti-hypertensive regimen is listed below:  Current Antihypertensives  valsartan tablet 160 mg, Every morning, Oral  spironolactone tablet 25 mg, Daily, Oral  hydrALAZINE tablet 25 mg, 4 times daily PRN, Oral  metoprolol succinate (TOPROL-XL) 24 hr split tablet 12.5 mg, Daily, Oral    Plan  - BP is uncontrolled, will adjust as follows:  Removed calcium channel blocker, add an AV celina blocker secondary to increase heart rate and blood pressure.  - p.r.n. hydralazine.

## 2025-03-16 NOTE — PROGRESS NOTES
Critical access hospital   Department of Infectious Disease  Progress Note        PATIENT NAME: Sumit Burger  MRN: [id number]  TODAY'S DATE: 03/16/2025  ADMIT DATE: 3/12/2025  LOS: 4 days    CHIEF COMPLAINT: Shortness of Breath (SOB and cough since Sunday. Pt has lung problems and believes she is having a flare up)      PRINCIPLE PROBLEM: Acute pneumonia    INTERVAL HISTORY      03/15/2025: Seen and evaluated at bedside.  No acute issues overnight.  Respiratory panel negative.  Other studies pending.  Has a anxiety which has been managed by hospitalist.  She is feeling better today.    03/16/2024: Seen and evaluated at bedside.  Chart reviewed.  No acute issues overnight.  Continues to slowly improve.  Sputum for AFBs in progress.    Antibiotics (From admission, onward)      Start     Stop Route Frequency Ordered    03/14/25 1300  levoFLOXacin tablet 750 mg         -- Oral Daily 03/14/25 1157    03/12/25 1700  azithromycin tablet 500 mg         -- Oral Every Mon, Wed, Fri 03/12/25 1330    03/12/25 1700  ethambutoL tablet 1,200 mg         -- Oral Every Mon, Wed, Fri 03/12/25 1330    03/12/25 1700  rifAMpin capsule 600 mg         -- Oral Every Mon, Wed, Fri 03/12/25 1330          Antifungals (From admission, onward)      None           Antivirals (From admission, onward)      None            ASSESSMENT and PLAN      Cough and shortness of breath in a patient with sarcoidosis who is also on therapy for pulmonary MAC.  CT chest slightly worse compared to December 2024.  May very well be sarcoidosis flare.  Currently on levofloxacin but she is also already on atypical coverage with azithromycin for MAC.   Obtain sputum induction for culture and AFB stain and culture to evaluate for persistent versus improvement in MAC.  Follow QuantiFERON TB gold and urine Histoplasma antigen.     2. Pulmonary sarcoidosis.  May need augmentation of her therapy for this.     3. Hypertension.  Management as per hospitalist.       She is improving overall though she is not happy with her pace of improvement.     RECOMMENDATIONS:    Follow up pending studies   Continue management of pulmonary sarcoidosis as per pulmonologist  Nothing further for ID to add at this time.  Defer ongoing management in the hospital to pulmonologist   ID can follow up in 2-3 weeks to review her pending labs    Thank you for involving ID in the care of this patient.  Discussed with patient and her mother at bedside.  All questions answered. ID service will drop off today and see in 2-3 weeks in the office for follow up.      SUBJECTIVE    Sumit Burger is a 54 y.o. female with history of hypertension, and sarcoidosis diagnosed with lung biopsy.  She is on prednisone 10 mg daily.  Diagnosed with SUZIE on p.o. culture 06/24/2024 and is on thrice weekly azithromycin/rifampin/ethambutol ? Since 9/16/24.  She is compliant and tolerating well.  She also been hospitalized in December 20, 2024 with episodes of myalgia associated with diaphoresis in the setting of cough.  Workup at the time unremarkable for any new infection.  Possibility of menopause and other entities were entertained.     Started having worsening cough with shortness of breath on 03/09/2025. Presented to the ED 03/12/2025 for evaluation.  In the ED /9 7, pulse 120, respiratory rate 20, temperature 98°  and oxygen saturation was 95%.  WBC 13 K, hematocrit 44, LFTs normal, creatinine 0.8.  Influenza and COVID assay negative.  Chest x-ray unchanged from 02/25/2025 with mildly increased intra hilar interstitial opacities.  CT PE protocol if for PE.  It did show multifocal geographic areas of airspace opacity a mosaic attenuation most pronounced in the upper lobes slightly worse in the right lower lobe suggesting combination multifocal infection/pneumoniae, interstitial lung disease like sarcoid possibly component of mild interstitial edema.  She was admitted and continued on her MAC therapy.   Seen by Pulmonary.  Levofloxacin added today.  ID asked to assist with her care.     Antibiotic history:    MAC therapy  Levofloxacin: 12/12/2025-     Microbiology:    Influenza and COVID assay 03/12/2025: Negative   Blood cultures 12/20/2025:  NGTD     Review of Systems  Negative except as stated above in Interval History     OBJECTIVE   Temp:  [97.7 °F (36.5 °C)-98.8 °F (37.1 °C)] 98.8 °F (37.1 °C)  Pulse:  [] 101  Resp:  [12-20] 12  SpO2:  [96 %-100 %] 100 %  BP: (143-187)/() 162/113  Temp:  [97.7 °F (36.5 °C)-98.8 °F (37.1 °C)]   Temp: 98.8 °F (37.1 °C) (03/16/25 1154)  Pulse: 101 (03/16/25 1154)  Resp: 12 (03/16/25 0650)  BP: (!) 162/113 (03/16/25 1154)  SpO2: 100 % (03/16/25 1154)    Intake/Output Summary (Last 24 hours) at 3/16/2025 1237  Last data filed at 3/15/2025 1816  Gross per 24 hour   Intake 480 ml   Output --   Net 480 ml       Physical Exam  General:  Middle-aged man sitting in chair in no acute distress   HEENT: No oral thrush, no cervical adenopathy   CVS: S1 and 2 heard, no murmurs appreciated   Respiratory: Clear to auscultation   Abdomen: Full, soft, nontender, palpable organomegaly   Skin: No rash appreciated   CNS: No gross focal deficits   Musculoskeletal: No joint or abnormalities appreciated  Psych: Good mood, normal affect.      VAD:  PIV  ISOLATION:  None     Wounds:  None    Significant Labs: All pertinent labs within the past 24 hours have been reviewed.    CBC LAST 7 DAYS  Recent Labs   Lab 03/12/25  1058 03/13/25  0727 03/14/25  0850 03/15/25  0516 03/16/25  0507   WBC 13.10* 11.23 18.81* 11.29 11.16   RBC 5.30 4.96 5.27 5.48* 5.00   HGB 14.7 13.6 14.4 14.9 13.9   HCT 44.2 41.5 45.5 47.8 42.7   MCV 83 84 86 87 85   MCH 27.7 27.4 27.3 27.2 27.8   MCHC 33.3 32.8 31.6* 31.2* 32.6   RDW 14.3 14.1 14.4 14.5 14.3    296 328 345 343   MPV 11.0 10.8 10.8 11.4 11.1   GRAN 79.0* 87.4*  9.8* 94.1*  17.7* 84.6*  9.6* 77.7*  8.7*   LYMPH 18.0 10.1*  1.1 4.1*  0.8* 10.8*   "1.2 15.9*  1.8   MONO 3.0* 2.0*  0.2* 1.3*  0.2* 4.1  0.5 5.6  0.6   BASO  --  0.02 0.02 0.01 0.01   NRBC 0 0 0 0 0       CHEMISTRY LAST 7 DAYS  Recent Labs   Lab 03/12/25  1058 03/13/25  0727 03/14/25  0850 03/15/25  0516 03/16/25  0508    138 141 138 137   K 3.5 4.0 4.3 4.2 4.2    104 104 103 101   CO2 23 24 31* 28 25   ANIONGAP 12 10 6* 7* 11   BUN 9 7 10 12 11   CREATININE 0.8 0.6 0.7 0.6 0.6   GLU 87 156* 127* 114* 141*   CALCIUM 10.9* 10.7* 10.5 11.0* 10.4   MG 1.8 2.1 2.2 2.1 1.9   ALBUMIN 4.7 4.5 4.5 4.6 4.2   PROT 8.8* 8.4 8.8* 8.3 7.5   ALKPHOS 87 82 85 85 69   ALT 25 20 18 16 14   AST 22 15 13 12 10   BILITOT 0.8 1.0 0.3 0.4 0.2       Estimated Creatinine Clearance: 99.4 mL/min (based on SCr of 0.6 mg/dL).    INFLAMMATORY/PROCAL  LAST 7 DAYS  Recent Labs   Lab 03/13/25  1045 03/14/25  0850   PROCAL <0.050 <0.050   CRP  --  4.30*     No results found for: "ESR"  CRP   Date Value Ref Range Status   03/14/2025 4.30 (H) <1.00 mg/dL Final     Comment:     CRP-Normal Application expected values:   <1.0        mg/dL   Normal Range  1.0 - 5.0  mg/dL   Indicates mild inflammation  5.0 - 10.0 mg/dL   Indicates severe inflammation  >10.0        mg/dL   Represents serious processes and   frequently         indicates the presence of a bacterial   infection.      10/19/2020 4.13 (H) <0.76 mg/dL Final   10/19/2020 3.49 (H) <0.76 mg/dL Final       PRIOR  MICROBIOLOGY:    No results found for the last 90 days.    LAST 7 DAYS MICROBIOLOGY   Microbiology Results (last 7 days)       Procedure Component Value Units Date/Time    Blood culture x two cultures. Draw prior to antibiotics. [[id number]] Collected: 03/12/25 1054    Order Status: Completed Specimen: Blood from Peripheral, Hand, Left Updated: 03/16/25 1232     Blood Culture, Routine No Growth to date      No Growth to date      No Growth to date      No Growth to date      No Growth to date    Narrative:      Aerobic and anaerobic  Collection has been " rescheduled by RE1 at 03/12/2025 10:56 Reason:   Done  Collection has been rescheduled by RE1 at 03/12/2025 10:56 Reason:   Done    Blood culture x two cultures. Draw prior to antibiotics. [[id number]] Collected: 03/12/25 1055    Order Status: Completed Specimen: Blood from Peripheral, Hand, Right Updated: 03/16/25 1232     Blood Culture, Routine No Growth to date      No Growth to date      No Growth to date      No Growth to date      No Growth to date    Narrative:      Aerobic and anaerobic    AFB Culture & Smear [[id number]]     Order Status: Sent Specimen: Sputum, Expectorated     Respiratory Infection Panel (PCR), Nasopharyngeal [[id number]] Collected: 03/15/25 0506    Order Status: Completed Specimen: Nasopharyngeal Swab Updated: 03/15/25 0819     Respiratory Infection Panel Source NP swab     Adenovirus Not Detected     Coronavirus 229E, Common Cold Virus Not Detected     Coronavirus HKU1, Common Cold Virus Not Detected     Coronavirus NL63, Common Cold Virus Not Detected     Coronavirus OC43, Common Cold Virus Not Detected     Comment: Coronavirus strains 229E, HKU1, NL63, and OC43 can cause the common   cold   and are not associated with the respiratory disease outbreak caused   by  the COVID-19 (SARS-CoV-2 novel Coronavirus) strain.           SARS-CoV2 (COVID-19) Qualitative PCR Not Detected     Human Metapneumovirus Not Detected     Human Rhinovirus/Enterovirus Not Detected     Influenza A Not Detected     Influenza B Not Detected     Parainfluenza Virus 1 Not Detected     Parainfluenza Virus 2 Not Detected     Parainfluenza Virus 3 Not Detected     Parainfluenza Virus 4 Not Detected     Respiratory Syncytial Virus Not Detected     Bordetella Parapertussis (AV4132) Not Detected     Bordetella pertussis (ptxP) Not Detected     Chlamydia pneumoniae Not Detected     Mycoplasma pneumoniae Not Detected     Comment: Respiratory Infection Panel testing performed by Multiplex PCR.       Narrative:       Respiratory Infection Panel source->NP Swab    AFB Culture & Smear [[id number]] Resulted: 03/15/25 0646    Order Status: No result Specimen: Sputum Updated: 03/15/25 0646    Afb Culture Stain [[id number]] Resulted: 03/15/25 0646    Order Status: No result Specimen: Sputum Updated: 03/15/25 0646    Culture, Respiratory with Gram Stain [[id number]] Resulted: 03/15/25 0645    Order Status: No result Specimen: Sputum Updated: 03/15/25 0645    Culture, Respiratory with Gram Stain [[id number]] Collected: 03/15/25 0623    Order Status: Canceled Specimen: Sputum, Induced     AFB Culture & Smear [[id number]] Collected: 03/15/25 0623    Order Status: Canceled Specimen: Sputum, Induced     Group A Strep, Molecular [[id number]] Collected: 03/12/25 0943    Order Status: Completed Specimen: Throat Updated: 03/12/25 1025     Group A Strep, Molecular Negative     Comment: Arcanobacterium haemolyticum and Beta Streptococcus group C   and G will not be detected by this test method.  Please order   Throat Culture ([id number]) if suspected.               CURRENT/PREVIOUS VISIT EKG  Results for orders placed or performed during the hospital encounter of 03/12/25   EKG 12-lead    Collection Time: 03/12/25  9:40 AM   Result Value Ref Range    QRS Duration 74 ms    [facility] QTC Calculation 441 ms    Narrative    Test Reason : R06.02,    Vent. Rate : 125 BPM     Atrial Rate : 125 BPM     P-R Int : 116 ms          QRS Dur :  74 ms      QT Int : 306 ms       P-R-T Axes :  65  19  59 degrees    QTcB Int : 441 ms    Sinus tachycardia  Right atrial enlargement  Borderline Abnormal ECG  Confirmed by Deric Wang ([id number]) on 3/12/2025 7:46:37 PM    Referred By:            Confirmed By: Deric Wang     Significant Imaging: I have reviewed all relevant and available imaging results/findings within the past 24 hours.    I spent a total of 50 minutes on the day of the visit.This includes face to face time and non-face to face time preparing to  see the patient (eg, review of tests), obtaining and/or reviewing separately obtained history, documenting clinical information in the electronic or other health record, independently interpreting results and communicating results to the patient/family/caregiver, or care coordinator.    Moises Funez MD  Date of Service: 03/16/2025      This note was created using [employer] voice recognition software that occasionally misinterpreted phrases or words.

## 2025-03-16 NOTE — PROGRESS NOTES
Critical access hospital Medicine  Progress Note    Patient Name: Sumit Burger  MRN: [id number]  Patient Class: IP- Inpatient   Admission Date: 3/12/2025  Length of Stay: 4 days  Attending Physician: Alessandro Marx MD  Primary Care Provider: Trisha, Provider        Subjective     Principal Problem:Acute pneumonia        HPI:  54 Year old pt with H/o Sarcoidosis/SUZIE getting admitted with possible acute Pneumonia  Symptom s started with cough few days ago   This was followed by SOB mainly on exertion   Later she developed chest discomfort and symptoms worsened   She came to ER and got admitted  Per records:She was admitted with same c/o on December 2024 and Pneumonia was r/o at that time   She had CTA Today in ER   Compared to previous CTA In December 2024 :new mosaic ground-glass attenuation opacity throughout the posterior segment of the right lower lobe , new from the previous exam     Overview/Hospital Course:  During hospitalization she was admitted to [facility] with telemetry for evaluation and management of an acute pneumonia, she did have a history of sarcoidosis, she was started on IV steroids and empirically managed.  Pulmonology was consulted, who does not suspect a bacterial component.  She complained of diffuse body aches and pains, she is emotional, medications were prescribed for anxiety and pain management.    Interval History:  She was seen and examined at bedside rounds, admits to generalized body aches and pains.  She has also emotional this morning, requested anxiety medications.  As well as additional sleep aid.  Pulmonology following.    Review of Systems   All other systems reviewed and are negative.    Objective:     Vital Signs (Most Recent):  Temp: 98 °F (36.7 °C) (03/16/25 0759)  Pulse: (!) 111 (03/16/25 0945)  Resp: 12 (03/16/25 0650)  BP: (!) 179/119 (03/16/25 0945)  SpO2: 98 % (03/16/25 0945) Vital Signs (24h Range):  Temp:  [97.7 °F (36.5 °C)-98.6 °F (37 °C)] 98 °F  (36.7 °C)  Pulse:  [] 111  Resp:  [12-20] 12  SpO2:  [96 %-100 %] 98 %  BP: (143-187)/() 179/119     Weight: 64.9 kg (142 lb 15.9 oz)  Body mass index is 25.33 kg/m².    Intake/Output Summary (Last 24 hours) at 3/16/2025 0973  Last data filed at 3/15/2025 1816  Gross per 24 hour   Intake 480 ml   Output --   Net 480 ml           Physical Exam  Constitutional:       Appearance: Normal appearance.      Comments: Emotional and tearful   HENT:      Head: Normocephalic.      Right Ear: Tympanic membrane normal.      Left Ear: Tympanic membrane normal.      Nose: Nose normal.   Eyes:      Pupils: Pupils are equal, round, and reactive to light.   Cardiovascular:      Rate and Rhythm: Normal rate and regular rhythm.   Pulmonary:      Effort: Pulmonary effort is normal.      Breath sounds: Normal breath sounds.   Abdominal:      General: Abdomen is flat.      Palpations: Abdomen is soft.   Musculoskeletal:         General: Normal range of motion.      Cervical back: Normal range of motion.   Skin:     General: Skin is warm and dry.   Neurological:      General: No focal deficit present.      Mental Status: She is alert.   Psychiatric:         Mood and Affect: Mood normal.               Significant Labs: All pertinent labs within the past 24 hours have been reviewed.    Significant Imaging: I have reviewed all pertinent imaging results/findings within the past 24 hours.      Assessment & Plan  Acute pneumonia  Unlikely bacterial component  Continue iv Levofloxacin and azithromycin per pulmonology recs  Continue iv solumedrol per pulmonology recs  Seen by pulmonology      Antibiotics (From admission, onward)      Start     Stop Route Frequency Ordered    03/14/25 1300  levoFLOXacin tablet 750 mg         -- Oral Daily 03/14/25 1157    03/12/25 1700  azithromycin tablet 500 mg         -- Oral Every Mon, Wed, Fri 03/12/25 1330    03/12/25 1700  ethambutoL tablet 1,200 mg         -- Oral Every Mon, Wed, Fri 03/12/25  1330    03/12/25 1700  rifAMpin capsule 600 mg         -- Oral Every Mon, Wed, Fri 03/12/25 1330            Microbiology Results (last 7 days)       Procedure Component Value Units Date/Time    AFB Culture & Smear [[id number]]     Order Status: Sent Specimen: Sputum, Expectorated     Blood culture x two cultures. Draw prior to antibiotics. [[id number]] Collected: 03/12/25 1054    Order Status: Completed Specimen: Blood from Peripheral, Hand, Left Updated: 03/15/25 1232     Blood Culture, Routine No Growth to date      No Growth to date      No Growth to date      No Growth to date    Narrative:      Aerobic and anaerobic  Collection has been rescheduled by RE1 at 03/12/2025 10:56 Reason:   Done  Collection has been rescheduled by RE1 at 03/12/2025 10:56 Reason:   Done    Blood culture x two cultures. Draw prior to antibiotics. [[id number]] Collected: 03/12/25 1055    Order Status: Completed Specimen: Blood from Peripheral, Hand, Right Updated: 03/15/25 1232     Blood Culture, Routine No Growth to date      No Growth to date      No Growth to date      No Growth to date    Narrative:      Aerobic and anaerobic    Respiratory Infection Panel (PCR), Nasopharyngeal [[id number]] Collected: 03/15/25 0506    Order Status: Completed Specimen: Nasopharyngeal Swab Updated: 03/15/25 0819     Respiratory Infection Panel Source NP swab     Adenovirus Not Detected     Coronavirus 229E, Common Cold Virus Not Detected     Coronavirus HKU1, Common Cold Virus Not Detected     Coronavirus NL63, Common Cold Virus Not Detected     Coronavirus OC43, Common Cold Virus Not Detected     Comment: Coronavirus strains 229E, HKU1, NL63, and OC43 can cause the common   cold   and are not associated with the respiratory disease outbreak caused   by  the COVID-19 (SARS-CoV-2 novel Coronavirus) strain.           SARS-CoV2 (COVID-19) Qualitative PCR Not Detected     Human Metapneumovirus Not Detected     Human Rhinovirus/Enterovirus Not Detected      Influenza A Not Detected     Influenza B Not Detected     Parainfluenza Virus 1 Not Detected     Parainfluenza Virus 2 Not Detected     Parainfluenza Virus 3 Not Detected     Parainfluenza Virus 4 Not Detected     Respiratory Syncytial Virus Not Detected     Bordetella Parapertussis (QL1114) Not Detected     Bordetella pertussis (ptxP) Not Detected     Chlamydia pneumoniae Not Detected     Mycoplasma pneumoniae Not Detected     Comment: Respiratory Infection Panel testing performed by Multiplex PCR.       Narrative:      Respiratory Infection Panel source->NP Swab    AFB Culture & Smear [[id number]] Resulted: 03/15/25 0646    Order Status: No result Specimen: Sputum Updated: 03/15/25 0646    Afb Culture Stain [[id number]] Resulted: 03/15/25 0646    Order Status: No result Specimen: Sputum Updated: 03/15/25 0646    Culture, Respiratory with Gram Stain [[id number]] Resulted: 03/15/25 0645    Order Status: No result Specimen: Sputum Updated: 03/15/25 0645    Culture, Respiratory with Gram Stain [[id number]] Collected: 03/15/25 0623    Order Status: Canceled Specimen: Sputum, Induced     AFB Culture & Smear [[id number]] Collected: 03/15/25 0623    Order Status: Canceled Specimen: Sputum, Induced     Group A Strep, Molecular [[id number]] Collected: 03/12/25 0943    Order Status: Completed Specimen: Throat Updated: 03/12/25 1025     Group A Strep, Molecular Negative     Comment: Arcanobacterium haemolyticum and Beta Streptococcus group C   and G will not be detected by this test method.  Please order   Throat Culture ([id number]) if suspected.               Sarcoidosis  H/o aware   Pt still om PO steroids at home    Pulmonology following    SUZIE (mycobacterium avium-intracellulare)  Continue therapy per ID recommendation    Respiratory distress  POA  As mentioned above     Hypoxia  New onset     Acute hypoxic respiratory failure  Improved   Patient with Hypoxic Respiratory failure which is Acute.  she is not on home oxygen.  Supplemental oxygen was provided and noted-      .   Signs/symptoms of respiratory failure include- respiratory distress. Contributing diagnoses includes - Pneumonia and sarcoidosis  Labs and images were reviewed. Patient Has not had a recent ABG. Will treat underlying causes and adjust management of respiratory failure as follows- follow up pulmonology consult  Leukocytosis  Procalcitonin remains normal  Suspect Secondary to corticosteroid use    Essential hypertension  Patient's blood pressure range in the last 24 hours was: BP  Min: 143/77  Max: 187/89.The patient's inpatient anti-hypertensive regimen is listed below:  Current Antihypertensives  valsartan tablet 160 mg, Every morning, Oral  spironolactone tablet 25 mg, Daily, Oral  hydrALAZINE tablet 25 mg, 4 times daily PRN, Oral  metoprolol succinate (TOPROL-XL) 24 hr split tablet 12.5 mg, Daily, Oral    Plan  - BP is uncontrolled, will adjust as follows:  Removed calcium channel blocker, add an AV celina blocker secondary to increase heart rate and blood pressure.  - p.r.n. hydralazine.  VTE Risk Mitigation (From admission, onward)           Ordered     enoxaparin injection 40 mg  Every 24 hours         03/13/25 1902     IP VTE LOW RISK PATIENT  Once         03/12/25 1330     Place sequential compression device  Until discontinued         03/12/25 1330                    Discharge Planning   SHAKIRA: 3/17/2025     Code Status: Full Code   Medical Readiness for Discharge Date:   Discharge Plan A: Home with family                        Portillo Baer NP  Department of Hospital Medicine   [facility]

## 2025-03-17 LAB
ACE SERPL-CCNC: 144 U/L (ref 14–82)
ALBUMIN SERPL BCP-MCNC: 4.6 G/DL (ref 3.5–5.2)
ALP SERPL-CCNC: 77 U/L (ref 55–135)
ALT SERPL W/O P-5'-P-CCNC: 16 U/L (ref 10–44)
ANION GAP SERPL CALC-SCNC: 12 MMOL/L (ref 8–16)
AST SERPL-CCNC: 12 U/L (ref 10–40)
BACTERIA BLD CULT: NORMAL
BACTERIA BLD CULT: NORMAL
BACTERIA SPEC AEROBE CULT: NORMAL
BACTERIA SPEC AEROBE CULT: NORMAL
BASOPHILS # BLD AUTO: 0.02 K/UL (ref 0–0.2)
BASOPHILS NFR BLD: 0.2 % (ref 0–1.9)
BILIRUB SERPL-MCNC: 0.3 MG/DL (ref 0.1–1)
BUN SERPL-MCNC: 15 MG/DL (ref 6–20)
CALCIUM SERPL-MCNC: 10.6 MG/DL (ref 8.7–10.5)
CHLORIDE SERPL-SCNC: 102 MMOL/L (ref 95–110)
CO2 SERPL-SCNC: 24 MMOL/L (ref 23–29)
CREAT SERPL-MCNC: 0.7 MG/DL (ref 0.5–1.4)
DIFFERENTIAL METHOD BLD: ABNORMAL
EOSINOPHIL # BLD AUTO: 0 K/UL (ref 0–0.5)
EOSINOPHIL NFR BLD: 0 % (ref 0–8)
ERYTHROCYTE [DISTWIDTH] IN BLOOD BY AUTOMATED COUNT: 14.2 % (ref 11.5–14.5)
EST. GFR  (NO RACE VARIABLE): >60 ML/MIN/1.73 M^2
GLUCOSE SERPL-MCNC: 116 MG/DL (ref 70–110)
GRAM STN SPEC: NORMAL
HCT VFR BLD AUTO: 46.9 % (ref 37–48.5)
HGB BLD-MCNC: 15.2 G/DL (ref 12–16)
IMM GRANULOCYTES # BLD AUTO: 0.06 K/UL (ref 0–0.04)
IMM GRANULOCYTES NFR BLD AUTO: 0.6 % (ref 0–0.5)
LYMPHOCYTES # BLD AUTO: 1.8 K/UL (ref 1–4.8)
LYMPHOCYTES NFR BLD: 16.5 % (ref 18–48)
MAGNESIUM SERPL-MCNC: 2 MG/DL (ref 1.6–2.6)
MCH RBC QN AUTO: 27.2 PG (ref 27–31)
MCHC RBC AUTO-ENTMCNC: 32.4 G/DL (ref 32–36)
MCV RBC AUTO: 84 FL (ref 82–98)
MONOCYTES # BLD AUTO: 0.6 K/UL (ref 0.3–1)
MONOCYTES NFR BLD: 5.1 % (ref 4–15)
NEUTROPHILS # BLD AUTO: 8.4 K/UL (ref 1.8–7.7)
NEUTROPHILS NFR BLD: 77.6 % (ref 38–73)
NRBC BLD-RTO: 0 /100 WBC
PLATELET # BLD AUTO: 403 K/UL (ref 150–450)
PMV BLD AUTO: 10.9 FL (ref 9.2–12.9)
POTASSIUM SERPL-SCNC: 4 MMOL/L (ref 3.5–5.1)
PROT SERPL-MCNC: 8.1 G/DL (ref 6–8.4)
RBC # BLD AUTO: 5.58 M/UL (ref 4–5.4)
SODIUM SERPL-SCNC: 138 MMOL/L (ref 136–145)
WBC # BLD AUTO: 10.75 K/UL (ref 3.9–12.7)

## 2025-03-17 PROCEDURE — 99900035 HC TECH TIME PER 15 MIN (STAT)

## 2025-03-17 PROCEDURE — 94640 AIRWAY INHALATION TREATMENT: CPT

## 2025-03-17 PROCEDURE — 63600175 PHARM REV CODE 636 W HCPCS: Mod: JZ | Performed by: NURSE PRACTITIONER

## 2025-03-17 PROCEDURE — 87206 SMEAR FLUORESCENT/ACID STAI: CPT | Performed by: INTERNAL MEDICINE

## 2025-03-17 PROCEDURE — 25000003 PHARM REV CODE 250: Performed by: STUDENT IN AN ORGANIZED HEALTH CARE EDUCATION/TRAINING PROGRAM

## 2025-03-17 PROCEDURE — 25000003 PHARM REV CODE 250: Performed by: INTERNAL MEDICINE

## 2025-03-17 PROCEDURE — 83735 ASSAY OF MAGNESIUM: CPT | Performed by: INTERNAL MEDICINE

## 2025-03-17 PROCEDURE — 25000003 PHARM REV CODE 250: Performed by: NURSE PRACTITIONER

## 2025-03-17 PROCEDURE — 94761 N-INVAS EAR/PLS OXIMETRY MLT: CPT

## 2025-03-17 PROCEDURE — 85025 COMPLETE CBC W/AUTO DIFF WBC: CPT | Performed by: INTERNAL MEDICINE

## 2025-03-17 PROCEDURE — 87116 MYCOBACTERIA CULTURE: CPT | Performed by: INTERNAL MEDICINE

## 2025-03-17 PROCEDURE — 99900031 HC PATIENT EDUCATION (STAT)

## 2025-03-17 PROCEDURE — 63600175 PHARM REV CODE 636 W HCPCS: Performed by: INTERNAL MEDICINE

## 2025-03-17 PROCEDURE — 80053 COMPREHEN METABOLIC PANEL: CPT | Performed by: INTERNAL MEDICINE

## 2025-03-17 PROCEDURE — 36415 COLL VENOUS BLD VENIPUNCTURE: CPT | Performed by: INTERNAL MEDICINE

## 2025-03-17 PROCEDURE — 25000242 PHARM REV CODE 250 ALT 637 W/ HCPCS: Performed by: HOSPITALIST

## 2025-03-17 PROCEDURE — 63700000 PHARM REV CODE 250 ALT 637 W/O HCPCS: Performed by: INTERNAL MEDICINE

## 2025-03-17 PROCEDURE — 12000002 HC ACUTE/MED SURGE SEMI-PRIVATE ROOM

## 2025-03-17 RX ORDER — CLONIDINE HYDROCHLORIDE 0.1 MG/1
0.1 TABLET ORAL EVERY 6 HOURS PRN
Status: DISCONTINUED | OUTPATIENT
Start: 2025-03-17 | End: 2025-03-20 | Stop reason: HOSPADM

## 2025-03-17 RX ADMIN — METHYLPREDNISOLONE SODIUM SUCCINATE 80 MG: 40 INJECTION, POWDER, FOR SOLUTION INTRAMUSCULAR; INTRAVENOUS at 05:03

## 2025-03-17 RX ADMIN — RIFAMPIN 600 MG: 300 CAPSULE ORAL at 06:03

## 2025-03-17 RX ADMIN — SPIRONOLACTONE 25 MG: 25 TABLET, FILM COATED ORAL at 08:03

## 2025-03-17 RX ADMIN — BUPROPION HYDROCHLORIDE 300 MG: 150 TABLET, EXTENDED RELEASE ORAL at 08:03

## 2025-03-17 RX ADMIN — LEVALBUTEROL HYDROCHLORIDE 1.25 MG: 1.25 SOLUTION RESPIRATORY (INHALATION) at 01:03

## 2025-03-17 RX ADMIN — VALSARTAN 160 MG: 80 TABLET, FILM COATED ORAL at 06:03

## 2025-03-17 RX ADMIN — LEVOFLOXACIN 750 MG: 750 TABLET, FILM COATED ORAL at 08:03

## 2025-03-17 RX ADMIN — LEVALBUTEROL HYDROCHLORIDE 1.25 MG: 1.25 SOLUTION RESPIRATORY (INHALATION) at 07:03

## 2025-03-17 RX ADMIN — ENOXAPARIN SODIUM 40 MG: 40 INJECTION SUBCUTANEOUS at 06:03

## 2025-03-17 RX ADMIN — FOLIC ACID 1000 MCG: 1 TABLET ORAL at 08:03

## 2025-03-17 RX ADMIN — METHYLPREDNISOLONE SODIUM SUCCINATE 40 MG: 40 INJECTION, POWDER, FOR SOLUTION INTRAMUSCULAR; INTRAVENOUS at 09:03

## 2025-03-17 RX ADMIN — METOPROLOL SUCCINATE 25 MG: 25 TABLET, EXTENDED RELEASE ORAL at 08:03

## 2025-03-17 RX ADMIN — Medication 6 MG: at 09:03

## 2025-03-17 RX ADMIN — METHYLPREDNISOLONE SODIUM SUCCINATE 40 MG: 40 INJECTION, POWDER, FOR SOLUTION INTRAMUSCULAR; INTRAVENOUS at 02:03

## 2025-03-17 RX ADMIN — AZITHROMYCIN DIHYDRATE 500 MG: 250 TABLET ORAL at 06:03

## 2025-03-17 RX ADMIN — ETHAMBUTOL HYDROCHLORIDE 1200 MG: 400 TABLET ORAL at 06:03

## 2025-03-17 RX ADMIN — ALPRAZOLAM 0.5 MG: 0.5 TABLET ORAL at 09:03

## 2025-03-17 RX ADMIN — BENZONATATE 100 MG: 100 CAPSULE ORAL at 04:03

## 2025-03-17 RX ADMIN — CLONIDINE HYDROCHLORIDE 0.1 MG: 0.1 TABLET ORAL at 11:03

## 2025-03-17 RX ADMIN — QUETIAPINE 25 MG: 25 TABLET ORAL at 09:03

## 2025-03-17 NOTE — PHYSICIAN QUERY
Due to the conflicting clinical picture, please clinically validate the Acute hypoxic respiratory failure. If validated, please provide additional clinical support for the diagnosis.  The respiratory condition has been ruled out

## 2025-03-17 NOTE — SUBJECTIVE & OBJECTIVE
Interval History: no acute events overnight. On room air and comfortable at rest, but still getting quite dyspneic with activity. D/w pulm and recommends continuing levaquin in addition to SUZIE coverage.     Review of Systems   Constitutional:  Positive for fatigue.   Respiratory:  Positive for chest tightness and shortness of breath.    Neurological:  Positive for weakness.     Objective:     Vital Signs (Most Recent):  Temp: 98.3 °F (36.8 °C) (03/17/25 1015)  Pulse: 88 (03/17/25 1352)  Resp: 16 (03/17/25 1352)  BP: (!) 160/101 (03/17/25 1015)  SpO2: 98 % (03/17/25 1352) Vital Signs (24h Range):  Temp:  [98.1 °F (36.7 °C)-98.3 °F (36.8 °C)] 98.3 °F (36.8 °C)  Pulse:  [] 88  Resp:  [15-20] 16  SpO2:  [97 %-100 %] 98 %  BP: (138-184)/() 160/101     Weight: 64.9 kg (142 lb 15.9 oz)  Body mass index is 25.33 kg/m².    Intake/Output Summary (Last 24 hours) at 3/17/2025 1516  Last data filed at 3/16/2025 2355  Gross per 24 hour   Intake 430 ml   Output --   Net 430 ml         Physical Exam  Vitals and nursing note reviewed.   Constitutional:       General: She is not in acute distress.  HENT:      Head: Normocephalic and atraumatic.      Nose: Nose normal.      Mouth/Throat:      Mouth: Mucous membranes are moist.      Pharynx: Oropharynx is clear.   Eyes:      Conjunctiva/sclera: Conjunctivae normal.      Pupils: Pupils are equal, round, and reactive to light.   Cardiovascular:      Rate and Rhythm: Normal rate and regular rhythm.      Pulses: Normal pulses.   Pulmonary:      Effort: Pulmonary effort is normal.      Comments: Diminished throughout  Abdominal:      General: Bowel sounds are normal.      Palpations: Abdomen is soft.   Musculoskeletal:         General: Normal range of motion.      Cervical back: Normal range of motion and neck supple.   Skin:     General: Skin is warm and dry.      Capillary Refill: Capillary refill takes less than 2 seconds.   Neurological:      Mental Status: She is alert and  oriented to person, place, and time. Mental status is at baseline.   Psychiatric:         Mood and Affect: Mood normal.         Behavior: Behavior normal.               Significant Labs: All pertinent labs within the past 24 hours have been reviewed.  CBC:   Recent Labs   Lab 03/16/25  0507 03/17/25  0631   WBC 11.16 10.75   HGB 13.9 15.2   HCT 42.7 46.9    403     CMP:   Recent Labs   Lab 03/16/25  0508 03/17/25  0631    138   K 4.2 4.0    102   CO2 25 24   * 116*   BUN 11 15   CREATININE 0.6 0.7   CALCIUM 10.4 10.6*   PROT 7.5 8.1   ALBUMIN 4.2 4.6   BILITOT 0.2 0.3   ALKPHOS 69 77   AST 10 12   ALT 14 16   ANIONGAP 11 12       Significant Imaging: I have reviewed all pertinent imaging results/findings within the past 24 hours.    CTA Chest Non-Coronary (PE Studies)  Result Date: 3/12/2025  CMS MANDATED QUALITY DATA - CT RADIATION - 436 All CT scans at this facility utilize dose modulation, iterative reconstruction, and/or weight based dosing when appropriate to reduce radiation dose to as low as reasonably achievable. CLINICAL HISTORY: ([id number])53 y/o  (1970) F Pulmonary embolism (PE) suspected, high prob; TECHNIQUE: (A#[id number], exam time 3/12/2025 12:47) CTA CHEST NON CORONARY (PE STUDIES) [id number] Axial CT examination of the chest with attention to the pulmonary arteries was performed using contiguous axial images from the diaphragms to the lung apices following the intravenous administration of non-ionic contrast material. Images were reviewed using lung, mediastinal, and bone windows. The study was performed with thin sections with subsequent 3-D reformats/MIP/reconstructions in multiple planes. COMPARISON: Radiograph from 02/25/2025, CT from 12/08/2024. FINDINGS: CTA PE evaluation: This is a moderate quality study for the evaluation of pulmonary embolism secondary to a combination of contrast bolus timing and motion artifact. The pulmonary arteries are normal in appearance  without pulmonary emboli noted up to the segmental level, noting the limitations of CT technique for identifying small or isolated subsegmental emboli. CT Chest: Visualized neck: normal Lungs: Moderate mosaic ground-glass attenuation opacity is seen throughout a majority of the right upper lobe, and left upper lobe, similar to the previous CT, with new mosaic ground-glass attenuation opacity throughout the posterior segment of the right lower lobe (image 207), new from the previous exam.  Mild faint central hilar interstitial opacity is present bilaterally. Airway: Mild bronchiectasis is present with a central hilar predominance. Pleura: There is no pleural effusion. There is no pneumothorax. Cardiovascular: The heart is top-normal in size.  No pericardial effusion is seen.  The great vessels are normal in course and caliber. Mediastinum: No pathologic lymphadenopathy is seen. Soft tissues: normal Musculoskeletal: There are mild degenerative changes of the thoracic spine.  There are no suspicious osseous lesions. Esophagus: normal Upper Abdomen: No acute abnormality in the upper abdomen.     1. No evidence of pulmonary embolism to the segmental arterial level. If there is continued clinical concern, consider further evaluation with lower extremity doppler. 2.  Multifocal geographic areas of airspace opacity and mosaic attenuation most pronounced in the upper lobes, and slightly worse in the right lower lobe suggesting a combination of multifocal infection/pneumonia, interstitial lung disease (e.g sarcoid, hypersensitivity pneumonitis, chronic covid, RB-ILD?), and possibly a component of mild interstitial edema. Electronically signed by: Jhoan Bruno Date:    03/12/2025 Time:    12:58    X-Ray Chest AP Portable  Result Date: 3/12/2025  CLINICAL HISTORY: ([id number])55 y/o  (1970) F Chest Pain; TECHNIQUE: (A#[id number], exam time 3/12/2025 11:00) XR CHEST AP PORTABLE [id number] COMPARISON: Radiograph from  02/25/2025. FINDINGS: Mildly increased central hilar interstitial opacities are seen bilaterally suggestive of either mild edema  atypical infection/pneumonia or bronchitis in the appropriate clinical setting. No consolidative pneumonia is seen. Patchy airspace opacities seen in the right upper lobe, similar to the previous exam.  No pneumothorax is identified. The heart is normal in size. The mediastinum is within normal limits. Osseous structures appear within normal limits. The visualized upper abdomen is unremarkable.     Unchanged radiograph of the chest when accounting for differences in imaging technique. Electronically signed by: Jhoan Bruno Date:    03/12/2025 Time:    11:02

## 2025-03-17 NOTE — PLAN OF CARE
Former[facility]  Discharge Reassessment    Primary Care Provider: Marcial Rico    Expected Discharge Date: 3/19/2025  Case Management continuing to follow and will assist with discharge planning as needed.  Patient just transferred out of ICU yesterday.  Not medically stable for discharge.  Still with SOB and sarcoid flare up.    Reassessment (most recent)       Discharge Reassessment - 03/17/25 1606          Discharge Reassessment    Assessment Type Discharge Planning Reassessment (P)      Did the patient's condition or plan change since previous assessment? Yes (P)      Discharge Plan discussed with: Patient (P)      Communicated SHAKIRA with patient/caregiver Yes (P)      Discharge Plan A Home with family (P)      Discharge Plan B Home (P)      DME Needed Upon Discharge  other (see comments) (P)    TBD    Transition of Care Barriers None (P)      Why the patient remains in the hospital Requires continued medical care (P)

## 2025-03-17 NOTE — PLAN OF CARE
03/16/25 1934   Patient Assessment/Suction   Level of Consciousness (AVPU) alert   Respiratory Effort Unlabored;Normal   Expansion/Accessory Muscles/Retractions no use of accessory muscles;expansion symmetric   All Lung Fields Breath Sounds diminished;clear   Rhythm/Pattern, Respiratory unlabored;pattern regular;depth regular   PRE-TX-O2   Device (Oxygen Therapy) room air   SpO2 99 %   Pulse Oximetry Type Intermittent   $ Pulse Oximetry - Multiple Charge Pulse Oximetry - Multiple   Pulse 102   Resp 18   Aerosol Therapy   $ Aerosol Therapy Charges Aerosol Treatment   Daily Review of Necessity (SVN) completed   Respiratory Treatment Status (SVN) given   Treatment Route (SVN) mask;oxygen   Patient Position HOB elevated   Post Treatment Assessment (SVN) breath sounds unchanged   Signs of Intolerance (SVN) none   Education   $ Education Bronchodilator;15 min

## 2025-03-17 NOTE — CARE UPDATE
03/17/25 0715   Patient Assessment/Suction   Level of Consciousness (AVPU) alert   Respiratory Effort Unlabored   Expansion/Accessory Muscles/Retractions no use of accessory muscles   All Lung Fields Breath Sounds diminished;clear   Rhythm/Pattern, Respiratory depth regular;pattern regular;unlabored   Cough Frequency frequent   Cough Type nonproductive   PRE-TX-O2   Device (Oxygen Therapy) room air   SpO2 98 %   Pulse Oximetry Type Intermittent   $ Pulse Oximetry - Multiple Charge Pulse Oximetry - Multiple   Pulse 92   Resp 15   Aerosol Therapy   $ Aerosol Therapy Charges Aerosol Treatment   Daily Review of Necessity (SVN) completed   Respiratory Treatment Status (SVN) given   Treatment Route (SVN) mouthpiece utilized   Patient Position semi-Montague's   Post Treatment Assessment (SVN) increased aeration   Signs of Intolerance (SVN) none   Breath Sounds Post-Respiratory Treatment   Throughout All Fields Post-Treatment aeration increased   Post-treatment Heart Rate (beats/min) 91   Post-treatment Resp Rate (breaths/min) 16   Education   $ Education Bronchodilator;15 min   Respiratory Evaluation   $ Care Plan Tech Time 15 min

## 2025-03-17 NOTE — PLAN OF CARE
Former[facility]  Discharge Reassessment    Primary Care Provider: Marcial Rico    Expected Discharge Date: 3/19/2025      Case Management continuing to follow and will assist with discharge planning as needed. Pt not medically clear for DC at this time. Actively weaning IV steroids and pulm following. ID signed off over weekend.   Reassessment (most recent)       Discharge Reassessment - 03/17/25 0521          Discharge Reassessment    Assessment Type Discharge Planning Reassessment     Did the patient's condition or plan change since previous assessment? No     Discharge Plan discussed with: Patient     Communicated SHAKIRA with patient/caregiver Yes     Discharge Plan A Home with family     DME Needed Upon Discharge  none     Transition of Care Barriers None     Why the patient remains in the hospital Requires continued medical care

## 2025-03-18 PROBLEM — R06.03 RESPIRATORY DISTRESS: Status: RESOLVED | Noted: 2025-03-12 | Resolved: 2025-03-18

## 2025-03-18 LAB
ALBUMIN SERPL BCP-MCNC: 4.3 G/DL (ref 3.5–5.2)
ALP SERPL-CCNC: 72 U/L (ref 55–135)
ALT SERPL W/O P-5'-P-CCNC: 20 U/L (ref 10–44)
ANION GAP SERPL CALC-SCNC: 6 MMOL/L (ref 8–16)
AST SERPL-CCNC: 15 U/L (ref 10–40)
BASOPHILS # BLD AUTO: 0.01 K/UL (ref 0–0.2)
BASOPHILS NFR BLD: 0.1 % (ref 0–1.9)
BILIRUB SERPL-MCNC: 0.4 MG/DL (ref 0.1–1)
BUN SERPL-MCNC: 15 MG/DL (ref 6–20)
CALCIUM SERPL-MCNC: 10.3 MG/DL (ref 8.7–10.5)
CHLORIDE SERPL-SCNC: 104 MMOL/L (ref 95–110)
CO2 SERPL-SCNC: 28 MMOL/L (ref 23–29)
CREAT SERPL-MCNC: 0.7 MG/DL (ref 0.5–1.4)
DIFFERENTIAL METHOD BLD: ABNORMAL
EOSINOPHIL # BLD AUTO: 0 K/UL (ref 0–0.5)
EOSINOPHIL NFR BLD: 0 % (ref 0–8)
ERYTHROCYTE [DISTWIDTH] IN BLOOD BY AUTOMATED COUNT: 14.2 % (ref 11.5–14.5)
EST. GFR  (NO RACE VARIABLE): >60 ML/MIN/1.73 M^2
FOLATE SERPL-MCNC: >22.3 NG/ML (ref 4–24)
GAMMA INTERFERON BACKGROUND BLD IA-ACNC: 0.02 IU/ML
GLUCOSE SERPL-MCNC: 112 MG/DL (ref 70–110)
HCT VFR BLD AUTO: 46.6 % (ref 37–48.5)
HGB BLD-MCNC: 15.2 G/DL (ref 12–16)
IMM GRANULOCYTES # BLD AUTO: 0.03 K/UL (ref 0–0.04)
IMM GRANULOCYTES NFR BLD AUTO: 0.4 % (ref 0–0.5)
LYMPHOCYTES # BLD AUTO: 2.2 K/UL (ref 1–4.8)
LYMPHOCYTES NFR BLD: 26.8 % (ref 18–48)
M TB IFN-G BLD-IMP: NEGATIVE
M TB IFN-G CD4+ T-CELLS BLD-ACNC: 0.02 IU/ML
M TBIFN-G CD4+ CD8+T-CELLS BLD-ACNC: 0.02 IU/ML
MAGNESIUM SERPL-MCNC: 2.3 MG/DL (ref 1.6–2.6)
MCH RBC QN AUTO: 27.9 PG (ref 27–31)
MCHC RBC AUTO-ENTMCNC: 32.6 G/DL (ref 32–36)
MCV RBC AUTO: 86 FL (ref 82–98)
MITOGEN IGNF BLD-ACNC: >10 IU/ML
MONOCYTES # BLD AUTO: 0.5 K/UL (ref 0.3–1)
MONOCYTES NFR BLD: 6.1 % (ref 4–15)
NEUTROPHILS # BLD AUTO: 5.5 K/UL (ref 1.8–7.7)
NEUTROPHILS NFR BLD: 66.6 % (ref 38–73)
NRBC BLD-RTO: 0 /100 WBC
PLATELET # BLD AUTO: 361 K/UL (ref 150–450)
PMV BLD AUTO: 10.6 FL (ref 9.2–12.9)
POTASSIUM SERPL-SCNC: 4.4 MMOL/L (ref 3.5–5.1)
PROT SERPL-MCNC: 7.9 G/DL (ref 6–8.4)
QUANTIFERON CRITERIA: NORMAL
RBC # BLD AUTO: 5.45 M/UL (ref 4–5.4)
SODIUM SERPL-SCNC: 138 MMOL/L (ref 136–145)
VIT B12 SERPL-MCNC: 481 PG/ML (ref 210–950)
WBC # BLD AUTO: 8.32 K/UL (ref 3.9–12.7)

## 2025-03-18 PROCEDURE — 85025 COMPLETE CBC W/AUTO DIFF WBC: CPT | Performed by: INTERNAL MEDICINE

## 2025-03-18 PROCEDURE — 99900031 HC PATIENT EDUCATION (STAT)

## 2025-03-18 PROCEDURE — 63600175 PHARM REV CODE 636 W HCPCS: Performed by: INTERNAL MEDICINE

## 2025-03-18 PROCEDURE — 25000003 PHARM REV CODE 250: Performed by: STUDENT IN AN ORGANIZED HEALTH CARE EDUCATION/TRAINING PROGRAM

## 2025-03-18 PROCEDURE — 25000003 PHARM REV CODE 250: Performed by: NURSE PRACTITIONER

## 2025-03-18 PROCEDURE — 36415 COLL VENOUS BLD VENIPUNCTURE: CPT | Performed by: INTERNAL MEDICINE

## 2025-03-18 PROCEDURE — 83735 ASSAY OF MAGNESIUM: CPT | Performed by: INTERNAL MEDICINE

## 2025-03-18 PROCEDURE — 82746 ASSAY OF FOLIC ACID SERUM: CPT | Performed by: INTERNAL MEDICINE

## 2025-03-18 PROCEDURE — 99233 SBSQ HOSP IP/OBS HIGH 50: CPT | Mod: ,,, | Performed by: STUDENT IN AN ORGANIZED HEALTH CARE EDUCATION/TRAINING PROGRAM

## 2025-03-18 PROCEDURE — 12000002 HC ACUTE/MED SURGE SEMI-PRIVATE ROOM

## 2025-03-18 PROCEDURE — 25000242 PHARM REV CODE 250 ALT 637 W/ HCPCS: Performed by: HOSPITALIST

## 2025-03-18 PROCEDURE — 63600175 PHARM REV CODE 636 W HCPCS: Mod: JZ | Performed by: NURSE PRACTITIONER

## 2025-03-18 PROCEDURE — 25000003 PHARM REV CODE 250: Performed by: INTERNAL MEDICINE

## 2025-03-18 PROCEDURE — 82607 VITAMIN B-12: CPT | Performed by: INTERNAL MEDICINE

## 2025-03-18 PROCEDURE — 94640 AIRWAY INHALATION TREATMENT: CPT

## 2025-03-18 PROCEDURE — 80053 COMPREHEN METABOLIC PANEL: CPT | Performed by: INTERNAL MEDICINE

## 2025-03-18 PROCEDURE — 94761 N-INVAS EAR/PLS OXIMETRY MLT: CPT

## 2025-03-18 RX ORDER — POLYETHYLENE GLYCOL 3350 17 G/17G
17 POWDER, FOR SOLUTION ORAL DAILY
Status: DISCONTINUED | OUTPATIENT
Start: 2025-03-18 | End: 2025-03-20 | Stop reason: HOSPADM

## 2025-03-18 RX ADMIN — LEVALBUTEROL HYDROCHLORIDE 1.25 MG: 1.25 SOLUTION RESPIRATORY (INHALATION) at 06:03

## 2025-03-18 RX ADMIN — METOPROLOL SUCCINATE 25 MG: 25 TABLET, EXTENDED RELEASE ORAL at 08:03

## 2025-03-18 RX ADMIN — LEVALBUTEROL HYDROCHLORIDE 1.25 MG: 1.25 SOLUTION RESPIRATORY (INHALATION) at 01:03

## 2025-03-18 RX ADMIN — SPIRONOLACTONE 25 MG: 25 TABLET, FILM COATED ORAL at 08:03

## 2025-03-18 RX ADMIN — METHYLPREDNISOLONE SODIUM SUCCINATE 40 MG: 40 INJECTION, POWDER, FOR SOLUTION INTRAMUSCULAR; INTRAVENOUS at 04:03

## 2025-03-18 RX ADMIN — VALSARTAN 160 MG: 80 TABLET, FILM COATED ORAL at 06:03

## 2025-03-18 RX ADMIN — BUPROPION HYDROCHLORIDE 300 MG: 150 TABLET, EXTENDED RELEASE ORAL at 08:03

## 2025-03-18 RX ADMIN — FOLIC ACID 1000 MCG: 1 TABLET ORAL at 06:03

## 2025-03-18 RX ADMIN — LEVALBUTEROL HYDROCHLORIDE 1.25 MG: 1.25 SOLUTION RESPIRATORY (INHALATION) at 07:03

## 2025-03-18 RX ADMIN — QUETIAPINE 25 MG: 25 TABLET ORAL at 10:03

## 2025-03-18 RX ADMIN — POLYETHYLENE GLYCOL 3350 17 G: 17 POWDER, FOR SOLUTION ORAL at 09:03

## 2025-03-18 RX ADMIN — METHYLPREDNISOLONE SODIUM SUCCINATE 40 MG: 40 INJECTION, POWDER, FOR SOLUTION INTRAMUSCULAR; INTRAVENOUS at 06:03

## 2025-03-18 RX ADMIN — ALPRAZOLAM 0.5 MG: 0.5 TABLET ORAL at 08:03

## 2025-03-18 RX ADMIN — ALPRAZOLAM 0.5 MG: 0.5 TABLET ORAL at 12:03

## 2025-03-18 RX ADMIN — LEVOFLOXACIN 750 MG: 750 TABLET, FILM COATED ORAL at 08:03

## 2025-03-18 RX ADMIN — Medication 6 MG: at 10:03

## 2025-03-18 RX ADMIN — ENOXAPARIN SODIUM 40 MG: 40 INJECTION SUBCUTANEOUS at 04:03

## 2025-03-18 RX ADMIN — CLONIDINE HYDROCHLORIDE 0.1 MG: 0.1 TABLET ORAL at 10:03

## 2025-03-18 NOTE — ASSESSMENT & PLAN NOTE
Patient's blood pressure range in the last 24 hours was: BP  Min: 120/73  Max: 174/113.The patient's inpatient anti-hypertensive regimen is listed below:  Current Antihypertensives  valsartan tablet 160 mg, Every morning, Oral  spironolactone tablet 25 mg, Daily, Oral  metoprolol succinate (TOPROL-XL) 24 hr tablet 25 mg, Daily, Oral  cloNIDine tablet 0.1 mg, Every 6 hours PRN, Oral    Plan  - BP is uncontrolled, will adjust as follows:  Removed calcium channel blocker, add an AV celina blocker secondary to increase heart rate and blood pressure.  - p.r.n. hydralazine.

## 2025-03-18 NOTE — SUBJECTIVE & OBJECTIVE
Interval History: BP peaking midday, added prn clonidine with some improvement. Pulm to see patient today    Review of Systems   Constitutional:  Positive for fatigue.   Respiratory:  Positive for chest tightness and shortness of breath.    Neurological:  Positive for weakness.     Objective:     Vital Signs (Most Recent):  Temp: 98.2 °F (36.8 °C) (03/18/25 1052)  Pulse: 84 (03/18/25 1052)  Resp: 16 (03/18/25 0717)  BP: (!) 174/113 (03/18/25 1052)  SpO2: 100 % (03/18/25 1052) Vital Signs (24h Range):  Temp:  [97.4 °F (36.3 °C)-98.5 °F (36.9 °C)] 98.2 °F (36.8 °C)  Pulse:  [72-88] 84  Resp:  [16-18] 16  SpO2:  [97 %-100 %] 100 %  BP: (120-174)/() 174/113     Weight: 64.9 kg (142 lb 15.9 oz)  Body mass index is 25.33 kg/m².    Intake/Output Summary (Last 24 hours) at 3/18/2025 1118  Last data filed at 3/18/2025 1018  Gross per 24 hour   Intake 540 ml   Output --   Net 540 ml         Physical Exam  Vitals and nursing note reviewed.   Constitutional:       General: She is not in acute distress.  HENT:      Head: Normocephalic and atraumatic.      Nose: Nose normal.      Mouth/Throat:      Mouth: Mucous membranes are moist.      Pharynx: Oropharynx is clear.   Eyes:      Conjunctiva/sclera: Conjunctivae normal.      Pupils: Pupils are equal, round, and reactive to light.   Cardiovascular:      Rate and Rhythm: Normal rate and regular rhythm.      Pulses: Normal pulses.   Pulmonary:      Effort: Pulmonary effort is normal.      Comments: Diminished throughout  Abdominal:      General: Bowel sounds are normal.      Palpations: Abdomen is soft.   Musculoskeletal:         General: Normal range of motion.      Cervical back: Normal range of motion and neck supple.   Skin:     General: Skin is warm and dry.      Capillary Refill: Capillary refill takes less than 2 seconds.   Neurological:      Mental Status: She is alert and oriented to person, place, and time. Mental status is at baseline.   Psychiatric:         Mood  and Affect: Mood normal.         Behavior: Behavior normal.               Significant Labs: All pertinent labs within the past 24 hours have been reviewed.  CBC:   Recent Labs   Lab 03/17/25  0631 03/18/25  0454   WBC 10.75 8.32   HGB 15.2 15.2   HCT 46.9 46.6    361     CMP:   Recent Labs   Lab 03/17/25  0631 03/18/25  0454    138   K 4.0 4.4    104   CO2 24 28   * 112*   BUN 15 15   CREATININE 0.7 0.7   CALCIUM 10.6* 10.3   PROT 8.1 7.9   ALBUMIN 4.6 4.3   BILITOT 0.3 0.4   ALKPHOS 77 72   AST 12 15   ALT 16 20   ANIONGAP 12 6*       Significant Imaging: I have reviewed all pertinent imaging results/findings within the past 24 hours.    CTA Chest Non-Coronary (PE Studies)  Result Date: 3/12/2025  CMS MANDATED QUALITY DATA - CT RADIATION - 436 All CT scans at this facility utilize dose modulation, iterative reconstruction, and/or weight based dosing when appropriate to reduce radiation dose to as low as reasonably achievable. CLINICAL HISTORY: ([id number])55 y/o  (1970) F Pulmonary embolism (PE) suspected, high prob; TECHNIQUE: (A#[id number], exam time 3/12/2025 12:47) CTA CHEST NON CORONARY (PE STUDIES) [id number] Axial CT examination of the chest with attention to the pulmonary arteries was performed using contiguous axial images from the diaphragms to the lung apices following the intravenous administration of non-ionic contrast material. Images were reviewed using lung, mediastinal, and bone windows. The study was performed with thin sections with subsequent 3-D reformats/MIP/reconstructions in multiple planes. COMPARISON: Radiograph from 02/25/2025, CT from 12/08/2024. FINDINGS: CTA PE evaluation: This is a moderate quality study for the evaluation of pulmonary embolism secondary to a combination of contrast bolus timing and motion artifact. The pulmonary arteries are normal in appearance without pulmonary emboli noted up to the segmental level, noting the limitations of CT technique  for identifying small or isolated subsegmental emboli. CT Chest: Visualized neck: normal Lungs: Moderate mosaic ground-glass attenuation opacity is seen throughout a majority of the right upper lobe, and left upper lobe, similar to the previous CT, with new mosaic ground-glass attenuation opacity throughout the posterior segment of the right lower lobe (image 207), new from the previous exam.  Mild faint central hilar interstitial opacity is present bilaterally. Airway: Mild bronchiectasis is present with a central hilar predominance. Pleura: There is no pleural effusion. There is no pneumothorax. Cardiovascular: The heart is top-normal in size.  No pericardial effusion is seen.  The great vessels are normal in course and caliber. Mediastinum: No pathologic lymphadenopathy is seen. Soft tissues: normal Musculoskeletal: There are mild degenerative changes of the thoracic spine.  There are no suspicious osseous lesions. Esophagus: normal Upper Abdomen: No acute abnormality in the upper abdomen.     1. No evidence of pulmonary embolism to the segmental arterial level. If there is continued clinical concern, consider further evaluation with lower extremity doppler. 2.  Multifocal geographic areas of airspace opacity and mosaic attenuation most pronounced in the upper lobes, and slightly worse in the right lower lobe suggesting a combination of multifocal infection/pneumonia, interstitial lung disease (e.g sarcoid, hypersensitivity pneumonitis, chronic covid, RB-ILD?), and possibly a component of mild interstitial edema. Electronically signed by: Jhoan Bruno Date:    03/12/2025 Time:    12:58    X-Ray Chest AP Portable  Result Date: 3/12/2025  CLINICAL HISTORY: ([id number])53 y/o  (1970) F Chest Pain; TECHNIQUE: (A#[id number], exam time 3/12/2025 11:00) XR CHEST AP PORTABLE [id number] COMPARISON: Radiograph from 02/25/2025. FINDINGS: Mildly increased central hilar interstitial opacities are seen bilaterally  suggestive of either mild edema  atypical infection/pneumonia or bronchitis in the appropriate clinical setting. No consolidative pneumonia is seen. Patchy airspace opacities seen in the right upper lobe, similar to the previous exam.  No pneumothorax is identified. The heart is normal in size. The mediastinum is within normal limits. Osseous structures appear within normal limits. The visualized upper abdomen is unremarkable.     Unchanged radiograph of the chest when accounting for differences in imaging technique. Electronically signed by: Jhoan Bruno Date:    03/12/2025 Time:    11:02

## 2025-03-18 NOTE — PROGRESS NOTES
Pulmonary/Critical Care Progress Note      Patient name: Sumit Burger  MRN: [id number]  Date: 03/18/2025    Admit Date: 3/12/2025  Consult Requested By: Alessandro aMrx MD    Reason for Consult: pneumonia, sarcoid    HPI:    3/13/2025 - Pt known to Dr Rolon with sarcoid (dx about 3 years ago) usually on prednisone 10 mg/d started with some increased cough for a few days then the last day or so has develop worsened SOB, cough and in general feeling worse.  Came to ER for evaluation, WBC was elevated, LA ok.  CTA shows bilateral infiltrates which are actually similar to prior CTA chest done 12/2024 except some increase in RLL.  Procalcitonin is very low.  RUL lung biopsy + noncaseating granuloma 2020.  Sats are OK on 1 LPM.  ACE level has been elevated in the past.  She also has a ho SUZIE on treatment with Ethambutol/Rifampin/azithromycin    3/14- pt very anxious and tearful today. Feels sob especially with exertion. Cough is non productive.    3/18/- Patient reports some subtle improvements over the last couple of days.  She is still getting short of breath when she gets up and walks to the door.  She is not currently on any oxygen.    Review of Systems    Review of Systems   Constitutional:  Positive for malaise/fatigue. Negative for chills, diaphoresis, fever and weight loss.   HENT:  Negative for congestion.    Eyes:  Negative for pain.   Respiratory:  Positive for cough and shortness of breath. Negative for hemoptysis, sputum production, wheezing and stridor.    Cardiovascular:  Negative for chest pain, palpitations, orthopnea, claudication, leg swelling and PND.   Gastrointestinal:  Positive for nausea. Negative for abdominal pain, constipation, diarrhea, heartburn and vomiting.        Decreased appetite   Genitourinary:  Negative for dysuria, frequency and urgency.   Musculoskeletal:  Negative for falls and myalgias.   Neurological:  Negative for sensory change, focal weakness and weakness.    Psychiatric/Behavioral:  Negative for depression. The patient is not nervous/anxious.        Past Medical History    Past Medical History:   Diagnosis Date    HTN (hypertension)     Pneumonia 10/2020    Sarcoidosis        Past Surgical History    Past Surgical History:   Procedure Laterality Date    BRONCHOALVEOLAR LAVAGE Bilateral 2024    Procedure: LAVAGE, BRONCHOALVEOLAR;  Surgeon: Chilango Zuluaga MD;  Location: [facility] ENDO;  Service: Pulmonary;  Laterality: Bilateral;    BRONCHOSCOPY WITH FLUOROSCOPY Right 10/20/2020    Procedure: BRONCHOSCOPY, WITH FLUOROSCOPY;  Surgeon: Ava Rolon MD;  Location: [facility] ENDO;  Service: Pulmonary;  Laterality: Right;     SECTION      x2    FALLOPIAN TUBOPLASTY      LAPAROSCOPIC APPENDECTOMY N/A 8/10/2022    Procedure: APPENDECTOMY, LAPAROSCOPIC;  Surgeon: Jackson Vogel MD;  Location: [facility] OR;  Service: General;  Laterality: N/A;    TUBAL LIGATION      WRIST FRACTURE SURGERY Right     ganglion cyst       Medications (scheduled):      azithromycin  500 mg Oral Every Mon, Wed, Fri    buPROPion  300 mg Oral Daily    enoxparin  40 mg Subcutaneous Q24H (prophylaxis, 1700)    ethambutoL  1,200 mg Oral Every Mon, Wed, Fri    folic acid  1,000 mcg Oral Daily    levalbuterol  1.25 mg Nebulization Q6H WAKE    levoFLOXacin  750 mg Oral Daily    melatonin  6 mg Oral Nightly    methylPREDNISolone injection (PEDS and ADULTS)  40 mg Intravenous Q8H    metoprolol succinate  25 mg Oral Daily    polyethylene glycol  17 g Oral Daily    QUEtiapine  25 mg Oral QHS    rifAMpin  600 mg Oral Every Mon, Wed, Fri    spironolactone  25 mg Oral Daily    valsartan  160 mg Oral QAM       Medications (infusions):         Medications (prn):       Current Facility-Administered Medications:     acetaminophen, 650 mg, Oral, Q8H PRN    acetaminophen, 650 mg, Oral, Q4H PRN    ALPRAZolam, 0.5 mg, Oral, TID PRN    aluminum-magnesium hydroxide-simethicone, 30 mL, Oral, QID PRN    benzonatate,  "100 mg, Oral, TID PRN    cloNIDine, 0.1 mg, Oral, Q6H PRN    HYDROcodone-acetaminophen, 1 tablet, Oral, Q6H PRN    HYDROmorphone, 0.5 mg, Intravenous, Q6H PRN    lactulose, 20 g, Oral, Daily PRN    magnesium oxide, 800 mg, Oral, PRN    magnesium oxide, 800 mg, Oral, PRN    methocarbamoL, 500 mg, Oral, QID PRN    naloxone, 0.02 mg, Intravenous, PRN    ondansetron, 4 mg, Intravenous, Q6H PRN    potassium bicarbonate, 35 mEq, Oral, PRN    potassium bicarbonate, 50 mEq, Oral, PRN    potassium bicarbonate, 60 mEq, Oral, PRN    potassium, sodium phosphates, 2 packet, Oral, PRN    potassium, sodium phosphates, 2 packet, Oral, PRN    potassium, sodium phosphates, 2 packet, Oral, PRN    Family History:   Family History   Problem Relation Name Age of Onset    Hypertension Mother         Social History: Tobacco: Tobacco Use History[1]                             EtOH:   Social History     Substance and Sexual Activity   Alcohol Use Not Currently                                Drugs:   Social History     Substance and Sexual Activity   Drug Use Never       Physical Exam    Vital signs:  Temp:  [97.4 °F (36.3 °C)-98.5 °F (36.9 °C)]   Pulse:  [71-88]   Resp:  [16-19]   BP: (120-174)/()   SpO2:  [97 %-100 %]     Intake/Output:   Intake/Output Summary (Last 24 hours) at 3/18/2025 1345  Last data filed at 3/18/2025 1018  Gross per 24 hour   Intake 540 ml   Output --   Net 540 ml        BMI: Estimated body mass index is 25.33 kg/m² as calculated from the following:    Height as of this encounter: 5' 3" (1.6 m).    Weight as of this encounter: 64.9 kg (142 lb 15.9 oz).    Physical Exam  Vitals and nursing note reviewed.   Constitutional:       General: She is not in acute distress.     Appearance: Normal appearance. She is not ill-appearing, toxic-appearing or diaphoretic.   HENT:      Head: Normocephalic and atraumatic.      Right Ear: External ear normal.      Left Ear: External ear normal.      Nose: Nose normal. No " congestion or rhinorrhea.      Mouth/Throat:      Mouth: Mucous membranes are moist.      Pharynx: Oropharynx is clear. No oropharyngeal exudate or posterior oropharyngeal erythema.   Eyes:      General: No scleral icterus.        Right eye: No discharge.         Left eye: No discharge.      Extraocular Movements: Extraocular movements intact.      Conjunctiva/sclera: Conjunctivae normal.      Pupils: Pupils are equal, round, and reactive to light.   Neck:      Vascular: No carotid bruit.   Cardiovascular:      Rate and Rhythm: Normal rate and regular rhythm.      Pulses: Normal pulses.      Heart sounds: No murmur heard.     No friction rub. No gallop.   Pulmonary:      Effort: Pulmonary effort is normal. No respiratory distress.      Breath sounds: No stridor. Rales present. No wheezing or rhonchi.   Chest:      Chest wall: No tenderness.   Abdominal:      General: Bowel sounds are normal. There is no distension.      Tenderness: There is no abdominal tenderness. There is no guarding.   Musculoskeletal:         General: No swelling. Normal range of motion.      Cervical back: Normal range of motion and neck supple. No rigidity or tenderness.      Right lower leg: No edema.      Left lower leg: No edema.   Lymphadenopathy:      Cervical: No cervical adenopathy.   Skin:     General: Skin is warm and dry.      Capillary Refill: Capillary refill takes less than 2 seconds.      Coloration: Skin is not jaundiced.      Findings: No bruising.   Neurological:      General: No focal deficit present.      Mental Status: She is alert and oriented to person, place, and time. Mental status is at baseline.      Cranial Nerves: No cranial nerve deficit.      Sensory: No sensory deficit.      Motor: No weakness.   Psychiatric:         Behavior: Behavior normal.         Thought Content: Thought content normal.         Judgment: Judgment normal.      Comments: Tearful and anxious         Laboratory    Recent Labs   Lab  "03/18/25  0454   WBC 8.32   RBC 5.45*   HGB 15.2   HCT 46.6      MCV 86   MCH 27.9   MCHC 32.6       Recent Labs   Lab 03/18/25  0454   CALCIUM 10.3   ALBUMIN 4.3   PROT 7.9      K 4.4   CO2 28      BUN 15   CREATININE 0.7   ALKPHOS 72   ALT 20   AST 15   BILITOT 0.4     6/24/24-   AFB Culture & Smear Notified Dr. Zuluaga via [employer] secure chat and acknowledged on 7/31/24   AFB Culture & Smear at 16:32; CJD   AFB Culture & Smear  Abnormal   MYCOBACTERIUM AVIUM COMPLEX  Testing performed by:  [facility] [address] [address]  Dir: Vika England MD    AFB CULTURE STAIN No acid fast bacilli seen.   AFB CULTURE STAIN Testing performed by:   AFB CULTURE STAIN [facility] [place]   [facility] [place] [address] [facility] [place] [address]   AFB CULTURE STAIN Dr. Rm Lake MD   Resulting Agency [facility]        Susceptibility              Amikacin 32.0 mcg/mL Intermediate      Ciprofloxacin 2.0 mcg/mL      Clarithromycin >64.0 mcg/mL Resistant 1     Linezolid 16.0 mcg/mL Intermediate     Minocycline 8.0 mcg/mL      Moxifloxacin 0.5 mcg/mL Sensitive     Rifampin >4.0 mcg/mL      Streptomycin 32.0 mcg/mL      Trimeth/Sulfa 4/76 mcg/mL           No results for input(s): "PT", "INR", "APTT" in the last 24 hours.    No results for input(s): "CPK", "CPKMB", "TROPONINI", "MB" in the last 24 hours.    Additional labs: reviewed    Microbiology:       Microbiology Results (last 7 days)       Procedure Component Value Units Date/Time    Blood culture x two cultures. Draw prior to antibiotics. [[id number]] Collected: 03/12/25 1054    Order Status: Completed Specimen: Blood from Peripheral, Hand, Left Updated: 03/17/25 1232     Blood Culture, Routine No growth after 5 days.    Narrative:      Aerobic and anaerobic  Collection has been rescheduled by RE1 at 03/12/2025 10:56 Reason:   Done  Collection has been rescheduled by RE1 at 03/12/2025 10:56 Reason: "   Done    Blood culture x two cultures. Draw prior to antibiotics. [[id number]] Collected: 03/12/25 1055    Order Status: Completed Specimen: Blood from Peripheral, Hand, Right Updated: 03/17/25 1232     Blood Culture, Routine No growth after 5 days.    Narrative:      Aerobic and anaerobic    AFB Culture & Smear [[id number]] Collected: 03/17/25 0628    Order Status: Sent Specimen: Sputum, Expectorated Updated: 03/17/25 0637    Culture, Respiratory with Gram Stain [[id number]] Collected: 03/16/25 1453    Order Status: Completed Specimen: Sputum Updated: 03/17/25 0626     Respiratory Culture Specimen inadequate - culture not performed      Spoke to Gloria Freedman [id number] for recollect     Gram Stain (Respiratory) >10 epithelial cells per low power field     Gram Stain (Respiratory) Rare WBC's     Gram Stain (Respiratory) Moderate Gram positive cocci     Gram Stain (Respiratory) Few Gram negative rods     Gram Stain (Respiratory) Predominance of oropharyngeal lashell. Please recollect.    AFB Culture & Smear [[id number]] Collected: 03/16/25 1453    Order Status: Sent Specimen: Sputum, Expectorated Updated: 03/16/25 1502    Respiratory Infection Panel (PCR), Nasopharyngeal [[id number]] Collected: 03/15/25 0506    Order Status: Completed Specimen: Nasopharyngeal Swab Updated: 03/15/25 0819     Respiratory Infection Panel Source NP swab     Adenovirus Not Detected     Coronavirus 229E, Common Cold Virus Not Detected     Coronavirus HKU1, Common Cold Virus Not Detected     Coronavirus NL63, Common Cold Virus Not Detected     Coronavirus OC43, Common Cold Virus Not Detected     Comment: Coronavirus strains 229E, HKU1, NL63, and OC43 can cause the common   cold   and are not associated with the respiratory disease outbreak caused   by  the COVID-19 (SARS-CoV-2 novel Coronavirus) strain.           SARS-CoV2 (COVID-19) Qualitative PCR Not Detected     Human Metapneumovirus Not Detected     Human Rhinovirus/Enterovirus Not  Detected     Influenza A Not Detected     Influenza B Not Detected     Parainfluenza Virus 1 Not Detected     Parainfluenza Virus 2 Not Detected     Parainfluenza Virus 3 Not Detected     Parainfluenza Virus 4 Not Detected     Respiratory Syncytial Virus Not Detected     Bordetella Parapertussis ([id number]) Not Detected     Bordetella pertussis (ptxP) Not Detected     Chlamydia pneumoniae Not Detected     Mycoplasma pneumoniae Not Detected     Comment: Respiratory Infection Panel testing performed by Multiplex PCR.       Narrative:      Respiratory Infection Panel source->NP Swab    AFB Culture & Smear [[id number]] Resulted: 03/15/25 0646    Order Status: No result Specimen: Sputum Updated: 03/15/25 0646    Afb Culture Stain [[id number]] Resulted: 03/15/25 0646    Order Status: No result Specimen: Sputum Updated: 03/15/25 0646    Culture, Respiratory with Gram Stain [[id number]] Collected: 03/15/25 0623    Order Status: Canceled Specimen: Sputum, Induced     AFB Culture & Smear [[id number]] Collected: 03/15/25 0623    Order Status: Canceled Specimen: Sputum, Induced     Group A Strep, Molecular [[id number]] Collected: 03/12/25 0943    Order Status: Completed Specimen: Throat Updated: 03/12/25 1025     Group A Strep, Molecular Negative     Comment: Arcanobacterium haemolyticum and Beta Streptococcus group C   and G will not be detected by this test method.  Please order   Throat Culture ([id number]) if suspected.                 Radiology    CTA Chest Non-Coronary (PE Studies)  Narrative: CMS MANDATED QUALITY DATA - CT RADIATION - 436    All CT scans at this facility utilize dose modulation, iterative reconstruction, and/or weight based dosing when appropriate to reduce radiation dose to as low as reasonably achievable.    CLINICAL HISTORY:  ([id number])55 y/o  (1970) F    Pulmonary embolism (PE) suspected, high prob;    TECHNIQUE:  (A#[id number], exam time 3/12/2025 12:47)    CTA CHEST NON CORONARY (PE STUDIES)  [id number]    Axial CT examination of the chest with attention to the pulmonary arteries was performed using contiguous axial images from the diaphragms to the lung apices following the intravenous administration of non-ionic contrast material. Images were reviewed using lung, mediastinal, and bone windows. The study was performed with thin sections with subsequent 3-D reformats/MIP/reconstructions in multiple planes.    COMPARISON:  Radiograph from 02/25/2025, CT from 12/08/2024.    FINDINGS:  CTA PE evaluation: This is a moderate quality study for the evaluation of pulmonary embolism secondary to a combination of contrast bolus timing and motion artifact. The pulmonary arteries are normal in appearance without pulmonary emboli noted up to the segmental level, noting the limitations of CT technique for identifying small or isolated subsegmental emboli.    CT Chest:    Visualized neck: normal    Lungs: Moderate mosaic ground-glass attenuation opacity is seen throughout a majority of the right upper lobe, and left upper lobe, similar to the previous CT, with new mosaic ground-glass attenuation opacity throughout the posterior segment of the right lower lobe (image 207), new from the previous exam.  Mild faint central hilar interstitial opacity is present bilaterally.    Airway: Mild bronchiectasis is present with a central hilar predominance.    Pleura: There is no pleural effusion. There is no pneumothorax.    Cardiovascular: The heart is top-normal in size.  No pericardial effusion is seen.  The great vessels are normal in course and caliber.    Mediastinum: No pathologic lymphadenopathy is seen.    Soft tissues: normal    Musculoskeletal: There are mild degenerative changes of the thoracic spine.  There are no suspicious osseous lesions.    Esophagus: normal    Upper Abdomen: No acute abnormality in the upper abdomen.  Impression: 1. No evidence of pulmonary embolism to the segmental arterial level. If there is  "continued clinical concern, consider further evaluation with lower extremity doppler.    2.  Multifocal geographic areas of airspace opacity and mosaic attenuation most pronounced in the upper lobes, and slightly worse in the right lower lobe suggesting a combination of multifocal infection/pneumonia, interstitial lung disease (e.g sarcoid, hypersensitivity pneumonitis, chronic covid, RB-ILD?), and possibly a component of mild interstitial edema.    Electronically signed by: Jhoan Bruno  Date:    03/12/2025  Time:    12:58  X-Ray Chest AP Portable  Narrative: CLINICAL HISTORY:  ([id number])53 y/o  (1970) F    Chest Pain;    TECHNIQUE:  (A#[id number], exam time 3/12/2025 11:00)    XR CHEST AP PORTABLE [id number]    COMPARISON:  Radiograph from 02/25/2025.    FINDINGS:  Mildly increased central hilar interstitial opacities are seen bilaterally suggestive of either mild edema  atypical infection/pneumonia or bronchitis in the appropriate clinical setting. No consolidative pneumonia is seen. Patchy airspace opacities seen in the right upper lobe, similar to the previous exam.  No pneumothorax is identified. The heart is normal in size. The mediastinum is within normal limits. Osseous structures appear within normal limits. The visualized upper abdomen is unremarkable.  Impression: Unchanged radiograph of the chest when accounting for differences in imaging technique.    Electronically signed by: Jhoan Bruno  Date:    03/12/2025  Time:    11:02        Additional Studies    reviwed    Ventilator Information                  No results for input(s): "PH", "PCO2", "PO2", "HCO3", "POCSATURATED", "BE" in the last 72 hours.      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  She reports that she has been on steroid sparing agents in the past   Primarily suspect that this is a sarcoid flare    Plan    Continue steroid taper  Continue ethambutol/rifampin/azithromycin   Complete 5 total days of Levaquin them.   Not ready for discharge quite yet      Chilango Zuluaga MD  Pulmonary & Critical Care Medicine             [1]   Social History  Tobacco Use   Smoking Status Former    Current packs/day: 0.00    Types: Cigarettes    Quit date: 2020    Years since quittin.9   Smokeless Tobacco Never   Tobacco Comments    ocassionally never was qd

## 2025-03-18 NOTE — ASSESSMENT & PLAN NOTE
Unlikely bacterial component  Continue iv Levofloxacin and azithromycin per pulmonology recs  Continue iv solumedrol per pulmonology recs  Seen by pulmonology      Antibiotics (From admission, onward)      Start     Stop Route Frequency Ordered    03/14/25 1300  levoFLOXacin tablet 750 mg         -- Oral Daily 03/14/25 1157    03/12/25 1700  azithromycin tablet 500 mg         -- Oral Every Mon, Wed, Fri 03/12/25 1330    03/12/25 1700  ethambutoL tablet 1,200 mg         -- Oral Every Mon, Wed, Fri 03/12/25 1330    03/12/25 1700  rifAMpin capsule 600 mg         -- Oral Every Mon, Wed, Fri 03/12/25 1330            Microbiology Results (last 7 days)       Procedure Component Value Units Date/Time    Blood culture x two cultures. Draw prior to antibiotics. [[id number]] Collected: 03/12/25 1054    Order Status: Completed Specimen: Blood from Peripheral, Hand, Left Updated: 03/17/25 1232     Blood Culture, Routine No growth after 5 days.    Narrative:      Aerobic and anaerobic  Collection has been rescheduled by RE1 at 03/12/2025 10:56 Reason:   Done  Collection has been rescheduled by RE1 at 03/12/2025 10:56 Reason:   Done    Blood culture x two cultures. Draw prior to antibiotics. [[id number]] Collected: 03/12/25 1055    Order Status: Completed Specimen: Blood from Peripheral, Hand, Right Updated: 03/17/25 1232     Blood Culture, Routine No growth after 5 days.    Narrative:      Aerobic and anaerobic    AFB Culture & Smear [[id number]] Collected: 03/17/25 0628    Order Status: Sent Specimen: Sputum, Expectorated Updated: 03/17/25 0637    Culture, Respiratory with Gram Stain [[id number]] Collected: 03/16/25 1453    Order Status: Completed Specimen: Sputum Updated: 03/17/25 0626     Respiratory Culture Specimen inadequate - culture not performed      Spoke to Gloria Freedman [id number] for recollect     Gram Stain (Respiratory) >10 epithelial cells per low power field     Gram Stain (Respiratory) Rare WBC's     Gram Stain  (Respiratory) Moderate Gram positive cocci     Gram Stain (Respiratory) Few Gram negative rods     Gram Stain (Respiratory) Predominance of oropharyngeal lashell. Please recollect.    AFB Culture & Smear [[id number]] Collected: 03/16/25 1453    Order Status: Sent Specimen: Sputum, Expectorated Updated: 03/16/25 1502    Respiratory Infection Panel (PCR), Nasopharyngeal [[id number]] Collected: 03/15/25 0506    Order Status: Completed Specimen: Nasopharyngeal Swab Updated: 03/15/25 0819     Respiratory Infection Panel Source NP swab     Adenovirus Not Detected     Coronavirus 229E, Common Cold Virus Not Detected     Coronavirus HKU1, Common Cold Virus Not Detected     Coronavirus NL63, Common Cold Virus Not Detected     Coronavirus OC43, Common Cold Virus Not Detected     Comment: Coronavirus strains 229E, HKU1, NL63, and OC43 can cause the common   cold   and are not associated with the respiratory disease outbreak caused   by  the COVID-19 (SARS-CoV-2 novel Coronavirus) strain.           SARS-CoV2 (COVID-19) Qualitative PCR Not Detected     Human Metapneumovirus Not Detected     Human Rhinovirus/Enterovirus Not Detected     Influenza A Not Detected     Influenza B Not Detected     Parainfluenza Virus 1 Not Detected     Parainfluenza Virus 2 Not Detected     Parainfluenza Virus 3 Not Detected     Parainfluenza Virus 4 Not Detected     Respiratory Syncytial Virus Not Detected     Bordetella Parapertussis ([id number]) Not Detected     Bordetella pertussis (ptxP) Not Detected     Chlamydia pneumoniae Not Detected     Mycoplasma pneumoniae Not Detected     Comment: Respiratory Infection Panel testing performed by Multiplex PCR.       Narrative:      Respiratory Infection Panel source->NP Swab    AFB Culture & Smear [[id number]] Resulted: 03/15/25 0646    Order Status: No result Specimen: Sputum Updated: 03/15/25 0646    Afb Culture Stain [[id number]] Resulted: 03/15/25 0646    Order Status: No result Specimen: Sputum Updated:  03/15/25 0646    Culture, Respiratory with Gram Stain [[id number]] Collected: 03/15/25 0623    Order Status: Canceled Specimen: Sputum, Induced     AFB Culture & Smear [[id number]] Collected: 03/15/25 0623    Order Status: Canceled Specimen: Sputum, Induced     Group A Strep, Molecular [[id number]] Collected: 03/12/25 0943    Order Status: Completed Specimen: Throat Updated: 03/12/25 1025     Group A Strep, Molecular Negative     Comment: Arcanobacterium haemolyticum and Beta Streptococcus group C   and G will not be detected by this test method.  Please order   Throat Culture ([id number]) if suspected.

## 2025-03-18 NOTE — ASSESSMENT & PLAN NOTE
Admitted to med/tele  Concern of pna - but negative procal x2  Pulm recommends continuing levaquin for now  High dose steroids solumedrol 80mg q8h  weaned down to 40mg solumedrol q8h  Supplemental O2 as needed  On prednisone 10mg at home  Pulm consulted

## 2025-03-18 NOTE — PROGRESS NOTES
[facility]  Progress Note    Patient Name: Sumit Burger  MRN: [id number]  Patient Class: IP- Inpatient   Admission Date: 3/12/2025  Length of Stay: 6 days  Attending Physician: Alessandro Marx MD  Primary Care Provider: Trisha, Provider        Subjective     Principal Problem:Sarcoidosis        HPI:  54 Year old pt with H/o Sarcoidosis/SUZIE getting admitted with possible acute Pneumonia  Symptom s started with cough few days ago   This was followed by SOB mainly on exertion   Later she developed chest discomfort and symptoms worsened   She came to ER and got admitted  Per records:She was admitted with same c/o on December 2024 and Pneumonia was r/o at that time   She had CTA Today in ER   Compared to previous CTA In December 2024 :new mosaic ground-glass attenuation opacity throughout the posterior segment of the right lower lobe , new from the previous exam     Overview/Hospital Course:  Ms. Burger has been monitored closely during her hospitalization. She was admitted on 3/12/25 for amyloidosis flare with concerns of pna. She was empirically started on levaquin and high dose steroids. Pulm has been consulted. She has known SUZIE and is currently on ethambutol, rifampin, and azithro, but there was question of macrolide resistance and ID was consulted per pulm recs. No changes in abx regimen recommended. Procal has been negative x2. Resp infection panel negative. AFB x3 in process. Histoplasma collected and pending. TB gold pending. CTA chest negative for PE, shows worsening of mosaic attenuation in the upper lobes. WBC peaked at 18K in the setting of high dose IV steroids, and has trended down. Solumedrol 80mg q8h for 4 days, decreased to 40mg q8h on 3/17.     Interval History: BP peaking midday, added prn clonidine with some improvement. Pulm to see patient today    Review of Systems   Constitutional:  Positive for fatigue.   Respiratory:  Positive for chest tightness and  shortness of breath.    Neurological:  Positive for weakness.     Objective:     Vital Signs (Most Recent):  Temp: 98.2 °F (36.8 °C) (03/18/25 1052)  Pulse: 84 (03/18/25 1052)  Resp: 16 (03/18/25 0717)  BP: (!) 174/113 (03/18/25 1052)  SpO2: 100 % (03/18/25 1052) Vital Signs (24h Range):  Temp:  [97.4 °F (36.3 °C)-98.5 °F (36.9 °C)] 98.2 °F (36.8 °C)  Pulse:  [72-88] 84  Resp:  [16-18] 16  SpO2:  [97 %-100 %] 100 %  BP: (120-174)/() 174/113     Weight: 64.9 kg (142 lb 15.9 oz)  Body mass index is 25.33 kg/m².    Intake/Output Summary (Last 24 hours) at 3/18/2025 1118  Last data filed at 3/18/2025 1018  Gross per 24 hour   Intake 540 ml   Output --   Net 540 ml         Physical Exam  Vitals and nursing note reviewed.   Constitutional:       General: She is not in acute distress.  HENT:      Head: Normocephalic and atraumatic.      Nose: Nose normal.      Mouth/Throat:      Mouth: Mucous membranes are moist.      Pharynx: Oropharynx is clear.   Eyes:      Conjunctiva/sclera: Conjunctivae normal.      Pupils: Pupils are equal, round, and reactive to light.   Cardiovascular:      Rate and Rhythm: Normal rate and regular rhythm.      Pulses: Normal pulses.   Pulmonary:      Effort: Pulmonary effort is normal.      Comments: Diminished throughout  Abdominal:      General: Bowel sounds are normal.      Palpations: Abdomen is soft.   Musculoskeletal:         General: Normal range of motion.      Cervical back: Normal range of motion and neck supple.   Skin:     General: Skin is warm and dry.      Capillary Refill: Capillary refill takes less than 2 seconds.   Neurological:      Mental Status: She is alert and oriented to person, place, and time. Mental status is at baseline.   Psychiatric:         Mood and Affect: Mood normal.         Behavior: Behavior normal.               Significant Labs: All pertinent labs within the past 24 hours have been reviewed.  CBC:   Recent Labs   Lab 03/17/25  0631 03/18/25  0454   WBC  10.75 8.32   HGB 15.2 15.2   HCT 46.9 46.6    361     CMP:   Recent Labs   Lab 03/17/25  0631 03/18/25  0454    138   K 4.0 4.4    104   CO2 24 28   * 112*   BUN 15 15   CREATININE 0.7 0.7   CALCIUM 10.6* 10.3   PROT 8.1 7.9   ALBUMIN 4.6 4.3   BILITOT 0.3 0.4   ALKPHOS 77 72   AST 12 15   ALT 16 20   ANIONGAP 12 6*       Significant Imaging: I have reviewed all pertinent imaging results/findings within the past 24 hours.    CTA Chest Non-Coronary (PE Studies)  Result Date: 3/12/2025  CMS MANDATED QUALITY DATA - CT RADIATION - 436 All CT scans at this facility utilize dose modulation, iterative reconstruction, and/or weight based dosing when appropriate to reduce radiation dose to as low as reasonably achievable. CLINICAL HISTORY: ([id number])55 y/o  (1970) F Pulmonary embolism (PE) suspected, high prob; TECHNIQUE: (A#[id number], exam time 3/12/2025 12:47) CTA CHEST NON CORONARY (PE STUDIES) [id number] Axial CT examination of the chest with attention to the pulmonary arteries was performed using contiguous axial images from the diaphragms to the lung apices following the intravenous administration of non-ionic contrast material. Images were reviewed using lung, mediastinal, and bone windows. The study was performed with thin sections with subsequent 3-D reformats/MIP/reconstructions in multiple planes. COMPARISON: Radiograph from 02/25/2025, CT from 12/08/2024. FINDINGS: CTA PE evaluation: This is a moderate quality study for the evaluation of pulmonary embolism secondary to a combination of contrast bolus timing and motion artifact. The pulmonary arteries are normal in appearance without pulmonary emboli noted up to the segmental level, noting the limitations of CT technique for identifying small or isolated subsegmental emboli. CT Chest: Visualized neck: normal Lungs: Moderate mosaic ground-glass attenuation opacity is seen throughout a majority of the right upper lobe, and left upper  lobe, similar to the previous CT, with new mosaic ground-glass attenuation opacity throughout the posterior segment of the right lower lobe (image 207), new from the previous exam.  Mild faint central hilar interstitial opacity is present bilaterally. Airway: Mild bronchiectasis is present with a central hilar predominance. Pleura: There is no pleural effusion. There is no pneumothorax. Cardiovascular: The heart is top-normal in size.  No pericardial effusion is seen.  The great vessels are normal in course and caliber. Mediastinum: No pathologic lymphadenopathy is seen. Soft tissues: normal Musculoskeletal: There are mild degenerative changes of the thoracic spine.  There are no suspicious osseous lesions. Esophagus: normal Upper Abdomen: No acute abnormality in the upper abdomen.     1. No evidence of pulmonary embolism to the segmental arterial level. If there is continued clinical concern, consider further evaluation with lower extremity doppler. 2.  Multifocal geographic areas of airspace opacity and mosaic attenuation most pronounced in the upper lobes, and slightly worse in the right lower lobe suggesting a combination of multifocal infection/pneumonia, interstitial lung disease (e.g sarcoid, hypersensitivity pneumonitis, chronic covid, RB-ILD?), and possibly a component of mild interstitial edema. Electronically signed by: Jhoan Bruno Date:    03/12/2025 Time:    12:58    X-Ray Chest AP Portable  Result Date: 3/12/2025  CLINICAL HISTORY: ([id number])55 y/o  (1970) F Chest Pain; TECHNIQUE: (A#[id number], exam time 3/12/2025 11:00) XR CHEST AP PORTABLE [id number] COMPARISON: Radiograph from 02/25/2025. FINDINGS: Mildly increased central hilar interstitial opacities are seen bilaterally suggestive of either mild edema  atypical infection/pneumonia or bronchitis in the appropriate clinical setting. No consolidative pneumonia is seen. Patchy airspace opacities seen in the right upper lobe, similar to the  previous exam.  No pneumothorax is identified. The heart is normal in size. The mediastinum is within normal limits. Osseous structures appear within normal limits. The visualized upper abdomen is unremarkable.     Unchanged radiograph of the chest when accounting for differences in imaging technique. Electronically signed by: Jhoan Bruno Date:    03/12/2025 Time:    11:02        Assessment & Plan  Acute pneumonia  Unlikely bacterial component  Continue iv Levofloxacin and azithromycin per pulmonology recs  Continue iv solumedrol per pulmonology recs  Seen by pulmonology      Antibiotics (From admission, onward)      Start     Stop Route Frequency Ordered    03/14/25 1300  levoFLOXacin tablet 750 mg         -- Oral Daily 03/14/25 1157    03/12/25 1700  azithromycin tablet 500 mg         -- Oral Every Mon, Wed, Fri 03/12/25 1330    03/12/25 1700  ethambutoL tablet 1,200 mg         -- Oral Every Mon, Wed, Fri 03/12/25 1330    03/12/25 1700  rifAMpin capsule 600 mg         -- Oral Every Mon, Wed, Fri 03/12/25 1330            Microbiology Results (last 7 days)       Procedure Component Value Units Date/Time    Blood culture x two cultures. Draw prior to antibiotics. [[id number]] Collected: 03/12/25 1054    Order Status: Completed Specimen: Blood from Peripheral, Hand, Left Updated: 03/17/25 1232     Blood Culture, Routine No growth after 5 days.    Narrative:      Aerobic and anaerobic  Collection has been rescheduled by RE1 at 03/12/2025 10:56 Reason:   Done  Collection has been rescheduled by RE1 at 03/12/2025 10:56 Reason:   Done    Blood culture x two cultures. Draw prior to antibiotics. [[id number]] Collected: 03/12/25 1055    Order Status: Completed Specimen: Blood from Peripheral, Hand, Right Updated: 03/17/25 1232     Blood Culture, Routine No growth after 5 days.    Narrative:      Aerobic and anaerobic    AFB Culture & Smear [[id number]] Collected: 03/17/25 0628    Order Status: Sent Specimen: Sputum,  Expectorated Updated: 03/17/25 0637    Culture, Respiratory with Gram Stain [[id number]] Collected: 03/16/25 1453    Order Status: Completed Specimen: Sputum Updated: 03/17/25 0626     Respiratory Culture Specimen inadequate - culture not performed      Spoke to Gloria Freedman [id number] for recollect     Gram Stain (Respiratory) >10 epithelial cells per low power field     Gram Stain (Respiratory) Rare WBC's     Gram Stain (Respiratory) Moderate Gram positive cocci     Gram Stain (Respiratory) Few Gram negative rods     Gram Stain (Respiratory) Predominance of oropharyngeal lashell. Please recollect.    AFB Culture & Smear [[id number]] Collected: 03/16/25 1453    Order Status: Sent Specimen: Sputum, Expectorated Updated: 03/16/25 1502    Respiratory Infection Panel (PCR), Nasopharyngeal [[id number]] Collected: 03/15/25 0506    Order Status: Completed Specimen: Nasopharyngeal Swab Updated: 03/15/25 0819     Respiratory Infection Panel Source NP swab     Adenovirus Not Detected     Coronavirus 229E, Common Cold Virus Not Detected     Coronavirus HKU1, Common Cold Virus Not Detected     Coronavirus NL63, Common Cold Virus Not Detected     Coronavirus OC43, Common Cold Virus Not Detected     Comment: Coronavirus strains 229E, HKU1, NL63, and OC43 can cause the common   cold   and are not associated with the respiratory disease outbreak caused   by  the COVID-19 (SARS-CoV-2 novel Coronavirus) strain.           SARS-CoV2 (COVID-19) Qualitative PCR Not Detected     Human Metapneumovirus Not Detected     Human Rhinovirus/Enterovirus Not Detected     Influenza A Not Detected     Influenza B Not Detected     Parainfluenza Virus 1 Not Detected     Parainfluenza Virus 2 Not Detected     Parainfluenza Virus 3 Not Detected     Parainfluenza Virus 4 Not Detected     Respiratory Syncytial Virus Not Detected     Bordetella Parapertussis (CY9054) Not Detected     Bordetella pertussis (ptxP) Not Detected     Chlamydia pneumoniae Not  Detected     Mycoplasma pneumoniae Not Detected     Comment: Respiratory Infection Panel testing performed by Multiplex PCR.       Narrative:      Respiratory Infection Panel source->NP Swab    AFB Culture & Smear [[id number]] Resulted: 03/15/25 0646    Order Status: No result Specimen: Sputum Updated: 03/15/25 0646    Afb Culture Stain [[id number]] Resulted: 03/15/25 0646    Order Status: No result Specimen: Sputum Updated: 03/15/25 0646    Culture, Respiratory with Gram Stain [[id number]] Collected: 03/15/25 0623    Order Status: Canceled Specimen: Sputum, Induced     AFB Culture & Smear [[id number]] Collected: 03/15/25 0623    Order Status: Canceled Specimen: Sputum, Induced     Group A Strep, Molecular [[id number]] Collected: 03/12/25 0943    Order Status: Completed Specimen: Throat Updated: 03/12/25 1025     Group A Strep, Molecular Negative     Comment: Arcanobacterium haemolyticum and Beta Streptococcus group C   and G will not be detected by this test method.  Please order   Throat Culture ([id number]) if suspected.               Sarcoidosis with flare  Admitted to med/tele  Concern of pna - but negative procal x2  Pulm recommends continuing levaquin for now  High dose steroids solumedrol 80mg q8h  weaned down to 40mg solumedrol q8h  Supplemental O2 as needed  On prednisone 10mg at home  Pulm consulted  SUZIE (mycobacterium avium-intracellulare)  Continue therapy per ID recommendation    Hypoxia  New onset     Acute hypoxic respiratory failure  Improved   Patient with Hypoxic Respiratory failure which is Acute.  she is not on home oxygen. Supplemental oxygen was provided and noted-      .   Signs/symptoms of respiratory failure include- respiratory distress. Contributing diagnoses includes - Pneumonia and sarcoidosis  Labs and images were reviewed. Patient Has not had a recent ABG. Will treat underlying causes and adjust management of respiratory failure as follows- follow up pulmonology  consult  Leukocytosis  Procalcitonin remains normal  Suspect Secondary to corticosteroid use    Essential hypertension  Patient's blood pressure range in the last 24 hours was: BP  Min: 120/73  Max: 174/113.The patient's inpatient anti-hypertensive regimen is listed below:  Current Antihypertensives  valsartan tablet 160 mg, Every morning, Oral  spironolactone tablet 25 mg, Daily, Oral  metoprolol succinate (TOPROL-XL) 24 hr tablet 25 mg, Daily, Oral  cloNIDine tablet 0.1 mg, Every 6 hours PRN, Oral    Plan  - BP is uncontrolled, will adjust as follows:  Removed calcium channel blocker, add an AV celina blocker secondary to increase heart rate and blood pressure.  - p.r.n. hydralazine.  VTE Risk Mitigation (From admission, onward)           Ordered     enoxaparin injection 40 mg  Every 24 hours         03/13/25 1902     IP VTE LOW RISK PATIENT  Once         03/12/25 1330     Place sequential compression device  Until discontinued         03/12/25 1330                    Discharge Planning   SHAKIRA: 3/20/2025     Code Status: Full Code   Medical Readiness for Discharge Date:   Discharge Plan A: Home with family                        Mallorie Pearson NP  Department of Hospital Medicine   [facility]

## 2025-03-18 NOTE — PROGRESS NOTES
[facility] Medicine  Progress Note    Patient Name: Sumit Burger  MRN: [id number]  Patient Class: IP- Inpatient   Admission Date: 3/12/2025  Length of Stay: 6 days  Attending Physician: Alessandro Marx MD  Primary Care Provider: Trisha, Provider        Subjective     Principal Problem:Sarcoidosis        HPI:  54 Year old pt with H/o Sarcoidosis/SUZIE getting admitted with possible acute Pneumonia  Symptom s started with cough few days ago   This was followed by SOB mainly on exertion   Later she developed chest discomfort and symptoms worsened   She came to ER and got admitted  Per records:She was admitted with same c/o on December 2024 and Pneumonia was r/o at that time   She had CTA Today in ER   Compared to previous CTA In December 2024 :new mosaic ground-glass attenuation opacity throughout the posterior segment of the right lower lobe , new from the previous exam     Overview/Hospital Course:  Ms. Burger has been monitored closely during her hospitalization. She was admitted on 3/12/25 for amyloidosis flare with concerns of pna. She was empirically started on levaquin and high dose steroids. Pulm has been consulted. She has known SUZIE and is currently on ethambutol, rifampin, and azithro, but there was question of macrolide resistance and ID was consulted per pulm recs. No changes in abx regimen recommended. Procal has been negative x2. Resp infection panel negative. AFB x3 in process. Histoplasma collected and pending. TB gold pending. CTA chest negative for PE, shows worsening of mosaic attenuation in the upper lobes. WBC peaked at 18K in the setting of high dose IV steroids, and has trended down. Solumedrol 80mg q8h for 4 days, decreased to 40mg q8h on 3/17.     Interval History: no acute events overnight. On room air and comfortable at rest, but still getting quite dyspneic with activity. D/w pulm and recommends continuing levaquin in addition to SUZIE coverage.     Review of  Systems   Constitutional:  Positive for fatigue.   Respiratory:  Positive for chest tightness and shortness of breath.    Neurological:  Positive for weakness.     Objective:     Vital Signs (Most Recent):  Temp: 98.3 °F (36.8 °C) (03/17/25 1015)  Pulse: 88 (03/17/25 1352)  Resp: 16 (03/17/25 1352)  BP: (!) 160/101 (03/17/25 1015)  SpO2: 98 % (03/17/25 1352) Vital Signs (24h Range):  Temp:  [98.1 °F (36.7 °C)-98.3 °F (36.8 °C)] 98.3 °F (36.8 °C)  Pulse:  [] 88  Resp:  [15-20] 16  SpO2:  [97 %-100 %] 98 %  BP: (138-184)/() 160/101     Weight: 64.9 kg (142 lb 15.9 oz)  Body mass index is 25.33 kg/m².    Intake/Output Summary (Last 24 hours) at 3/17/2025 1516  Last data filed at 3/16/2025 2355  Gross per 24 hour   Intake 430 ml   Output --   Net 430 ml         Physical Exam  Vitals and nursing note reviewed.   Constitutional:       General: She is not in acute distress.  HENT:      Head: Normocephalic and atraumatic.      Nose: Nose normal.      Mouth/Throat:      Mouth: Mucous membranes are moist.      Pharynx: Oropharynx is clear.   Eyes:      Conjunctiva/sclera: Conjunctivae normal.      Pupils: Pupils are equal, round, and reactive to light.   Cardiovascular:      Rate and Rhythm: Normal rate and regular rhythm.      Pulses: Normal pulses.   Pulmonary:      Effort: Pulmonary effort is normal.      Comments: Diminished throughout  Abdominal:      General: Bowel sounds are normal.      Palpations: Abdomen is soft.   Musculoskeletal:         General: Normal range of motion.      Cervical back: Normal range of motion and neck supple.   Skin:     General: Skin is warm and dry.      Capillary Refill: Capillary refill takes less than 2 seconds.   Neurological:      Mental Status: She is alert and oriented to person, place, and time. Mental status is at baseline.   Psychiatric:         Mood and Affect: Mood normal.         Behavior: Behavior normal.               Significant Labs: All pertinent labs within the  past 24 hours have been reviewed.  CBC:   Recent Labs   Lab 03/16/25  0507 03/17/25  0631   WBC 11.16 10.75   HGB 13.9 15.2   HCT 42.7 46.9    403     CMP:   Recent Labs   Lab 03/16/25  0508 03/17/25  0631    138   K 4.2 4.0    102   CO2 25 24   * 116*   BUN 11 15   CREATININE 0.6 0.7   CALCIUM 10.4 10.6*   PROT 7.5 8.1   ALBUMIN 4.2 4.6   BILITOT 0.2 0.3   ALKPHOS 69 77   AST 10 12   ALT 14 16   ANIONGAP 11 12       Significant Imaging: I have reviewed all pertinent imaging results/findings within the past 24 hours.    CTA Chest Non-Coronary (PE Studies)  Result Date: 3/12/2025  CMS MANDATED QUALITY DATA - CT RADIATION - 436 All CT scans at this facility utilize dose modulation, iterative reconstruction, and/or weight based dosing when appropriate to reduce radiation dose to as low as reasonably achievable. CLINICAL HISTORY: ([id number])55 y/o  (1970) F Pulmonary embolism (PE) suspected, high prob; TECHNIQUE: (A#[id number], exam time 3/12/2025 12:47) CTA CHEST NON CORONARY (PE STUDIES) [id number] Axial CT examination of the chest with attention to the pulmonary arteries was performed using contiguous axial images from the diaphragms to the lung apices following the intravenous administration of non-ionic contrast material. Images were reviewed using lung, mediastinal, and bone windows. The study was performed with thin sections with subsequent 3-D reformats/MIP/reconstructions in multiple planes. COMPARISON: Radiograph from 02/25/2025, CT from 12/08/2024. FINDINGS: CTA PE evaluation: This is a moderate quality study for the evaluation of pulmonary embolism secondary to a combination of contrast bolus timing and motion artifact. The pulmonary arteries are normal in appearance without pulmonary emboli noted up to the segmental level, noting the limitations of CT technique for identifying small or isolated subsegmental emboli. CT Chest: Visualized neck: normal Lungs: Moderate mosaic  ground-glass attenuation opacity is seen throughout a majority of the right upper lobe, and left upper lobe, similar to the previous CT, with new mosaic ground-glass attenuation opacity throughout the posterior segment of the right lower lobe (image 207), new from the previous exam.  Mild faint central hilar interstitial opacity is present bilaterally. Airway: Mild bronchiectasis is present with a central hilar predominance. Pleura: There is no pleural effusion. There is no pneumothorax. Cardiovascular: The heart is top-normal in size.  No pericardial effusion is seen.  The great vessels are normal in course and caliber. Mediastinum: No pathologic lymphadenopathy is seen. Soft tissues: normal Musculoskeletal: There are mild degenerative changes of the thoracic spine.  There are no suspicious osseous lesions. Esophagus: normal Upper Abdomen: No acute abnormality in the upper abdomen.     1. No evidence of pulmonary embolism to the segmental arterial level. If there is continued clinical concern, consider further evaluation with lower extremity doppler. 2.  Multifocal geographic areas of airspace opacity and mosaic attenuation most pronounced in the upper lobes, and slightly worse in the right lower lobe suggesting a combination of multifocal infection/pneumonia, interstitial lung disease (e.g sarcoid, hypersensitivity pneumonitis, chronic covid, RB-ILD?), and possibly a component of mild interstitial edema. Electronically signed by: Jhoan Bruno Date:    03/12/2025 Time:    12:58    X-Ray Chest AP Portable  Result Date: 3/12/2025  CLINICAL HISTORY: ([id number])53 y/o  (1970) F Chest Pain; TECHNIQUE: (A#[id number], exam time 3/12/2025 11:00) XR CHEST AP PORTABLE [id number] COMPARISON: Radiograph from 02/25/2025. FINDINGS: Mildly increased central hilar interstitial opacities are seen bilaterally suggestive of either mild edema  atypical infection/pneumonia or bronchitis in the appropriate clinical setting. No  consolidative pneumonia is seen. Patchy airspace opacities seen in the right upper lobe, similar to the previous exam.  No pneumothorax is identified. The heart is normal in size. The mediastinum is within normal limits. Osseous structures appear within normal limits. The visualized upper abdomen is unremarkable.     Unchanged radiograph of the chest when accounting for differences in imaging technique. Electronically signed by: Jhoan Bruno Date:    03/12/2025 Time:    11:02        Assessment & Plan  Acute pneumonia  Unlikely bacterial component  Continue iv Levofloxacin and azithromycin per pulmonology recs  Continue iv solumedrol per pulmonology recs  Seen by pulmonology      Antibiotics (From admission, onward)      Start     Stop Route Frequency Ordered    03/14/25 1300  levoFLOXacin tablet 750 mg         -- Oral Daily 03/14/25 1157    03/12/25 1700  azithromycin tablet 500 mg         -- Oral Every Mon, Wed, Fri 03/12/25 1330    03/12/25 1700  ethambutoL tablet 1,200 mg         -- Oral Every Mon, Wed, Fri 03/12/25 1330    03/12/25 1700  rifAMpin capsule 600 mg         -- Oral Every Mon, Wed, Fri 03/12/25 1330            Microbiology Results (last 7 days)       Procedure Component Value Units Date/Time    Blood culture x two cultures. Draw prior to antibiotics. [[id number]] Collected: 03/12/25 1054    Order Status: Completed Specimen: Blood from Peripheral, Hand, Left Updated: 03/17/25 1232     Blood Culture, Routine No growth after 5 days.    Narrative:      Aerobic and anaerobic  Collection has been rescheduled by RE1 at 03/12/2025 10:56 Reason:   Done  Collection has been rescheduled by RE1 at 03/12/2025 10:56 Reason:   Done    Blood culture x two cultures. Draw prior to antibiotics. [[id number]] Collected: 03/12/25 1055    Order Status: Completed Specimen: Blood from Peripheral, Hand, Right Updated: 03/17/25 1232     Blood Culture, Routine No growth after 5 days.    Narrative:      Aerobic and anaerobic     AFB Culture & Smear [[id number]] Collected: 03/17/25 0628    Order Status: Sent Specimen: Sputum, Expectorated Updated: 03/17/25 0637    Culture, Respiratory with Gram Stain [[id number]] Collected: 03/16/25 1453    Order Status: Completed Specimen: Sputum Updated: 03/17/25 0626     Respiratory Culture Specimen inadequate - culture not performed      Spoke to Gloria Freedman [id number] for recollect     Gram Stain (Respiratory) >10 epithelial cells per low power field     Gram Stain (Respiratory) Rare WBC's     Gram Stain (Respiratory) Moderate Gram positive cocci     Gram Stain (Respiratory) Few Gram negative rods     Gram Stain (Respiratory) Predominance of oropharyngeal lashell. Please recollect.    AFB Culture & Smear [[id number]] Collected: 03/16/25 1453    Order Status: Sent Specimen: Sputum, Expectorated Updated: 03/16/25 1502    Respiratory Infection Panel (PCR), Nasopharyngeal [[id number]] Collected: 03/15/25 0506    Order Status: Completed Specimen: Nasopharyngeal Swab Updated: 03/15/25 0819     Respiratory Infection Panel Source NP swab     Adenovirus Not Detected     Coronavirus 229E, Common Cold Virus Not Detected     Coronavirus HKU1, Common Cold Virus Not Detected     Coronavirus NL63, Common Cold Virus Not Detected     Coronavirus OC43, Common Cold Virus Not Detected     Comment: Coronavirus strains 229E, HKU1, NL63, and OC43 can cause the common   cold   and are not associated with the respiratory disease outbreak caused   by  the COVID-19 (SARS-CoV-2 novel Coronavirus) strain.           SARS-CoV2 (COVID-19) Qualitative PCR Not Detected     Human Metapneumovirus Not Detected     Human Rhinovirus/Enterovirus Not Detected     Influenza A Not Detected     Influenza B Not Detected     Parainfluenza Virus 1 Not Detected     Parainfluenza Virus 2 Not Detected     Parainfluenza Virus 3 Not Detected     Parainfluenza Virus 4 Not Detected     Respiratory Syncytial Virus Not Detected     Bordetella  Parapertussis ([id number]) Not Detected     Bordetella pertussis (ptxP) Not Detected     Chlamydia pneumoniae Not Detected     Mycoplasma pneumoniae Not Detected     Comment: Respiratory Infection Panel testing performed by Multiplex PCR.       Narrative:      Respiratory Infection Panel source->NP Swab    AFB Culture & Smear [[id number]] Resulted: 03/15/25 0646    Order Status: No result Specimen: Sputum Updated: 03/15/25 0646    Afb Culture Stain [[id number]] Resulted: 03/15/25 0646    Order Status: No result Specimen: Sputum Updated: 03/15/25 0646    Culture, Respiratory with Gram Stain [[id number]] Collected: 03/15/25 0623    Order Status: Canceled Specimen: Sputum, Induced     AFB Culture & Smear [[id number]] Collected: 03/15/25 0623    Order Status: Canceled Specimen: Sputum, Induced     Group A Strep, Molecular [[id number]] Collected: 03/12/25 0943    Order Status: Completed Specimen: Throat Updated: 03/12/25 1025     Group A Strep, Molecular Negative     Comment: Arcanobacterium haemolyticum and Beta Streptococcus group C   and G will not be detected by this test method.  Please order   Throat Culture ([id number]) if suspected.               Sarcoidosis with flare  Admitted to med/tele  Concern of pna - but negative procal x2  Pulm recommends continuing levaquin for now  High dose steroids solumedrol 80mg q8h  weaned down to 40mg solumedrol q8h  Supplemental O2 as needed  On prednisone 10mg at home  Pulm consulted  SUZIE (mycobacterium avium-intracellulare)  Continue therapy per ID recommendation    Hypoxia  New onset     Acute hypoxic respiratory failure  Improved   Patient with Hypoxic Respiratory failure which is Acute.  she is not on home oxygen. Supplemental oxygen was provided and noted-      .   Signs/symptoms of respiratory failure include- respiratory distress. Contributing diagnoses includes - Pneumonia and sarcoidosis  Labs and images were reviewed. Patient Has not had a recent ABG. Will treat underlying  causes and adjust management of respiratory failure as follows- follow up pulmonology consult  Leukocytosis  Procalcitonin remains normal  Suspect Secondary to corticosteroid use    Essential hypertension  Patient's blood pressure range in the last 24 hours was: BP  Min: 120/73  Max: 165/106.The patient's inpatient anti-hypertensive regimen is listed below:  Current Antihypertensives  valsartan tablet 160 mg, Every morning, Oral  spironolactone tablet 25 mg, Daily, Oral  metoprolol succinate (TOPROL-XL) 24 hr tablet 25 mg, Daily, Oral  cloNIDine tablet 0.1 mg, Every 6 hours PRN, Oral    Plan  - BP is uncontrolled, will adjust as follows:  Removed calcium channel blocker, add an AV celina blocker secondary to increase heart rate and blood pressure.  - p.r.n. hydralazine.  VTE Risk Mitigation (From admission, onward)           Ordered     enoxaparin injection 40 mg  Every 24 hours         03/13/25 1902     IP VTE LOW RISK PATIENT  Once         03/12/25 1330     Place sequential compression device  Until discontinued         03/12/25 1330                    Discharge Planning   SHAKIRA: 3/19/2025     Code Status: Full Code   Medical Readiness for Discharge Date:   Discharge Plan A: Home with family                        Mallorie Pearson NP  Department of Hospital Medicine   [facility]

## 2025-03-18 NOTE — ASSESSMENT & PLAN NOTE
Unlikely bacterial component  Continue iv Levofloxacin and azithromycin per pulmonology recs  Continue iv solumedrol per pulmonology recs  Seen by pulmonology      Antibiotics (From admission, onward)      Start     Stop Route Frequency Ordered    03/14/25 1300  levoFLOXacin tablet 750 mg         -- Oral Daily 03/14/25 1157    03/12/25 1700  azithromycin tablet 500 mg         -- Oral Every Mon, Wed, Fri 03/12/25 1330    03/12/25 1700  ethambutoL tablet 1,200 mg         -- Oral Every Mon, Wed, Fri 03/12/25 1330    03/12/25 1700  rifAMpin capsule 600 mg         -- Oral Every Mon, Wed, Fri 03/12/25 1330            Microbiology Results (last 7 days)       Procedure Component Value Units Date/Time    Blood culture x two cultures. Draw prior to antibiotics. [[id number]] Collected: 03/12/25 1054    Order Status: Completed Specimen: Blood from Peripheral, Hand, Left Updated: 03/17/25 1232     Blood Culture, Routine No growth after 5 days.    Narrative:      Aerobic and anaerobic  Collection has been rescheduled by RE1 at 03/12/2025 10:56 Reason:   Done  Collection has been rescheduled by RE1 at 03/12/2025 10:56 Reason:   Done    Blood culture x two cultures. Draw prior to antibiotics. [[id number]] Collected: 03/12/25 1055    Order Status: Completed Specimen: Blood from Peripheral, Hand, Right Updated: 03/17/25 1232     Blood Culture, Routine No growth after 5 days.    Narrative:      Aerobic and anaerobic    AFB Culture & Smear [[id number]] Collected: 03/17/25 0628    Order Status: Sent Specimen: Sputum, Expectorated Updated: 03/17/25 0637    Culture, Respiratory with Gram Stain [[id number]] Collected: 03/16/25 1453    Order Status: Completed Specimen: Sputum Updated: 03/17/25 0626     Respiratory Culture Specimen inadequate - culture not performed      Spoke to Gloria Freedman [id number] for recollect     Gram Stain (Respiratory) >10 epithelial cells per low power field     Gram Stain (Respiratory) Rare WBC's     Gram Stain  (Respiratory) Moderate Gram positive cocci     Gram Stain (Respiratory) Few Gram negative rods     Gram Stain (Respiratory) Predominance of oropharyngeal lashell. Please recollect.    AFB Culture & Smear [[id number]] Collected: 03/16/25 1453    Order Status: Sent Specimen: Sputum, Expectorated Updated: 03/16/25 1502    Respiratory Infection Panel (PCR), Nasopharyngeal [[id number]] Collected: 03/15/25 0506    Order Status: Completed Specimen: Nasopharyngeal Swab Updated: 03/15/25 0819     Respiratory Infection Panel Source NP swab     Adenovirus Not Detected     Coronavirus 229E, Common Cold Virus Not Detected     Coronavirus HKU1, Common Cold Virus Not Detected     Coronavirus NL63, Common Cold Virus Not Detected     Coronavirus OC43, Common Cold Virus Not Detected     Comment: Coronavirus strains 229E, HKU1, NL63, and OC43 can cause the common   cold   and are not associated with the respiratory disease outbreak caused   by  the COVID-19 (SARS-CoV-2 novel Coronavirus) strain.           SARS-CoV2 (COVID-19) Qualitative PCR Not Detected     Human Metapneumovirus Not Detected     Human Rhinovirus/Enterovirus Not Detected     Influenza A Not Detected     Influenza B Not Detected     Parainfluenza Virus 1 Not Detected     Parainfluenza Virus 2 Not Detected     Parainfluenza Virus 3 Not Detected     Parainfluenza Virus 4 Not Detected     Respiratory Syncytial Virus Not Detected     Bordetella Parapertussis ([id number]) Not Detected     Bordetella pertussis (ptxP) Not Detected     Chlamydia pneumoniae Not Detected     Mycoplasma pneumoniae Not Detected     Comment: Respiratory Infection Panel testing performed by Multiplex PCR.       Narrative:      Respiratory Infection Panel source->NP Swab    AFB Culture & Smear [[id number]] Resulted: 03/15/25 0646    Order Status: No result Specimen: Sputum Updated: 03/15/25 0646    Afb Culture Stain [[id number]] Resulted: 03/15/25 0646    Order Status: No result Specimen: Sputum Updated:  03/15/25 0646    Culture, Respiratory with Gram Stain [[id number]] Collected: 03/15/25 0623    Order Status: Canceled Specimen: Sputum, Induced     AFB Culture & Smear [[id number]] Collected: 03/15/25 0623    Order Status: Canceled Specimen: Sputum, Induced     Group A Strep, Molecular [[id number]] Collected: 03/12/25 0943    Order Status: Completed Specimen: Throat Updated: 03/12/25 1025     Group A Strep, Molecular Negative     Comment: Arcanobacterium haemolyticum and Beta Streptococcus group C   and G will not be detected by this test method.  Please order   Throat Culture ([id number]) if suspected.

## 2025-03-18 NOTE — CARE UPDATE
03/18/25 0647   Patient Assessment/Suction   Level of Consciousness (AVPU) alert   Respiratory Effort Normal;Unlabored   Expansion/Accessory Muscles/Retractions expansion symmetric   All Lung Fields Breath Sounds clear   Rhythm/Pattern, Respiratory unlabored   Cough Frequency infrequent   Cough Type dry   PRE-TX-O2   Device (Oxygen Therapy) room air   SpO2 100 %   Pulse Oximetry Type Intermittent   $ Pulse Oximetry - Multiple Charge Pulse Oximetry - Multiple   Pulse 72   Resp 18   Aerosol Therapy   $ Aerosol Therapy Charges Aerosol Treatment   Daily Review of Necessity (SVN) completed   Respiratory Treatment Status (SVN) given   Treatment Route (SVN) air;mouthpiece utilized   Patient Position semi-Montague's   Post Treatment Assessment (SVN) breath sounds unchanged   Signs of Intolerance (SVN) none   Breath Sounds Post-Respiratory Treatment   Throughout All Fields Post-Treatment All Fields   Throughout All Fields Post-Treatment no change   Post-treatment Heart Rate (beats/min) 75   Post-treatment Resp Rate (breaths/min) 16   Education   $ Education Bronchodilator;15 min

## 2025-03-18 NOTE — ASSESSMENT & PLAN NOTE
Patient's blood pressure range in the last 24 hours was: BP  Min: 120/73  Max: 165/106.The patient's inpatient anti-hypertensive regimen is listed below:  Current Antihypertensives  valsartan tablet 160 mg, Every morning, Oral  spironolactone tablet 25 mg, Daily, Oral  metoprolol succinate (TOPROL-XL) 24 hr tablet 25 mg, Daily, Oral  cloNIDine tablet 0.1 mg, Every 6 hours PRN, Oral    Plan  - BP is uncontrolled, will adjust as follows:  Removed calcium channel blocker, add an AV celina blocker secondary to increase heart rate and blood pressure.  - p.r.n. hydralazine.

## 2025-03-19 PROBLEM — R09.02 HYPOXIA: Status: RESOLVED | Noted: 2025-03-12 | Resolved: 2025-03-19

## 2025-03-19 LAB
ACID FAST MOD KINY STN SPEC: NORMAL
ALBUMIN SERPL BCP-MCNC: 3.8 G/DL (ref 3.5–5.2)
ALP SERPL-CCNC: 70 U/L (ref 55–135)
ALT SERPL W/O P-5'-P-CCNC: 23 U/L (ref 10–44)
ANION GAP SERPL CALC-SCNC: 8 MMOL/L (ref 8–16)
AST SERPL-CCNC: 13 U/L (ref 10–40)
BASOPHILS # BLD AUTO: 0.02 K/UL (ref 0–0.2)
BASOPHILS NFR BLD: 0.2 % (ref 0–1.9)
BILIRUB SERPL-MCNC: 0.2 MG/DL (ref 0.1–1)
BUN SERPL-MCNC: 12 MG/DL (ref 6–20)
CALCIUM SERPL-MCNC: 9.9 MG/DL (ref 8.7–10.5)
CHLORIDE SERPL-SCNC: 104 MMOL/L (ref 95–110)
CO2 SERPL-SCNC: 26 MMOL/L (ref 23–29)
CREAT SERPL-MCNC: 0.7 MG/DL (ref 0.5–1.4)
DIFFERENTIAL METHOD BLD: ABNORMAL
EOSINOPHIL # BLD AUTO: 0 K/UL (ref 0–0.5)
EOSINOPHIL NFR BLD: 0.4 % (ref 0–8)
ERYTHROCYTE [DISTWIDTH] IN BLOOD BY AUTOMATED COUNT: 14.1 % (ref 11.5–14.5)
EST. GFR  (NO RACE VARIABLE): >60 ML/MIN/1.73 M^2
GLUCOSE SERPL-MCNC: 104 MG/DL (ref 70–110)
HCT VFR BLD AUTO: 44.4 % (ref 37–48.5)
HGB BLD-MCNC: 13.8 G/DL (ref 12–16)
IMM GRANULOCYTES # BLD AUTO: 0.05 K/UL (ref 0–0.04)
IMM GRANULOCYTES NFR BLD AUTO: 0.4 % (ref 0–0.5)
LYMPHOCYTES # BLD AUTO: 4.3 K/UL (ref 1–4.8)
LYMPHOCYTES NFR BLD: 38.7 % (ref 18–48)
MAGNESIUM SERPL-MCNC: 2.2 MG/DL (ref 1.6–2.6)
MCH RBC QN AUTO: 27 PG (ref 27–31)
MCHC RBC AUTO-ENTMCNC: 31.1 G/DL (ref 32–36)
MCV RBC AUTO: 87 FL (ref 82–98)
MONOCYTES # BLD AUTO: 0.7 K/UL (ref 0.3–1)
MONOCYTES NFR BLD: 6.6 % (ref 4–15)
NEUTROPHILS # BLD AUTO: 6 K/UL (ref 1.8–7.7)
NEUTROPHILS NFR BLD: 53.7 % (ref 38–73)
NRBC BLD-RTO: 0 /100 WBC
PLATELET # BLD AUTO: 336 K/UL (ref 150–450)
PMV BLD AUTO: 11.1 FL (ref 9.2–12.9)
POTASSIUM SERPL-SCNC: 3.7 MMOL/L (ref 3.5–5.1)
PROT SERPL-MCNC: 7.2 G/DL (ref 6–8.4)
RBC # BLD AUTO: 5.11 M/UL (ref 4–5.4)
SODIUM SERPL-SCNC: 138 MMOL/L (ref 136–145)
WBC # BLD AUTO: 11.16 K/UL (ref 3.9–12.7)

## 2025-03-19 PROCEDURE — 94640 AIRWAY INHALATION TREATMENT: CPT

## 2025-03-19 PROCEDURE — 83735 ASSAY OF MAGNESIUM: CPT | Performed by: INTERNAL MEDICINE

## 2025-03-19 PROCEDURE — 25000242 PHARM REV CODE 250 ALT 637 W/ HCPCS: Performed by: HOSPITALIST

## 2025-03-19 PROCEDURE — 25000003 PHARM REV CODE 250: Performed by: STUDENT IN AN ORGANIZED HEALTH CARE EDUCATION/TRAINING PROGRAM

## 2025-03-19 PROCEDURE — 99900035 HC TECH TIME PER 15 MIN (STAT)

## 2025-03-19 PROCEDURE — 36415 COLL VENOUS BLD VENIPUNCTURE: CPT | Performed by: INTERNAL MEDICINE

## 2025-03-19 PROCEDURE — 63600175 PHARM REV CODE 636 W HCPCS: Performed by: NURSE PRACTITIONER

## 2025-03-19 PROCEDURE — 94761 N-INVAS EAR/PLS OXIMETRY MLT: CPT

## 2025-03-19 PROCEDURE — 99233 SBSQ HOSP IP/OBS HIGH 50: CPT | Mod: ,,, | Performed by: STUDENT IN AN ORGANIZED HEALTH CARE EDUCATION/TRAINING PROGRAM

## 2025-03-19 PROCEDURE — 99900031 HC PATIENT EDUCATION (STAT)

## 2025-03-19 PROCEDURE — 25000003 PHARM REV CODE 250: Performed by: NURSE PRACTITIONER

## 2025-03-19 PROCEDURE — 63700000 PHARM REV CODE 250 ALT 637 W/O HCPCS: Performed by: INTERNAL MEDICINE

## 2025-03-19 PROCEDURE — 85025 COMPLETE CBC W/AUTO DIFF WBC: CPT | Performed by: INTERNAL MEDICINE

## 2025-03-19 PROCEDURE — 12000002 HC ACUTE/MED SURGE SEMI-PRIVATE ROOM

## 2025-03-19 PROCEDURE — 25000003 PHARM REV CODE 250: Performed by: INTERNAL MEDICINE

## 2025-03-19 PROCEDURE — 80053 COMPREHEN METABOLIC PANEL: CPT | Performed by: INTERNAL MEDICINE

## 2025-03-19 PROCEDURE — 94618 PULMONARY STRESS TESTING: CPT

## 2025-03-19 RX ORDER — PREDNISONE 20 MG/1
40 TABLET ORAL DAILY
Status: DISCONTINUED | OUTPATIENT
Start: 2025-03-20 | End: 2025-03-19

## 2025-03-19 RX ORDER — SYRING-NEEDL,DISP,INSUL,0.3 ML 29 G X1/2"
148 SYRINGE, EMPTY DISPOSABLE MISCELLANEOUS ONCE
Status: COMPLETED | OUTPATIENT
Start: 2025-03-19 | End: 2025-03-19

## 2025-03-19 RX ORDER — PREDNISONE 20 MG/1
20 TABLET ORAL DAILY
Status: DISCONTINUED | OUTPATIENT
Start: 2025-04-04 | End: 2025-03-20 | Stop reason: HOSPADM

## 2025-03-19 RX ORDER — PREDNISONE 20 MG/1
60 TABLET ORAL DAILY
Status: COMPLETED | OUTPATIENT
Start: 2025-03-19 | End: 2025-03-19

## 2025-03-19 RX ORDER — PREDNISONE 20 MG/1
60 TABLET ORAL DAILY
Status: DISCONTINUED | OUTPATIENT
Start: 2025-03-20 | End: 2025-03-20 | Stop reason: HOSPADM

## 2025-03-19 RX ORDER — FAMOTIDINE 20 MG/1
20 TABLET, FILM COATED ORAL 2 TIMES DAILY
Status: DISCONTINUED | OUTPATIENT
Start: 2025-03-19 | End: 2025-03-20 | Stop reason: HOSPADM

## 2025-03-19 RX ORDER — PREDNISONE 20 MG/1
40 TABLET ORAL DAILY
Status: DISCONTINUED | OUTPATIENT
Start: 2025-03-30 | End: 2025-03-20 | Stop reason: HOSPADM

## 2025-03-19 RX ORDER — LEVOFLOXACIN 750 MG/1
750 TABLET ORAL DAILY
Status: COMPLETED | OUTPATIENT
Start: 2025-03-19 | End: 2025-03-19

## 2025-03-19 RX ADMIN — SPIRONOLACTONE 25 MG: 25 TABLET, FILM COATED ORAL at 09:03

## 2025-03-19 RX ADMIN — Medication 6 MG: at 08:03

## 2025-03-19 RX ADMIN — FAMOTIDINE 20 MG: 20 TABLET, FILM COATED ORAL at 08:03

## 2025-03-19 RX ADMIN — POLYETHYLENE GLYCOL 3350 17 G: 17 POWDER, FOR SOLUTION ORAL at 09:03

## 2025-03-19 RX ADMIN — BENZONATATE 100 MG: 100 CAPSULE ORAL at 09:03

## 2025-03-19 RX ADMIN — RIFAMPIN 600 MG: 300 CAPSULE ORAL at 04:03

## 2025-03-19 RX ADMIN — LEVALBUTEROL HYDROCHLORIDE 1.25 MG: 1.25 SOLUTION RESPIRATORY (INHALATION) at 07:03

## 2025-03-19 RX ADMIN — FAMOTIDINE 20 MG: 20 TABLET, FILM COATED ORAL at 09:03

## 2025-03-19 RX ADMIN — LEVOFLOXACIN 750 MG: 750 TABLET, FILM COATED ORAL at 09:03

## 2025-03-19 RX ADMIN — LACTULOSE 20 G: 20 SOLUTION ORAL at 06:03

## 2025-03-19 RX ADMIN — BUPROPION HYDROCHLORIDE 300 MG: 150 TABLET, EXTENDED RELEASE ORAL at 09:03

## 2025-03-19 RX ADMIN — PREDNISONE 60 MG: 20 TABLET ORAL at 09:03

## 2025-03-19 RX ADMIN — VALSARTAN 160 MG: 80 TABLET, FILM COATED ORAL at 06:03

## 2025-03-19 RX ADMIN — AZITHROMYCIN DIHYDRATE 500 MG: 250 TABLET ORAL at 06:03

## 2025-03-19 RX ADMIN — QUETIAPINE 25 MG: 25 TABLET ORAL at 08:03

## 2025-03-19 RX ADMIN — ALPRAZOLAM 0.5 MG: 0.5 TABLET ORAL at 08:03

## 2025-03-19 RX ADMIN — FOLIC ACID 1000 MCG: 1 TABLET ORAL at 09:03

## 2025-03-19 RX ADMIN — METOPROLOL SUCCINATE 25 MG: 25 TABLET, EXTENDED RELEASE ORAL at 09:03

## 2025-03-19 RX ADMIN — ALPRAZOLAM 0.5 MG: 0.5 TABLET ORAL at 04:03

## 2025-03-19 RX ADMIN — MAGNESIUM CITRATE 148 ML: 1.75 LIQUID ORAL at 11:03

## 2025-03-19 RX ADMIN — ETHAMBUTOL HYDROCHLORIDE 1200 MG: 400 TABLET ORAL at 04:03

## 2025-03-19 NOTE — PROGRESS NOTES
Pulmonary/Critical Care Progress Note      Patient name: Sumit Burger  MRN: [id number]  Date: 03/19/2025    Admit Date: 3/12/2025  Consult Requested By: Alessandro Marx MD    Reason for Consult: pneumonia, sarcoid    HPI:    3/13/2025 - Pt known to Dr Rolon with sarcoid (dx about 3 years ago) usually on prednisone 10 mg/d started with some increased cough for a few days then the last day or so has develop worsened SOB, cough and in general feeling worse.  Came to ER for evaluation, WBC was elevated, LA ok.  CTA shows bilateral infiltrates which are actually similar to prior CTA chest done 12/2024 except some increase in RLL.  Procalcitonin is very low.  RUL lung biopsy + noncaseating granuloma 2020.  Sats are OK on 1 LPM.  ACE level has been elevated in the past.  She also has a ho SUZIE on treatment with Ethambutol/Rifampin/azithromycin    3/14- pt very anxious and tearful today. Feels sob especially with exertion. Cough is non productive.    3/18/- Patient reports some subtle improvements over the last couple of days.  She is still getting short of breath when she gets up and walks to the door.  She is not currently on any oxygen.    3/19/25- doing well today she is seeing overall improvement today.   Review of Systems    Review of Systems   Constitutional:  Positive for malaise/fatigue. Negative for chills, diaphoresis, fever and weight loss.   HENT:  Negative for congestion.    Eyes:  Negative for pain.   Respiratory:  Positive for cough and shortness of breath. Negative for hemoptysis, sputum production, wheezing and stridor.    Cardiovascular:  Negative for chest pain, palpitations, orthopnea, claudication, leg swelling and PND.   Gastrointestinal:  Positive for nausea. Negative for abdominal pain, constipation, diarrhea, heartburn and vomiting.        Decreased appetite   Genitourinary:  Negative for dysuria, frequency and urgency.   Musculoskeletal:  Negative for falls and myalgias.   Neurological:   Negative for sensory change, focal weakness and weakness.   Psychiatric/Behavioral:  Negative for depression. The patient is not nervous/anxious.        Past Medical History    Past Medical History:   Diagnosis Date    HTN (hypertension)     Pneumonia 10/2020    Sarcoidosis        Past Surgical History    Past Surgical History:   Procedure Laterality Date    BRONCHOALVEOLAR LAVAGE Bilateral 2024    Procedure: LAVAGE, BRONCHOALVEOLAR;  Surgeon: Chilango Zuluaga MD;  Location: [facility] ENDO;  Service: Pulmonary;  Laterality: Bilateral;    BRONCHOSCOPY WITH FLUOROSCOPY Right 10/20/2020    Procedure: BRONCHOSCOPY, WITH FLUOROSCOPY;  Surgeon: Ava Rolon MD;  Location: [facility] ENDO;  Service: Pulmonary;  Laterality: Right;     SECTION      x2    FALLOPIAN TUBOPLASTY      LAPAROSCOPIC APPENDECTOMY N/A 8/10/2022    Procedure: APPENDECTOMY, LAPAROSCOPIC;  Surgeon: Jackson Vogel MD;  Location: [facility] OR;  Service: General;  Laterality: N/A;    TUBAL LIGATION      WRIST FRACTURE SURGERY Right     ganglion cyst       Medications (scheduled):      azithromycin  500 mg Oral Every Mon, Wed, Fri    buPROPion  300 mg Oral Daily    enoxparin  40 mg Subcutaneous Q24H (prophylaxis, 1700)    ethambutoL  1,200 mg Oral Every Mon, Wed, Fri    famotidine  20 mg Oral BID    folic acid  1,000 mcg Oral Daily    levalbuterol  1.25 mg Nebulization Q6H WAKE    magnesium citrate  148 mL Oral Once    melatonin  6 mg Oral Nightly    metoprolol succinate  25 mg Oral Daily    polyethylene glycol  17 g Oral Daily    [START ON 3/20/2025] predniSONE  60 mg Oral Daily    Followed by    [START ON 3/25/2025] predniSONE  50 mg Oral Daily    Followed by    [START ON 3/30/2025] predniSONE  40 mg Oral Daily    Followed by    [START ON 2025] predniSONE  20 mg Oral Daily    QUEtiapine  25 mg Oral QHS    rifAMpin  600 mg Oral Every Mon, Wed, Fri    spironolactone  25 mg Oral Daily    valsartan  160 mg Oral QAM       Medications (infusions):  "        Medications (prn):       Current Facility-Administered Medications:     acetaminophen, 650 mg, Oral, Q8H PRN    acetaminophen, 650 mg, Oral, Q4H PRN    ALPRAZolam, 0.5 mg, Oral, TID PRN    aluminum-magnesium hydroxide-simethicone, 30 mL, Oral, QID PRN    benzonatate, 100 mg, Oral, TID PRN    cloNIDine, 0.1 mg, Oral, Q6H PRN    HYDROcodone-acetaminophen, 1 tablet, Oral, Q6H PRN    HYDROmorphone, 0.5 mg, Intravenous, Q6H PRN    lactulose, 20 g, Oral, Daily PRN    magnesium oxide, 800 mg, Oral, PRN    magnesium oxide, 800 mg, Oral, PRN    methocarbamoL, 500 mg, Oral, QID PRN    naloxone, 0.02 mg, Intravenous, PRN    ondansetron, 4 mg, Intravenous, Q6H PRN    potassium bicarbonate, 35 mEq, Oral, PRN    potassium bicarbonate, 50 mEq, Oral, PRN    potassium bicarbonate, 60 mEq, Oral, PRN    potassium, sodium phosphates, 2 packet, Oral, PRN    potassium, sodium phosphates, 2 packet, Oral, PRN    potassium, sodium phosphates, 2 packet, Oral, PRN    Family History:   Family History   Problem Relation Name Age of Onset    Hypertension Mother         Social History: Tobacco: Tobacco Use History[1]                             EtOH:   Social History     Substance and Sexual Activity   Alcohol Use Not Currently                                Drugs:   Social History     Substance and Sexual Activity   Drug Use Never       Physical Exam    Vital signs:  Temp:  [98 °F (36.7 °C)-99.1 °F (37.3 °C)]   Pulse:  [63-94]   Resp:  [16-18]   BP: (143-181)/()   SpO2:  [99 %-100 %]     Intake/Output:   Intake/Output Summary (Last 24 hours) at 3/19/2025 1356  Last data filed at 3/19/2025 1306  Gross per 24 hour   Intake 450 ml   Output --   Net 450 ml        BMI: Estimated body mass index is 25.33 kg/m² as calculated from the following:    Height as of this encounter: 5' 3" (1.6 m).    Weight as of this encounter: 64.9 kg (142 lb 15.9 oz).    Physical Exam  Vitals and nursing note reviewed.   Constitutional:       General: She is " not in acute distress.     Appearance: Normal appearance. She is not ill-appearing, toxic-appearing or diaphoretic.   HENT:      Head: Normocephalic and atraumatic.      Right Ear: External ear normal.      Left Ear: External ear normal.      Nose: Nose normal. No congestion or rhinorrhea.      Mouth/Throat:      Mouth: Mucous membranes are moist.      Pharynx: Oropharynx is clear. No oropharyngeal exudate or posterior oropharyngeal erythema.   Eyes:      General: No scleral icterus.        Right eye: No discharge.         Left eye: No discharge.      Extraocular Movements: Extraocular movements intact.      Conjunctiva/sclera: Conjunctivae normal.      Pupils: Pupils are equal, round, and reactive to light.   Neck:      Vascular: No carotid bruit.   Cardiovascular:      Rate and Rhythm: Normal rate and regular rhythm.      Pulses: Normal pulses.      Heart sounds: No murmur heard.     No friction rub. No gallop.   Pulmonary:      Effort: Pulmonary effort is normal. No respiratory distress.      Breath sounds: No stridor. Rales present. No wheezing or rhonchi.   Chest:      Chest wall: No tenderness.   Abdominal:      General: Bowel sounds are normal. There is no distension.      Tenderness: There is no abdominal tenderness. There is no guarding.   Musculoskeletal:         General: No swelling. Normal range of motion.      Cervical back: Normal range of motion and neck supple. No rigidity or tenderness.      Right lower leg: No edema.      Left lower leg: No edema.   Lymphadenopathy:      Cervical: No cervical adenopathy.   Skin:     General: Skin is warm and dry.      Capillary Refill: Capillary refill takes less than 2 seconds.      Coloration: Skin is not jaundiced.      Findings: No bruising.   Neurological:      General: No focal deficit present.      Mental Status: She is alert and oriented to person, place, and time. Mental status is at baseline.      Cranial Nerves: No cranial nerve deficit.      Sensory: No  "sensory deficit.      Motor: No weakness.   Psychiatric:         Behavior: Behavior normal.         Thought Content: Thought content normal.         Judgment: Judgment normal.      Comments: Tearful and anxious         Laboratory    Recent Labs   Lab 03/19/25  0448   WBC 11.16   RBC 5.11   HGB 13.8   HCT 44.4      MCV 87   MCH 27.0   MCHC 31.1*       Recent Labs   Lab 03/19/25  0448   CALCIUM 9.9   ALBUMIN 3.8   PROT 7.2      K 3.7   CO2 26      BUN 12   CREATININE 0.7   ALKPHOS 70   ALT 23   AST 13   BILITOT 0.2     6/24/24-   AFB Culture & Smear Notified Dr. Zuluaga via [employer] secure chat and acknowledged on 7/31/24   AFB Culture & Smear at 16:32; CJD   AFB Culture & Smear  Abnormal   MYCOBACTERIUM AVIUM COMPLEX  Testing performed by:  [facility] [address] [address]  Dir: Vika England MD    AFB CULTURE STAIN No acid fast bacilli seen.   AFB CULTURE STAIN Testing performed by:   AFB CULTURE STAIN [facility]   [facility] [address] [facility]   [place] [address]   AFB CULTURE STAIN Dr. Rm Lake MD   Resulting Agency [facility]        Susceptibility              Amikacin 32.0 mcg/mL Intermediate      Ciprofloxacin 2.0 mcg/mL      Clarithromycin >64.0 mcg/mL Resistant 1     Linezolid 16.0 mcg/mL Intermediate     Minocycline 8.0 mcg/mL      Moxifloxacin 0.5 mcg/mL Sensitive     Rifampin >4.0 mcg/mL      Streptomycin 32.0 mcg/mL      Trimeth/Sulfa 4/76 mcg/mL           No results for input(s): "PT", "INR", "APTT" in the last 24 hours.    No results for input(s): "CPK", "CPKMB", "TROPONINI", "MB" in the last 24 hours.    Additional labs: reviewed    Microbiology:       Microbiology Results (last 7 days)       Procedure Component Value Units Date/Time    AFB Culture & Smear [[id number]] Collected: 03/17/25 0628    Order Status: Completed Specimen: Sputum, Expectorated Updated: 03/19/25 0506     AFB CULTURE STAIN No acid fast " bacilli seen.     AFB CULTURE STAIN Testing performed by:     AFB CULTURE STAIN [facility] [place]     [facility] [address] [facility]     [place] [address]     AFB CULTURE STAIN Dr. Rm Lake MD    Narrative:      Use sputum induction by respiratory therapist    AFB Culture & Smear [[id number]] Collected: 03/16/25 1453    Order Status: Completed Specimen: Sputum, Expectorated Updated: 03/19/25 0502     AFB CULTURE STAIN No acid fast bacilli seen.    Narrative:      Use sputum induction by respiratory therapist    Blood culture x two cultures. Draw prior to antibiotics. [[id number]] Collected: 03/12/25 1054    Order Status: Completed Specimen: Blood from Peripheral, Hand, Left Updated: 03/17/25 1232     Blood Culture, Routine No growth after 5 days.    Narrative:      Aerobic and anaerobic  Collection has been rescheduled by RE1 at 03/12/2025 10:56 Reason:   Done  Collection has been rescheduled by RE1 at 03/12/2025 10:56 Reason:   Done    Blood culture x two cultures. Draw prior to antibiotics. [[id number]] Collected: 03/12/25 1055    Order Status: Completed Specimen: Blood from Peripheral, Hand, Right Updated: 03/17/25 1232     Blood Culture, Routine No growth after 5 days.    Narrative:      Aerobic and anaerobic    Culture, Respiratory with Gram Stain [[id number]] Collected: 03/16/25 1453    Order Status: Completed Specimen: Sputum Updated: 03/17/25 0626     Respiratory Culture Specimen inadequate - culture not performed      Spoke to Gloria Freedman [id number] for recollect     Gram Stain (Respiratory) >10 epithelial cells per low power field     Gram Stain (Respiratory) Rare WBC's     Gram Stain (Respiratory) Moderate Gram positive cocci     Gram Stain (Respiratory) Few Gram negative rods     Gram Stain (Respiratory) Predominance of oropharyngeal lashell. Please recollect.    Respiratory Infection Panel (PCR), Nasopharyngeal [[id number]] Collected: 03/15/25 0506    Order  Status: Completed Specimen: Nasopharyngeal Swab Updated: 03/15/25 0819     Respiratory Infection Panel Source NP swab     Adenovirus Not Detected     Coronavirus 229E, Common Cold Virus Not Detected     Coronavirus HKU1, Common Cold Virus Not Detected     Coronavirus NL63, Common Cold Virus Not Detected     Coronavirus OC43, Common Cold Virus Not Detected     Comment: Coronavirus strains 229E, HKU1, NL63, and OC43 can cause the common   cold   and are not associated with the respiratory disease outbreak caused   by  the COVID-19 (SARS-CoV-2 novel Coronavirus) strain.           SARS-CoV2 (COVID-19) Qualitative PCR Not Detected     Human Metapneumovirus Not Detected     Human Rhinovirus/Enterovirus Not Detected     Influenza A Not Detected     Influenza B Not Detected     Parainfluenza Virus 1 Not Detected     Parainfluenza Virus 2 Not Detected     Parainfluenza Virus 3 Not Detected     Parainfluenza Virus 4 Not Detected     Respiratory Syncytial Virus Not Detected     Bordetella Parapertussis (RZ5008) Not Detected     Bordetella pertussis (ptxP) Not Detected     Chlamydia pneumoniae Not Detected     Mycoplasma pneumoniae Not Detected     Comment: Respiratory Infection Panel testing performed by Multiplex PCR.       Narrative:      Respiratory Infection Panel source->NP Swab    AFB Culture & Smear [[id number]] Resulted: 03/15/25 0646    Order Status: No result Specimen: Sputum Updated: 03/15/25 0646    Afb Culture Stain [[id number]] Resulted: 03/15/25 0646    Order Status: No result Specimen: Sputum Updated: 03/15/25 0646    Culture, Respiratory with Gram Stain [[id number]] Collected: 03/15/25 0623    Order Status: Canceled Specimen: Sputum, Induced     AFB Culture & Smear [[id number]] Collected: 03/15/25 0623    Order Status: Canceled Specimen: Sputum, Induced             Radiology    CTA Chest Non-Coronary (PE Studies)  Narrative: CMS MANDATED QUALITY DATA - CT RADIATION - 436    All CT scans at this facility  utilize dose modulation, iterative reconstruction, and/or weight based dosing when appropriate to reduce radiation dose to as low as reasonably achievable.    CLINICAL HISTORY:  ([id number])53 y/o  (1970) F    Pulmonary embolism (PE) suspected, high prob;    TECHNIQUE:  (A#[id number], exam time 3/12/2025 12:47)    CTA CHEST NON CORONARY (PE STUDIES) [id number]    Axial CT examination of the chest with attention to the pulmonary arteries was performed using contiguous axial images from the diaphragms to the lung apices following the intravenous administration of non-ionic contrast material. Images were reviewed using lung, mediastinal, and bone windows. The study was performed with thin sections with subsequent 3-D reformats/MIP/reconstructions in multiple planes.    COMPARISON:  Radiograph from 02/25/2025, CT from 12/08/2024.    FINDINGS:  CTA PE evaluation: This is a moderate quality study for the evaluation of pulmonary embolism secondary to a combination of contrast bolus timing and motion artifact. The pulmonary arteries are normal in appearance without pulmonary emboli noted up to the segmental level, noting the limitations of CT technique for identifying small or isolated subsegmental emboli.    CT Chest:    Visualized neck: normal    Lungs: Moderate mosaic ground-glass attenuation opacity is seen throughout a majority of the right upper lobe, and left upper lobe, similar to the previous CT, with new mosaic ground-glass attenuation opacity throughout the posterior segment of the right lower lobe (image 207), new from the previous exam.  Mild faint central hilar interstitial opacity is present bilaterally.    Airway: Mild bronchiectasis is present with a central hilar predominance.    Pleura: There is no pleural effusion. There is no pneumothorax.    Cardiovascular: The heart is top-normal in size.  No pericardial effusion is seen.  The great vessels are normal in course and caliber.    Mediastinum: No  "pathologic lymphadenopathy is seen.    Soft tissues: normal    Musculoskeletal: There are mild degenerative changes of the thoracic spine.  There are no suspicious osseous lesions.    Esophagus: normal    Upper Abdomen: No acute abnormality in the upper abdomen.  Impression: 1. No evidence of pulmonary embolism to the segmental arterial level. If there is continued clinical concern, consider further evaluation with lower extremity doppler.    2.  Multifocal geographic areas of airspace opacity and mosaic attenuation most pronounced in the upper lobes, and slightly worse in the right lower lobe suggesting a combination of multifocal infection/pneumonia, interstitial lung disease (e.g sarcoid, hypersensitivity pneumonitis, chronic covid, RB-ILD?), and possibly a component of mild interstitial edema.    Electronically signed by: Jhoan Bruno  Date:    03/12/2025  Time:    12:58  X-Ray Chest AP Portable  Narrative: CLINICAL HISTORY:  ([id number])53 y/o  (1970) F    Chest Pain;    TECHNIQUE:  (A#[id number], exam time 3/12/2025 11:00)    XR CHEST AP PORTABLE [id number]    COMPARISON:  Radiograph from 02/25/2025.    FINDINGS:  Mildly increased central hilar interstitial opacities are seen bilaterally suggestive of either mild edema  atypical infection/pneumonia or bronchitis in the appropriate clinical setting. No consolidative pneumonia is seen. Patchy airspace opacities seen in the right upper lobe, similar to the previous exam.  No pneumothorax is identified. The heart is normal in size. The mediastinum is within normal limits. Osseous structures appear within normal limits. The visualized upper abdomen is unremarkable.  Impression: Unchanged radiograph of the chest when accounting for differences in imaging technique.    Electronically signed by: Jhoan Bruno  Date:    03/12/2025  Time:    11:02        Additional Studies    reviwed    Ventilator Information                  No results for input(s): "PH", "PCO2", " ""PO2", "HCO3", "POCSATURATED", "BE" in the last 72 hours.      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  She reports that she has been on steroid sparing agents in the past   Primarily suspect that this is a sarcoid flare- she is improving with steroid treatment     Plan    Continue steroid taper, I have placed a prolonged taper and she should continue this after discharge.   Follow up with Dr Rolon in clinic. She asked for a second opinion on her management and I provided her some information regarding that and where to seek ILD specialists- specifically I think may be time to consider steroid sparing immune suppression.    Continue ethambutol/rifampin/azithromycin   Complete 5 total days of Levaquin them.  Likely can discharge home tomorrow       Chilango Zuluaga MD  Pulmonary & Critical Care Medicine             [1]   Social History  Tobacco Use   Smoking Status Former    Current packs/day: 0.00    Types: Cigarettes    Quit date: 2020    Years since quittin.9   Smokeless Tobacco Never   Tobacco Comments    ocassionally never was qd     "

## 2025-03-19 NOTE — ASSESSMENT & PLAN NOTE
Unlikely bacterial component  Continue iv Levofloxacin and azithromycin per pulmonology recs  Continue iv solumedrol per pulmonology recs  Seen by pulmonology      Antibiotics (From admission, onward)      Start     Stop Route Frequency Ordered    03/12/25 1700  azithromycin tablet 500 mg         -- Oral Every Mon, Wed, Fri 03/12/25 1330    03/12/25 1700  ethambutoL tablet 1,200 mg         -- Oral Every Mon, Wed, Fri 03/12/25 1330    03/12/25 1700  rifAMpin capsule 600 mg         -- Oral Every Mon, Wed, Fri 03/12/25 1330            Microbiology Results (last 7 days)       Procedure Component Value Units Date/Time    AFB Culture & Smear [[id number]] Collected: 03/17/25 0628    Order Status: Completed Specimen: Sputum, Expectorated Updated: 03/19/25 0507     AFB CULTURE STAIN No acid fast bacilli seen.     AFB CULTURE STAIN Testing performed by:     AFB CULTURE STAIN Lab [facility]     AFB CULTURE STAIN [address] [facility]     [place] [address]     AFB CULTURE STAIN Dr. Rm Lake MD    Narrative:      Use sputum induction by respiratory therapist    AFB Culture & Smear [[id number]] Collected: 03/16/25 1453    Order Status: Completed Specimen: Sputum, Expectorated Updated: 03/19/25 0502     AFB CULTURE STAIN No acid fast bacilli seen.    Narrative:      Use sputum induction by respiratory therapist    Blood culture x two cultures. Draw prior to antibiotics. [[id number]] Collected: 03/12/25 1054    Order Status: Completed Specimen: Blood from Peripheral, Hand, Left Updated: 03/17/25 1232     Blood Culture, Routine No growth after 5 days.    Narrative:      Aerobic and anaerobic  Collection has been rescheduled by RE1 at 03/12/2025 10:56 Reason:   Done  Collection has been rescheduled by RE1 at 03/12/2025 10:56 Reason:   Done    Blood culture x two cultures. Draw prior to antibiotics. [[id number]] Collected: 03/12/25 1055    Order Status: Completed Specimen: Blood from Peripheral,  Hand, Right Updated: 03/17/25 1232     Blood Culture, Routine No growth after 5 days.    Narrative:      Aerobic and anaerobic    Culture, Respiratory with Gram Stain [[id number]] Collected: 03/16/25 1453    Order Status: Completed Specimen: Sputum Updated: 03/17/25 0626     Respiratory Culture Specimen inadequate - culture not performed      Spoke to Gloria Freedman [id number] for recollect     Gram Stain (Respiratory) >10 epithelial cells per low power field     Gram Stain (Respiratory) Rare WBC's     Gram Stain (Respiratory) Moderate Gram positive cocci     Gram Stain (Respiratory) Few Gram negative rods     Gram Stain (Respiratory) Predominance of oropharyngeal lashell. Please recollect.    Respiratory Infection Panel (PCR), Nasopharyngeal [[id number]] Collected: 03/15/25 0506    Order Status: Completed Specimen: Nasopharyngeal Swab Updated: 03/15/25 0819     Respiratory Infection Panel Source NP swab     Adenovirus Not Detected     Coronavirus 229E, Common Cold Virus Not Detected     Coronavirus HKU1, Common Cold Virus Not Detected     Coronavirus NL63, Common Cold Virus Not Detected     Coronavirus OC43, Common Cold Virus Not Detected     Comment: Coronavirus strains 229E, HKU1, NL63, and OC43 can cause the common   cold   and are not associated with the respiratory disease outbreak caused   by  the COVID-19 (SARS-CoV-2 novel Coronavirus) strain.           SARS-CoV2 (COVID-19) Qualitative PCR Not Detected     Human Metapneumovirus Not Detected     Human Rhinovirus/Enterovirus Not Detected     Influenza A Not Detected     Influenza B Not Detected     Parainfluenza Virus 1 Not Detected     Parainfluenza Virus 2 Not Detected     Parainfluenza Virus 3 Not Detected     Parainfluenza Virus 4 Not Detected     Respiratory Syncytial Virus Not Detected     Bordetella Parapertussis ([id number]) Not Detected     Bordetella pertussis (ptxP) Not Detected     Chlamydia pneumoniae Not Detected     Mycoplasma pneumoniae Not Detected      Comment: Respiratory Infection Panel testing performed by Multiplex PCR.       Narrative:      Respiratory Infection Panel source->NP Swab    AFB Culture & Smear [[id number]] Resulted: 03/15/25 0646    Order Status: No result Specimen: Sputum Updated: 03/15/25 0646    Afb Culture Stain [[id number]] Resulted: 03/15/25 0646    Order Status: No result Specimen: Sputum Updated: 03/15/25 0646    Culture, Respiratory with Gram Stain [[id number]] Collected: 03/15/25 0623    Order Status: Canceled Specimen: Sputum, Induced     AFB Culture & Smear [[id number]] Collected: 03/15/25 0623    Order Status: Canceled Specimen: Sputum, Induced

## 2025-03-19 NOTE — PROGRESS NOTES
[facility]  Progress Note    Patient Name: Sumit Burger  MRN: [id number]  Patient Class: IP- Inpatient   Admission Date: 3/12/2025  Length of Stay: 7 days  Attending Physician: Alessandro Marx MD  Primary Care Provider: Trisha, Provider        Subjective     Principal Problem:Sarcoidosis        HPI:  54 Year old pt with H/o Sarcoidosis/SUZIE getting admitted with possible acute Pneumonia  Symptom s started with cough few days ago   This was followed by SOB mainly on exertion   Later she developed chest discomfort and symptoms worsened   She came to ER and got admitted  Per records:She was admitted with same c/o on December 2024 and Pneumonia was r/o at that time   She had CTA Today in ER   Compared to previous CTA In December 2024 :new mosaic ground-glass attenuation opacity throughout the posterior segment of the right lower lobe , new from the previous exam     Overview/Hospital Course:  Ms. Burger has been monitored closely during her hospitalization. She was admitted on 3/12/25 for amyloidosis flare with concerns of pna. She was empirically started on levaquin and high dose steroids. Pulm has been consulted. She has known SUZIE and is currently on ethambutol, rifampin, and azithro, but there was question of macrolide resistance and ID was consulted per pulm recs. No changes in abx regimen recommended. Procal has been negative x2. Resp infection panel negative. AFB x3 in process. Histoplasma collected and pending. TB gold pending. CTA chest negative for PE, shows worsening of mosaic attenuation in the upper lobes. WBC peaked at 18K in the setting of high dose IV steroids, and has trended down. Solumedrol 80mg q8h for 4 days, decreased to 40mg q8h on 3/17 and transitioned to PO on 3/19. 6 minute walk test completed and does not qualify for home O2    Interval History: feeling better, better control of BP. Weaned to oral steroids today. If tolerates, can likely d/c in  AM    Review of Systems   Constitutional:  Positive for fatigue.   Respiratory:  Positive for chest tightness and shortness of breath.    Neurological:  Positive for weakness.     Objective:     Vital Signs (Most Recent):  Temp: 98.3 °F (36.8 °C) (03/19/25 1145)  Pulse: 94 (03/19/25 1145)  Resp: 17 (03/19/25 1145)  BP: (!) 152/74 (03/19/25 1145)  SpO2: 100 % (03/19/25 1145) Vital Signs (24h Range):  Temp:  [98 °F (36.7 °C)-99.1 °F (37.3 °C)] 98.3 °F (36.8 °C)  Pulse:  [63-94] 94  Resp:  [16-18] 17  SpO2:  [99 %-100 %] 100 %  BP: (143-181)/() 152/74     Weight: 64.9 kg (142 lb 15.9 oz)  Body mass index is 25.33 kg/m².    Intake/Output Summary (Last 24 hours) at 3/19/2025 1302  Last data filed at 3/19/2025 0952  Gross per 24 hour   Intake 200 ml   Output --   Net 200 ml         Physical Exam  Vitals and nursing note reviewed.   Constitutional:       General: She is not in acute distress.  HENT:      Head: Normocephalic and atraumatic.      Nose: Nose normal.      Mouth/Throat:      Mouth: Mucous membranes are moist.      Pharynx: Oropharynx is clear.   Eyes:      Conjunctiva/sclera: Conjunctivae normal.      Pupils: Pupils are equal, round, and reactive to light.   Cardiovascular:      Rate and Rhythm: Normal rate and regular rhythm.      Pulses: Normal pulses.   Pulmonary:      Effort: Pulmonary effort is normal.      Comments: Diminished throughout  Abdominal:      General: Bowel sounds are normal.      Palpations: Abdomen is soft.   Musculoskeletal:         General: Normal range of motion.      Cervical back: Normal range of motion and neck supple.   Skin:     General: Skin is warm and dry.      Capillary Refill: Capillary refill takes less than 2 seconds.   Neurological:      Mental Status: She is alert and oriented to person, place, and time. Mental status is at baseline.   Psychiatric:         Mood and Affect: Mood normal.         Behavior: Behavior normal.               Significant Labs: All pertinent  labs within the past 24 hours have been reviewed.  CBC:   Recent Labs   Lab 03/18/25  0454 03/19/25 0448   WBC 8.32 11.16   HGB 15.2 13.8   HCT 46.6 44.4    336     CMP:   Recent Labs   Lab 03/18/25  0454 03/19/25 0448    138   K 4.4 3.7    104   CO2 28 26   * 104   BUN 15 12   CREATININE 0.7 0.7   CALCIUM 10.3 9.9   PROT 7.9 7.2   ALBUMIN 4.3 3.8   BILITOT 0.4 0.2   ALKPHOS 72 70   AST 15 13   ALT 20 23   ANIONGAP 6* 8       Significant Imaging: I have reviewed all pertinent imaging results/findings within the past 24 hours.    CTA Chest Non-Coronary (PE Studies)  Result Date: 3/12/2025  CMS MANDATED QUALITY DATA - CT RADIATION - 436 All CT scans at this facility utilize dose modulation, iterative reconstruction, and/or weight based dosing when appropriate to reduce radiation dose to as low as reasonably achievable. CLINICAL HISTORY: ([id number])55 y/o  (1970) F Pulmonary embolism (PE) suspected, high prob; TECHNIQUE: (A#[id number], exam time 3/12/2025 12:47) CTA CHEST NON CORONARY (PE STUDIES) [id number] Axial CT examination of the chest with attention to the pulmonary arteries was performed using contiguous axial images from the diaphragms to the lung apices following the intravenous administration of non-ionic contrast material. Images were reviewed using lung, mediastinal, and bone windows. The study was performed with thin sections with subsequent 3-D reformats/MIP/reconstructions in multiple planes. COMPARISON: Radiograph from 02/25/2025, CT from 12/08/2024. FINDINGS: CTA PE evaluation: This is a moderate quality study for the evaluation of pulmonary embolism secondary to a combination of contrast bolus timing and motion artifact. The pulmonary arteries are normal in appearance without pulmonary emboli noted up to the segmental level, noting the limitations of CT technique for identifying small or isolated subsegmental emboli. CT Chest: Visualized neck: normal Lungs: Moderate mosaic  ground-glass attenuation opacity is seen throughout a majority of the right upper lobe, and left upper lobe, similar to the previous CT, with new mosaic ground-glass attenuation opacity throughout the posterior segment of the right lower lobe (image 207), new from the previous exam.  Mild faint central hilar interstitial opacity is present bilaterally. Airway: Mild bronchiectasis is present with a central hilar predominance. Pleura: There is no pleural effusion. There is no pneumothorax. Cardiovascular: The heart is top-normal in size.  No pericardial effusion is seen.  The great vessels are normal in course and caliber. Mediastinum: No pathologic lymphadenopathy is seen. Soft tissues: normal Musculoskeletal: There are mild degenerative changes of the thoracic spine.  There are no suspicious osseous lesions. Esophagus: normal Upper Abdomen: No acute abnormality in the upper abdomen.     1. No evidence of pulmonary embolism to the segmental arterial level. If there is continued clinical concern, consider further evaluation with lower extremity doppler. 2.  Multifocal geographic areas of airspace opacity and mosaic attenuation most pronounced in the upper lobes, and slightly worse in the right lower lobe suggesting a combination of multifocal infection/pneumonia, interstitial lung disease (e.g sarcoid, hypersensitivity pneumonitis, chronic covid, RB-ILD?), and possibly a component of mild interstitial edema. Electronically signed by: Jhoan Bruno Date:    03/12/2025 Time:    12:58    X-Ray Chest AP Portable  Result Date: 3/12/2025  CLINICAL HISTORY: ([id number])55 y/o  (1970) F Chest Pain; TECHNIQUE: (A#[id number], exam time 3/12/2025 11:00) XR CHEST AP PORTABLE [id number] COMPARISON: Radiograph from 02/25/2025. FINDINGS: Mildly increased central hilar interstitial opacities are seen bilaterally suggestive of either mild edema  atypical infection/pneumonia or bronchitis in the appropriate clinical setting. No  consolidative pneumonia is seen. Patchy airspace opacities seen in the right upper lobe, similar to the previous exam.  No pneumothorax is identified. The heart is normal in size. The mediastinum is within normal limits. Osseous structures appear within normal limits. The visualized upper abdomen is unremarkable.     Unchanged radiograph of the chest when accounting for differences in imaging technique. Electronically signed by: Jhoan Bruno Date:    03/12/2025 Time:    11:02        Assessment & Plan  Acute pneumonia  Unlikely bacterial component  Continue iv Levofloxacin and azithromycin per pulmonology recs  Continue iv solumedrol per pulmonology recs  Seen by pulmonology      Antibiotics (From admission, onward)      Start     Stop Route Frequency Ordered    03/12/25 1700  azithromycin tablet 500 mg         -- Oral Every Mon, Wed, Fri 03/12/25 1330    03/12/25 1700  ethambutoL tablet 1,200 mg         -- Oral Every Mon, Wed, Fri 03/12/25 1330    03/12/25 1700  rifAMpin capsule 600 mg         -- Oral Every Mon, Wed, Fri 03/12/25 1330            Microbiology Results (last 7 days)       Procedure Component Value Units Date/Time    AFB Culture & Smear [[id number]] Collected: 03/17/25 0628    Order Status: Completed Specimen: Sputum, Expectorated Updated: 03/19/25 0507     AFB CULTURE STAIN No acid fast bacilli seen.     AFB CULTURE STAIN Testing performed by:     AFB CULTURE STAIN Lab [facility] [place]     AFB CULTURE STAIN [address] [facility] [place] [address]     AFB CULTURE STAIN Dr. Rm Lake MD    Narrative:      Use sputum induction by respiratory therapist    AFB Culture & Smear [[id number]] Collected: 03/16/25 1453    Order Status: Completed Specimen: Sputum, Expectorated Updated: 03/19/25 0502     AFB CULTURE STAIN No acid fast bacilli seen.    Narrative:      Use sputum induction by respiratory therapist    Blood culture x two cultures. Draw prior to antibiotics.  [[id number]] Collected: 03/12/25 1054    Order Status: Completed Specimen: Blood from Peripheral, Hand, Left Updated: 03/17/25 1232     Blood Culture, Routine No growth after 5 days.    Narrative:      Aerobic and anaerobic  Collection has been rescheduled by RE1 at 03/12/2025 10:56 Reason:   Done  Collection has been rescheduled by RE1 at 03/12/2025 10:56 Reason:   Done    Blood culture x two cultures. Draw prior to antibiotics. [[id number]] Collected: 03/12/25 1055    Order Status: Completed Specimen: Blood from Peripheral, Hand, Right Updated: 03/17/25 1232     Blood Culture, Routine No growth after 5 days.    Narrative:      Aerobic and anaerobic    Culture, Respiratory with Gram Stain [[id number]] Collected: 03/16/25 1453    Order Status: Completed Specimen: Sputum Updated: 03/17/25 0626     Respiratory Culture Specimen inadequate - culture not performed      Spoke to Gloria Freedman [id number] for recollect     Gram Stain (Respiratory) >10 epithelial cells per low power field     Gram Stain (Respiratory) Rare WBC's     Gram Stain (Respiratory) Moderate Gram positive cocci     Gram Stain (Respiratory) Few Gram negative rods     Gram Stain (Respiratory) Predominance of oropharyngeal lashell. Please recollect.    Respiratory Infection Panel (PCR), Nasopharyngeal [[id number]] Collected: 03/15/25 0506    Order Status: Completed Specimen: Nasopharyngeal Swab Updated: 03/15/25 0819     Respiratory Infection Panel Source NP swab     Adenovirus Not Detected     Coronavirus 229E, Common Cold Virus Not Detected     Coronavirus HKU1, Common Cold Virus Not Detected     Coronavirus NL63, Common Cold Virus Not Detected     Coronavirus OC43, Common Cold Virus Not Detected     Comment: Coronavirus strains 229E, HKU1, NL63, and OC43 can cause the common   cold   and are not associated with the respiratory disease outbreak caused   by  the COVID-19 (SARS-CoV-2 novel Coronavirus) strain.           SARS-CoV2 (COVID-19) Qualitative  PCR Not Detected     Human Metapneumovirus Not Detected     Human Rhinovirus/Enterovirus Not Detected     Influenza A Not Detected     Influenza B Not Detected     Parainfluenza Virus 1 Not Detected     Parainfluenza Virus 2 Not Detected     Parainfluenza Virus 3 Not Detected     Parainfluenza Virus 4 Not Detected     Respiratory Syncytial Virus Not Detected     Bordetella Parapertussis (XZ1699) Not Detected     Bordetella pertussis (ptxP) Not Detected     Chlamydia pneumoniae Not Detected     Mycoplasma pneumoniae Not Detected     Comment: Respiratory Infection Panel testing performed by Multiplex PCR.       Narrative:      Respiratory Infection Panel source->NP Swab    AFB Culture & Smear [[id number]] Resulted: 03/15/25 0646    Order Status: No result Specimen: Sputum Updated: 03/15/25 0646    Afb Culture Stain [[id number]] Resulted: 03/15/25 0646    Order Status: No result Specimen: Sputum Updated: 03/15/25 0646    Culture, Respiratory with Gram Stain [[id number]] Collected: 03/15/25 0623    Order Status: Canceled Specimen: Sputum, Induced     AFB Culture & Smear [[id number]] Collected: 03/15/25 0623    Order Status: Canceled Specimen: Sputum, Induced           Sarcoidosis with flare  Admitted to med/tele  Concern of pna - but negative procal x2  Pulm recommends continuing levaquin for now  High dose steroids solumedrol 80mg q8h  weaned down over several days now on 60mg PO prednisone  Supplemental O2 as needed  On prednisone 10mg at home  Pulm consulted  SUZIE (mycobacterium avium-intracellulare)  Continue therapy per ID recommendation    Hypoxia (Resolved: 3/19/2025)  New onset     Acute hypoxic respiratory failure  Improved   Patient with Hypoxic Respiratory failure which is Acute.  she is not on home oxygen. Supplemental oxygen was provided and noted-      .   Signs/symptoms of respiratory failure include- respiratory distress. Contributing diagnoses includes - Pneumonia and sarcoidosis  Labs and images were  reviewed. Patient Has not had a recent ABG. Will treat underlying causes and adjust management of respiratory failure as follows- follow up pulmonology consult  Leukocytosis  Procalcitonin remains normal  Suspect Secondary to corticosteroid use    Essential hypertension  Patient's blood pressure range in the last 24 hours was: BP  Min: 143/80  Max: 181/112.The patient's inpatient anti-hypertensive regimen is listed below:  Current Antihypertensives  valsartan tablet 160 mg, Every morning, Oral  spironolactone tablet 25 mg, Daily, Oral  metoprolol succinate (TOPROL-XL) 24 hr tablet 25 mg, Daily, Oral  cloNIDine tablet 0.1 mg, Every 6 hours PRN, Oral    Plan  - BP is uncontrolled, will adjust as follows:  Removed calcium channel blocker, add an AV celina blocker secondary to increase heart rate and blood pressure.  - p.r.n. hydralazine.  VTE Risk Mitigation (From admission, onward)           Ordered     enoxaparin injection 40 mg  Every 24 hours         03/13/25 1902     IP VTE LOW RISK PATIENT  Once         03/12/25 1330     Place sequential compression device  Until discontinued         03/12/25 1330                    Discharge Planning   SHAKIRA: 3/20/2025     Code Status: Full Code   Medical Readiness for Discharge Date:   Discharge Plan A: Home with family                        Mallorie Pearson NP  Department of Hospital Medicine   [facility]

## 2025-03-19 NOTE — CARE UPDATE
03/19/25 0706   Patient Assessment/Suction   Level of Consciousness (AVPU) alert   Respiratory Effort Normal;Unlabored   Expansion/Accessory Muscles/Retractions no use of accessory muscles;no retractions;expansion symmetric   All Lung Fields Breath Sounds diminished   Rhythm/Pattern, Respiratory unlabored   Cough Frequency no cough   PRE-TX-O2   Device (Oxygen Therapy) room air   SpO2 100 %   Pulse Oximetry Type Intermittent   $ Pulse Oximetry - Multiple Charge Pulse Oximetry - Multiple   Pulse 80   Resp 18   Aerosol Therapy   $ Aerosol Therapy Charges Aerosol Treatment   Daily Review of Necessity (SVN) completed   Respiratory Treatment Status (SVN) given   Treatment Route (SVN) air;mask   Patient Position sitting in chair   Post Treatment Assessment (SVN) breath sounds unchanged   Signs of Intolerance (SVN) none   Breath Sounds Post-Respiratory Treatment   Throughout All Fields Post-Treatment All Fields   Throughout All Fields Post-Treatment no change   Post-treatment Heart Rate (beats/min) 77   Post-treatment Resp Rate (breaths/min) 17   Education   $ Education Bronchodilator;15 min

## 2025-03-19 NOTE — ASSESSMENT & PLAN NOTE
Admitted to med/tele  Concern of pna - but negative procal x2  Pulm recommends continuing levaquin for now  High dose steroids solumedrol 80mg q8h  weaned down over several days now on 60mg PO prednisone  Supplemental O2 as needed  On prednisone 10mg at home  Pulm consulted

## 2025-03-19 NOTE — CARE UPDATE
03/19/25 1229   Home Oxygen Qualification   $ Home O2 Qualification Pulmonary Stress Test/6 min walk   Room Air SpO2 At Rest 100 %   Room Air SpO2 During Ambulation 97 %   SpO2 Post Ambulation 100 %   Post Ambulation Heart Rate 84 bpm   Home O2 Eval Comments Patient does not need home oxygen

## 2025-03-19 NOTE — SUBJECTIVE & OBJECTIVE
Interval History: feeling better, better control of BP. Weaned to oral steroids today. If tolerates, can likely d/c in AM    Review of Systems   Constitutional:  Positive for fatigue.   Respiratory:  Positive for chest tightness and shortness of breath.    Neurological:  Positive for weakness.     Objective:     Vital Signs (Most Recent):  Temp: 98.3 °F (36.8 °C) (03/19/25 1145)  Pulse: 94 (03/19/25 1145)  Resp: 17 (03/19/25 1145)  BP: (!) 152/74 (03/19/25 1145)  SpO2: 100 % (03/19/25 1145) Vital Signs (24h Range):  Temp:  [98 °F (36.7 °C)-99.1 °F (37.3 °C)] 98.3 °F (36.8 °C)  Pulse:  [63-94] 94  Resp:  [16-18] 17  SpO2:  [99 %-100 %] 100 %  BP: (143-181)/() 152/74     Weight: 64.9 kg (142 lb 15.9 oz)  Body mass index is 25.33 kg/m².    Intake/Output Summary (Last 24 hours) at 3/19/2025 1302  Last data filed at 3/19/2025 0952  Gross per 24 hour   Intake 200 ml   Output --   Net 200 ml         Physical Exam  Vitals and nursing note reviewed.   Constitutional:       General: She is not in acute distress.  HENT:      Head: Normocephalic and atraumatic.      Nose: Nose normal.      Mouth/Throat:      Mouth: Mucous membranes are moist.      Pharynx: Oropharynx is clear.   Eyes:      Conjunctiva/sclera: Conjunctivae normal.      Pupils: Pupils are equal, round, and reactive to light.   Cardiovascular:      Rate and Rhythm: Normal rate and regular rhythm.      Pulses: Normal pulses.   Pulmonary:      Effort: Pulmonary effort is normal.      Comments: Diminished throughout  Abdominal:      General: Bowel sounds are normal.      Palpations: Abdomen is soft.   Musculoskeletal:         General: Normal range of motion.      Cervical back: Normal range of motion and neck supple.   Skin:     General: Skin is warm and dry.      Capillary Refill: Capillary refill takes less than 2 seconds.   Neurological:      Mental Status: She is alert and oriented to person, place, and time. Mental status is at baseline.   Psychiatric:          Mood and Affect: Mood normal.         Behavior: Behavior normal.               Significant Labs: All pertinent labs within the past 24 hours have been reviewed.  CBC:   Recent Labs   Lab 03/18/25 0454 03/19/25 0448   WBC 8.32 11.16   HGB 15.2 13.8   HCT 46.6 44.4    336     CMP:   Recent Labs   Lab 03/18/25 0454 03/19/25 0448    138   K 4.4 3.7    104   CO2 28 26   * 104   BUN 15 12   CREATININE 0.7 0.7   CALCIUM 10.3 9.9   PROT 7.9 7.2   ALBUMIN 4.3 3.8   BILITOT 0.4 0.2   ALKPHOS 72 70   AST 15 13   ALT 20 23   ANIONGAP 6* 8       Significant Imaging: I have reviewed all pertinent imaging results/findings within the past 24 hours.    CTA Chest Non-Coronary (PE Studies)  Result Date: 3/12/2025  CMS MANDATED QUALITY DATA - CT RADIATION - 436 All CT scans at this facility utilize dose modulation, iterative reconstruction, and/or weight based dosing when appropriate to reduce radiation dose to as low as reasonably achievable. CLINICAL HISTORY: ([id number])55 y/o  (1970) F Pulmonary embolism (PE) suspected, high prob; TECHNIQUE: (A#[id number], exam time 3/12/2025 12:47) CTA CHEST NON CORONARY (PE STUDIES) [id number] Axial CT examination of the chest with attention to the pulmonary arteries was performed using contiguous axial images from the diaphragms to the lung apices following the intravenous administration of non-ionic contrast material. Images were reviewed using lung, mediastinal, and bone windows. The study was performed with thin sections with subsequent 3-D reformats/MIP/reconstructions in multiple planes. COMPARISON: Radiograph from 02/25/2025, CT from 12/08/2024. FINDINGS: CTA PE evaluation: This is a moderate quality study for the evaluation of pulmonary embolism secondary to a combination of contrast bolus timing and motion artifact. The pulmonary arteries are normal in appearance without pulmonary emboli noted up to the segmental level, noting the limitations of CT  technique for identifying small or isolated subsegmental emboli. CT Chest: Visualized neck: normal Lungs: Moderate mosaic ground-glass attenuation opacity is seen throughout a majority of the right upper lobe, and left upper lobe, similar to the previous CT, with new mosaic ground-glass attenuation opacity throughout the posterior segment of the right lower lobe (image 207), new from the previous exam.  Mild faint central hilar interstitial opacity is present bilaterally. Airway: Mild bronchiectasis is present with a central hilar predominance. Pleura: There is no pleural effusion. There is no pneumothorax. Cardiovascular: The heart is top-normal in size.  No pericardial effusion is seen.  The great vessels are normal in course and caliber. Mediastinum: No pathologic lymphadenopathy is seen. Soft tissues: normal Musculoskeletal: There are mild degenerative changes of the thoracic spine.  There are no suspicious osseous lesions. Esophagus: normal Upper Abdomen: No acute abnormality in the upper abdomen.     1. No evidence of pulmonary embolism to the segmental arterial level. If there is continued clinical concern, consider further evaluation with lower extremity doppler. 2.  Multifocal geographic areas of airspace opacity and mosaic attenuation most pronounced in the upper lobes, and slightly worse in the right lower lobe suggesting a combination of multifocal infection/pneumonia, interstitial lung disease (e.g sarcoid, hypersensitivity pneumonitis, chronic covid, RB-ILD?), and possibly a component of mild interstitial edema. Electronically signed by: Jhoan Bruno Date:    03/12/2025 Time:    12:58    X-Ray Chest AP Portable  Result Date: 3/12/2025  CLINICAL HISTORY: ([id number])53 y/o  (1970) F Chest Pain; TECHNIQUE: (A#[id number], exam time 3/12/2025 11:00) XR CHEST AP PORTABLE [id number] COMPARISON: Radiograph from 02/25/2025. FINDINGS: Mildly increased central hilar interstitial opacities are seen bilaterally  suggestive of either mild edema  atypical infection/pneumonia or bronchitis in the appropriate clinical setting. No consolidative pneumonia is seen. Patchy airspace opacities seen in the right upper lobe, similar to the previous exam.  No pneumothorax is identified. The heart is normal in size. The mediastinum is within normal limits. Osseous structures appear within normal limits. The visualized upper abdomen is unremarkable.     Unchanged radiograph of the chest when accounting for differences in imaging technique. Electronically signed by: Jhoan Bruno Date:    03/12/2025 Time:    11:02

## 2025-03-19 NOTE — ASSESSMENT & PLAN NOTE
Patient's blood pressure range in the last 24 hours was: BP  Min: 143/80  Max: 181/112.The patient's inpatient anti-hypertensive regimen is listed below:  Current Antihypertensives  valsartan tablet 160 mg, Every morning, Oral  spironolactone tablet 25 mg, Daily, Oral  metoprolol succinate (TOPROL-XL) 24 hr tablet 25 mg, Daily, Oral  cloNIDine tablet 0.1 mg, Every 6 hours PRN, Oral    Plan  - BP is uncontrolled, will adjust as follows:  Removed calcium channel blocker, add an AV celina blocker secondary to increase heart rate and blood pressure.  - p.r.n. hydralazine.

## 2025-03-20 VITALS
HEIGHT: 63 IN | OXYGEN SATURATION: 100 % | SYSTOLIC BLOOD PRESSURE: 117 MMHG | HEART RATE: 78 BPM | TEMPERATURE: 98 F | RESPIRATION RATE: 17 BRPM | DIASTOLIC BLOOD PRESSURE: 83 MMHG | WEIGHT: 143 LBS | BODY MASS INDEX: 25.34 KG/M2

## 2025-03-20 LAB
ALBUMIN SERPL BCP-MCNC: 4.3 G/DL (ref 3.5–5.2)
ALP SERPL-CCNC: 81 U/L (ref 55–135)
ALT SERPL W/O P-5'-P-CCNC: 28 U/L (ref 10–44)
ANION GAP SERPL CALC-SCNC: 9 MMOL/L (ref 8–16)
AST SERPL-CCNC: 20 U/L (ref 10–40)
BASOPHILS # BLD AUTO: 0.03 K/UL (ref 0–0.2)
BASOPHILS NFR BLD: 0.3 % (ref 0–1.9)
BILIRUB SERPL-MCNC: 0.2 MG/DL (ref 0.1–1)
BUN SERPL-MCNC: 14 MG/DL (ref 6–20)
CALCIUM SERPL-MCNC: 9.8 MG/DL (ref 8.7–10.5)
CHLORIDE SERPL-SCNC: 101 MMOL/L (ref 95–110)
CO2 SERPL-SCNC: 25 MMOL/L (ref 23–29)
CREAT SERPL-MCNC: 0.7 MG/DL (ref 0.5–1.4)
DIFFERENTIAL METHOD BLD: ABNORMAL
EOSINOPHIL # BLD AUTO: 0.1 K/UL (ref 0–0.5)
EOSINOPHIL NFR BLD: 0.7 % (ref 0–8)
ERYTHROCYTE [DISTWIDTH] IN BLOOD BY AUTOMATED COUNT: 14.5 % (ref 11.5–14.5)
EST. GFR  (NO RACE VARIABLE): >60 ML/MIN/1.73 M^2
GLUCOSE SERPL-MCNC: 97 MG/DL (ref 70–110)
HCT VFR BLD AUTO: 54.1 % (ref 37–48.5)
HGB BLD-MCNC: 16.8 G/DL (ref 12–16)
IMM GRANULOCYTES # BLD AUTO: 0.05 K/UL (ref 0–0.04)
IMM GRANULOCYTES NFR BLD AUTO: 0.5 % (ref 0–0.5)
LYMPHOCYTES # BLD AUTO: 3.9 K/UL (ref 1–4.8)
LYMPHOCYTES NFR BLD: 38.2 % (ref 18–48)
MAGNESIUM SERPL-MCNC: 2.6 MG/DL (ref 1.6–2.6)
MCH RBC QN AUTO: 27.7 PG (ref 27–31)
MCHC RBC AUTO-ENTMCNC: 31.1 G/DL (ref 32–36)
MCV RBC AUTO: 89 FL (ref 82–98)
MONOCYTES # BLD AUTO: 0.7 K/UL (ref 0.3–1)
MONOCYTES NFR BLD: 7.1 % (ref 4–15)
NEUTROPHILS # BLD AUTO: 5.4 K/UL (ref 1.8–7.7)
NEUTROPHILS NFR BLD: 53.2 % (ref 38–73)
NRBC BLD-RTO: 0 /100 WBC
PLATELET # BLD AUTO: 225 K/UL (ref 150–450)
PMV BLD AUTO: 10 FL (ref 9.2–12.9)
POTASSIUM SERPL-SCNC: 3.8 MMOL/L (ref 3.5–5.1)
PROT SERPL-MCNC: 7.9 G/DL (ref 6–8.4)
RBC # BLD AUTO: 6.07 M/UL (ref 4–5.4)
SODIUM SERPL-SCNC: 135 MMOL/L (ref 136–145)
WBC # BLD AUTO: 10.12 K/UL (ref 3.9–12.7)

## 2025-03-20 PROCEDURE — 63600175 PHARM REV CODE 636 W HCPCS: Performed by: STUDENT IN AN ORGANIZED HEALTH CARE EDUCATION/TRAINING PROGRAM

## 2025-03-20 PROCEDURE — 25000003 PHARM REV CODE 250: Performed by: NURSE PRACTITIONER

## 2025-03-20 PROCEDURE — 25000242 PHARM REV CODE 250 ALT 637 W/ HCPCS: Performed by: HOSPITALIST

## 2025-03-20 PROCEDURE — 99900031 HC PATIENT EDUCATION (STAT)

## 2025-03-20 PROCEDURE — 36415 COLL VENOUS BLD VENIPUNCTURE: CPT | Performed by: INTERNAL MEDICINE

## 2025-03-20 PROCEDURE — 94761 N-INVAS EAR/PLS OXIMETRY MLT: CPT

## 2025-03-20 PROCEDURE — 25000003 PHARM REV CODE 250: Performed by: STUDENT IN AN ORGANIZED HEALTH CARE EDUCATION/TRAINING PROGRAM

## 2025-03-20 PROCEDURE — 83735 ASSAY OF MAGNESIUM: CPT | Performed by: INTERNAL MEDICINE

## 2025-03-20 PROCEDURE — 85025 COMPLETE CBC W/AUTO DIFF WBC: CPT | Performed by: INTERNAL MEDICINE

## 2025-03-20 PROCEDURE — 25000003 PHARM REV CODE 250: Performed by: INTERNAL MEDICINE

## 2025-03-20 PROCEDURE — 99900035 HC TECH TIME PER 15 MIN (STAT)

## 2025-03-20 PROCEDURE — 80053 COMPREHEN METABOLIC PANEL: CPT | Performed by: INTERNAL MEDICINE

## 2025-03-20 RX ORDER — PREDNISONE 20 MG/1
TABLET ORAL
Qty: 40 TABLET | Refills: 0 | Status: SHIPPED | OUTPATIENT
Start: 2025-03-21 | End: 2025-04-09

## 2025-03-20 RX ORDER — DEXTROMETHORPHAN POLISTIREX 30 MG/5 ML
1 SUSPENSION, EXTENDED RELEASE 12 HR ORAL ONCE AS NEEDED
Status: DISCONTINUED | OUTPATIENT
Start: 2025-03-20 | End: 2025-03-20 | Stop reason: HOSPADM

## 2025-03-20 RX ORDER — ATOVAQUONE 750 MG/5ML
1500 SUSPENSION ORAL DAILY
Qty: 300 ML | Refills: 0 | Status: SHIPPED | OUTPATIENT
Start: 2025-03-20 | End: 2025-04-19

## 2025-03-20 RX ORDER — BENZONATATE 100 MG/1
100 CAPSULE ORAL 3 TIMES DAILY PRN
Qty: 30 CAPSULE | Refills: 0 | Status: SHIPPED | OUTPATIENT
Start: 2025-03-20 | End: 2025-03-30

## 2025-03-20 RX ORDER — ALPRAZOLAM 0.5 MG/1
0.5 TABLET ORAL DAILY PRN
Qty: 30 TABLET | Refills: 0 | Status: SHIPPED | OUTPATIENT
Start: 2025-03-20 | End: 2025-04-19

## 2025-03-20 RX ORDER — CLONIDINE HYDROCHLORIDE 0.1 MG/1
0.1 TABLET ORAL EVERY 8 HOURS PRN
Qty: 90 TABLET | Refills: 0 | Status: SHIPPED | OUTPATIENT
Start: 2025-03-20 | End: 2025-04-19

## 2025-03-20 RX ORDER — METOPROLOL SUCCINATE 25 MG/1
25 TABLET, EXTENDED RELEASE ORAL DAILY
Qty: 90 TABLET | Refills: 0 | Status: SHIPPED | OUTPATIENT
Start: 2025-03-21 | End: 2025-06-19

## 2025-03-20 RX ORDER — FAMOTIDINE 20 MG/1
20 TABLET, FILM COATED ORAL 2 TIMES DAILY
Qty: 28 TABLET | Refills: 0 | Status: SHIPPED | OUTPATIENT
Start: 2025-03-20 | End: 2025-04-03

## 2025-03-20 RX ORDER — BISACODYL 10 MG/1
10 SUPPOSITORY RECTAL ONCE
Status: COMPLETED | OUTPATIENT
Start: 2025-03-20 | End: 2025-03-20

## 2025-03-20 RX ADMIN — BISACODYL 10 MG: 10 SUPPOSITORY RECTAL at 10:03

## 2025-03-20 RX ADMIN — LEVALBUTEROL HYDROCHLORIDE 1.25 MG: 1.25 SOLUTION RESPIRATORY (INHALATION) at 01:03

## 2025-03-20 RX ADMIN — PREDNISONE 60 MG: 20 TABLET ORAL at 08:03

## 2025-03-20 RX ADMIN — METOPROLOL SUCCINATE 25 MG: 25 TABLET, EXTENDED RELEASE ORAL at 08:03

## 2025-03-20 RX ADMIN — VALSARTAN 160 MG: 80 TABLET, FILM COATED ORAL at 06:03

## 2025-03-20 RX ADMIN — POLYETHYLENE GLYCOL 3350 17 G: 17 POWDER, FOR SOLUTION ORAL at 08:03

## 2025-03-20 RX ADMIN — FAMOTIDINE 20 MG: 20 TABLET, FILM COATED ORAL at 10:03

## 2025-03-20 RX ADMIN — BUPROPION HYDROCHLORIDE 300 MG: 150 TABLET, EXTENDED RELEASE ORAL at 08:03

## 2025-03-20 RX ADMIN — CLONIDINE HYDROCHLORIDE 0.1 MG: 0.1 TABLET ORAL at 08:03

## 2025-03-20 RX ADMIN — SPIRONOLACTONE 25 MG: 25 TABLET, FILM COATED ORAL at 08:03

## 2025-03-20 RX ADMIN — LEVALBUTEROL HYDROCHLORIDE 1.25 MG: 1.25 SOLUTION RESPIRATORY (INHALATION) at 07:03

## 2025-03-20 RX ADMIN — LACTULOSE 20 G: 20 SOLUTION ORAL at 08:03

## 2025-03-20 RX ADMIN — FOLIC ACID 1000 MCG: 1 TABLET ORAL at 06:03

## 2025-03-20 NOTE — NURSING
Lactulose, & Miralax given on am med  pass, pt continue to C/o no BM. Suppository ordered per per NP orders, pt refuse to allow nurse to administer. Suppository and lubricant given to pt to administer.

## 2025-03-20 NOTE — DISCHARGE SUMMARY
[facility] Medicine  Discharge Summary      Patient Name: Sumit Burger  MRN: [id number]  ANA: [id number]  Patient Class: IP- Inpatient  Admission Date: 3/12/2025  Hospital Length of Stay: 8 days  Discharge Date and Time: 3/20/2025  3:04 PM  Attending Physician: No att. providers found   Discharging Provider: Mallorie Pearson NP  Primary Care Provider: Susan Wilson MD    Primary Care Team: Networked reference to record PCT     HPI:   54 Year old pt with H/o Sarcoidosis/SUZIE getting admitted with possible acute Pneumonia  Symptom s started with cough few days ago   This was followed by SOB mainly on exertion   Later she developed chest discomfort and symptoms worsened   She came to ER and got admitted  Per records:She was admitted with same c/o on December 2024 and Pneumonia was r/o at that time   She had CTA Today in ER   Compared to previous CTA In December 2024 :new mosaic ground-glass attenuation opacity throughout the posterior segment of the right lower lobe , new from the previous exam     * No surgery found *      Hospital Course:   Ms. Burger has been monitored closely during her hospitalization. She was admitted on 3/12/25 for amyloidosis flare with concerns of pna. She was empirically started on levaquin and high dose steroids. Pulm has been consulted. She has known SUZIE and is currently on ethambutol, rifampin, and azithro, but there was question of macrolide resistance and ID was consulted per pulm recs. No changes in abx regimen recommended. She completed 5 days of levaquin as recommended by pulm. Procal has been negative x2. Resp infection panel negative. AFB x3 in process. Histoplasma collected and pending. TB gold pending. CTA chest negative for PE, shows worsening of mosaic attenuation in the upper lobes. WBC peaked at 18K in the setting of high dose IV steroids, and has trended down. Solumedrol 80mg q8h for 4 days, decreased to 40mg q8h on 3/17 and transitioned to PO on  3/19. 6 minute walk test completed and does not qualify for home O2. Pulm placed orders for a long steroid taper that has been ordered and recommends Atovaquone for OI prophylaxis given sulfa allergy. She had constipation and after several laxatives, she finally had some relief. She had episodes of hypertension, worse with high dose steroids, and she was initiated on prn clonidine with improvement. She will be discharged with this prn only, as she will likely have lower blood pressure readings while at home. Recommend she bring her BP log to her PCP for further med management outpt. She reports improvement of panic on xanax, explained this should be used only to abort panic attacks and is not for daily use as it has addiction and abuse potential. She verbalizes understanding and will make an appt with her mental health provider for further management. She will have close f/u with pulm and her PCP. She was seen and examined on the date of discharge. Strict return prxn provided.     Goals of Care Treatment Preferences:  Code Status: Full Code      SDOH Screening:  The patient declined to be screened for utility difficulties, food insecurity, transport difficulties, housing insecurity, and interpersonal safety, so no concerns could be identified this admission.     Consults:   Consults (From admission, onward)          Status Ordering Provider     Inpatient consult to Infectious Diseases  Once        Provider:  Moises Funez MD    Completed NESTOR BROWN     Inpatient consult to Pulmonology  Once        Provider:  Jake Cohen MD    Completed NESTOR BROWN            Assessment & Plan  Acute pneumonia  Unlikely bacterial component  Continue iv Levofloxacin and azithromycin per pulmonology recs  Continue iv solumedrol per pulmonology recs  Seen by pulmonology      Antibiotics (From admission, onward)      Start     Stop Route Frequency Ordered    03/12/25 1700  azithromycin tablet 500 mg         --  Oral Every Mon, Wed, Fri 03/12/25 1330    03/12/25 1700  ethambutoL tablet 1,200 mg         -- Oral Every Mon, Wed, Fri 03/12/25 1330    03/12/25 1700  rifAMpin capsule 600 mg         -- Oral Every Mon, Wed, Fri 03/12/25 1330            Microbiology Results (last 7 days)       Procedure Component Value Units Date/Time    AFB Culture & Smear [[id number]] Collected: 03/17/25 0628    Order Status: Completed Specimen: Sputum, Expectorated Updated: 03/19/25 0507     AFB CULTURE STAIN No acid fast bacilli seen.     AFB CULTURE STAIN Testing performed by:     AFB CULTURE STAIN Lab [facility] [place]     [facility]     [place] [address]     AFB CULTURE STAIN Dr. Rm [place] MD    Narrative:      Use sputum induction by respiratory therapist    AFB Culture & Smear [[id number]] Collected: 03/16/25 1453    Order Status: Completed Specimen: Sputum, Expectorated Updated: 03/19/25 0502     AFB CULTURE STAIN No acid fast bacilli seen.    Narrative:      Use sputum induction by respiratory therapist    Blood culture x two cultures. Draw prior to antibiotics. [[id number]] Collected: 03/12/25 1054    Order Status: Completed Specimen: Blood from Peripheral, Hand, Left Updated: 03/17/25 1232     Blood Culture, Routine No growth after 5 days.    Narrative:      Aerobic and anaerobic  Collection has been rescheduled by RE1 at 03/12/2025 10:56 Reason:   Done  Collection has been rescheduled by RE1 at 03/12/2025 10:56 Reason:   Done    Blood culture x two cultures. Draw prior to antibiotics. [[id number]] Collected: 03/12/25 1055    Order Status: Completed Specimen: Blood from Peripheral, Hand, Right Updated: 03/17/25 1232     Blood Culture, Routine No growth after 5 days.    Narrative:      Aerobic and anaerobic    Culture, Respiratory with Gram Stain [[id number]] Collected: 03/16/25 1453    Order Status: Completed Specimen: Sputum Updated: 03/17/25 0626     Respiratory Culture Specimen  inadequate - culture not performed      Spoke to Gloria Freedman [id number] for recollect     Gram Stain (Respiratory) >10 epithelial cells per low power field     Gram Stain (Respiratory) Rare WBC's     Gram Stain (Respiratory) Moderate Gram positive cocci     Gram Stain (Respiratory) Few Gram negative rods     Gram Stain (Respiratory) Predominance of oropharyngeal lashell. Please recollect.    Respiratory Infection Panel (PCR), Nasopharyngeal [[id number]] Collected: 03/15/25 0506    Order Status: Completed Specimen: Nasopharyngeal Swab Updated: 03/15/25 0819     Respiratory Infection Panel Source NP swab     Adenovirus Not Detected     Coronavirus 229E, Common Cold Virus Not Detected     Coronavirus HKU1, Common Cold Virus Not Detected     Coronavirus NL63, Common Cold Virus Not Detected     Coronavirus OC43, Common Cold Virus Not Detected     Comment: Coronavirus strains 229E, HKU1, NL63, and OC43 can cause the common   cold   and are not associated with the respiratory disease outbreak caused   by  the COVID-19 (SARS-CoV-2 novel Coronavirus) strain.           SARS-CoV2 (COVID-19) Qualitative PCR Not Detected     Human Metapneumovirus Not Detected     Human Rhinovirus/Enterovirus Not Detected     Influenza A Not Detected     Influenza B Not Detected     Parainfluenza Virus 1 Not Detected     Parainfluenza Virus 2 Not Detected     Parainfluenza Virus 3 Not Detected     Parainfluenza Virus 4 Not Detected     Respiratory Syncytial Virus Not Detected     Bordetella Parapertussis (BM9817) Not Detected     Bordetella pertussis (ptxP) Not Detected     Chlamydia pneumoniae Not Detected     Mycoplasma pneumoniae Not Detected     Comment: Respiratory Infection Panel testing performed by Multiplex PCR.       Narrative:      Respiratory Infection Panel source->NP Swab    AFB Culture & Smear [[id number]] Resulted: 03/15/25 0646    Order Status: No result Specimen: Sputum Updated: 03/15/25 0646    Afb Culture Stain [[id number]]  Resulted: 03/15/25 0646    Order Status: No result Specimen: Sputum Updated: 03/15/25 0646    Culture, Respiratory with Gram Stain [[id number]] Collected: 03/15/25 0623    Order Status: Canceled Specimen: Sputum, Induced     AFB Culture & Smear [[id number]] Collected: 03/15/25 0623    Order Status: Canceled Specimen: Sputum, Induced           Sarcoidosis with flare  Admitted to med/tele  Concern of pna - but negative procal x2  Pulm recommends continuing levaquin for now  High dose steroids solumedrol 80mg q8h  weaned down over several days now on 60mg PO prednisone  Supplemental O2 as needed  On prednisone 10mg at home  Pulm consulted  SUZIE (mycobacterium avium-intracellulare)  Continue therapy per ID recommendation    Acute hypoxic respiratory failure  Improved   Patient with Hypoxic Respiratory failure which is Acute.  she is not on home oxygen. Supplemental oxygen was provided and noted-      .   Signs/symptoms of respiratory failure include- respiratory distress. Contributing diagnoses includes - Pneumonia and sarcoidosis  Labs and images were reviewed. Patient Has not had a recent ABG. Will treat underlying causes and adjust management of respiratory failure as follows- follow up pulmonology consult  Leukocytosis  Procalcitonin remains normal  Suspect Secondary to corticosteroid use    Essential hypertension  Patient's blood pressure range in the last 24 hours was: BP  Min: 117/83  Max: 170/65.The patient's inpatient anti-hypertensive regimen is listed below:  Current Antihypertensives  valsartan tablet 160 mg, Every morning, Oral  spironolactone tablet 25 mg, Daily, Oral  metoprolol succinate (TOPROL-XL) 24 hr tablet 25 mg, Daily, Oral  cloNIDine tablet 0.1 mg, Every 6 hours PRN, Oral  , Every 8 hours PRN, Oral  metoprolol succinate (TOPROL-XL) 24 hr tablet, Daily, Oral    Plan  - BP is uncontrolled, will adjust as follows:  Removed calcium channel blocker, add an AV celina blocker secondary to increase heart  rate and blood pressure.  - p.r.n. hydralazine.  Final Active Diagnoses:    Diagnosis Date Noted POA    PRINCIPAL PROBLEM:  Sarcoidosis with flare [D86.9] 11/03/2020 Yes    Leukocytosis [D72.829] 03/14/2025 Yes    Acute hypoxic respiratory failure [J96.01] 03/13/2025 Yes    Acute pneumonia [J18.9] 03/12/2025 Yes    SUZIE (mycobacterium avium-intracellulare) [A31.0] 12/08/2024 Yes    Essential hypertension [I10] 06/30/2023 Yes      Problems Resolved During this Admission:    Diagnosis Date Noted Date Resolved POA    Respiratory distress [R06.03] 03/12/2025 03/18/2025 Yes    Hypoxia [R09.02] 03/12/2025 03/19/2025 Yes       Discharged Condition: stable    Disposition: Home or Self Care    Follow Up:   Follow-up Information       Ava Rolon MD. Call in 1 week(s).    Specialties: Pulmonary Disease, Sleep Medicine  Why: Office staff will contact patient to schedule hospital follow up appt within 1 wk  Contact information:  [address]  [address]  [place] [address] [phone]               Susan Wilson MD. Call in 1 week(s).    Specialty: Family Medicine  Why: office staff will contact patient to schedule hospital follow up appt within 1 wk  Contact information:  [address]   [address]  [place] [address]  [phone]                           Patient Instructions:      Diet Adult Regular     Notify your health care provider if you experience any of the following:  temperature >100.4     Notify your health care provider if you experience any of the following:  persistent nausea and vomiting or diarrhea     Notify your health care provider if you experience any of the following:  severe uncontrolled pain     Notify your health care provider if you experience any of the following:  redness, tenderness, or signs of infection (pain, swelling, redness, odor or green/yellow discharge around incision site)     Notify your health care provider if you experience any of the following:  difficulty breathing or  increased cough     Notify your health care provider if you experience any of the following:  persistent dizziness, light-headedness, or visual disturbances     Activity as tolerated       Significant Diagnostic Studies: Labs: CMP   Recent Labs   Lab 03/19/25  0448 03/20/25  1030    135*   K 3.7 3.8    101   CO2 26 25    97   BUN 12 14   CREATININE 0.7 0.7   CALCIUM 9.9 9.8   PROT 7.2 7.9   ALBUMIN 3.8 4.3   BILITOT 0.2 0.2   ALKPHOS 70 81   AST 13 20   ALT 23 28   ANIONGAP 8 9    and CBC   Recent Labs   Lab 03/19/25  0448 03/20/25  1030   WBC 11.16 10.12   HGB 13.8 16.8*   HCT 44.4 54.1*    225     CTA Chest Non-Coronary (PE Studies)  Result Date: 3/12/2025  CMS MANDATED QUALITY DATA - CT RADIATION - 436 All CT scans at this facility utilize dose modulation, iterative reconstruction, and/or weight based dosing when appropriate to reduce radiation dose to as low as reasonably achievable. CLINICAL HISTORY: ([id number])55 y/o  (1970) F Pulmonary embolism (PE) suspected, high prob; TECHNIQUE: (A#[id number], exam time 3/12/2025 12:47) CTA CHEST NON CORONARY (PE STUDIES) [id number] Axial CT examination of the chest with attention to the pulmonary arteries was performed using contiguous axial images from the diaphragms to the lung apices following the intravenous administration of non-ionic contrast material. Images were reviewed using lung, mediastinal, and bone windows. The study was performed with thin sections with subsequent 3-D reformats/MIP/reconstructions in multiple planes. COMPARISON: Radiograph from 02/25/2025, CT from 12/08/2024. FINDINGS: CTA PE evaluation: This is a moderate quality study for the evaluation of pulmonary embolism secondary to a combination of contrast bolus timing and motion artifact. The pulmonary arteries are normal in appearance without pulmonary emboli noted up to the segmental level, noting the limitations of CT technique for identifying small or isolated  subsegmental emboli. CT Chest: Visualized neck: normal Lungs: Moderate mosaic ground-glass attenuation opacity is seen throughout a majority of the right upper lobe, and left upper lobe, similar to the previous CT, with new mosaic ground-glass attenuation opacity throughout the posterior segment of the right lower lobe (image 207), new from the previous exam.  Mild faint central hilar interstitial opacity is present bilaterally. Airway: Mild bronchiectasis is present with a central hilar predominance. Pleura: There is no pleural effusion. There is no pneumothorax. Cardiovascular: The heart is top-normal in size.  No pericardial effusion is seen.  The great vessels are normal in course and caliber. Mediastinum: No pathologic lymphadenopathy is seen. Soft tissues: normal Musculoskeletal: There are mild degenerative changes of the thoracic spine.  There are no suspicious osseous lesions. Esophagus: normal Upper Abdomen: No acute abnormality in the upper abdomen.     1. No evidence of pulmonary embolism to the segmental arterial level. If there is continued clinical concern, consider further evaluation with lower extremity doppler. 2.  Multifocal geographic areas of airspace opacity and mosaic attenuation most pronounced in the upper lobes, and slightly worse in the right lower lobe suggesting a combination of multifocal infection/pneumonia, interstitial lung disease (e.g sarcoid, hypersensitivity pneumonitis, chronic covid, RB-ILD?), and possibly a component of mild interstitial edema. Electronically signed by: Jhoan Bruno Date:    03/12/2025 Time:    12:58    X-Ray Chest AP Portable  Result Date: 3/12/2025  CLINICAL HISTORY: ([id number])55 y/o  (1970) F Chest Pain; TECHNIQUE: (A#[id number], exam time 3/12/2025 11:00) XR CHEST AP PORTABLE [id number] COMPARISON: Radiograph from 02/25/2025. FINDINGS: Mildly increased central hilar interstitial opacities are seen bilaterally suggestive of either mild edema  atypical  infection/pneumonia or bronchitis in the appropriate clinical setting. No consolidative pneumonia is seen. Patchy airspace opacities seen in the right upper lobe, similar to the previous exam.  No pneumothorax is identified. The heart is normal in size. The mediastinum is within normal limits. Osseous structures appear within normal limits. The visualized upper abdomen is unremarkable.     Unchanged radiograph of the chest when accounting for differences in imaging technique. Electronically signed by: Jhoan Bruno Date:    03/12/2025 Time:    11:02    X-Ray Chest PA And Lateral  Result Date: 2/25/2025  EXAMINATION: XR CHEST PA AND LATERAL CLINICAL HISTORY: Sarcoidosis of lung FINDINGS: PA and lateral chest 12/08/2024 shows normal cardiomediastinal silhouette. Stable diffuse interstitial and ground-glass opacities consistent with patient's history of sarcoidosis.  There are no new infiltrates pleural effusions or pneumothorax.  Pulmonary vasculature is normal. No acute osseous abnormality.     Stable diffuse interstitial and ground-glass opacities consistent with patient's history of sarcoidosis No new infiltrates Electronically signed by: Sherri Quintero Date:    02/25/2025 Time:    07:41        Pending Diagnostic Studies:       Procedure Component Value Units Date/Time    Histoplasma/Blastomyces Ag, Urine [[id number]] Collected: 03/15/25 0457    Order Status: Sent Lab Status: No result     Specimen: Urine            Medications:  Reconciled Home Medications:      Medication List        PAUSE taking these medications      amLODIPine 5 MG tablet  Wait to take this until your doctor or other care provider tells you to start again.  Commonly known as: NORVASC  Take 1 tablet (5 mg total) by mouth once daily.  What changed: how much to take            START taking these medications      ALPRAZolam 0.5 MG tablet  Commonly known as: XANAX  Take 1 tablet (0.5 mg total) by mouth daily as needed for Anxiety.     atovaquone  750 mg/5 mL Susp oral liquid  Commonly known as: MEPRON  Take 10 mLs (1,500 mg total) by mouth once daily. Take with food     benzonatate 100 MG capsule  Commonly known as: TESSALON  Take 1 capsule (100 mg total) by mouth 3 (three) times daily as needed for Cough.     cloNIDine 0.1 MG tablet  Commonly known as: CATAPRES  Take 1 tablet (0.1 mg total) by mouth every 8 (eight) hours as needed (top number >160 bottom number >100).     famotidine 20 MG tablet  Commonly known as: PEPCID  Take 1 tablet (20 mg total) by mouth 2 (two) times daily. for 14 days     metoprolol succinate 25 MG 24 hr tablet  Commonly known as: TOPROL-XL  Take 1 tablet (25 mg total) by mouth once daily.  Start taking on: March 21, 2025     predniSONE 20 MG tablet  Commonly known as: DELTASONE  Take 3 tablets (60 mg total) by mouth once daily for 4 days, THEN 2.5 tablets (50 mg total) once daily for 5 days, THEN 2 tablets (40 mg total) once daily for 5 days, THEN 1 tablet (20 mg total) once daily for 5 days.  Start taking on: March 21, 2025     pyridoxine (vitamin B6) 50 MG Tab  Commonly known as: B-6  Take 1 tablet (50 mg total) by mouth every Mon, Wed, Fri.            CHANGE how you take these medications      valsartan 160 MG tablet  Commonly known as: DIOVAN  Take 1 tablet (160 mg total) by mouth every evening.  What changed: when to take this            CONTINUE taking these medications      azithromycin 500 MG tablet  Commonly known as: ZITHROMAX  Take 1 tablet (500 mg total) by mouth every Mon, Wed, Fri.     buPROPion 300 MG 24 hr tablet  Commonly known as: WELLBUTRIN XL  Take 300 mg by mouth once daily.     ethambutoL 400 MG Tab  Commonly known as: MYAMBUTOL  Take 3 tablets (1,200 mg total) by mouth every Mon, Wed, Fri. Take every Monday Wednesday and Friday     folic acid 1 MG tablet  Commonly known as: FOLVITE  Take 1 tablet by mouth once daily.     rifAMpin 300 MG capsule  Commonly known as: RIFADIN  Take 2 capsules (600 mg total) by  mouth every Mon, Wed, Fri. Every Monday Wednesday and Friday     spironolactone 25 MG tablet  Commonly known as: ALDACTONE  Take 25 mg by mouth once daily.     zolpidem 5 MG Tab  Commonly known as: AMBIEN  Take 1 tablet (5 mg total) by mouth nightly as needed (insomnia).              Indwelling Lines/Drains at time of discharge:   Lines/Drains/Airways       None                   Time spent on the discharge of patient: 49 minutes         Mallorie Pearson NP  Department of Hospital Medicine  [facility]

## 2025-03-20 NOTE — ASSESSMENT & PLAN NOTE
Unlikely bacterial component  Continue iv Levofloxacin and azithromycin per pulmonology recs  Continue iv solumedrol per pulmonology recs  Seen by pulmonology      Antibiotics (From admission, onward)      Start     Stop Route Frequency Ordered    03/12/25 1700  azithromycin tablet 500 mg         -- Oral Every Mon, Wed, Fri 03/12/25 1330    03/12/25 1700  ethambutoL tablet 1,200 mg         -- Oral Every Mon, Wed, Fri 03/12/25 1330    03/12/25 1700  rifAMpin capsule 600 mg         -- Oral Every Mon, Wed, Fri 03/12/25 1330            Microbiology Results (last 7 days)       Procedure Component Value Units Date/Time    AFB Culture & Smear [[id number]] Collected: 03/17/25 0628    Order Status: Completed Specimen: Sputum, Expectorated Updated: 03/19/25 0507     AFB CULTURE STAIN No acid fast bacilli seen.     AFB CULTURE STAIN Testing performed by:     AFB CULTURE STAIN Lab [facility]     AFB CULTURE STAIN [address] [facility]     [place] [address]     AFB CULTURE STAIN Dr. Rm Lake MD    Narrative:      Use sputum induction by respiratory therapist    AFB Culture & Smear [[id number]] Collected: 03/16/25 1453    Order Status: Completed Specimen: Sputum, Expectorated Updated: 03/19/25 0502     AFB CULTURE STAIN No acid fast bacilli seen.    Narrative:      Use sputum induction by respiratory therapist    Blood culture x two cultures. Draw prior to antibiotics. [[id number]] Collected: 03/12/25 1054    Order Status: Completed Specimen: Blood from Peripheral, Hand, Left Updated: 03/17/25 1232     Blood Culture, Routine No growth after 5 days.    Narrative:      Aerobic and anaerobic  Collection has been rescheduled by RE1 at 03/12/2025 10:56 Reason:   Done  Collection has been rescheduled by RE1 at 03/12/2025 10:56 Reason:   Done    Blood culture x two cultures. Draw prior to antibiotics. [[id number]] Collected: 03/12/25 1055    Order Status: Completed Specimen: Blood from Peripheral,  Hand, Right Updated: 03/17/25 1232     Blood Culture, Routine No growth after 5 days.    Narrative:      Aerobic and anaerobic    Culture, Respiratory with Gram Stain [[id number]] Collected: 03/16/25 1453    Order Status: Completed Specimen: Sputum Updated: 03/17/25 0626     Respiratory Culture Specimen inadequate - culture not performed      Spoke to Gloria Freedman [id number] for recollect     Gram Stain (Respiratory) >10 epithelial cells per low power field     Gram Stain (Respiratory) Rare WBC's     Gram Stain (Respiratory) Moderate Gram positive cocci     Gram Stain (Respiratory) Few Gram negative rods     Gram Stain (Respiratory) Predominance of oropharyngeal lashell. Please recollect.    Respiratory Infection Panel (PCR), Nasopharyngeal [[id number]] Collected: 03/15/25 0506    Order Status: Completed Specimen: Nasopharyngeal Swab Updated: 03/15/25 0819     Respiratory Infection Panel Source NP swab     Adenovirus Not Detected     Coronavirus 229E, Common Cold Virus Not Detected     Coronavirus HKU1, Common Cold Virus Not Detected     Coronavirus NL63, Common Cold Virus Not Detected     Coronavirus OC43, Common Cold Virus Not Detected     Comment: Coronavirus strains 229E, HKU1, NL63, and OC43 can cause the common   cold   and are not associated with the respiratory disease outbreak caused   by  the COVID-19 (SARS-CoV-2 novel Coronavirus) strain.           SARS-CoV2 (COVID-19) Qualitative PCR Not Detected     Human Metapneumovirus Not Detected     Human Rhinovirus/Enterovirus Not Detected     Influenza A Not Detected     Influenza B Not Detected     Parainfluenza Virus 1 Not Detected     Parainfluenza Virus 2 Not Detected     Parainfluenza Virus 3 Not Detected     Parainfluenza Virus 4 Not Detected     Respiratory Syncytial Virus Not Detected     Bordetella Parapertussis (SA3675) Not Detected     Bordetella pertussis (ptxP) Not Detected     Chlamydia pneumoniae Not Detected     Mycoplasma pneumoniae Not Detected      Comment: Respiratory Infection Panel testing performed by Multiplex PCR.       Narrative:      Respiratory Infection Panel source->NP Swab    AFB Culture & Smear [[id number]] Resulted: 03/15/25 0646    Order Status: No result Specimen: Sputum Updated: 03/15/25 0646    Afb Culture Stain [[id number]] Resulted: 03/15/25 0646    Order Status: No result Specimen: Sputum Updated: 03/15/25 0646    Culture, Respiratory with Gram Stain [[id number]] Collected: 03/15/25 0623    Order Status: Canceled Specimen: Sputum, Induced     AFB Culture & Smear [[id number]] Collected: 03/15/25 0623    Order Status: Canceled Specimen: Sputum, Induced

## 2025-03-20 NOTE — NURSING
F/u with suppository pt verbalized she did have results, also verbalized she feel her stomach BS's and verbalized she feel as if she will definitely have more results from laxatives.

## 2025-03-20 NOTE — CARE UPDATE
03/19/25 1939   Patient Assessment/Suction   Level of Consciousness (AVPU) alert   Respiratory Effort Normal;Unlabored   Expansion/Accessory Muscles/Retractions no use of accessory muscles;no retractions;expansion symmetric   All Lung Fields Breath Sounds clear;equal bilaterally   Rhythm/Pattern, Respiratory unlabored;pattern regular   Cough Frequency no cough   PRE-TX-O2   Device (Oxygen Therapy) room air   SpO2 98 %   Pulse Oximetry Type Intermittent   $ Pulse Oximetry - Multiple Charge Pulse Oximetry - Multiple   Pulse 80   Resp 16   Aerosol Therapy   $ Aerosol Therapy Charges Refused   Education   $ Education Bronchodilator;15 min

## 2025-03-20 NOTE — PLAN OF CARE
PCP office contacted to request hospital follow up appt . Per staff , the nurse will contact patient to schedule appt within 1 wk.     In basket request sent to Pulmonology for hospital follow up appt . Office staff will contact patient to schedule within 1 wk.        03/20/25 1203   Post-Acute Status   Hospital Resources/Appts/Education Provided Appointment suggestion unavailable

## 2025-03-20 NOTE — ASSESSMENT & PLAN NOTE
Patient's blood pressure range in the last 24 hours was: BP  Min: 117/83  Max: 170/65.The patient's inpatient anti-hypertensive regimen is listed below:  Current Antihypertensives  valsartan tablet 160 mg, Every morning, Oral  spironolactone tablet 25 mg, Daily, Oral  metoprolol succinate (TOPROL-XL) 24 hr tablet 25 mg, Daily, Oral  cloNIDine tablet 0.1 mg, Every 6 hours PRN, Oral  , Every 8 hours PRN, Oral  metoprolol succinate (TOPROL-XL) 24 hr tablet, Daily, Oral    Plan  - BP is uncontrolled, will adjust as follows:  Removed calcium channel blocker, add an AV celina blocker secondary to increase heart rate and blood pressure.  - p.r.n. hydralazine.

## 2025-03-20 NOTE — PLAN OF CARE
Pt clear for DC from case management standpoint. Discharging to home with NN. Patient's son will transport patient home from facility .         PENDING RX DELIVERY TO BEDSIDE .         03/20/25 1208   Final Note   Assessment Type Final Discharge Note   Anticipated Discharge Disposition Home

## 2025-03-21 ENCOUNTER — HOSPITAL ENCOUNTER (EMERGENCY)
Facility: HOSPITAL | Age: 55
Discharge: HOME | End: 2025-03-21
Attending: STUDENT IN AN ORGANIZED HEALTH CARE EDUCATION/TRAINING PROGRAM
Payer: OTHER GOVERNMENT

## 2025-03-21 VITALS
DIASTOLIC BLOOD PRESSURE: 59 MMHG | WEIGHT: 143 LBS | BODY MASS INDEX: 25.34 KG/M2 | SYSTOLIC BLOOD PRESSURE: 110 MMHG | TEMPERATURE: 98 F | HEIGHT: 63 IN | HEART RATE: 69 BPM | OXYGEN SATURATION: 100 % | RESPIRATION RATE: 14 BRPM

## 2025-03-21 DIAGNOSIS — I95.9 HYPOTENSION: ICD-10-CM

## 2025-03-21 DIAGNOSIS — I95.9 HYPOTENSION, UNSPECIFIED HYPOTENSION TYPE: Primary | ICD-10-CM

## 2025-03-21 LAB
ALBUMIN SERPL BCP-MCNC: 3.8 G/DL (ref 3.5–5.2)
ALP SERPL-CCNC: 95 U/L (ref 55–135)
ALT SERPL W/O P-5'-P-CCNC: 25 U/L (ref 10–44)
ANION GAP SERPL CALC-SCNC: 9 MMOL/L (ref 8–16)
AST SERPL-CCNC: 16 U/L (ref 10–40)
BASOPHILS # BLD AUTO: 0.02 K/UL (ref 0–0.2)
BASOPHILS NFR BLD: 0.2 % (ref 0–1.9)
BILIRUB SERPL-MCNC: 0.2 MG/DL (ref 0.1–1)
BNP SERPL-MCNC: 7 PG/ML (ref 0–99)
BUN SERPL-MCNC: 14 MG/DL (ref 6–20)
CALCIUM SERPL-MCNC: 9.3 MG/DL (ref 8.7–10.5)
CHLORIDE SERPL-SCNC: 101 MMOL/L (ref 95–110)
CO2 SERPL-SCNC: 24 MMOL/L (ref 23–29)
CREAT SERPL-MCNC: 0.9 MG/DL (ref 0.5–1.4)
DIFFERENTIAL METHOD BLD: ABNORMAL
EOSINOPHIL # BLD AUTO: 0.1 K/UL (ref 0–0.5)
EOSINOPHIL NFR BLD: 1.1 % (ref 0–8)
ERYTHROCYTE [DISTWIDTH] IN BLOOD BY AUTOMATED COUNT: 14.1 % (ref 11.5–14.5)
EST. GFR  (NO RACE VARIABLE): >60 ML/MIN/1.73 M^2
GLUCOSE SERPL-MCNC: 98 MG/DL (ref 70–110)
HCT VFR BLD AUTO: 44.4 % (ref 37–48.5)
HGB BLD-MCNC: 14.2 G/DL (ref 12–16)
IMM GRANULOCYTES # BLD AUTO: 0.06 K/UL (ref 0–0.04)
IMM GRANULOCYTES NFR BLD AUTO: 0.6 % (ref 0–0.5)
LDH SERPL L TO P-CCNC: 1.54 MMOL/L (ref 0.5–2.2)
LYMPHOCYTES # BLD AUTO: 4.3 K/UL (ref 1–4.8)
LYMPHOCYTES NFR BLD: 41.1 % (ref 18–48)
MAGNESIUM SERPL-MCNC: 2.3 MG/DL (ref 1.6–2.6)
MCH RBC QN AUTO: 27.7 PG (ref 27–31)
MCHC RBC AUTO-ENTMCNC: 32 G/DL (ref 32–36)
MCV RBC AUTO: 87 FL (ref 82–98)
MONOCYTES # BLD AUTO: 0.8 K/UL (ref 0.3–1)
MONOCYTES NFR BLD: 7.7 % (ref 4–15)
NEUTROPHILS # BLD AUTO: 5.2 K/UL (ref 1.8–7.7)
NEUTROPHILS NFR BLD: 49.3 % (ref 38–73)
NRBC BLD-RTO: 0 /100 WBC
PLATELET # BLD AUTO: 348 K/UL (ref 150–450)
PMV BLD AUTO: 10.1 FL (ref 9.2–12.9)
POCT GLUCOSE: 104 MG/DL (ref 70–110)
POTASSIUM SERPL-SCNC: 3.7 MMOL/L (ref 3.5–5.1)
PROT SERPL-MCNC: 6.6 G/DL (ref 6–8.4)
RBC # BLD AUTO: 5.13 M/UL (ref 4–5.4)
SAMPLE: NORMAL
SODIUM SERPL-SCNC: 134 MMOL/L (ref 136–145)
TROPONIN I SERPL HS-MCNC: 2.6 PG/ML (ref 0–14.9)
TROPONIN I SERPL HS-MCNC: <2.3 PG/ML (ref 0–14.9)
WBC # BLD AUTO: 10.49 K/UL (ref 3.9–12.7)

## 2025-03-21 PROCEDURE — 85025 COMPLETE CBC W/AUTO DIFF WBC: CPT

## 2025-03-21 PROCEDURE — 83735 ASSAY OF MAGNESIUM: CPT

## 2025-03-21 PROCEDURE — 99285 EMERGENCY DEPT VISIT HI MDM: CPT | Mod: 25

## 2025-03-21 PROCEDURE — 93010 ELECTROCARDIOGRAM REPORT: CPT | Mod: ,,, | Performed by: INTERNAL MEDICINE

## 2025-03-21 PROCEDURE — 84484 ASSAY OF TROPONIN QUANT: CPT | Mod: 91

## 2025-03-21 PROCEDURE — 83880 ASSAY OF NATRIURETIC PEPTIDE: CPT

## 2025-03-21 PROCEDURE — 80053 COMPREHEN METABOLIC PANEL: CPT

## 2025-03-21 PROCEDURE — 93005 ELECTROCARDIOGRAM TRACING: CPT | Performed by: INTERNAL MEDICINE

## 2025-03-21 PROCEDURE — 82962 GLUCOSE BLOOD TEST: CPT

## 2025-03-21 PROCEDURE — 96361 HYDRATE IV INFUSION ADD-ON: CPT

## 2025-03-21 PROCEDURE — 25000003 PHARM REV CODE 250: Performed by: STUDENT IN AN ORGANIZED HEALTH CARE EDUCATION/TRAINING PROGRAM

## 2025-03-21 PROCEDURE — 96360 HYDRATION IV INFUSION INIT: CPT

## 2025-03-21 RX ADMIN — SODIUM CHLORIDE 500 ML: 9 INJECTION, SOLUTION INTRAVENOUS at 02:03

## 2025-03-21 NOTE — ED PROVIDER NOTES
Encounter Date: 3/20/2025       History     Chief Complaint   Patient presents with    Fatigue     Episode of hypotension at home after being discharged from [facility] this afternoon     HPI  54-year-old woman with a history of sarcoid and hypertension presents for evaluation of an episode of hypotension lightheadedness and feeling drunk that occurred this afternoon.  She reports they gave her clonidine today before she was discharged at 1:00 p.m. she started to feel off around 2:00 p.m. and then it got worse at home at 4:00 p.m..  She felt weak all over and unable to get up.  She denies fever or chills.  She denies any new chest pain or shortness of breath.  She says she is having some vague chest pain shortness of breath which is typical for her.  She denies belly pain nausea or vomiting change in bowel or bladder habits.  Denies taking any other medicines today.  Review of patient's allergies indicates:   Allergen Reactions    Sulfamethoxazole-trimethoprim Hives, Shortness Of Breath, Nausea And Vomiting and Rash     Hives, nausea and vomiting, and shortness of breath.  Did not require admission  Hives, nausea and vomiting, and shortness of breath.  Did not require admission    Benadryl [diphenhydramine hcl]     Latex, natural rubber     Percocet [oxycodone-acetaminophen]     Shrimp     Codeine Itching and Nausea Only     Past Medical History:   Diagnosis Date    HTN (hypertension)     Pneumonia 10/2020    Sarcoidosis      Past Surgical History:   Procedure Laterality Date    BRONCHOALVEOLAR LAVAGE Bilateral 2024    Procedure: LAVAGE, BRONCHOALVEOLAR;  Surgeon: Chilango Zuluaga MD;  Location: [facility];  Service: Pulmonary;  Laterality: Bilateral;    BRONCHOSCOPY WITH FLUOROSCOPY Right 10/20/2020    Procedure: BRONCHOSCOPY, WITH FLUOROSCOPY;  Surgeon: Ava Rolon MD;  Location[facility] ENDO;  Service: Pulmonary;  Laterality: Right;     SECTION      x2    FALLOPIAN TUBOPLASTY      LAPAROSCOPIC  APPENDECTOMY N/A 8/10/2022    Procedure: APPENDECTOMY, LAPAROSCOPIC;  Surgeon: Jackson Vogel MD;  Location: [facility] OR;  Service: General;  Laterality: N/A;    TUBAL LIGATION      WRIST FRACTURE SURGERY Right     ganglion cyst     Family History   Problem Relation Name Age of Onset    Hypertension Mother       Social History[1]  Review of Systems   All other systems reviewed and are negative.      Physical Exam     Initial Vitals [03/21/25 0001]   BP Pulse Resp Temp SpO2   122/76 72 17 97.5 °F (36.4 °C) 100 %      MAP       --         Physical Exam    Nursing note and vitals reviewed.  Constitutional: She appears well-developed. She is not diaphoretic.   HENT:   Head: Normocephalic and atraumatic.   Eyes: Right eye exhibits no discharge. Left eye exhibits no discharge.   Neck: No tracheal deviation present.   Cardiovascular:  Normal rate, regular rhythm and intact distal pulses.           Pulmonary/Chest: Breath sounds normal. No stridor. No respiratory distress.   Abdominal: Abdomen is soft. There is no abdominal tenderness.   Musculoskeletal:         General: No tenderness.     Neurological: She is alert and oriented to person, place, and time.   Cranial nerves 2-12 intact no upper extremity or lower extremity drift 5 5  strength 5/5 dorsi and plantar flexion, sensation intact to light touch   Skin: Skin is warm and dry.         ED Course   Procedures  Labs Reviewed   CBC W/ AUTO DIFFERENTIAL - Abnormal       Result Value    WBC 10.49      RBC 5.13      Hemoglobin 14.2      Hematocrit 44.4      MCV 87      MCH 27.7      MCHC 32.0      RDW 14.1      Platelets 348      MPV 10.1      Immature Granulocytes 0.6 (*)     Gran # (ANC) 5.2      Immature Grans (Abs) 0.06 (*)     Lymph # 4.3      Mono # 0.8      Eos # 0.1      Baso # 0.02      nRBC 0      Gran % 49.3      Lymph % 41.1      Mono % 7.7      Eosinophil % 1.1      Basophil % 0.2      Differential Method Automated     COMPREHENSIVE METABOLIC PANEL - Abnormal     Sodium 134 (*)     Potassium 3.7      Chloride 101      CO2 24      Glucose 98      BUN 14      Creatinine 0.9      Calcium 9.3      Total Protein 6.6      Albumin 3.8      Total Bilirubin 0.2      Alkaline Phosphatase 95      AST 16      ALT 25      eGFR >60.0      Anion Gap 9     MAGNESIUM    Magnesium 2.3     TROPONIN I HIGH SENSITIVITY    Troponin I High Sensitivity <2.3     TROPONIN I HIGH SENSITIVITY    Troponin I High Sensitivity 2.6     B-TYPE NATRIURETIC PEPTIDE    BNP 7     URINALYSIS, REFLEX TO URINE CULTURE   POCT GLUCOSE    POCT Glucose 104     ISTAT LACTATE    POC Lactate 1.54      Sample VENOUS     POCT LACTATE   POCT GLUCOSE MONITORING CONTINUOUS        ECG Results              EKG 12-lead (In process)        Collection Time Result Time QRS Duration OHS QTC Calculation    03/21/25 01:26:15 03/21/25 05:08:55 88 417                     In process by Interface, Lab In [facility] (03/21/25 05:09:03)                   Narrative:    Test Reason : I95.9,    Vent. Rate :  74 BPM     Atrial Rate :  74 BPM     P-R Int : 144 ms          QRS Dur :  88 ms      QT Int : 376 ms       P-R-T Axes :  47  29  46 degrees    QTcB Int : 417 ms    Normal sinus rhythm  Possible Left atrial enlargement  Septal infarct ,age undetermined  Abnormal ECG  When compared with ECG of 12-Mar-2025 09:40,  Vent. rate has decreased by  51 bpm  Septal infarct is now Present  Nonspecific T wave abnormality now evident in Anterior leads    Referred By: AAAREFERRAL SELF           Confirmed By:                                   Imaging Results              CT Head Without Contrast (Final result)  Result time 03/21/25 02:06:28      Final result by Daksha Flores MD (03/21/25 02:06:28)                   Impression:      No CT evidence of acute intracranial abnormality. Clinical correlation and further evaluation as warranted.      Electronically signed by: Daksha Flores MD  Date:    03/21/2025  Time:    02:06               Narrative:     EXAMINATION:  CT HEAD WITHOUT CONTRAST    CLINICAL HISTORY:  Mental status change, unknown cause;    TECHNIQUE:  Low dose axial images were obtained through the head.  Coronal and sagittal reformations were also performed. Contrast was not administered.    COMPARISON:  Head CT 01/17/2022    FINDINGS:  There is no acute intracranial hemorrhage, hydrocephalus, midline shift or mass effect. Gray-white matter differentiation appears maintained. Incidentally noted bilateral physiologic basal ganglia calcifications.  The basal cisterns are patent.  The visualized paranasal sinuses and mastoid air cells are clear of acute process.  The visualized bones of the calvarium demonstrate no acute osseous abnormality.                                       Medications   sodium chloride 0.9% bolus 500 mL 500 mL (0 mLs Intravenous Stopped 3/21/25 0500)     Medical Decision Making  Hypotensive lightheaded weak at home she just got out of the hospital on 03/20, she is not hypotensive here, she does seem a little tired appearing but she is awake and alert and answers questions appropriately she is neurologically intact.  Differential includes medication effect tox metabolic or endocrine derangement hypovolemia, my suspicion for intracranial pathology is pretty low I will get a CT head to rule it out.  I do not think she needs a CTA.  Give her a fluid bolus.  Discussed with her that if her workup is reassuring and she feels better she can go home if she does not feel like she is back to her baseline then she can be admitted    See ED course for discussion regarding disposition I think that her symptoms are related to her clonidine    Amount and/or Complexity of Data Reviewed  External Data Reviewed: notes.     Details: Discharge summary from 03/20/2025 she was getting clonidine for steroid induced hypertension  Labs: ordered. Decision-making details documented in ED Course.  Radiology: ordered and independent interpretation performed.  Decision-making details documented in ED Course.  ECG/medicine tests: ordered and independent interpretation performed. Decision-making details documented in ED Course.               ED Course as of 03/21/25 0637   Fri Mar 21, 2025   0132 EKG on my independent interpretation 74 beats per minute normal axis no STEMI QTC less than 500 appears similar to prior from 03/12/2025 [IC]   0136 She refused the chest x-ray, I went back and discussed with her that I was concerned that she had just been admitted for pneumonia and now she was not feeling well we discussed that the risk of a x-ray is very low, she reported that she does not think it has to do with the lung so she does not want to get it, I will cancel it [IC]   0418 I offered to admit her if she felt that she was not back to her baseline, she does not wish to be admitted she feels comfortable going home she does not want to provide a urine she will wait for a 2nd troponin [IC]   0601 She is still would like to go home, Return precautions/follow up instructions/treatment plan given, expressed agreement and understanding.    [IC]      ED Course User Index  [IC] Randy Rodríguez MD                           Clinical Impression:  Final diagnoses:  [I95.9] Hypotension  [I95.9] Hypotension, unspecified hypotension type (Primary)          ED Disposition Condition    Discharge Stable          ED Prescriptions    None       Follow-up Information       Follow up With Specialties Details Why Contact Info Additional Information    Susan Wilson MD Family Medicine Schedule an appointment as soon as possible for a visit on 3/24/2025  [address]   [address]  [facility] [address]  [phone]       [facility] - Emergency Dept Emergency Medicine Go to  As needed, If symptoms worsen [address] [facility] [address] [phone] 1st floor               [1]   Social History  Tobacco Use    Smoking status: Former     Current packs/day: 0.00     Types:  Cigarettes     Quit date: 2020     Years since quittin.9    Smokeless tobacco: Never    Tobacco comments:     ocassionally never was qd   Substance Use Topics    Alcohol use: Not Currently    Drug use: Never        Randy Rodríguez MD  25 [id number]

## 2025-03-21 NOTE — DISCHARGE INSTRUCTIONS
You were seen for low blood pressure.  Your blood pressure was monitored here.  We offered to admit you to the hospital.  I would recommend not taking anymore clonidine.    Please return to this facility or another ED as needed for any new or worsening symptoms including chest pain, shortness of breath, abdominal pain, nausea or vomiting, fever or chills. Please follow up with your primary care doctor in 3 days. Please take all medicines as prescribed.

## 2025-03-22 LAB
OHS QRS DURATION: 88 MS
OHS QTC CALCULATION: 417 MS

## 2025-05-21 DIAGNOSIS — E05.90 THYROTOXICOSIS, UNSPECIFIED WITHOUT THYROTOXIC CRISIS OR STORM: Primary | ICD-10-CM

## 2025-06-03 ENCOUNTER — PATIENT MESSAGE (OUTPATIENT)
Dept: ENDOCRINOLOGY | Facility: CLINIC | Age: 55
End: 2025-06-03

## 2025-06-03 ENCOUNTER — OFFICE VISIT (OUTPATIENT)
Dept: ENDOCRINOLOGY | Facility: CLINIC | Age: 55
End: 2025-06-03
Payer: OTHER GOVERNMENT

## 2025-06-03 DIAGNOSIS — R79.89 ABNORMAL TSH: ICD-10-CM

## 2025-06-03 DIAGNOSIS — Z87.81 HISTORY OF FRACTURE: Primary | ICD-10-CM

## 2025-06-03 DIAGNOSIS — D86.0 SARCOIDOSIS, STAGE 3: ICD-10-CM

## 2025-06-03 DIAGNOSIS — R09.A2 GLOBUS PHARYNGEUS: ICD-10-CM

## 2025-06-03 PROCEDURE — 98002 SYNCH AUDIO-VIDEO NEW MOD 45: CPT | Mod: 95,GC,, | Performed by: INTERNAL MEDICINE

## 2025-06-03 PROCEDURE — G2211 COMPLEX E/M VISIT ADD ON: HCPCS | Mod: 95,GC,, | Performed by: INTERNAL MEDICINE

## 2025-06-04 ENCOUNTER — LAB VISIT (OUTPATIENT)
Dept: LAB | Facility: HOSPITAL | Age: 55
End: 2025-06-04
Attending: STUDENT IN AN ORGANIZED HEALTH CARE EDUCATION/TRAINING PROGRAM
Payer: OTHER GOVERNMENT

## 2025-06-04 ENCOUNTER — RESULTS FOLLOW-UP (OUTPATIENT)
Dept: ENDOCRINOLOGY | Facility: CLINIC | Age: 55
End: 2025-06-04

## 2025-06-04 DIAGNOSIS — R79.89 ABNORMAL TSH: ICD-10-CM

## 2025-06-04 DIAGNOSIS — A31.0 MAI (MYCOBACTERIUM AVIUM-INTRACELLULARE): ICD-10-CM

## 2025-06-04 DIAGNOSIS — D86.0 SARCOIDOSIS, STAGE 3: ICD-10-CM

## 2025-06-04 DIAGNOSIS — Z87.81 HISTORY OF FRACTURE: ICD-10-CM

## 2025-06-04 LAB
25(OH)D3+25(OH)D2 SERPL-MCNC: 23 NG/ML (ref 30–96)
ALBUMIN SERPL-MCNC: 4.6 G/DL (ref 3.5–5.2)
ANION GAP (SMH): 6 MMOL/L (ref 8–16)
BUN SERPL-MCNC: 10 MG/DL (ref 6–20)
CALCIUM SERPL-MCNC: 10.1 MG/DL (ref 8.7–10.5)
CHLORIDE SERPL-SCNC: 105 MMOL/L (ref 95–110)
CO2 SERPL-SCNC: 27 MMOL/L (ref 23–29)
CREAT SERPL-MCNC: 0.6 MG/DL (ref 0.5–1.4)
GFR SERPLBLD CREATININE-BSD FMLA CKD-EPI: >60 ML/MIN/1.73/M2
GLUCOSE SERPL-MCNC: 103 MG/DL (ref 70–110)
PHOSPHATE SERPL-MCNC: 3.5 MG/DL (ref 2.7–4.5)
POTASSIUM SERPL-SCNC: 4.3 MMOL/L (ref 3.5–5.1)
SODIUM SERPL-SCNC: 138 MMOL/L (ref 136–145)
T4 FREE SERPL-MCNC: 0.77 NG/DL (ref 0.71–1.51)
TSH SERPL-ACNC: 0.48 UIU/ML (ref 0.34–5.6)

## 2025-06-04 PROCEDURE — 36415 COLL VENOUS BLD VENIPUNCTURE: CPT

## 2025-06-04 PROCEDURE — 82040 ASSAY OF SERUM ALBUMIN: CPT

## 2025-06-04 PROCEDURE — 84443 ASSAY THYROID STIM HORMONE: CPT

## 2025-06-04 PROCEDURE — 84439 ASSAY OF FREE THYROXINE: CPT

## 2025-06-04 PROCEDURE — 82306 VITAMIN D 25 HYDROXY: CPT

## 2025-06-04 PROCEDURE — 84480 ASSAY TRIIODOTHYRONINE (T3): CPT

## 2025-06-05 ENCOUNTER — LAB VISIT (OUTPATIENT)
Dept: LAB | Facility: HOSPITAL | Age: 55
End: 2025-06-05
Attending: STUDENT IN AN ORGANIZED HEALTH CARE EDUCATION/TRAINING PROGRAM
Payer: OTHER GOVERNMENT

## 2025-06-05 DIAGNOSIS — Z87.81 HISTORY OF FRACTURE: ICD-10-CM

## 2025-06-05 DIAGNOSIS — R79.89 ABNORMAL TSH: ICD-10-CM

## 2025-06-05 DIAGNOSIS — A31.0 MAI (MYCOBACTERIUM AVIUM-INTRACELLULARE): ICD-10-CM

## 2025-06-05 DIAGNOSIS — D86.0 SARCOIDOSIS, STAGE 3: ICD-10-CM

## 2025-06-05 LAB
ALT SERPL-CCNC: 31 UNIT/L (ref 10–44)
AST SERPL-CCNC: 27 UNIT/L (ref 10–40)
PTH-INTACT SERPL-MCNC: 28.8 PG/ML (ref 9–77)

## 2025-06-05 PROCEDURE — 30000890 MISCELLANEOUS LABCORP TEST (BEAKER)

## 2025-06-05 PROCEDURE — 83970 ASSAY OF PARATHORMONE: CPT

## 2025-06-05 PROCEDURE — 83520 IMMUNOASSAY QUANT NOS NONAB: CPT

## 2025-06-05 PROCEDURE — 36415 COLL VENOUS BLD VENIPUNCTURE: CPT

## 2025-06-05 PROCEDURE — 84450 TRANSFERASE (AST) (SGOT): CPT

## 2025-06-05 PROCEDURE — 84480 ASSAY TRIIODOTHYRONINE (T3): CPT

## 2025-06-05 PROCEDURE — 84460 ALANINE AMINO (ALT) (SGPT): CPT

## 2025-06-06 LAB — T3 SERPL-MCNC: 125 NG/DL (ref 71–180)

## 2025-06-07 LAB — LABCORP MISCELLANEOUS TEST RESULT: NORMAL

## 2025-06-09 ENCOUNTER — RESULTS FOLLOW-UP (OUTPATIENT)
Dept: ENDOCRINOLOGY | Facility: CLINIC | Age: 55
End: 2025-06-09

## 2025-06-09 DIAGNOSIS — R09.A2 GLOBUS PHARYNGEUS: Primary | ICD-10-CM

## 2025-06-11 ENCOUNTER — HOSPITAL ENCOUNTER (OUTPATIENT)
Dept: RADIOLOGY | Facility: HOSPITAL | Age: 55
Discharge: HOME | End: 2025-06-11
Attending: STUDENT IN AN ORGANIZED HEALTH CARE EDUCATION/TRAINING PROGRAM
Payer: OTHER GOVERNMENT

## 2025-06-11 ENCOUNTER — PATIENT MESSAGE (OUTPATIENT)
Dept: ENDOCRINOLOGY | Facility: CLINIC | Age: 55
End: 2025-06-11
Payer: MEDICAID

## 2025-06-11 ENCOUNTER — HOSPITAL ENCOUNTER (OUTPATIENT)
Dept: RADIOLOGY | Facility: CLINIC | Age: 55
Discharge: HOME | End: 2025-06-11
Attending: STUDENT IN AN ORGANIZED HEALTH CARE EDUCATION/TRAINING PROGRAM
Payer: MEDICAID

## 2025-06-11 DIAGNOSIS — Z87.81 HISTORY OF FRACTURE: ICD-10-CM

## 2025-06-11 DIAGNOSIS — R09.A2 GLOBUS PHARYNGEUS: ICD-10-CM

## 2025-06-11 PROCEDURE — 76536 US EXAM OF HEAD AND NECK: CPT | Mod: 26,,, | Performed by: RADIOLOGY

## 2025-06-11 PROCEDURE — 77080 DXA BONE DENSITY AXIAL: CPT | Mod: TC,PO

## 2025-06-11 PROCEDURE — 77080 DXA BONE DENSITY AXIAL: CPT | Mod: 26,,, | Performed by: RADIOLOGY

## 2025-06-11 PROCEDURE — 76536 US EXAM OF HEAD AND NECK: CPT | Mod: TC

## 2025-07-09 DIAGNOSIS — M25.511 PAIN IN RIGHT SHOULDER: Primary | ICD-10-CM

## 2025-07-14 DIAGNOSIS — M25.511 RIGHT SHOULDER PAIN, UNSPECIFIED CHRONICITY: Primary | ICD-10-CM

## 2025-07-16 ENCOUNTER — OFFICE VISIT (OUTPATIENT)
Dept: ORTHOPEDICS | Facility: CLINIC | Age: 55
End: 2025-07-16
Payer: OTHER GOVERNMENT

## 2025-07-16 ENCOUNTER — HOSPITAL ENCOUNTER (OUTPATIENT)
Dept: RADIOLOGY | Facility: HOSPITAL | Age: 55
Discharge: HOME | End: 2025-07-16
Attending: ORTHOPAEDIC SURGERY
Payer: OTHER GOVERNMENT

## 2025-07-16 VITALS — BODY MASS INDEX: 26.72 KG/M2 | HEIGHT: 63 IN | WEIGHT: 150.81 LBS

## 2025-07-16 DIAGNOSIS — M25.511 RIGHT SHOULDER PAIN, UNSPECIFIED CHRONICITY: ICD-10-CM

## 2025-07-16 DIAGNOSIS — M25.511 PAIN IN RIGHT SHOULDER: ICD-10-CM

## 2025-07-16 DIAGNOSIS — M25.511 RIGHT SHOULDER PAIN, UNSPECIFIED CHRONICITY: Primary | ICD-10-CM

## 2025-07-16 PROCEDURE — 73030 X-RAY EXAM OF SHOULDER: CPT | Mod: 26,RT,, | Performed by: RADIOLOGY

## 2025-07-16 PROCEDURE — 99214 OFFICE O/P EST MOD 30 MIN: CPT | Mod: PBBFAC,25,PO | Performed by: ORTHOPAEDIC SURGERY

## 2025-07-16 PROCEDURE — 73030 X-RAY EXAM OF SHOULDER: CPT | Mod: TC,PO,RT

## 2025-07-16 PROCEDURE — 99205 OFFICE O/P NEW HI 60 MIN: CPT | Mod: S$PBB,,, | Performed by: ORTHOPAEDIC SURGERY

## 2025-07-16 PROCEDURE — 99999 PR PBB SHADOW E&M-EST. PATIENT-LVL IV: CPT | Mod: PBBFAC,,, | Performed by: ORTHOPAEDIC SURGERY

## 2025-07-16 NOTE — PROGRESS NOTES
Subjective:      Patient ID: Sumit Burger is a 54 y.o. female.    Chief Complaint: Pain of the Right Shoulder (Increased pain over the last 8m. [facility].)    HPI  54-year-old female 8 month history of right shoulder pain.  She states she was reaching back behind her in the car to get her purse felt a tearing sensation in his shoulder.  [facility] did [facility] her [facility] without much improvement.  She states it she has an MRI arthrogram ordered but it is going to take quite some time to get that accomplished.  She is complaining of pain with certain positions overhead activities and any heavy lifting.  She is a right-hand dominant retired dental hygienist.  ROS      Objective:    Ortho Exam     Constitutional:   Patient is alert  and oriented in no acute distress  HEENT:  normocephalic atraumatic; PERRL EOMI  Neck:  Supple without adenopathy  Cardiovascular:  Normal rate and rhythm  Pulmonary:  Normal respiratory effort normal chest wall expansion  Abdominal:  Nonprotuberant nondistended  Musculoskeletal:  Patient has a steady nonantalgic gait.  Neck is supple with adequate range of motion  No Lhermitte's or Spurling sign.  Adequate range of motion of the right shoulder with discomfort with full forward flexion and internal rotation.  There is a mildly positive impingement sign.  Negative Yergason's and equivocal Speed's testing.  No gross instability although she is a bit guarded  No gross weakness however pain with strength testing against resistance there is no swelling mass or atrophy noted.  There is a normal distal neurologic and vascular examination.  Neurological:  No focal defect; cranial nerves 2-12 grossly intact  Psychiatric/behavioral:  Mood and behavior normal    X-Ray Shoulder Trauma 3 view Right  Narrative: EXAMINATION:  XR SHOULDER TRAUMA 3 VIEW RIGHT    CLINICAL HISTORY:  Pain in right shoulder    TECHNIQUE:  2 or more views of the  shoulder are obtained.    COMPARISON:  None.    FINDINGS:  No dislocation is seen.  A fracture of the scapula, humerus or clavicle is not seen.  The acromioclavicular and glenohumeral joints are within normal limits.  Impression: Negative shoulder radiographs.    Electronically signed by: Tyrell Sharp MD  Date:    2025  Time:    08:11       My Radiographs Findings:    Radiographs of the right shoulder without acute osseous abnormalities  Assessment:       Encounter Diagnosis   Name Primary?    Pain in right shoulder          Plan:       I have discussed medical condition treatment options with her at length.  We will try to obtain the MRI arthrogram that has previously been ordered she had back afterwards for more definitive disposition.  I have discussed use of NSAIDs that she has been prescribed GI cardiac and renal precautions were discussed.  Follow up otherwise as needed.        Past Medical History:   Diagnosis Date    HTN (hypertension)     Pneumonia 10/2020    Sarcoidosis      Past Surgical History:   Procedure Laterality Date    BRONCHOALVEOLAR LAVAGE Bilateral 2024    Procedure: LAVAGE, BRONCHOALVEOLAR;  Surgeon: Chilango Zuluaga MD;  Location: [facility] ENDO;  Service: Pulmonary;  Laterality: Bilateral;    BRONCHOSCOPY WITH FLUOROSCOPY Right 10/20/2020    Procedure: BRONCHOSCOPY, WITH FLUOROSCOPY;  Surgeon: Ava Rolon MD;  Location: [facility] ENDO;  Service: Pulmonary;  Laterality: Right;     SECTION      x2    FALLOPIAN TUBOPLASTY      LAPAROSCOPIC APPENDECTOMY N/A 8/10/2022    Procedure: APPENDECTOMY, LAPAROSCOPIC;  Surgeon: Jackson Vogel MD;  Location: [facility] OR;  Service: General;  Laterality: N/A;    TUBAL LIGATION      WRIST FRACTURE SURGERY Right     ganglion cyst       Current Medications[1]    Review of patient's allergies indicates:   Allergen Reactions    Sulfamethoxazole-trimethoprim Hives, Shortness Of Breath, Nausea And Vomiting and Rash     Hives, nausea and vomiting,  and shortness of breath.  Did not require admission  Hives, nausea and vomiting, and shortness of breath.  Did not require admission    Benadryl [diphenhydramine hcl]     Latex, natural rubber     Percocet [oxycodone-acetaminophen]     Shrimp     Codeine Itching and Nausea Only       Family History   Problem Relation Name Age of Onset    Hypertension Mother       Social History     Occupational History    Not on file   Tobacco Use    Smoking status: Former     Current packs/day: 0.00     Types: Cigarettes     Quit date: 2020     Years since quittin.2    Smokeless tobacco: Never    Tobacco comments:     ocassionally never was qd   Substance and Sexual Activity    Alcohol use: Not Currently    Drug use: Never    Sexual activity: Not Currently            [1]   Current Outpatient Medications:     ALPRAZolam (XANAX) 0.5 MG tablet, Take 1 tablet (0.5 mg total) by mouth daily as needed for Anxiety., Disp: 30 tablet, Rfl: 0    [Paused] amLODIPine (NORVASC) 5 MG tablet, Take 1 tablet (5 mg total) by mouth once daily. (Patient taking differently: Take 10 mg by mouth once daily.), Disp: 30 tablet, Rfl: 0    azithromycin (ZITHROMAX) 500 MG tablet, Take 1 tablet (500 mg total) by mouth every Mon, Wed, Fri., Disp: 12 tablet, Rfl: 17    buPROPion (WELLBUTRIN XL) 300 MG 24 hr tablet, Take 300 mg by mouth once daily., Disp: , Rfl:     cloNIDine (CATAPRES) 0.1 MG tablet, Take 1 tablet (0.1 mg total) by mouth every 8 (eight) hours as needed (top number >160 bottom number >100)., Disp: 90 tablet, Rfl: 0    ethambutoL (MYAMBUTOL) 400 MG Tab, Take 3 tablets (1,200 mg total) by mouth every Mon, Wed, Fri. Take every  and Friday, Disp: 36 tablet, Rfl: 17    famotidine (PEPCID) 20 MG tablet, Take 1 tablet (20 mg total) by mouth 2 (two) times daily. for 14 days, Disp: 28 tablet, Rfl: 0    folic acid (FOLVITE) 1 MG tablet, Take 1 tablet by mouth once daily., Disp: , Rfl:     metoprolol succinate (TOPROL-XL) 25 MG 24 hr  tablet, Take 1 tablet (25 mg total) by mouth once daily., Disp: 90 tablet, Rfl: 0    rifAMpin (RIFADIN) 300 MG capsule, Take 2 capsules (600 mg total) by mouth every Mon, Wed, Fri. Every Monday Wednesday and Friday, Disp: 24 capsule, Rfl: 17    spironolactone (ALDACTONE) 25 MG tablet, Take 25 mg by mouth once daily., Disp: , Rfl:     traMADoL (ULTRAM) 50 mg tablet, take 1 tablet by mouth every 4 to 6 hours as needed for pain, Disp: 20 tablet, Rfl: 0    valsartan (DIOVAN) 160 MG tablet, Take 1 tablet (160 mg total) by mouth every evening., Disp: 90 tablet, Rfl: 0    zolpidem (AMBIEN) 5 MG Tab, Take 1 tablet (5 mg total) by mouth nightly as needed (insomnia)., Disp: 30 tablet, Rfl: 5

## 2025-07-17 DIAGNOSIS — M25.511 RIGHT SHOULDER PAIN, UNSPECIFIED CHRONICITY: Primary | ICD-10-CM

## 2025-08-01 ENCOUNTER — HOSPITAL ENCOUNTER (OUTPATIENT)
Dept: RADIOLOGY | Facility: HOSPITAL | Age: 55
Discharge: HOME | End: 2025-08-01
Attending: ORTHOPAEDIC SURGERY
Payer: OTHER GOVERNMENT

## 2025-08-01 DIAGNOSIS — M25.511 RIGHT SHOULDER PAIN, UNSPECIFIED CHRONICITY: ICD-10-CM

## 2025-08-01 PROCEDURE — A4215 STERILE NEEDLE: HCPCS

## 2025-08-01 PROCEDURE — 77002 NEEDLE LOCALIZATION BY XRAY: CPT | Mod: 26,RT,, | Performed by: RADIOLOGY

## 2025-08-01 PROCEDURE — 63600175 PHARM REV CODE 636 W HCPCS: Performed by: ORTHOPAEDIC SURGERY

## 2025-08-01 PROCEDURE — A9585 GADOBUTROL INJECTION: HCPCS | Performed by: ORTHOPAEDIC SURGERY

## 2025-08-01 PROCEDURE — 73222 MRI JOINT UPR EXTREM W/DYE: CPT | Mod: 26,RT,, | Performed by: RADIOLOGY

## 2025-08-01 PROCEDURE — 25500020 PHARM REV CODE 255: Performed by: ORTHOPAEDIC SURGERY

## 2025-08-01 PROCEDURE — 23350 INJECTION FOR SHOULDER X-RAY: CPT | Mod: RT,,, | Performed by: RADIOLOGY

## 2025-08-01 PROCEDURE — 73222 MRI JOINT UPR EXTREM W/DYE: CPT | Mod: TC,RT

## 2025-08-01 RX ORDER — LIDOCAINE HYDROCHLORIDE 10 MG/ML
5 INJECTION, SOLUTION INFILTRATION; PERINEURAL ONCE
Status: COMPLETED | OUTPATIENT
Start: 2025-08-01 | End: 2025-08-01

## 2025-08-01 RX ORDER — GADOBUTROL 604.72 MG/ML
7.5 INJECTION INTRAVENOUS
Status: COMPLETED | OUTPATIENT
Start: 2025-08-01 | End: 2025-08-01

## 2025-08-01 RX ADMIN — IOHEXOL 17 ML: 300 INJECTION, SOLUTION INTRAVENOUS at 01:08

## 2025-08-01 RX ADMIN — GADOBUTROL 1.25 ML: 604.72 INJECTION INTRAVENOUS at 01:08

## 2025-08-01 RX ADMIN — LIDOCAINE HYDROCHLORIDE 5 ML: 10 INJECTION, SOLUTION EPIDURAL; INFILTRATION; INTRACAUDAL at 01:08

## 2025-08-22 ENCOUNTER — OFFICE VISIT (OUTPATIENT)
Dept: ORTHOPEDICS | Facility: CLINIC | Age: 55
End: 2025-08-22
Payer: OTHER GOVERNMENT

## 2025-08-22 VITALS — BODY MASS INDEX: 26.58 KG/M2 | WEIGHT: 150 LBS | HEIGHT: 63 IN

## 2025-08-22 DIAGNOSIS — M75.41 IMPINGEMENT SYNDROME OF RIGHT SHOULDER: Primary | ICD-10-CM

## 2025-08-22 PROCEDURE — 99213 OFFICE O/P EST LOW 20 MIN: CPT | Mod: PBBFAC,PO | Performed by: ORTHOPAEDIC SURGERY

## 2025-08-22 PROCEDURE — 99999 PR PBB SHADOW E&M-EST. PATIENT-LVL III: CPT | Mod: PBBFAC,,, | Performed by: ORTHOPAEDIC SURGERY

## 2025-08-22 RX ORDER — CELECOXIB 100 MG/1
100 CAPSULE ORAL 2 TIMES DAILY
Qty: 60 CAPSULE | Refills: 1 | Status: SHIPPED | OUTPATIENT
Start: 2025-08-22

## 2025-08-22 RX ORDER — TRIAMCINOLONE ACETONIDE 40 MG/ML
40 INJECTION, SUSPENSION INTRA-ARTICULAR; INTRAMUSCULAR
Status: DISCONTINUED | OUTPATIENT
Start: 2025-08-22 | End: 2025-08-22 | Stop reason: HOSPADM

## 2025-08-22 RX ADMIN — TRIAMCINOLONE ACETONIDE 40 MG: 40 INJECTION, SUSPENSION INTRA-ARTICULAR; INTRAMUSCULAR at 10:08

## 2025-09-02 ENCOUNTER — OFFICE VISIT (OUTPATIENT)
Dept: ENDOCRINOLOGY | Facility: CLINIC | Age: 55
End: 2025-09-02
Payer: OTHER GOVERNMENT

## 2025-09-02 ENCOUNTER — TELEPHONE (OUTPATIENT)
Dept: ENDOCRINOLOGY | Facility: CLINIC | Age: 55
End: 2025-09-02

## 2025-09-02 VITALS
HEIGHT: 63 IN | OXYGEN SATURATION: 98 % | WEIGHT: 149.81 LBS | HEART RATE: 83 BPM | SYSTOLIC BLOOD PRESSURE: 120 MMHG | BODY MASS INDEX: 26.54 KG/M2 | TEMPERATURE: 98 F | DIASTOLIC BLOOD PRESSURE: 84 MMHG

## 2025-09-02 DIAGNOSIS — M54.50 LUMBAR PAIN: Primary | ICD-10-CM

## 2025-09-02 DIAGNOSIS — D86.9 SARCOIDOSIS: ICD-10-CM

## 2025-09-02 DIAGNOSIS — M81.8 OTHER OSTEOPOROSIS WITHOUT CURRENT PATHOLOGICAL FRACTURE: ICD-10-CM

## 2025-09-02 PROCEDURE — 99999 PR PBB SHADOW E&M-EST. PATIENT-LVL IV: CPT | Mod: PBBFAC,,, | Performed by: STUDENT IN AN ORGANIZED HEALTH CARE EDUCATION/TRAINING PROGRAM

## 2025-09-02 PROCEDURE — 99214 OFFICE O/P EST MOD 30 MIN: CPT | Mod: S$PBB,,, | Performed by: STUDENT IN AN ORGANIZED HEALTH CARE EDUCATION/TRAINING PROGRAM

## 2025-09-02 PROCEDURE — 99214 OFFICE O/P EST MOD 30 MIN: CPT | Mod: PBBFAC,PO | Performed by: STUDENT IN AN ORGANIZED HEALTH CARE EDUCATION/TRAINING PROGRAM

## 2025-09-02 PROCEDURE — G2211 COMPLEX E/M VISIT ADD ON: HCPCS | Mod: ,,, | Performed by: STUDENT IN AN ORGANIZED HEALTH CARE EDUCATION/TRAINING PROGRAM

## 2025-09-02 RX ORDER — SODIUM CHLORIDE 0.9 % (FLUSH) 0.9 %
10 SYRINGE (ML) INJECTION
OUTPATIENT
Start: 2025-09-02

## 2025-09-02 RX ORDER — ZOLEDRONIC ACID 5 MG/100ML
5 INJECTION, SOLUTION INTRAVENOUS
OUTPATIENT
Start: 2025-09-02 | End: 2025-09-02

## 2025-09-02 RX ORDER — HEPARIN 100 UNIT/ML
500 SYRINGE INTRAVENOUS
OUTPATIENT
Start: 2025-09-02

## (undated) DEVICE — DRESSING MEPORE 2.5 X 3   670800

## (undated) DEVICE — RETRIEVER ENDOPOUCH SPEC BAG

## (undated) DEVICE — GLOVE BIOGELPI GOLD SIZE 7.5

## (undated) DEVICE — STAPLER SKIN NON ROTATE PXW35

## (undated) DEVICE — SCISSORS 5MM APPLIED MEDICAL   CB030

## (undated) DEVICE — DISSECTOR KITTNER 13300

## (undated) DEVICE — SOLUTION IRRI H2O BOTTLE 1000ML

## (undated) DEVICE — TRAY GENERAL LAPAROSCOPY

## (undated) DEVICE — STERISTRIP 1/2 R1547

## (undated) DEVICE — NEEDLE INSUFFLATION 120MM 172015

## (undated) DEVICE — DISSECTOR CURVED WITH MONOPOLAR 5MM

## (undated) DEVICE — SUTURE MONOCRYL 4-0 PS-2 Y496G

## (undated) DEVICE — Device

## (undated) DEVICE — CUTTER LINEAR FLEX ARTICNG 45MM ATS45

## (undated) DEVICE — PAD BOVIE ADULT

## (undated) DEVICE — SUTURE VICRYL #0 27 UR-6 VCP603H

## (undated) DEVICE — TROCAR OPTICAL ZTHREAD 12MMX100MM CTF73

## (undated) DEVICE — RELOAD LINEAR 6R45B

## (undated) DEVICE — TROCAR ENDOPATH XCEL BLADELESS B5LT

## (undated) DEVICE — SLEEVE TROCAR ENDOPATH XCEL

## (undated) DEVICE — ADHESIVE MASTISOL VIAL 0523-48